# Patient Record
Sex: FEMALE | Race: WHITE | Employment: FULL TIME | URBAN - METROPOLITAN AREA
[De-identification: names, ages, dates, MRNs, and addresses within clinical notes are randomized per-mention and may not be internally consistent; named-entity substitution may affect disease eponyms.]

---

## 2020-09-10 PROCEDURE — U0003 INFECTIOUS AGENT DETECTION BY NUCLEIC ACID (DNA OR RNA); SEVERE ACUTE RESPIRATORY SYNDROME CORONAVIRUS 2 (SARS-COV-2) (CORONAVIRUS DISEASE [COVID-19]), AMPLIFIED PROBE TECHNIQUE, MAKING USE OF HIGH THROUGHPUT TECHNOLOGIES AS DESCRIBED BY CMS-2020-01-R: HCPCS | Performed by: FAMILY MEDICINE

## 2020-09-25 LAB
SARS-COV-2 RNA SPEC QL NAA+PROBE: NOT DETECTED
SPECIMEN SOURCE: NORMAL

## 2020-10-15 ENCOUNTER — OFFICE VISIT (OUTPATIENT)
Dept: FAMILY MEDICINE | Facility: CLINIC | Age: 40
End: 2020-10-15
Payer: COMMERCIAL

## 2020-10-15 VITALS
HEART RATE: 84 BPM | HEIGHT: 64 IN | SYSTOLIC BLOOD PRESSURE: 140 MMHG | BODY MASS INDEX: 35.85 KG/M2 | DIASTOLIC BLOOD PRESSURE: 80 MMHG | WEIGHT: 210 LBS | RESPIRATION RATE: 18 BRPM | TEMPERATURE: 97.8 F

## 2020-10-15 DIAGNOSIS — J45.20 MILD INTERMITTENT ASTHMA WITHOUT COMPLICATION: ICD-10-CM

## 2020-10-15 DIAGNOSIS — F90.2 ATTENTION DEFICIT HYPERACTIVITY DISORDER (ADHD), COMBINED TYPE: ICD-10-CM

## 2020-10-15 DIAGNOSIS — Z23 NEED FOR PROPHYLACTIC VACCINATION AND INOCULATION AGAINST INFLUENZA: ICD-10-CM

## 2020-10-15 DIAGNOSIS — Z00.00 ROUTINE GENERAL MEDICAL EXAMINATION AT A HEALTH CARE FACILITY: Primary | ICD-10-CM

## 2020-10-15 DIAGNOSIS — F41.1 GAD (GENERALIZED ANXIETY DISORDER): ICD-10-CM

## 2020-10-15 DIAGNOSIS — R00.2 PALPITATIONS: ICD-10-CM

## 2020-10-15 DIAGNOSIS — N89.8 VAGINAL DISCHARGE: ICD-10-CM

## 2020-10-15 DIAGNOSIS — I10 BENIGN ESSENTIAL HYPERTENSION: ICD-10-CM

## 2020-10-15 DIAGNOSIS — G43.009 MIGRAINE WITHOUT AURA AND WITHOUT STATUS MIGRAINOSUS, NOT INTRACTABLE: ICD-10-CM

## 2020-10-15 DIAGNOSIS — R63.5 ABNORMAL WEIGHT GAIN: ICD-10-CM

## 2020-10-15 DIAGNOSIS — Z30.41 ENCOUNTER FOR SURVEILLANCE OF CONTRACEPTIVE PILLS: ICD-10-CM

## 2020-10-15 LAB
ALBUMIN SERPL-MCNC: 3.4 G/DL (ref 3.4–5)
ALP SERPL-CCNC: 65 U/L (ref 40–150)
ALT SERPL W P-5'-P-CCNC: 20 U/L (ref 0–50)
ANION GAP SERPL CALCULATED.3IONS-SCNC: 2 MMOL/L (ref 3–14)
AST SERPL W P-5'-P-CCNC: 16 U/L (ref 0–45)
BILIRUB SERPL-MCNC: 0.3 MG/DL (ref 0.2–1.3)
BUN SERPL-MCNC: 10 MG/DL (ref 7–30)
CALCIUM SERPL-MCNC: 8.7 MG/DL (ref 8.5–10.1)
CHLORIDE SERPL-SCNC: 104 MMOL/L (ref 94–109)
CHOLEST SERPL-MCNC: 175 MG/DL
CO2 SERPL-SCNC: 28 MMOL/L (ref 20–32)
CREAT SERPL-MCNC: 0.64 MG/DL (ref 0.52–1.04)
ERYTHROCYTE [DISTWIDTH] IN BLOOD BY AUTOMATED COUNT: 13.6 % (ref 10–15)
GFR SERPL CREATININE-BSD FRML MDRD: >90 ML/MIN/{1.73_M2}
GLUCOSE SERPL-MCNC: 90 MG/DL (ref 70–99)
HCT VFR BLD AUTO: 38.1 % (ref 35–47)
HDLC SERPL-MCNC: 46 MG/DL
HGB BLD-MCNC: 12.2 G/DL (ref 11.7–15.7)
LDLC SERPL CALC-MCNC: 71 MG/DL
MCH RBC QN AUTO: 28.6 PG (ref 26.5–33)
MCHC RBC AUTO-ENTMCNC: 32 G/DL (ref 31.5–36.5)
MCV RBC AUTO: 89 FL (ref 78–100)
NONHDLC SERPL-MCNC: 129 MG/DL
PLATELET # BLD AUTO: 285 10E9/L (ref 150–450)
POTASSIUM SERPL-SCNC: 3.9 MMOL/L (ref 3.4–5.3)
PROT SERPL-MCNC: 7.7 G/DL (ref 6.8–8.8)
RBC # BLD AUTO: 4.26 10E12/L (ref 3.8–5.2)
SODIUM SERPL-SCNC: 134 MMOL/L (ref 133–144)
SPECIMEN SOURCE: NORMAL
T4 FREE SERPL-MCNC: 0.8 NG/DL (ref 0.76–1.46)
TRIGL SERPL-MCNC: 289 MG/DL
TSH SERPL DL<=0.005 MIU/L-ACNC: 4.93 MU/L (ref 0.4–4)
WBC # BLD AUTO: 11.5 10E9/L (ref 4–11)
WET PREP SPEC: NORMAL

## 2020-10-15 PROCEDURE — 90471 IMMUNIZATION ADMIN: CPT | Performed by: NURSE PRACTITIONER

## 2020-10-15 PROCEDURE — 90686 IIV4 VACC NO PRSV 0.5 ML IM: CPT | Performed by: NURSE PRACTITIONER

## 2020-10-15 PROCEDURE — 80061 LIPID PANEL: CPT | Performed by: NURSE PRACTITIONER

## 2020-10-15 PROCEDURE — 84439 ASSAY OF FREE THYROXINE: CPT | Performed by: NURSE PRACTITIONER

## 2020-10-15 PROCEDURE — 80053 COMPREHEN METABOLIC PANEL: CPT | Performed by: NURSE PRACTITIONER

## 2020-10-15 PROCEDURE — G0145 SCR C/V CYTO,THINLAYER,RESCR: HCPCS | Performed by: NURSE PRACTITIONER

## 2020-10-15 PROCEDURE — 99214 OFFICE O/P EST MOD 30 MIN: CPT | Mod: 25 | Performed by: NURSE PRACTITIONER

## 2020-10-15 PROCEDURE — 87210 SMEAR WET MOUNT SALINE/INK: CPT | Performed by: NURSE PRACTITIONER

## 2020-10-15 PROCEDURE — 84443 ASSAY THYROID STIM HORMONE: CPT | Performed by: NURSE PRACTITIONER

## 2020-10-15 PROCEDURE — 36415 COLL VENOUS BLD VENIPUNCTURE: CPT | Performed by: NURSE PRACTITIONER

## 2020-10-15 PROCEDURE — 85027 COMPLETE CBC AUTOMATED: CPT | Performed by: NURSE PRACTITIONER

## 2020-10-15 PROCEDURE — 87624 HPV HI-RISK TYP POOLED RSLT: CPT | Performed by: NURSE PRACTITIONER

## 2020-10-15 PROCEDURE — 99396 PREV VISIT EST AGE 40-64: CPT | Mod: 25 | Performed by: NURSE PRACTITIONER

## 2020-10-15 RX ORDER — MONTELUKAST SODIUM 10 MG/1
10 TABLET ORAL AT BEDTIME
Qty: 90 TABLET | Refills: 1 | Status: SHIPPED | OUTPATIENT
Start: 2020-10-15 | End: 2021-03-18

## 2020-10-15 RX ORDER — DEXMETHYLPHENIDATE HYDROCHLORIDE 10 MG/1
10 TABLET ORAL 2 TIMES DAILY
Qty: 60 TABLET | Refills: 0 | Status: SHIPPED | OUTPATIENT
Start: 2020-12-14 | End: 2021-01-19

## 2020-10-15 RX ORDER — METHOCARBAMOL 750 MG/1
750 TABLET, FILM COATED ORAL 4 TIMES DAILY PRN
COMMUNITY
End: 2020-12-11

## 2020-10-15 RX ORDER — PROPRANOLOL HYDROCHLORIDE 80 MG/1
80 TABLET ORAL DAILY
COMMUNITY
End: 2020-10-15

## 2020-10-15 RX ORDER — ALBUTEROL SULFATE 90 UG/1
2 AEROSOL, METERED RESPIRATORY (INHALATION) EVERY 6 HOURS
COMMUNITY
End: 2020-10-15

## 2020-10-15 RX ORDER — DEXMETHYLPHENIDATE HYDROCHLORIDE 10 MG/1
10 TABLET ORAL 2 TIMES DAILY
Qty: 60 TABLET | Refills: 0 | Status: SHIPPED | OUTPATIENT
Start: 2020-11-14 | End: 2020-10-15

## 2020-10-15 RX ORDER — ESCITALOPRAM OXALATE 20 MG/1
20 TABLET ORAL DAILY
Qty: 90 TABLET | Refills: 3 | Status: SHIPPED | OUTPATIENT
Start: 2020-10-15 | End: 2021-09-13

## 2020-10-15 RX ORDER — ESCITALOPRAM OXALATE 20 MG/1
20 TABLET ORAL DAILY
COMMUNITY
End: 2020-10-15

## 2020-10-15 RX ORDER — LISINOPRIL 20 MG/1
20 TABLET ORAL DAILY
Qty: 90 TABLET | Refills: 1 | Status: SHIPPED | OUTPATIENT
Start: 2020-10-15 | End: 2021-04-12

## 2020-10-15 RX ORDER — DEXMETHYLPHENIDATE HYDROCHLORIDE 10 MG/1
10 TABLET ORAL 2 TIMES DAILY
COMMUNITY
End: 2020-10-15

## 2020-10-15 RX ORDER — DEXMETHYLPHENIDATE HYDROCHLORIDE 10 MG/1
10 TABLET ORAL 2 TIMES DAILY
Qty: 60 TABLET | Refills: 0 | Status: SHIPPED | OUTPATIENT
Start: 2020-10-15 | End: 2020-10-15

## 2020-10-15 RX ORDER — ALBUTEROL SULFATE 90 UG/1
2 AEROSOL, METERED RESPIRATORY (INHALATION) EVERY 6 HOURS
Qty: 1 INHALER | Refills: 1 | Status: SHIPPED | OUTPATIENT
Start: 2020-10-15 | End: 2023-12-19

## 2020-10-15 RX ORDER — LORAZEPAM 1 MG/1
1 TABLET ORAL EVERY 6 HOURS PRN
COMMUNITY
End: 2020-12-11

## 2020-10-15 RX ORDER — IPRATROPIUM BROMIDE AND ALBUTEROL SULFATE 2.5; .5 MG/3ML; MG/3ML
1 SOLUTION RESPIRATORY (INHALATION) EVERY 6 HOURS PRN
COMMUNITY
End: 2023-02-21

## 2020-10-15 RX ORDER — PROPRANOLOL HYDROCHLORIDE 80 MG/1
80 TABLET ORAL DAILY
Qty: 90 TABLET | Refills: 3 | Status: SHIPPED | OUTPATIENT
Start: 2020-10-15 | End: 2021-02-10

## 2020-10-15 RX ORDER — ETHYNODIOL DIACETATE AND ETHINYL ESTRADIOL 1 MG-50MCG
1 KIT ORAL DAILY
COMMUNITY
End: 2020-10-15

## 2020-10-15 RX ORDER — ETHYNODIOL DIACETATE AND ETHINYL ESTRADIOL 1 MG-50MCG
1 KIT ORAL DAILY
Qty: 84 TABLET | Refills: 4 | Status: SHIPPED | OUTPATIENT
Start: 2020-10-15 | End: 2021-10-27

## 2020-10-15 RX ORDER — BUSPIRONE HYDROCHLORIDE 5 MG/1
TABLET ORAL
Qty: 106 TABLET | Refills: 0 | Status: SHIPPED | OUTPATIENT
Start: 2020-10-15 | End: 2020-11-09

## 2020-10-15 ASSESSMENT — PATIENT HEALTH QUESTIONNAIRE - PHQ9
SUM OF ALL RESPONSES TO PHQ QUESTIONS 1-9: 12
10. IF YOU CHECKED OFF ANY PROBLEMS, HOW DIFFICULT HAVE THESE PROBLEMS MADE IT FOR YOU TO DO YOUR WORK, TAKE CARE OF THINGS AT HOME, OR GET ALONG WITH OTHER PEOPLE: SOMEWHAT DIFFICULT
SUM OF ALL RESPONSES TO PHQ QUESTIONS 1-9: 12

## 2020-10-15 ASSESSMENT — ENCOUNTER SYMPTOMS
HEARTBURN: 1
SORE THROAT: 0
ABDOMINAL PAIN: 1
MYALGIAS: 1
DYSURIA: 0
PARESTHESIAS: 0
EYE PAIN: 0
FREQUENCY: 0
COUGH: 1
BREAST MASS: 0
DIZZINESS: 0
CHILLS: 0
ARTHRALGIAS: 1
NAUSEA: 0
HEMATOCHEZIA: 0
CONSTIPATION: 0
DIARRHEA: 1
HEADACHES: 0
HEMATURIA: 0
WEAKNESS: 0
PALPITATIONS: 1
SHORTNESS OF BREATH: 1
NERVOUS/ANXIOUS: 1
FEVER: 0
JOINT SWELLING: 0

## 2020-10-15 ASSESSMENT — MIFFLIN-ST. JEOR: SCORE: 1607.55

## 2020-10-15 NOTE — PROGRESS NOTES
SUBJECTIVE:   CC: Anne Ferreira is an 39 year old woman who presents for preventive health visit.       Patient has been advised of split billing requirements and indicates understanding: Yes  Healthy Habits:     Getting at least 3 servings of Calcium per day:  Yes    Bi-annual eye exam:  Yes    Dental care twice a year:  NO    Sleep apnea or symptoms of sleep apnea:  Daytime drowsiness    Diet:  Regular (no restrictions)    Frequency of exercise:  None    Taking medications regularly:  Yes    Medication side effects:  None    PHQ-2 Total Score: 5    Additional concerns today:  Yes          Today's PHQ-2 Score:   PHQ-2 ( 1999 [employer]) 10/15/2020   Q1: Little interest or pleasure in doing things 3   Q2: Feeling down, depressed or hopeless 2   PHQ-2 Score 5   Q1: Little interest or pleasure in doing things Nearly every day   Q2: Feeling down, depressed or hopeless More than half the days   PHQ-2 Score 5       Abuse: Current or Past (Physical, Sexual or Emotional) - No  Do you feel safe in your environment? Yes        Social History     Tobacco Use     Smoking status: Not on file   Substance Use Topics     Alcohol use: Not on file         Alcohol Use 10/15/2020   Prescreen: >3 drinks/day or >7 drinks/week? No       Reviewed orders with patient.  Reviewed health maintenance and updated orders accordingly - Yes  BP Readings from Last 3 Encounters:   10/15/20 (!) 140/80    Wt Readings from Last 3 Encounters:   10/15/20 95.3 kg (210 lb)                  There is no problem list on file for this patient.    History reviewed. No pertinent surgical history.    Social History     Tobacco Use     Smoking status: Current Every Day Smoker     Smokeless tobacco: Never Used   Substance Use Topics     Alcohol use: Yes     Family History   Problem Relation Age of Onset     Hypertension Father      Hypertension Brother      Hypertension Sister      Hypertension Brother          Current Outpatient Medications   Medication Sig Dispense  Refill     albuterol (PROAIR HFA/PROVENTIL HFA/VENTOLIN HFA) 108 (90 Base) MCG/ACT inhaler Inhale 2 puffs into the lungs every 6 hours       escitalopram (LEXAPRO) 20 MG tablet Take 20 mg by mouth daily       ethynodiol-ethinyl estradiol (ZOVIA) 1-50 MG-MCG tablet Take 1 tablet by mouth daily       ipratropium - albuterol 0.5 mg/2.5 mg/3 mL (DUONEB) 0.5-2.5 (3) MG/3ML neb solution Take 1 vial by nebulization every 6 hours as needed for shortness of breath / dyspnea or wheezing       LORazepam (ATIVAN) 1 MG tablet Take 1 mg by mouth every 6 hours as needed for anxiety       methocarbamol (ROBAXIN) 750 MG tablet Take 750 mg by mouth 4 times daily as needed for muscle spasms       omeprazole (PRILOSEC) 20 MG DR capsule Take 20 mg by mouth daily       propranolol (INDERAL) 80 MG tablet Take 80 mg by mouth daily       dexmethylphenidate (FOCALIN) 10 MG tablet Take 10 mg by mouth 2 times daily       No Known Allergies  No lab results found.     Mammogram Screening: Patient under age 50, mutual decision reflected in health maintenance.      Pertinent mammograms are reviewed under the imaging tab.  History of abnormal Pap smear: NO - age 30- 65 PAP every 3 years recommended     Reviewed and updated as needed this visit by clinical staff                 Reviewed and updated as needed this visit by Provider                History reviewed. No pertinent past medical history.   History reviewed. No pertinent surgical history.  OB History   No obstetric history on file.       Review of Systems   Constitutional: Negative for chills and fever.   HENT: Negative for congestion, ear pain, hearing loss and sore throat.    Eyes: Negative for pain and visual disturbance.   Respiratory: Positive for cough and shortness of breath.    Cardiovascular: Positive for chest pain, palpitations and peripheral edema.   Gastrointestinal: Positive for abdominal pain, diarrhea and heartburn. Negative for constipation, hematochezia and nausea.  "  Breasts:  Negative for tenderness, breast mass and discharge.   Genitourinary: Positive for pelvic pain and vaginal discharge. Negative for dysuria, frequency, genital sores, hematuria, urgency and vaginal bleeding.   Musculoskeletal: Positive for arthralgias and myalgias. Negative for joint swelling.   Skin: Negative for rash.   Neurological: Negative for dizziness, weakness, headaches and paresthesias.   Psychiatric/Behavioral: Positive for mood changes. The patient is nervous/anxious.           OBJECTIVE:   BP (!) 140/80 (BP Location: Right arm, Cuff Size: Adult Large)   Pulse 84   Temp 97.8  F (36.6  C) (Tympanic)   Resp 18   Ht 1.626 m (5' 4\")   Wt 95.3 kg (210 lb)   LMP  (LMP Unknown)   BMI 36.05 kg/m    Physical Exam  GENERAL: healthy, alert and no distress  EYES: Eyes grossly normal to inspection, PERRL and conjunctivae and sclerae normal  HENT: ear canals and TM's normal, nose and mouth without ulcers or lesions  NECK: no adenopathy, no asymmetry, masses, or scars and thyroid normal to palpation  RESP: lungs clear to auscultation - no rales, rhonchi or wheezes  BREAST: normal without masses, tenderness or nipple discharge and no palpable axillary masses or adenopathy  CV: regular rate and rhythm, normal S1 S2, no S3 or S4, no murmur, click or rub, no peripheral edema and peripheral pulses strong  ABDOMEN: soft, nontender, no hepatosplenomegaly, no masses and bowel sounds normal   (female): normal female external genitalia, normal urethral meatus, vaginal mucosa pink, moist, well rugated, and normal cervix/adnexa/uterus without masses or discharge  MS: no gross musculoskeletal defects noted, no edema  SKIN: no suspicious lesions or rashes  NEURO: Normal strength and tone, mentation intact and speech normal  PSYCH: mentation appears normal, affect normal/bright    Diagnostic Test Results:  Labs reviewed in Epic  Results for orders placed or performed in visit on 10/15/20   TSH with free T4 reflex   "   Status: Abnormal   Result Value Ref Range    TSH 4.93 (H) 0.40 - 4.00 mU/L   CBC with platelets     Status: Abnormal   Result Value Ref Range    WBC 11.5 (H) 4.0 - 11.0 10e9/L    RBC Count 4.26 3.8 - 5.2 10e12/L    Hemoglobin 12.2 11.7 - 15.7 g/dL    Hematocrit 38.1 35.0 - 47.0 %    MCV 89 78 - 100 fl    MCH 28.6 26.5 - 33.0 pg    MCHC 32.0 31.5 - 36.5 g/dL    RDW 13.6 10.0 - 15.0 %    Platelet Count 285 150 - 450 10e9/L   Comprehensive metabolic panel     Status: Abnormal   Result Value Ref Range    Sodium 134 133 - 144 mmol/L    Potassium 3.9 3.4 - 5.3 mmol/L    Chloride 104 94 - 109 mmol/L    Carbon Dioxide 28 20 - 32 mmol/L    Anion Gap 2 (L) 3 - 14 mmol/L    Glucose 90 70 - 99 mg/dL    Urea Nitrogen 10 7 - 30 mg/dL    Creatinine 0.64 0.52 - 1.04 mg/dL    GFR Estimate >90 >60 mL/min/[1.73_m2]    GFR Estimate If Black >90 >60 mL/min/[1.73_m2]    Calcium 8.7 8.5 - 10.1 mg/dL    Bilirubin Total 0.3 0.2 - 1.3 mg/dL    Albumin 3.4 3.4 - 5.0 g/dL    Protein Total 7.7 6.8 - 8.8 g/dL    Alkaline Phosphatase 65 40 - 150 U/L    ALT 20 0 - 50 U/L    AST 16 0 - 45 U/L   Lipid panel reflex to direct LDL Fasting     Status: Abnormal   Result Value Ref Range    Cholesterol 175 <200 mg/dL    Triglycerides 289 (H) <150 mg/dL    HDL Cholesterol 46 (L) >49 mg/dL    LDL Cholesterol Calculated 71 <100 mg/dL    Non HDL Cholesterol 129 <130 mg/dL   T4 free     Status: None   Result Value Ref Range    T4 Free 0.80 0.76 - 1.46 ng/dL   Wet prep     Status: None    Specimen: Vagina   Result Value Ref Range    Specimen Description Vagina     Wet Prep No Trichomonas seen     Wet Prep No clue cells seen     Wet Prep No yeast seen     Wet Prep Moderate  WBC'S seen          ASSESSMENT/PLAN:   (Z00.00) Routine general medical examination at a health care facility  (primary encounter diagnosis)  Comment:   Plan: Pap imaged thin layer screen with HPV -         recommended age 30 - 65 years (select HPV order        below), HPV High Risk Types  DNA Cervical, *MA         Screening Digital Bilateral, CBC with         platelets, Comprehensive metabolic panel, Lipid        panel reflex to direct LDL Fasting, T4 free            (Z23) Need for prophylactic vaccination and inoculation against influenza  Comment:   Plan: INFLUENZA VACCINE IM > 6 MONTHS VALENT IIV4         [54775], Vaccine Administration, Initial         [48187]            (F41.1) MILES (generalized anxiety disorder)  Comment: Patient presently on Lexapro continue to have increased anxiety is not suicidal we will start BuSpar and have follow-up in 1 month for refills  Plan: escitalopram (LEXAPRO) 20 MG tablet, busPIRone         (BUSPAR) 5 MG tablet, OFFICE/OUTPT         VISIT,EST,LEVL IV        (Z30.41) Encounter for surveillance of contraceptive pills  Comment: Refill today  Plan: ethynodiol-ethinyl estradiol (ZOVIA) 1-50         MG-MCG tablet          (I10) Benign essential hypertension  Comment: Controlled no change in treatment plan  Plan: propranolol (INDERAL) 80 MG tablet, lisinopril         (ZESTRIL) 20 MG tablet, OFFICE/OUTPT         VISIT,EST,LEVL IV            (G43.009) Migraine without aura and without status migrainosus, not intractable  Comment: We will refill Inderal  Plan: propranolol (INDERAL) 80 MG tablet,         OFFICE/OUTPT VISIT,EST,LEVL IV      (R63.5) Abnormal weight gain  Comment: We will obtain thyroid thyroid with mildly elevated would not recommend medications at this time but would recommend a recheck in 6 weeks if it remains elevated or increases would start on levothyroxine low-dose  Plan: TSH with free T4 reflex, OFFICE/OUTPT         VISIT,EST,LEVL IV        (R00.2) Palpitations  Comment: Patient concerning for palpitations will obtain cardiac monitor  Plan: TSH with free T4 reflex, Zio Patch Holter Adult        Pediatric Greater than 48 hrs, **TSH with free         T4 reflex FUTURE anytime, OFFICE/OUTPT         VISIT,EST,LEVL IV      (J45.20) Mild intermittent asthma  without complication  Comment: Uncontrolled will start on Singulair  Plan: albuterol (PROAIR HFA/PROVENTIL HFA/VENTOLIN         HFA) 108 (90 Base) MCG/ACT inhaler, montelukast        (SINGULAIR) 10 MG tablet, OFFICE/OUTPT         VISIT,EST,LEVL IV      (N89.8) Vaginal discharge  Comment: Wet prep negative  Plan: Wet prep, OFFICE/OUTPT VISIT,EST,LEVL IV          (F90.2) Attention deficit hyperactivity disorder (ADHD), combined type  Comment:  Controlled no change in treatment plan  Will need a virtual appointment for further refills in 3 months  Plan: dexmethylphenidate (FOCALIN) 10 MG tablet,         OFFICE/OUTPT VISIT,EST,LEVL IV, DISCONTINUED:         dexmethylphenidate (FOCALIN) 10 MG tablet,         DISCONTINUED: dexmethylphenidate (FOCALIN) 10         MG tablet        Patient has been advised of split billing requirements and indicates understanding: Yes  COUNSELING:  Reviewed preventive health counseling, as reflected in patient instructions       Regular exercise       Healthy diet/nutrition       Vision screening       Hearing screening       Contraception       Family planning       Folic Acid Counseling       Osteoporosis Prevention/Bone Health    There is no height or weight on file to calculate BMI.    Weight management plan: Discussed healthy diet and exercise guidelines    She has no history on file for tobacco.      Counseling Resources:  ATP IV Guidelines  Pooled Cohorts Equation Calculator  Breast Cancer Risk Calculator  BRCA-Related Cancer Risk Assessment: FHS-7 Tool  FRAX Risk Assessment  ICSI Preventive Guidelines  Dietary Guidelines for Americans, 2010  USDA's MyPlate  ASA Prophylaxis  Lung CA Screening    FRANKLYN Christianson CNP  [facility]

## 2020-10-15 NOTE — LETTER
My Asthma Action Plan    Name: Anne Ferreira   YOB: 1980  Date: 10/15/2020   My doctor: FRANKLYN Christianson CNP   My clinic: [facility]        My Rescue Medicine:   Albuterol inhaler (Proair/Ventolin/Proventil HFA)  2-4 puffs EVERY 4 HOURS as needed. Use a spacer if recommended by your provider.   My Asthma Severity:   Intermittent / Exercise Induced  Know your asthma triggers: Patient is unaware of triggers             GREEN ZONE   Good Control    I feel good    No cough or wheeze    Can work, sleep and play without asthma symptoms       Take your asthma control medicine every day.     1. If exercise triggers your asthma, take your rescue medication    15 minutes before exercise or sports, and    During exercise if you have asthma symptoms  2. Spacer to use with inhaler: If you have a spacer, make sure to use it with your inhaler             YELLOW ZONE Getting Worse  I have ANY of these:    I do not feel good    Cough or wheeze    Chest feels tight    Wake up at night   1. Keep taking your Green Zone medications  2. Start taking your rescue medicine:    every 20 minutes for up to 1 hour. Then every 4 hours for 24-48 hours.  3. If you stay in the Yellow Zone for more than 12-24 hours, contact your doctor.  4. If you do not return to the Green Zone in 12-24 hours or you get worse, start taking your oral steroid medicine if prescribed by your provider.           RED ZONE Medical Alert - Get Help  I have ANY of these:    I feel awful    Medicine is not helping    Breathing getting harder    Trouble walking or talking    Nose opens wide to breathe       1. Take your rescue medicine NOW  2. If your provider has prescribed an oral steroid medicine, start taking it NOW  3. Call your doctor NOW  4. If you are still in the Red Zone after 20 minutes and you have not reached your doctor:    Take your rescue medicine again and    Call 911 or go to the emergency room right away    See  your regular doctor within 2 weeks of an Emergency Room or Urgent Care visit for follow-up treatment.          Annual Reminders:  Meet with Asthma Educator,  Flu Shot in the Fall, consider Pneumonia Vaccination for patients with asthma (aged 19 and older).    Pharmacy: [facility] [id number] [place] [address]    Electronically signed by FRANKLYN Christianson CNP   Date: 10/15/20                    Asthma Triggers  How To Control Things That Make Your Asthma Worse    Triggers are things that make your asthma worse.  Look at the list below to help you find your triggers and   what you can do about them. You can help prevent asthma flare-ups by staying away from your triggers.      Trigger                                                          What you can do   Cigarette Smoke  Tobacco smoke can make asthma worse. Do not allow smoking in your home, car or around you.  Be sure no one smokes at a child s day care or school.  If you smoke, ask your health care provider for ways to help you quit.  Ask family members to quit too.  Ask your health care provider for a referral to Quit Plan to help you quit smoking, or call 9-241-706-PLAN.     Colds, Flu, Bronchitis  These are common triggers of asthma. Wash your hands often.  Don t touch your eyes, nose or mouth.  Get a flu shot every year.     Dust Mites  These are tiny bugs that live in cloth or carpet. They are too small to see. Wash sheets and blankets in hot water every week.   Encase pillows and mattress in dust mite proof covers.  Avoid having carpet if you can. If you have carpet, vacuum weekly.   Use a dust mask and HEPA vacuum.   Pollen and Outdoor Mold  Some people are allergic to trees, grass, or weed pollen, or molds. Try to keep your windows closed.  Limit time out doors when pollen count is high.   Ask you health care provider about taking medicine during allergy season.     Animal Dander  Some people are allergic to skin flakes, urine or  saliva from pets with fur or feathers. Keep pets with fur or feathers out of your home.    If you can t keep the pet outdoors, then keep the pet out of your bedroom.  Keep the bedroom door closed.  Keep pets off cloth furniture and away from stuffed toys.     Mice, Rats, and Cockroaches  Some people are allergic to the waste from these pests.   Cover food and garbage.  Clean up spills and food crumbs.  Store grease in the refrigerator.   Keep food out of the bedroom.   Indoor Mold  This can be a trigger if your home has high moisture. Fix leaking faucets, pipes, or other sources of water.   Clean moldy surfaces.  Dehumidify basement if it is damp and smelly.   Smoke, Strong Odors, and Sprays  These can reduce air quality. Stay away from strong odors and sprays, such as perfume, powder, hair spray, paints, smoke incense, paint, cleaning products, candles and new carpet.   Exercise or Sports  Some people with asthma have this trigger. Be active!  Ask your doctor about taking medicine before sports or exercise to prevent symptoms.    Warm up for 5-10 minutes before and after sports or exercise.     Other Triggers of Asthma  Cold air:  Cover your nose and mouth with a scarf.  Sometimes laughing or crying can be a trigger.  Some medicines and food can trigger asthma.

## 2020-10-15 NOTE — PATIENT INSTRUCTIONS
Contact Cardiology procedure at [phone]     [facility] Mammo Schedule  [facility] ~ [phone]  One Day Weekly- Alternating Days    [place] ~ [phone]  Every other Monday or Wednesday   & one Saturday morning a month    [place] ~ [phone]  Every Other Monday Afternoon    [place]   Every Other Monday Morning    [place] ~ [phone]  Every Monday morning  Every Tuesday afternoon  Wed, Thurs, Friday morning & afternoon      Preventive Health Recommendations  Female Ages 40 to 49    Yearly exam:     See your health care provider every year in order to  1. Review health changes.   2. Discuss preventive care.    3. Review your medicines if your doctor prescribed any.      Get a Pap test every three years (unless you have an abnormal result and your provider advises testing more often).      If you get Pap tests with HPV test, you only need to test every 5 years, unless you have an abnormal result. You do not need a Pap test if your uterus was removed (hysterectomy) and you have not had cancer.      You should be tested each year for STDs (sexually transmitted diseases), if you're at risk.     Ask your doctor if you should have a mammogram.      Have a colonoscopy (test for colon cancer) if someone in your family has had colon cancer or polyps before age 50.       Have a cholesterol test every 5 years.       Have a diabetes test (fasting glucose) after age 45. If you are at risk for diabetes, you should have this test every 3 years.    Shots: Get a flu shot each year. Get a tetanus shot every 10 years.     Nutrition:     Eat at least 5 servings of fruits and vegetables each day.    Eat whole-grain bread, whole-wheat pasta and brown rice instead of white grains and rice.    Get adequate Calcium and Vitamin D.      Lifestyle    Exercise at least 150 minutes a week (an average of 30 minutes a day, 5 days a week). This will help you control your weight and prevent disease.    Limit alcohol to one  drink per day.    No smoking.     Wear sunscreen to prevent skin cancer.    See your dentist every six months for an exam and cleaning.

## 2020-10-16 ASSESSMENT — ASTHMA QUESTIONNAIRES: ACT_TOTALSCORE: 16

## 2020-10-16 NOTE — RESULT ENCOUNTER NOTE
Please Notify Anne  of test results your thyroid came back mildly elevated at 4.9 normal is 4.0.  Based on this I would not start medications at this time but would recheck a lab only visit in 6 weeks if it remains elevated we will start medications at that time.    Comprehensive panel remains normal limits indicating good kidney and liver function.    Total cholesterol is within normal limits at 175 relates that below 200 your triglycerides are mildly elevated at 289 relates he goes below 150 continue with low-cholesterol diet and continued exercise and will recheck in 1 year.  Your CBC is within normal limits        Bertha Loya CNP

## 2020-10-19 LAB
COPATH REPORT: NORMAL
PAP: NORMAL

## 2020-10-20 LAB
FINAL DIAGNOSIS: NORMAL
HPV HR 12 DNA CVX QL NAA+PROBE: NEGATIVE
HPV16 DNA SPEC QL NAA+PROBE: NEGATIVE
HPV18 DNA SPEC QL NAA+PROBE: NEGATIVE
SPECIMEN DESCRIPTION: NORMAL
SPECIMEN SOURCE CVX/VAG CYTO: NORMAL

## 2020-11-06 DIAGNOSIS — F41.1 GAD (GENERALIZED ANXIETY DISORDER): ICD-10-CM

## 2020-11-09 RX ORDER — BUSPIRONE HYDROCHLORIDE 5 MG/1
10 TABLET ORAL 2 TIMES DAILY
Qty: 120 TABLET | Refills: 0 | Status: SHIPPED | OUTPATIENT
Start: 2020-11-09 | End: 2020-12-02

## 2020-11-10 ENCOUNTER — APPOINTMENT (OUTPATIENT)
Dept: ULTRASOUND IMAGING | Facility: CLINIC | Age: 40
End: 2020-11-10
Attending: NURSE PRACTITIONER
Payer: COMMERCIAL

## 2020-11-10 ENCOUNTER — HOSPITAL ENCOUNTER (EMERGENCY)
Facility: CLINIC | Age: 40
Discharge: HOME | End: 2020-11-10
Attending: NURSE PRACTITIONER | Admitting: NURSE PRACTITIONER
Payer: COMMERCIAL

## 2020-11-10 VITALS
TEMPERATURE: 98 F | WEIGHT: 200 LBS | HEART RATE: 64 BPM | SYSTOLIC BLOOD PRESSURE: 124 MMHG | DIASTOLIC BLOOD PRESSURE: 69 MMHG | BODY MASS INDEX: 34.33 KG/M2 | OXYGEN SATURATION: 97 % | RESPIRATION RATE: 18 BRPM

## 2020-11-10 DIAGNOSIS — R82.90 ABNORMAL FINDING ON URINALYSIS: ICD-10-CM

## 2020-11-10 DIAGNOSIS — R10.11 ABDOMINAL PAIN, RIGHT UPPER QUADRANT: ICD-10-CM

## 2020-11-10 PROBLEM — F90.2 ATTENTION DEFICIT HYPERACTIVITY DISORDER, COMBINED TYPE, MILD: Status: ACTIVE | Noted: 2018-08-29

## 2020-11-10 PROBLEM — E78.5 DYSLIPIDEMIA: Status: ACTIVE | Noted: 2020-11-10

## 2020-11-10 PROBLEM — M54.6 THORACIC BACK PAIN: Status: ACTIVE | Noted: 2020-11-10

## 2020-11-10 PROBLEM — F41.1 GENERALIZED ANXIETY DISORDER: Status: ACTIVE | Noted: 2018-08-29

## 2020-11-10 PROBLEM — M48.02 CERVICAL SPINAL STENOSIS: Status: ACTIVE | Noted: 2019-07-01

## 2020-11-10 PROBLEM — K21.9 GERD (GASTROESOPHAGEAL REFLUX DISEASE): Status: ACTIVE | Noted: 2020-11-10

## 2020-11-10 PROBLEM — N94.6 DYSMENORRHEA: Status: ACTIVE | Noted: 2020-11-10

## 2020-11-10 LAB
ALBUMIN SERPL-MCNC: 3.2 G/DL (ref 3.4–5)
ALBUMIN UR-MCNC: NEGATIVE MG/DL
ALP SERPL-CCNC: 72 U/L (ref 40–150)
ALT SERPL W P-5'-P-CCNC: 16 U/L (ref 0–50)
ANION GAP SERPL CALCULATED.3IONS-SCNC: 3 MMOL/L (ref 3–14)
APPEARANCE UR: ABNORMAL
AST SERPL W P-5'-P-CCNC: 14 U/L (ref 0–45)
BACTERIA #/AREA URNS HPF: ABNORMAL /HPF
BASOPHILS # BLD AUTO: 0 10E9/L (ref 0–0.2)
BASOPHILS NFR BLD AUTO: 0.4 %
BILIRUB SERPL-MCNC: 0.2 MG/DL (ref 0.2–1.3)
BILIRUB UR QL STRIP: NEGATIVE
BUN SERPL-MCNC: 9 MG/DL (ref 7–30)
CALCIUM SERPL-MCNC: 9.2 MG/DL (ref 8.5–10.1)
CHLORIDE SERPL-SCNC: 109 MMOL/L (ref 94–109)
CO2 SERPL-SCNC: 27 MMOL/L (ref 20–32)
COLOR UR AUTO: YELLOW
CREAT SERPL-MCNC: 0.66 MG/DL (ref 0.52–1.04)
DIFFERENTIAL METHOD BLD: NORMAL
EOSINOPHIL # BLD AUTO: 0.1 10E9/L (ref 0–0.7)
EOSINOPHIL NFR BLD AUTO: 1.2 %
ERYTHROCYTE [DISTWIDTH] IN BLOOD BY AUTOMATED COUNT: 13.2 % (ref 10–15)
GFR SERPL CREATININE-BSD FRML MDRD: >90 ML/MIN/{1.73_M2}
GLUCOSE SERPL-MCNC: 83 MG/DL (ref 70–99)
GLUCOSE UR STRIP-MCNC: NEGATIVE MG/DL
HCT VFR BLD AUTO: 38.5 % (ref 35–47)
HGB BLD-MCNC: 12.4 G/DL (ref 11.7–15.7)
HGB UR QL STRIP: ABNORMAL
HYALINE CASTS #/AREA URNS LPF: 2 /LPF (ref 0–2)
IMM GRANULOCYTES # BLD: 0 10E9/L (ref 0–0.4)
IMM GRANULOCYTES NFR BLD: 0.3 %
KETONES UR STRIP-MCNC: NEGATIVE MG/DL
LEUKOCYTE ESTERASE UR QL STRIP: ABNORMAL
LIPASE SERPL-CCNC: 101 U/L (ref 73–393)
LYMPHOCYTES # BLD AUTO: 2.6 10E9/L (ref 0.8–5.3)
LYMPHOCYTES NFR BLD AUTO: 25.6 %
MCH RBC QN AUTO: 29 PG (ref 26.5–33)
MCHC RBC AUTO-ENTMCNC: 32.2 G/DL (ref 31.5–36.5)
MCV RBC AUTO: 90 FL (ref 78–100)
MONOCYTES # BLD AUTO: 0.6 10E9/L (ref 0–1.3)
MONOCYTES NFR BLD AUTO: 5.8 %
MUCOUS THREADS #/AREA URNS LPF: PRESENT /LPF
NEUTROPHILS # BLD AUTO: 6.7 10E9/L (ref 1.6–8.3)
NEUTROPHILS NFR BLD AUTO: 66.7 %
NITRATE UR QL: NEGATIVE
NRBC # BLD AUTO: 0 10*3/UL
NRBC BLD AUTO-RTO: 0 /100
PH UR STRIP: 5 PH (ref 5–7)
PLATELET # BLD AUTO: 282 10E9/L (ref 150–450)
POTASSIUM SERPL-SCNC: 4.4 MMOL/L (ref 3.4–5.3)
PROT SERPL-MCNC: 7 G/DL (ref 6.8–8.8)
RBC # BLD AUTO: 4.28 10E12/L (ref 3.8–5.2)
RBC #/AREA URNS AUTO: 9 /HPF (ref 0–2)
SODIUM SERPL-SCNC: 139 MMOL/L (ref 133–144)
SOURCE: ABNORMAL
SP GR UR STRIP: 1.02 (ref 1–1.03)
SQUAMOUS #/AREA URNS AUTO: 9 /HPF (ref 0–1)
UROBILINOGEN UR STRIP-MCNC: 0 MG/DL (ref 0–2)
WBC # BLD AUTO: 10.1 10E9/L (ref 4–11)
WBC #/AREA URNS AUTO: 40 /HPF (ref 0–5)

## 2020-11-10 PROCEDURE — 76705 ECHO EXAM OF ABDOMEN: CPT

## 2020-11-10 PROCEDURE — 80053 COMPREHEN METABOLIC PANEL: CPT | Performed by: NURSE PRACTITIONER

## 2020-11-10 PROCEDURE — U0003 INFECTIOUS AGENT DETECTION BY NUCLEIC ACID (DNA OR RNA); SEVERE ACUTE RESPIRATORY SYNDROME CORONAVIRUS 2 (SARS-COV-2) (CORONAVIRUS DISEASE [COVID-19]), AMPLIFIED PROBE TECHNIQUE, MAKING USE OF HIGH THROUGHPUT TECHNOLOGIES AS DESCRIBED BY CMS-2020-01-R: HCPCS | Performed by: NURSE PRACTITIONER

## 2020-11-10 PROCEDURE — 81001 URINALYSIS AUTO W/SCOPE: CPT | Performed by: FAMILY MEDICINE

## 2020-11-10 PROCEDURE — 85025 COMPLETE CBC W/AUTO DIFF WBC: CPT | Performed by: NURSE PRACTITIONER

## 2020-11-10 PROCEDURE — 83690 ASSAY OF LIPASE: CPT | Performed by: NURSE PRACTITIONER

## 2020-11-10 PROCEDURE — 87086 URINE CULTURE/COLONY COUNT: CPT | Performed by: NURSE PRACTITIONER

## 2020-11-10 PROCEDURE — 99285 EMERGENCY DEPT VISIT HI MDM: CPT | Mod: 25 | Performed by: NURSE PRACTITIONER

## 2020-11-10 PROCEDURE — 96374 THER/PROPH/DIAG INJ IV PUSH: CPT | Performed by: NURSE PRACTITIONER

## 2020-11-10 PROCEDURE — 250N000011 HC RX IP 250 OP 636: Performed by: NURSE PRACTITIONER

## 2020-11-10 PROCEDURE — 99284 EMERGENCY DEPT VISIT MOD MDM: CPT | Performed by: NURSE PRACTITIONER

## 2020-11-10 PROCEDURE — C9803 HOPD COVID-19 SPEC COLLECT: HCPCS | Performed by: NURSE PRACTITIONER

## 2020-11-10 PROCEDURE — 96375 TX/PRO/DX INJ NEW DRUG ADDON: CPT | Performed by: NURSE PRACTITIONER

## 2020-11-10 RX ORDER — CEPHALEXIN 500 MG/1
500 CAPSULE ORAL 2 TIMES DAILY
Qty: 14 CAPSULE | Refills: 0 | Status: SHIPPED | OUTPATIENT
Start: 2020-11-10 | End: 2020-11-17

## 2020-11-10 RX ORDER — HYDROMORPHONE HYDROCHLORIDE 1 MG/ML
0.5 INJECTION, SOLUTION INTRAMUSCULAR; INTRAVENOUS; SUBCUTANEOUS ONCE
Status: COMPLETED | OUTPATIENT
Start: 2020-11-10 | End: 2020-11-10

## 2020-11-10 RX ORDER — HYDROCODONE BITARTRATE AND ACETAMINOPHEN 5; 325 MG/1; MG/1
1 TABLET ORAL EVERY 6 HOURS PRN
Qty: 10 TABLET | Refills: 0 | Status: SHIPPED | OUTPATIENT
Start: 2020-11-10 | End: 2020-11-13

## 2020-11-10 RX ORDER — ONDANSETRON 2 MG/ML
4 INJECTION INTRAMUSCULAR; INTRAVENOUS ONCE
Status: COMPLETED | OUTPATIENT
Start: 2020-11-10 | End: 2020-11-10

## 2020-11-10 RX ORDER — ONDANSETRON 4 MG/1
4 TABLET, ORALLY DISINTEGRATING ORAL EVERY 8 HOURS PRN
Qty: 10 TABLET | Refills: 0 | Status: SHIPPED | OUTPATIENT
Start: 2020-11-10 | End: 2020-11-13

## 2020-11-10 RX ORDER — KETOROLAC TROMETHAMINE 15 MG/ML
15 INJECTION, SOLUTION INTRAMUSCULAR; INTRAVENOUS ONCE
Status: COMPLETED | OUTPATIENT
Start: 2020-11-10 | End: 2020-11-10

## 2020-11-10 RX ADMIN — ONDANSETRON 4 MG: 2 INJECTION INTRAMUSCULAR; INTRAVENOUS at 16:31

## 2020-11-10 RX ADMIN — HYDROMORPHONE HYDROCHLORIDE 0.5 MG: 1 INJECTION, SOLUTION INTRAMUSCULAR; INTRAVENOUS; SUBCUTANEOUS at 17:18

## 2020-11-10 RX ADMIN — KETOROLAC TROMETHAMINE 15 MG: 15 INJECTION, SOLUTION INTRAMUSCULAR; INTRAVENOUS at 16:32

## 2020-11-10 ASSESSMENT — ENCOUNTER SYMPTOMS
DIARRHEA: 1
NEUROLOGICAL NEGATIVE: 1
ABDOMINAL PAIN: 1
VOMITING: 0
FREQUENCY: 0
APPETITE CHANGE: 1
DYSURIA: 0
FEVER: 0
NAUSEA: 1
COUGH: 1
BACK PAIN: 1

## 2020-11-10 NOTE — LETTER
November 10, 2020      To Whom It May Concern:      Anne WOODROW Ferreira was seen in our Emergency Department today, 11/10/20.  No work until she knows the results of Covid test.  Sincerely,        FRANKLYN Antonio CNP

## 2020-11-10 NOTE — ED PROVIDER NOTES
History     Chief Complaint   Patient presents with     Abdominal Pain     RUQ abd pain, known covid exposure. nausea. sx for 3 days.      HPI  Anne Ferreira is a 40 year old female with history of dyslipidemia, GERD, allergic rhinitis, and anxiety who presents to the emergency department for evaluation of abdominal pain. Symptoms started on Saturday, 3 days ago shortly after eating [employer] hamburger. Accompanied by diarrhea. Diarrhea has subsided and reports normal formed stools. Pain is persistent, more focal to the right upper abdominal region and radiates into her back. Feels bloated. Nauseated, but no vomiting. No fevers. Urine has had a foul smell.   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  Allergies:  Allergies   Allergen Reactions     Sulfa Drugs        Problem List:    Patient Active Problem List    Diagnosis Date Noted     Dyslipidemia 11/10/2020     Priority: Medium     Dysmenorrhea 11/10/2020     Priority: Medium     treated with continuous OCPs       GERD (gastroesophageal reflux disease) 11/10/2020     Priority: Medium     Thoracic back pain 11/10/2020     Priority: Medium     left, approx T10       H/O LEEP      Priority: Medium     1998 LEEP- Unknown results.  2010 NIL pap.  2/2/12 COLP and ECC- DARREN 1.  2013 NIL pap  4/7/15 LSIL pap, + HR HPV   8/13/15 COLP- DARREN 1, ECC- Negative.  6/10/16 NIL pap, Neg HPV.  5/14/19 NIL pap, Neg HPV.  Above results found in [facility]  10/15/20 NIL pap, Neg HPV. Plan cotest in 3 years.        Cervical spinal stenosis 07/01/2019     Priority: Medium     7/2019 MRI - At C5-6, broad-based central disc extrusion demonstrating caudal migration mildly deforms the cord and severely narrows the spinal canal. Uncovertebral joint spurring contributes to mild to moderate left neural foraminal narrowing with potential left C6 nerve root encroachment.       Herniation of cervical intervertebral disc with radiculopathy 07/01/2019     Priority: Medium     see MRI  7/2019       Attention deficit hyperactivity disorder, combined type, mild 08/29/2018     Priority: Medium     Generalized anxiety disorder 08/29/2018     Priority: Medium     Allergic rhinitis 04/01/2008     Priority: Medium        Past Medical History:    Past Medical History:   Diagnosis Date     Abnormal Pap smear of cervix        Past Surgical History:    No past surgical history on file.    Family History:    Family History   Problem Relation Age of Onset     Hypertension Father      Hypertension Brother      Hypertension Sister      Hypertension Brother        Social History:  Marital Status:   [4]  Social History     Tobacco Use     Smoking status: Current Every Day Smoker     Smokeless tobacco: Never Used   Substance Use Topics     Alcohol use: Yes     Drug use: Never        Medications:         cephALEXin (KEFLEX) 500 MG capsule       HYDROcodone-acetaminophen (NORCO) 5-325 MG tablet       ondansetron (ZOFRAN ODT) 4 MG ODT tab       albuterol (PROAIR HFA/PROVENTIL HFA/VENTOLIN HFA) 108 (90 Base) MCG/ACT inhaler       busPIRone (BUSPAR) 5 MG tablet       [START ON 12/14/2020] dexmethylphenidate (FOCALIN) 10 MG tablet       escitalopram (LEXAPRO) 20 MG tablet       ethynodiol-ethinyl estradiol (ZOVIA) 1-50 MG-MCG tablet       ipratropium - albuterol 0.5 mg/2.5 mg/3 mL (DUONEB) 0.5-2.5 (3) MG/3ML neb solution       lisinopril (ZESTRIL) 20 MG tablet       LORazepam (ATIVAN) 1 MG tablet       methocarbamol (ROBAXIN) 750 MG tablet       montelukast (SINGULAIR) 10 MG tablet       omeprazole (PRILOSEC) 20 MG DR capsule       propranolol (INDERAL) 80 MG tablet          Review of Systems   Constitutional: Positive for appetite change. Negative for fever.   HENT: Positive for congestion.    Respiratory: Positive for cough.    Gastrointestinal: Positive for abdominal pain, diarrhea and nausea (burping). Negative for vomiting.   Genitourinary: Negative for dysuria, frequency and urgency.   Musculoskeletal:  Positive for back pain.   Neurological: Negative.    All other systems reviewed and are negative.      Physical Exam   BP: (!) 146/85  Pulse: 70  Temp: 98  F (36.7  C)  Resp: 18  Weight: 90.7 kg (200 lb)  SpO2: 98 %      Physical Exam  Constitutional:       General: She is in acute distress.      Appearance: She is well-developed. She is not ill-appearing.   Cardiovascular:      Rate and Rhythm: Normal rate and regular rhythm.   Pulmonary:      Effort: Pulmonary effort is normal.      Breath sounds: Normal breath sounds.   Abdominal:      General: Bowel sounds are normal. There is no distension.      Palpations: Abdomen is soft. There is no hepatomegaly or splenomegaly.      Tenderness: There is generalized abdominal tenderness (more focal to the RUQ) and tenderness in the right upper quadrant.   Neurological:      General: No focal deficit present.      Mental Status: She is alert and oriented to person, place, and time.         ED Course        Procedures               Results for orders placed or performed during the hospital encounter of 11/10/20 (from the past 24 hour(s))   UA reflex to Microscopic   Result Value Ref Range    Color Urine Yellow     Appearance Urine Cloudy     Glucose Urine Negative NEG^Negative mg/dL    Bilirubin Urine Negative NEG^Negative    Ketones Urine Negative NEG^Negative mg/dL    Specific Gravity Urine 1.024 1.003 - 1.035    Blood Urine Small (A) NEG^Negative    pH Urine 5.0 5.0 - 7.0 pH    Protein Albumin Urine Negative NEG^Negative mg/dL    Urobilinogen mg/dL 0.0 0.0 - 2.0 mg/dL    Nitrite Urine Negative NEG^Negative    Leukocyte Esterase Urine Large (A) NEG^Negative    Source Midstream Urine     RBC Urine 9 (H) 0 - 2 /HPF    WBC Urine 40 (H) 0 - 5 /HPF    Bacteria Urine Moderate (A) NEG^Negative /HPF    Squamous Epithelial /HPF Urine 9 (H) 0 - 1 /HPF    Mucous Urine Present (A) NEG^Negative /LPF    Hyaline Casts 2 0 - 2 /LPF   CBC with platelets differential   Result Value Ref Range     WBC 10.1 4.0 - 11.0 10e9/L    RBC Count 4.28 3.8 - 5.2 10e12/L    Hemoglobin 12.4 11.7 - 15.7 g/dL    Hematocrit 38.5 35.0 - 47.0 %    MCV 90 78 - 100 fl    MCH 29.0 26.5 - 33.0 pg    MCHC 32.2 31.5 - 36.5 g/dL    RDW 13.2 10.0 - 15.0 %    Platelet Count 282 150 - 450 10e9/L    Diff Method Automated Method     % Neutrophils 66.7 %    % Lymphocytes 25.6 %    % Monocytes 5.8 %    % Eosinophils 1.2 %    % Basophils 0.4 %    % Immature Granulocytes 0.3 %    Nucleated RBCs 0 0 /100    Absolute Neutrophil 6.7 1.6 - 8.3 10e9/L    Absolute Lymphocytes 2.6 0.8 - 5.3 10e9/L    Absolute Monocytes 0.6 0.0 - 1.3 10e9/L    Absolute Eosinophils 0.1 0.0 - 0.7 10e9/L    Absolute Basophils 0.0 0.0 - 0.2 10e9/L    Abs Immature Granulocytes 0.0 0 - 0.4 10e9/L    Absolute Nucleated RBC 0.0    Lipase   Result Value Ref Range    Lipase 101 73 - 393 U/L   Comprehensive metabolic panel   Result Value Ref Range    Sodium 139 133 - 144 mmol/L    Potassium 4.4 3.4 - 5.3 mmol/L    Chloride 109 94 - 109 mmol/L    Carbon Dioxide 27 20 - 32 mmol/L    Anion Gap 3 3 - 14 mmol/L    Glucose 83 70 - 99 mg/dL    Urea Nitrogen 9 7 - 30 mg/dL    Creatinine 0.66 0.52 - 1.04 mg/dL    GFR Estimate >90 >60 mL/min/[1.73_m2]    GFR Estimate If Black >90 >60 mL/min/[1.73_m2]    Calcium 9.2 8.5 - 10.1 mg/dL    Bilirubin Total 0.2 0.2 - 1.3 mg/dL    Albumin 3.2 (L) 3.4 - 5.0 g/dL    Protein Total 7.0 6.8 - 8.8 g/dL    Alkaline Phosphatase 72 40 - 150 U/L    ALT 16 0 - 50 U/L    AST 14 0 - 45 U/L   US Abdomen Limited (RUQ)    Narrative    US ABDOMEN LIMITED 11/10/2020 5:08 PM    CLINICAL HISTORY: RUQ abdominal pain.    TECHNIQUE: Limited abdominal ultrasound.    COMPARISON: None.    FINDINGS:    GALLBLADDER: The gallbladder is normal. No gallstones, wall  thickening, or pericholecystic fluid. Small amount of sludge is  suspected. Negative sonographic Ortega's sign.    BILE DUCTS: No evidence of common bile duct dilatation. The common  duct measures 5  mm.    LIVER: The liver is enlarged and increased in echogenicity without  focal mass.     RIGHT KIDNEY: Unremarkable.    PANCREAS: The visualized portions of the pancreas are normal.    No ascites.      Impression    IMPRESSION: Hepatomegaly with diffuse fatty infiltration of liver. No  gallstones or bile duct dilatation.    RAMÍREZ MCGHEE MD       Medications   ondansetron (ZOFRAN) injection 4 mg (4 mg Intravenous Given 11/10/20 1631)   ketorolac (TORADOL) injection 15 mg (15 mg Intravenous Given 11/10/20 1632)   HYDROmorphone (PF) (DILAUDID) injection 0.5 mg (0.5 mg Intravenous Given 11/10/20 1718)     5:15 pm-awaiting US results.  At bedside. toradol did not help pain. Order for Dilaudid placed.    Assessments & Plan (with Medical Decision Making)   40 year old female with history of dyslipidemia, GERD, allergic rhinitis, and anxiety who presents to the emergency department for evaluation of abdominal pain. Symptoms started on Saturday, 3 days ago shortly after eating [employer] hamburger. Accompanied by diarrhea. Diarrhea has subsided and reports normal formed stools. Pain is persistent, more focal to the right upper abdominal region and radiates into her back. Feels bloated. Nauseated, but no vomiting. No fevers. Urine has had a foul smell.     On exam patient is alert. Distressed due to pain. Afebrile. Normotensive. No tachycardia. Lung sounds CTA. No hypoxia. Abdominal tenderness generalized throughout with increased focal tenderness over the RUQ. CBC is normal. Electrolytes are normal. Kidney function is normal. LFTs are normal. Lipase is normal. UA reveals small blood, large leukocyte Estrace, >9 RBCs, >40 WBCs, and moderate bacteria.  Right upper quadrant ultrasound obtained and reveals hepatomegaly with diffuse fatty infiltration of the liver.  No gallstones or bile duct dilation.  No evidence of bile duct obstruction, bile duct is not dilated.  There is a small amount of sludge that is suspected in the  gallbladder.  I suspect patient's pain is related to her gallbladder given her history and constellation of symptoms and exam findings.  However, no acute cholecystitis or evidence of infection.  Incidentally her urine is concerning for infection and patient will be started on antibiotics with Keflex pending urine culture.  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  COVID-19 test was obtained and the result is pending.  Patient was given IV normal saline 1 L bolus, IV Zofran, IV Toradol, and IV Dilaudid.  This improved her pain.    Plan as follows:  Keflex 500 mg twice a day for 7 days. (possible UTI)  Zofran 4 mg every 8 hours as needed for nausea.  Ibuprofen 400-600 mg every 6-8 hours as needed for mild/moderate pain. Take with food. Stop if it is causing nausea or abdominal pain.     Tylenol 650 mg every 4 hours as needed for mild/moderate pain.  OR--  Norco 5-325 (hydrocodone-acetaminophen) 1 tablet every 6 hours if needed for moderate/severe pain. Do not use alcohol, operate machinery, drive, or climb on ladders for 8 hours after taking Norco.     Make appointment for recheck with surgery clinic. [phone].  Return for fevers, increased pain, vomiting, or any new symptoms of concern.    I have reviewed the nursing notes.    I have reviewed the findings, diagnosis, plan and need for follow up with the patient.    New Prescriptions    CEPHALEXIN (KEFLEX) 500 MG CAPSULE    Take 1 capsule (500 mg) by mouth 2 times daily for 7 days    HYDROCODONE-ACETAMINOPHEN (NORCO) 5-325 MG TABLET    Take 1 tablet by mouth every 6 hours as needed for moderate to severe pain    ONDANSETRON (ZOFRAN ODT) 4 MG ODT TAB    Take 1 tablet (4 mg) by mouth every 8 hours as needed for nausea       Final diagnoses:   Abdominal pain, right upper quadrant   Abnormal finding on urinalysis - concerning for UTI, Urine culture pending.       11/10/2020   [facility] EMERGENCY DEPT     Norma Pardo  Caro, FRANKLYN CNP  11/10/20 1815

## 2020-11-10 NOTE — DISCHARGE INSTRUCTIONS
Keflex 500 mg twice a day for 7 days. (possible UTI)  Zofran 4 mg every 8 hours as needed for nausea.  Ibuprofen 400-600 mg every 6-8 hours as needed for mild/moderate pain. Take with food. Stop if it is causing nausea or abdominal pain.     Tylenol 650 mg every 4 hours as needed for mild/moderate pain.  OR--  Norco 5-325 (hydrocodone-acetaminophen) 1 tablet every 6 hours if needed for moderate/severe pain. Do not use alcohol, operate machinery, drive, or climb on ladders for 8 hours after taking Norco.     Make appointment for recheck with surgery clinic. [phone].  Return for fevers, increased pain, vomiting, or any new symptoms of concern.

## 2020-11-10 NOTE — ED NOTES
Sx started Saturday, right abdominal pain  Nausea and diarrhea Saturday after eating [employer] 24 hours duration, resolved  Pain still persists

## 2020-11-10 NOTE — ED AVS SNAPSHOT
[facility] Emergency Dept  [address] [address]  Phone: [phone]  Fax: [phone]                                    Anne Ferreira   MRN: [id number]    Department: [facility] Emergency Dept   Date of Visit: 11/10/2020           After Visit Summary Signature Page    I have received my discharge instructions, and my questions have been answered. I have discussed any challenges I see with this plan with the nurse or doctor.    ..........................................................................................................................................  Patient/Patient Representative Signature      ..........................................................................................................................................  Patient Representative Print Name and Relationship to Patient    ..................................................               ................................................  Date                                   Time    ..........................................................................................................................................  Reviewed by Signature/Title    ...................................................              ..............................................  Date                                               Time          22EPIC Rev 08/18

## 2020-11-11 LAB
BACTERIA SPEC CULT: NO GROWTH
Lab: NORMAL
SPECIMEN SOURCE: NORMAL

## 2020-11-12 LAB
SARS-COV-2 RNA SPEC QL NAA+PROBE: NOT DETECTED
SPECIMEN SOURCE: NORMAL

## 2020-11-12 NOTE — RESULT ENCOUNTER NOTE
Emergency Dept/Urgent Care discharge antibiotic (if prescribed): Cephalexin (Keflex) 500 mg capsule, 1 capsule (500 mg) by mouth 2 times daily for 7 days.  Date of Rx (if applicable):  11/10/20  No changes in treatment per Urine culture protocol.

## 2020-11-13 NOTE — RESULT ENCOUNTER NOTE
Left voicemail message requesting a call back to [facility] ED Lab Result RN at [phone].  RN is available every day between 10 a.m. and 6:30 p.m..

## 2020-11-13 NOTE — RESULT ENCOUNTER NOTE
Notified of culture result  Advised to discontinue.  She is f/u with Surgeon on Monday.  Abdominal pain is less but still present on and off

## 2020-12-02 DIAGNOSIS — F41.1 GAD (GENERALIZED ANXIETY DISORDER): ICD-10-CM

## 2020-12-02 RX ORDER — BUSPIRONE HYDROCHLORIDE 5 MG/1
10 TABLET ORAL 2 TIMES DAILY
Qty: 120 TABLET | Refills: 3 | Status: SHIPPED | OUTPATIENT
Start: 2020-12-02 | End: 2020-12-11

## 2020-12-08 ENCOUNTER — HOSPITAL ENCOUNTER (EMERGENCY)
Facility: CLINIC | Age: 40
Discharge: HOME | End: 2020-12-08
Attending: FAMILY MEDICINE | Admitting: FAMILY MEDICINE
Payer: COMMERCIAL

## 2020-12-08 VITALS
TEMPERATURE: 97.2 F | SYSTOLIC BLOOD PRESSURE: 106 MMHG | BODY MASS INDEX: 35.85 KG/M2 | HEART RATE: 67 BPM | RESPIRATION RATE: 16 BRPM | DIASTOLIC BLOOD PRESSURE: 46 MMHG | WEIGHT: 210 LBS | OXYGEN SATURATION: 97 % | HEIGHT: 64 IN

## 2020-12-08 DIAGNOSIS — R10.11 RUQ ABDOMINAL PAIN: ICD-10-CM

## 2020-12-08 DIAGNOSIS — R07.9 RIGHT-SIDED CHEST PAIN: ICD-10-CM

## 2020-12-08 LAB
ALBUMIN SERPL-MCNC: 3.3 G/DL (ref 3.4–5)
ALP SERPL-CCNC: 74 U/L (ref 40–150)
ALT SERPL W P-5'-P-CCNC: 19 U/L (ref 0–50)
ANION GAP SERPL CALCULATED.3IONS-SCNC: 7 MMOL/L (ref 3–14)
AST SERPL W P-5'-P-CCNC: 10 U/L (ref 0–45)
BASOPHILS # BLD AUTO: 0 10E9/L (ref 0–0.2)
BASOPHILS NFR BLD AUTO: 0.3 %
BILIRUB SERPL-MCNC: 0.2 MG/DL (ref 0.2–1.3)
BUN SERPL-MCNC: 8 MG/DL (ref 7–30)
CALCIUM SERPL-MCNC: 9.2 MG/DL (ref 8.5–10.1)
CHLORIDE SERPL-SCNC: 109 MMOL/L (ref 94–109)
CO2 SERPL-SCNC: 24 MMOL/L (ref 20–32)
CREAT SERPL-MCNC: 0.6 MG/DL (ref 0.52–1.04)
DIFFERENTIAL METHOD BLD: ABNORMAL
EOSINOPHIL # BLD AUTO: 0.2 10E9/L (ref 0–0.7)
EOSINOPHIL NFR BLD AUTO: 1.3 %
ERYTHROCYTE [DISTWIDTH] IN BLOOD BY AUTOMATED COUNT: 13.2 % (ref 10–15)
GFR SERPL CREATININE-BSD FRML MDRD: >90 ML/MIN/{1.73_M2}
GLUCOSE SERPL-MCNC: 121 MG/DL (ref 70–99)
HCT VFR BLD AUTO: 40.8 % (ref 35–47)
HGB BLD-MCNC: 13.1 G/DL (ref 11.7–15.7)
IMM GRANULOCYTES # BLD: 0.1 10E9/L (ref 0–0.4)
IMM GRANULOCYTES NFR BLD: 0.6 %
LYMPHOCYTES # BLD AUTO: 3.1 10E9/L (ref 0.8–5.3)
LYMPHOCYTES NFR BLD AUTO: 26.2 %
MCH RBC QN AUTO: 29.2 PG (ref 26.5–33)
MCHC RBC AUTO-ENTMCNC: 32.1 G/DL (ref 31.5–36.5)
MCV RBC AUTO: 91 FL (ref 78–100)
MONOCYTES # BLD AUTO: 0.6 10E9/L (ref 0–1.3)
MONOCYTES NFR BLD AUTO: 4.9 %
NEUTROPHILS # BLD AUTO: 8 10E9/L (ref 1.6–8.3)
NEUTROPHILS NFR BLD AUTO: 66.7 %
NRBC # BLD AUTO: 0 10*3/UL
NRBC BLD AUTO-RTO: 0 /100
PLATELET # BLD AUTO: 313 10E9/L (ref 150–450)
POTASSIUM SERPL-SCNC: 4 MMOL/L (ref 3.4–5.3)
PROT SERPL-MCNC: 7.4 G/DL (ref 6.8–8.8)
RBC # BLD AUTO: 4.48 10E12/L (ref 3.8–5.2)
SODIUM SERPL-SCNC: 140 MMOL/L (ref 133–144)
TROPONIN I SERPL-MCNC: <0.015 UG/L (ref 0–0.04)
WBC # BLD AUTO: 12 10E9/L (ref 4–11)

## 2020-12-08 PROCEDURE — 99284 EMERGENCY DEPT VISIT MOD MDM: CPT | Performed by: FAMILY MEDICINE

## 2020-12-08 PROCEDURE — 85025 COMPLETE CBC W/AUTO DIFF WBC: CPT | Performed by: FAMILY MEDICINE

## 2020-12-08 PROCEDURE — 80053 COMPREHEN METABOLIC PANEL: CPT | Performed by: FAMILY MEDICINE

## 2020-12-08 PROCEDURE — 99284 EMERGENCY DEPT VISIT MOD MDM: CPT | Mod: 25 | Performed by: FAMILY MEDICINE

## 2020-12-08 PROCEDURE — 93005 ELECTROCARDIOGRAM TRACING: CPT | Performed by: FAMILY MEDICINE

## 2020-12-08 PROCEDURE — 93010 ELECTROCARDIOGRAM REPORT: CPT | Performed by: FAMILY MEDICINE

## 2020-12-08 PROCEDURE — 84484 ASSAY OF TROPONIN QUANT: CPT | Performed by: FAMILY MEDICINE

## 2020-12-08 ASSESSMENT — MIFFLIN-ST. JEOR: SCORE: 1602.55

## 2020-12-08 NOTE — ED AVS SNAPSHOT
[facility] Emergency Dept  [address] [address]  Phone: [phone]  Fax: [phone]                                    Anne Ferreira   MRN: [id number]    Department: [facility] Emergency Dept   Date of Visit: 12/8/2020           After Visit Summary Signature Page    I have received my discharge instructions, and my questions have been answered. I have discussed any challenges I see with this plan with the nurse or doctor.    ..........................................................................................................................................  Patient/Patient Representative Signature      ..........................................................................................................................................  Patient Representative Print Name and Relationship to Patient    ..................................................               ................................................  Date                                   Time    ..........................................................................................................................................  Reviewed by Signature/Title    ...................................................              ..............................................  Date                                               Time          22EPIC Rev 08/18

## 2020-12-09 NOTE — ED NOTES
Pt c/o R sided chest, shoulder, and jaw pain starting around 45 minutes ago. Pt has hx of anxiety that produces similar symptoms but pt doesn't believe this to be related to her anxiety.

## 2020-12-09 NOTE — DISCHARGE INSTRUCTIONS
Return to the Emergency Room if the following occurs:     Severely worsened pain, worsened breathing, fever >101, or for any concern at anytime.    Or, follow-up with the following provider as we discussed:     Return to the general surgeon to discuss whether or not your gallbladder could be the cause of your pain.  See contact information provided for scheduling.    Medications discussed:    None new.  No changes.    If you received pain-relieving or sedating medication during your time in the ER, avoid alcohol, driving automobiles, or working with machinery.  Also, a responsible adult must stay with you.        Call the Nurse Advice Line at [phone] or [phone] for any concern at anytime.

## 2020-12-09 NOTE — ED PROVIDER NOTES
"  HPI   The patient is a 40-year-old female presenting with right-sided chest pain.  She has a known history of anxiety and reflux.  She was seen recently for right upper quadrant abdominal pain and was found to have sludge in her gallbladder.  She does not drink alcohol.  She does smoke.  She has a known history of asthma recently finished a prescription of prednisone.  No drugs of abuse.    The patient had some anxiety earlier today.  She took some Ativan at home at about 11.  She says that she was \"doing nothing all day since then.\"  At about 6:00 she had.  At about 6:30 PM she had onset of right upper quadrant and right-sided chest pain.  Her pain was moderate to severe in severity.  It peaked quickly and then has slowly improved since.  She rates her pain as 0-1/10 currently.  She says, \"it is not really a pain it is just dull and uncomfortable.\"   cough she denies having shortness of breath or diaphoresis.  She denies having a new cough or fever.  She denies having new trauma, injury, overuse, or new activities.  She denies leg pain and swelling.  She denies skin rash.  She denies being especially anxious leading into the onset of symptoms.  She does get reflux on a regular basis.  She denies that it was especially worse today.        Allergies:  Allergies   Allergen Reactions     Sulfa Drugs      Problem List:    Patient Active Problem List    Diagnosis Date Noted     Dyslipidemia 11/10/2020     Priority: Medium     Dysmenorrhea 11/10/2020     Priority: Medium     treated with continuous OCPs       GERD (gastroesophageal reflux disease) 11/10/2020     Priority: Medium     Thoracic back pain 11/10/2020     Priority: Medium     left, approx T10       H/O LEEP      Priority: Medium     1998 LEEP- Unknown results.  2010 NIL pap.  2/2/12 COLP and ECC- DARREN 1.  2013 NIL pap  4/7/15 LSIL pap, + HR HPV   8/13/15 COLP- DARREN 1, ECC- Negative.  6/10/16 NIL pap, Neg HPV.  5/14/19 NIL pap, Neg HPV.  Above results found in " [facility]  10/15/20 NIL pap, Neg HPV. Plan cotest in 3 years.        Cervical spinal stenosis 07/01/2019     Priority: Medium     7/2019 MRI - At C5-6, broad-based central disc extrusion demonstrating caudal migration mildly deforms the cord and severely narrows the spinal canal. Uncovertebral joint spurring contributes to mild to moderate left neural foraminal narrowing with potential left C6 nerve root encroachment.       Herniation of cervical intervertebral disc with radiculopathy 07/01/2019     Priority: Medium     see MRI 7/2019       Attention deficit hyperactivity disorder, combined type, mild 08/29/2018     Priority: Medium     Generalized anxiety disorder 08/29/2018     Priority: Medium     Allergic rhinitis 04/01/2008     Priority: Medium      Past Medical History:    Past Medical History:   Diagnosis Date     Abnormal Pap smear of cervix      Past Surgical History:    No past surgical history on file.  Family History:    Family History   Problem Relation Age of Onset     Hypertension Father      Hypertension Brother      Hypertension Sister      Hypertension Brother      Social History:  Marital Status:   [4]  Social History     Tobacco Use     Smoking status: Current Every Day Smoker     Smokeless tobacco: Never Used   Substance Use Topics     Alcohol use: Yes     Drug use: Never      Medications:         albuterol (PROAIR HFA/PROVENTIL HFA/VENTOLIN HFA) 108 (90 Base) MCG/ACT inhaler       busPIRone (BUSPAR) 5 MG tablet       [START ON 12/14/2020] dexmethylphenidate (FOCALIN) 10 MG tablet       escitalopram (LEXAPRO) 20 MG tablet       ethynodiol-ethinyl estradiol (ZOVIA) 1-50 MG-MCG tablet       ipratropium - albuterol 0.5 mg/2.5 mg/3 mL (DUONEB) 0.5-2.5 (3) MG/3ML neb solution       lisinopril (ZESTRIL) 20 MG tablet       LORazepam (ATIVAN) 1 MG tablet       methocarbamol (ROBAXIN) 750 MG tablet       montelukast (SINGULAIR) 10 MG tablet       omeprazole (PRILOSEC) 20 MG DR capsule       propranolol  "(INDERAL) 80 MG tablet      Review of Systems   All other systems reviewed and are negative.      PE   BP: (!) 165/78  Pulse: 77  Temp: 97.2  F (36.2  C)  Resp: 18  Height: 162.6 cm (5' 4\")  Weight: 95.3 kg (210 lb)  SpO2: 96 %  Physical Exam  Vitals signs reviewed.   Constitutional:       General: She is not in acute distress.     Appearance: She is well-developed. She is obese.   HENT:      Head: Normocephalic and atraumatic.      Right Ear: External ear normal.      Left Ear: External ear normal.      Nose: Nose normal.      Mouth/Throat:      Mouth: Mucous membranes are moist.      Pharynx: Oropharynx is clear.   Eyes:      Extraocular Movements: Extraocular movements intact.      Conjunctiva/sclera: Conjunctivae normal.      Pupils: Pupils are equal, round, and reactive to light.   Neck:      Musculoskeletal: Normal range of motion.   Cardiovascular:      Rate and Rhythm: Normal rate and regular rhythm.   Pulmonary:      Effort: Pulmonary effort is normal.   Abdominal:      Comments: Pain with palpation in the right upper quadrant.  No chest wall tenderness.   Musculoskeletal: Normal range of motion.   Skin:     General: Skin is warm and dry.   Neurological:      Mental Status: She is alert and oriented to person, place, and time.   Psychiatric:         Behavior: Behavior normal.         ED COURSE and MDM   2100.  Patient presents with right-sided chest pain primarily.  At the end of my history taking she told me that her right upper quadrant abdomen was also more comfortable but better currently.  She has some mild tenderness there.  Recent history of evaluation for cholecystitis, as above.  EKG is unremarkable and documented below.  Chest x-ray added.    2213.  The patient is without symptoms.  She is without any difficulty with breathing.  She is refusing a chest x-ray.  We did discuss the reasoning behind getting the x-ray which she is refusing regardless.  Her blood work appears unremarkable except for " slight elevation of her white blood cell count.  I do have some concerns that her pain is related to her gallbladder.  No emergent need for hospitalization or consultation.  I will provide follow-up information for the general surgery clinic.  She should return here for worsening.    EKG  (2030)   Interpretation performed by me.  Rate: 71     Rhythm: sinus     Axis: nl  Intervals: OR (12-2) 154, QRS (<12) 90, QTc (>5) 387  P wave: nl     QRS complex: nl  ST segment / T-wave: nl  Conclusion: nl      LABS  Labs Ordered and Resulted from Time of ED Arrival Up to the Time of Departure from the ED   CBC WITH PLATELETS DIFFERENTIAL - Abnormal; Notable for the following components:       Result Value    WBC 12.0 (*)     All other components within normal limits   COMPREHENSIVE METABOLIC PANEL - Abnormal; Notable for the following components:    Glucose 121 (*)     Albumin 3.3 (*)     All other components within normal limits   TROPONIN I       IMAGING  Images reviewed by me.  Radiology report also reviewed.  XR Chest 2 Views    (Results Pending)       Procedures    Medications - No data to display      IMPRESSION       ICD-10-CM    1. Right-sided chest pain  R07.9    2. RUQ abdominal pain  R10.11             Medication List      There are no discharge medications for this visit.                       Sami Fischer MD  12/08/20 5870

## 2020-12-10 ENCOUNTER — APPOINTMENT (OUTPATIENT)
Dept: ULTRASOUND IMAGING | Facility: CLINIC | Age: 40
End: 2020-12-10
Attending: NURSE PRACTITIONER
Payer: COMMERCIAL

## 2020-12-10 ENCOUNTER — HOSPITAL ENCOUNTER (EMERGENCY)
Facility: CLINIC | Age: 40
Discharge: HOME | End: 2020-12-10
Attending: NURSE PRACTITIONER | Admitting: NURSE PRACTITIONER
Payer: COMMERCIAL

## 2020-12-10 ENCOUNTER — APPOINTMENT (OUTPATIENT)
Dept: CT IMAGING | Facility: CLINIC | Age: 40
End: 2020-12-10
Attending: NURSE PRACTITIONER
Payer: COMMERCIAL

## 2020-12-10 DIAGNOSIS — R10.11 RUQ ABDOMINAL PAIN: ICD-10-CM

## 2020-12-10 LAB
ALBUMIN SERPL-MCNC: 3.1 G/DL (ref 3.4–5)
ALBUMIN UR-MCNC: NEGATIVE MG/DL
ALP SERPL-CCNC: 64 U/L (ref 40–150)
ALT SERPL W P-5'-P-CCNC: 18 U/L (ref 0–50)
ANION GAP SERPL CALCULATED.3IONS-SCNC: 6 MMOL/L (ref 3–14)
APPEARANCE UR: CLEAR
AST SERPL W P-5'-P-CCNC: 14 U/L (ref 0–45)
BACTERIA #/AREA URNS HPF: ABNORMAL /HPF
BASOPHILS # BLD AUTO: 0 10E9/L (ref 0–0.2)
BASOPHILS NFR BLD AUTO: 0.3 %
BILIRUB SERPL-MCNC: 0.2 MG/DL (ref 0.2–1.3)
BILIRUB UR QL STRIP: NEGATIVE
BUN SERPL-MCNC: 10 MG/DL (ref 7–30)
CALCIUM SERPL-MCNC: 8.9 MG/DL (ref 8.5–10.1)
CHLORIDE SERPL-SCNC: 106 MMOL/L (ref 94–109)
CO2 SERPL-SCNC: 25 MMOL/L (ref 20–32)
COLOR UR AUTO: ABNORMAL
CREAT SERPL-MCNC: 0.58 MG/DL (ref 0.52–1.04)
DIFFERENTIAL METHOD BLD: NORMAL
EOSINOPHIL # BLD AUTO: 0.2 10E9/L (ref 0–0.7)
EOSINOPHIL NFR BLD AUTO: 1.5 %
ERYTHROCYTE [DISTWIDTH] IN BLOOD BY AUTOMATED COUNT: 13.4 % (ref 10–15)
GFR SERPL CREATININE-BSD FRML MDRD: >90 ML/MIN/{1.73_M2}
GLUCOSE SERPL-MCNC: 120 MG/DL (ref 70–99)
GLUCOSE UR STRIP-MCNC: NEGATIVE MG/DL
HCG UR QL: NEGATIVE
HCT VFR BLD AUTO: 38.4 % (ref 35–47)
HGB BLD-MCNC: 12.3 G/DL (ref 11.7–15.7)
HGB UR QL STRIP: ABNORMAL
IMM GRANULOCYTES # BLD: 0.1 10E9/L (ref 0–0.4)
IMM GRANULOCYTES NFR BLD: 0.5 %
KETONES UR STRIP-MCNC: NEGATIVE MG/DL
LEUKOCYTE ESTERASE UR QL STRIP: NEGATIVE
LIPASE SERPL-CCNC: 107 U/L (ref 73–393)
LYMPHOCYTES # BLD AUTO: 2.2 10E9/L (ref 0.8–5.3)
LYMPHOCYTES NFR BLD AUTO: 22 %
MCH RBC QN AUTO: 28.7 PG (ref 26.5–33)
MCHC RBC AUTO-ENTMCNC: 32 G/DL (ref 31.5–36.5)
MCV RBC AUTO: 90 FL (ref 78–100)
MONOCYTES # BLD AUTO: 0.7 10E9/L (ref 0–1.3)
MONOCYTES NFR BLD AUTO: 6.6 %
NEUTROPHILS # BLD AUTO: 7 10E9/L (ref 1.6–8.3)
NEUTROPHILS NFR BLD AUTO: 69.1 %
NITRATE UR QL: NEGATIVE
NRBC # BLD AUTO: 0 10*3/UL
NRBC BLD AUTO-RTO: 0 /100
PH UR STRIP: 6 PH (ref 5–7)
PLATELET # BLD AUTO: 252 10E9/L (ref 150–450)
POTASSIUM SERPL-SCNC: 3.8 MMOL/L (ref 3.4–5.3)
PROT SERPL-MCNC: 7 G/DL (ref 6.8–8.8)
RBC # BLD AUTO: 4.29 10E12/L (ref 3.8–5.2)
RBC #/AREA URNS AUTO: 0 /HPF (ref 0–2)
SODIUM SERPL-SCNC: 137 MMOL/L (ref 133–144)
SOURCE: ABNORMAL
SP GR UR STRIP: 1 (ref 1–1.03)
SPECIMEN SOURCE: NORMAL
UROBILINOGEN UR STRIP-MCNC: 0 MG/DL (ref 0–2)
WBC # BLD AUTO: 10.2 10E9/L (ref 4–11)
WBC #/AREA URNS AUTO: 1 /HPF (ref 0–5)
WET PREP SPEC: NORMAL

## 2020-12-10 PROCEDURE — 87210 SMEAR WET MOUNT SALINE/INK: CPT | Performed by: NURSE PRACTITIONER

## 2020-12-10 PROCEDURE — 250N000011 HC RX IP 250 OP 636: Performed by: NURSE PRACTITIONER

## 2020-12-10 PROCEDURE — 96375 TX/PRO/DX INJ NEW DRUG ADDON: CPT | Performed by: NURSE PRACTITIONER

## 2020-12-10 PROCEDURE — 250N000013 HC RX MED GY IP 250 OP 250 PS 637: Performed by: NURSE PRACTITIONER

## 2020-12-10 PROCEDURE — 99285 EMERGENCY DEPT VISIT HI MDM: CPT | Mod: 25 | Performed by: NURSE PRACTITIONER

## 2020-12-10 PROCEDURE — 85025 COMPLETE CBC W/AUTO DIFF WBC: CPT | Performed by: NURSE PRACTITIONER

## 2020-12-10 PROCEDURE — 250N000009 HC RX 250: Performed by: NURSE PRACTITIONER

## 2020-12-10 PROCEDURE — 74177 CT ABD & PELVIS W/CONTRAST: CPT

## 2020-12-10 PROCEDURE — 81025 URINE PREGNANCY TEST: CPT | Performed by: NURSE PRACTITIONER

## 2020-12-10 PROCEDURE — 96374 THER/PROPH/DIAG INJ IV PUSH: CPT | Mod: 59 | Performed by: NURSE PRACTITIONER

## 2020-12-10 PROCEDURE — 80053 COMPREHEN METABOLIC PANEL: CPT | Performed by: NURSE PRACTITIONER

## 2020-12-10 PROCEDURE — 99285 EMERGENCY DEPT VISIT HI MDM: CPT | Performed by: NURSE PRACTITIONER

## 2020-12-10 PROCEDURE — 81001 URINALYSIS AUTO W/SCOPE: CPT | Performed by: NURSE PRACTITIONER

## 2020-12-10 PROCEDURE — 76705 ECHO EXAM OF ABDOMEN: CPT

## 2020-12-10 PROCEDURE — 96376 TX/PRO/DX INJ SAME DRUG ADON: CPT | Performed by: NURSE PRACTITIONER

## 2020-12-10 PROCEDURE — 83690 ASSAY OF LIPASE: CPT | Performed by: NURSE PRACTITIONER

## 2020-12-10 RX ORDER — ONDANSETRON 4 MG/1
4 TABLET, ORALLY DISINTEGRATING ORAL EVERY 8 HOURS PRN
Qty: 10 TABLET | Refills: 0 | Status: SHIPPED | OUTPATIENT
Start: 2020-12-10 | End: 2020-12-13

## 2020-12-10 RX ORDER — LORAZEPAM 0.5 MG/1
0.5 TABLET ORAL ONCE
Status: COMPLETED | OUTPATIENT
Start: 2020-12-10 | End: 2020-12-10

## 2020-12-10 RX ORDER — IOPAMIDOL 755 MG/ML
100 INJECTION, SOLUTION INTRAVASCULAR ONCE
Status: COMPLETED | OUTPATIENT
Start: 2020-12-10 | End: 2020-12-10

## 2020-12-10 RX ORDER — HYDROCODONE BITARTRATE AND ACETAMINOPHEN 5; 325 MG/1; MG/1
1 TABLET ORAL EVERY 6 HOURS PRN
Qty: 10 TABLET | Refills: 0 | Status: SHIPPED | OUTPATIENT
Start: 2020-12-10 | End: 2020-12-13

## 2020-12-10 RX ORDER — ONDANSETRON 2 MG/ML
4 INJECTION INTRAMUSCULAR; INTRAVENOUS ONCE
Status: COMPLETED | OUTPATIENT
Start: 2020-12-10 | End: 2020-12-10

## 2020-12-10 RX ORDER — HYDROMORPHONE HYDROCHLORIDE 1 MG/ML
0.5 INJECTION, SOLUTION INTRAMUSCULAR; INTRAVENOUS; SUBCUTANEOUS ONCE
Status: COMPLETED | OUTPATIENT
Start: 2020-12-10 | End: 2020-12-10

## 2020-12-10 RX ORDER — ONDANSETRON 2 MG/ML
4 INJECTION INTRAMUSCULAR; INTRAVENOUS EVERY 30 MIN PRN
Status: DISCONTINUED | OUTPATIENT
Start: 2020-12-10 | End: 2020-12-11 | Stop reason: HOSPADM

## 2020-12-10 RX ADMIN — HYDROMORPHONE HYDROCHLORIDE 0.5 MG: 1 INJECTION, SOLUTION INTRAMUSCULAR; INTRAVENOUS; SUBCUTANEOUS at 15:06

## 2020-12-10 RX ADMIN — IOPAMIDOL 100 ML: 755 INJECTION, SOLUTION INTRAVENOUS at 17:16

## 2020-12-10 RX ADMIN — HYDROMORPHONE HYDROCHLORIDE 0.5 MG: 1 INJECTION, SOLUTION INTRAMUSCULAR; INTRAVENOUS; SUBCUTANEOUS at 16:49

## 2020-12-10 RX ADMIN — ONDANSETRON 4 MG: 2 INJECTION INTRAMUSCULAR; INTRAVENOUS at 18:28

## 2020-12-10 RX ADMIN — SODIUM CHLORIDE 66 ML: 9 INJECTION, SOLUTION INTRAVENOUS at 17:16

## 2020-12-10 RX ADMIN — ONDANSETRON 4 MG: 2 INJECTION INTRAMUSCULAR; INTRAVENOUS at 15:06

## 2020-12-10 RX ADMIN — LORAZEPAM 0.5 MG: 0.5 TABLET ORAL at 18:28

## 2020-12-10 ASSESSMENT — ENCOUNTER SYMPTOMS
BACK PAIN: 1
FLANK PAIN: 1
DYSURIA: 0
APPETITE CHANGE: 1
NEUROLOGICAL NEGATIVE: 1
COUGH: 0
CONSTIPATION: 1
ABDOMINAL PAIN: 1
SHORTNESS OF BREATH: 0
FEVER: 0
DIARRHEA: 1
VOMITING: 0
NAUSEA: 1
BLOOD IN STOOL: 0

## 2020-12-10 ASSESSMENT — MIFFLIN-ST. JEOR: SCORE: 1602.55

## 2020-12-10 NOTE — LETTER
December 10, 2020      To Whom It May Concern:      Anne Ferreira was seen in our Emergency Department 11/27/2020 and 12/10/20. Please excuse her from work.    Sincerely,        FRANKLYN Antonio CNP

## 2020-12-10 NOTE — ED AVS SNAPSHOT
[facility] Emergency Dept  [address] [address]  Phone: [phone]  Fax: [phone]                                    Anne Ferreira   MRN: [id number]    Department: [facility] Emergency Dept   Date of Visit: 12/10/2020           After Visit Summary Signature Page    I have received my discharge instructions, and my questions have been answered. I have discussed any challenges I see with this plan with the nurse or doctor.    ..........................................................................................................................................  Patient/Patient Representative Signature      ..........................................................................................................................................  Patient Representative Print Name and Relationship to Patient    ..................................................               ................................................  Date                                   Time    ..........................................................................................................................................  Reviewed by Signature/Title    ...................................................              ..............................................  Date                                               Time          22EPIC Rev 08/18

## 2020-12-10 NOTE — ED PROVIDER NOTES
History     Chief Complaint   Patient presents with     Abdominal Pain     RUQ  pain. inermittent for weeks, constant pain past 24 hours. has been seen for same sx ? gallbladder     HPI  Anne Ferreira is a 40 year old female with history of MILES, GERD, ADHD, and allergic rhinitis who presents with RUQ abdominal pain. Pain has been intermittent for the last month. Persistent pain for the last 24 hours. Pain focal to the RUQ with radiation down right side and into her back. Worsened by eating. Nauseated but no vomiting. Having constipation and diarrhea. No fevers or chills. Urine smells foul. Vaginal discharge    Evaluated here on 11/10/2020 and had labs and ultrasound. No significant concerning findings, possible sludge in the gallbladder. She was treated for her pain and instructed to follow-up with surgery clinic if pain returns.  Evaluated again here 2 days ago for similar symptoms but pain was also in the right side of chest. Slight elevation of WBC, but no other concerning findings. Pt had declined chest xray. Her symptoms had resolved spontaneously and she was instructed to follow-up with surgery clinic. Covid test done on 11/27/2020 due to exposure and was negative.    Allergies:  Allergies   Allergen Reactions     Sulfa Drugs        Problem List:    Patient Active Problem List    Diagnosis Date Noted     Dyslipidemia 11/10/2020     Priority: Medium     Dysmenorrhea 11/10/2020     Priority: Medium     treated with continuous OCPs       GERD (gastroesophageal reflux disease) 11/10/2020     Priority: Medium     Thoracic back pain 11/10/2020     Priority: Medium     left, approx T10       H/O LEEP      Priority: Medium     1998 LEEP- Unknown results.  2010 NIL pap.  2/2/12 COLP and ECC- DARREN 1.  2013 NIL pap  4/7/15 LSIL pap, + HR HPV   8/13/15 COLP- DARREN 1, ECC- Negative.  6/10/16 NIL pap, Neg HPV.  5/14/19 NIL pap, Neg HPV.  Above results found in [facility]  10/15/20 NIL pap, Neg HPV. Plan cotest in 3 years.         Cervical spinal stenosis 07/01/2019     Priority: Medium     7/2019 MRI - At C5-6, broad-based central disc extrusion demonstrating caudal migration mildly deforms the cord and severely narrows the spinal canal. Uncovertebral joint spurring contributes to mild to moderate left neural foraminal narrowing with potential left C6 nerve root encroachment.       Herniation of cervical intervertebral disc with radiculopathy 07/01/2019     Priority: Medium     see MRI 7/2019       Attention deficit hyperactivity disorder, combined type, mild 08/29/2018     Priority: Medium     Generalized anxiety disorder 08/29/2018     Priority: Medium     Allergic rhinitis 04/01/2008     Priority: Medium        Past Medical History:    Past Medical History:   Diagnosis Date     Abnormal Pap smear of cervix        Past Surgical History:    No past surgical history on file.    Family History:    Family History   Problem Relation Age of Onset     Hypertension Father      Hypertension Brother      Hypertension Sister      Hypertension Brother        Social History:  Marital Status:   [4]  Social History     Tobacco Use     Smoking status: Current Every Day Smoker     Smokeless tobacco: Never Used   Substance Use Topics     Alcohol use: Yes     Drug use: Never        Medications:         HYDROcodone-acetaminophen (NORCO) 5-325 MG tablet       ondansetron (ZOFRAN ODT) 4 MG ODT tab       albuterol (PROAIR HFA/PROVENTIL HFA/VENTOLIN HFA) 108 (90 Base) MCG/ACT inhaler       busPIRone (BUSPAR) 5 MG tablet       [START ON 12/14/2020] dexmethylphenidate (FOCALIN) 10 MG tablet       escitalopram (LEXAPRO) 20 MG tablet       ethynodiol-ethinyl estradiol (ZOVIA) 1-50 MG-MCG tablet       ipratropium - albuterol 0.5 mg/2.5 mg/3 mL (DUONEB) 0.5-2.5 (3) MG/3ML neb solution       lisinopril (ZESTRIL) 20 MG tablet       LORazepam (ATIVAN) 1 MG tablet       methocarbamol (ROBAXIN) 750 MG tablet       montelukast (SINGULAIR) 10 MG tablet        "omeprazole (PRILOSEC) 20 MG DR capsule       propranolol (INDERAL) 80 MG tablet          Review of Systems   Constitutional: Positive for appetite change. Negative for fever.   HENT: Negative for congestion.    Respiratory: Negative for cough and shortness of breath.    Cardiovascular: Negative for chest pain.   Gastrointestinal: Positive for abdominal pain, constipation, diarrhea and nausea. Negative for blood in stool and vomiting.   Genitourinary: Positive for flank pain (right) and vaginal discharge (blood tinged/dark thick). Negative for dysuria and urgency.   Musculoskeletal: Positive for back pain.   Skin: Negative.    Neurological: Negative.    All other systems reviewed and are negative.      Physical Exam   BP: (!) 154/98  Pulse: 76  Temp: 97.6  F (36.4  C)  Resp: 18  Height: 162.6 cm (5' 4\")  Weight: 95.3 kg (210 lb)  SpO2: 99 %      Physical Exam  Vitals signs reviewed.   Constitutional:       General: She is in acute distress.      Appearance: She is well-developed.      Comments: tearful   HENT:      Head: Normocephalic and atraumatic.   Eyes:      General: No scleral icterus.  Cardiovascular:      Rate and Rhythm: Normal rate and regular rhythm.      Heart sounds: No murmur.   Pulmonary:      Effort: Pulmonary effort is normal.      Breath sounds: Normal breath sounds.   Abdominal:      General: Bowel sounds are normal. There is no distension.      Palpations: Abdomen is soft.      Tenderness: There is abdominal tenderness in the right upper quadrant. There is right CVA tenderness.   Skin:     General: Skin is warm and dry.   Neurological:      General: No focal deficit present.      Mental Status: She is alert and oriented to person, place, and time.         ED Course        Procedures             Results for orders placed or performed during the hospital encounter of 12/10/20 (from the past 24 hour(s))   CBC with platelets differential   Result Value Ref Range    WBC 10.2 4.0 - 11.0 10e9/L    RBC " Count 4.29 3.8 - 5.2 10e12/L    Hemoglobin 12.3 11.7 - 15.7 g/dL    Hematocrit 38.4 35.0 - 47.0 %    MCV 90 78 - 100 fl    MCH 28.7 26.5 - 33.0 pg    MCHC 32.0 31.5 - 36.5 g/dL    RDW 13.4 10.0 - 15.0 %    Platelet Count 252 150 - 450 10e9/L    Diff Method Automated Method     % Neutrophils 69.1 %    % Lymphocytes 22.0 %    % Monocytes 6.6 %    % Eosinophils 1.5 %    % Basophils 0.3 %    % Immature Granulocytes 0.5 %    Nucleated RBCs 0 0 /100    Absolute Neutrophil 7.0 1.6 - 8.3 10e9/L    Absolute Lymphocytes 2.2 0.8 - 5.3 10e9/L    Absolute Monocytes 0.7 0.0 - 1.3 10e9/L    Absolute Eosinophils 0.2 0.0 - 0.7 10e9/L    Absolute Basophils 0.0 0.0 - 0.2 10e9/L    Abs Immature Granulocytes 0.1 0 - 0.4 10e9/L    Absolute Nucleated RBC 0.0    Comprehensive metabolic panel   Result Value Ref Range    Sodium 137 133 - 144 mmol/L    Potassium 3.8 3.4 - 5.3 mmol/L    Chloride 106 94 - 109 mmol/L    Carbon Dioxide 25 20 - 32 mmol/L    Anion Gap 6 3 - 14 mmol/L    Glucose 120 (H) 70 - 99 mg/dL    Urea Nitrogen 10 7 - 30 mg/dL    Creatinine 0.58 0.52 - 1.04 mg/dL    GFR Estimate >90 >60 mL/min/[1.73_m2]    GFR Estimate If Black >90 >60 mL/min/[1.73_m2]    Calcium 8.9 8.5 - 10.1 mg/dL    Bilirubin Total 0.2 0.2 - 1.3 mg/dL    Albumin 3.1 (L) 3.4 - 5.0 g/dL    Protein Total 7.0 6.8 - 8.8 g/dL    Alkaline Phosphatase 64 40 - 150 U/L    ALT 18 0 - 50 U/L    AST 14 0 - 45 U/L   Lipase   Result Value Ref Range    Lipase 107 73 - 393 U/L   UA with Microscopic   Result Value Ref Range    Color Urine Straw     Appearance Urine Clear     Glucose Urine Negative NEG^Negative mg/dL    Bilirubin Urine Negative NEG^Negative    Ketones Urine Negative NEG^Negative mg/dL    Specific Gravity Urine 1.005 1.003 - 1.035    Blood Urine Small (A) NEG^Negative    pH Urine 6.0 5.0 - 7.0 pH    Protein Albumin Urine Negative NEG^Negative mg/dL    Urobilinogen mg/dL 0.0 0.0 - 2.0 mg/dL    Nitrite Urine Negative NEG^Negative    Leukocyte Esterase Urine  Negative NEG^Negative    Source Midstream Urine     WBC Urine 1 0 - 5 /HPF    RBC Urine 0 0 - 2 /HPF    Bacteria Urine Few (A) NEG^Negative /HPF   HCG qualitative urine (UPT)   Result Value Ref Range    HCG Qual Urine Negative NEG^Negative   Wet prep    Specimen: Vagina   Result Value Ref Range    Specimen Description Vagina     Wet Prep WBC'S seen  Few       Wet Prep No yeast seen     Wet Prep No Trichomonas seen     Wet Prep No clue cells seen    US Abdomen Limited (RUQ)    Narrative    US ABDOMEN LIMITED   12/10/2020 4:34 PM     HISTORY:  Right upper quadrant and right flank pain.    COMPARISON: Abdominal ultrasound on 11/10/2020    FINDINGS:    Gallbladder: Normal with no cholelithiasis, wall thickening or focal  tenderness.      Bile ducts:  CHD is normal diameter.  No intrahepatic biliary  dilatation. The distal portion of the common bile duct is obscured by  overlying bowel gas.    Liver: Demonstrates increased parenchymal echogenicity. No focal  hepatic mass visualized.    Pancreas:  Partially obscured by overlying bowel gas,  but grossly  unremarkable.     Right kidney:  No hydronephrosis or shadowing calculi.    Aorta and IVC:  Not specifically assessed.       Impression    IMPRESSION:  Hepatic steatosis. No focal hepatic mass visualized.    GEORGE BAHENA MD   CT Abdomen Pelvis w Contrast    Narrative    EXAM: CT ABDOMEN PELVIS W CONTRAST  LOCATION: [facility]  DATE/TIME: 12/10/2020 5:13 PM    INDICATION:  right side abdominal pain. back pain.;  COMPARISON: None.  TECHNIQUE: CT scan of the abdomen and pelvis was performed following injection of IV contrast. Multiplanar reformats were obtained. Dose reduction techniques were used.  CONTRAST: 100 mL Isovue 370    FINDINGS:   LOWER CHEST: Normal.    HEPATOBILIARY: Mild hepatomegaly. No suspicious liver lesion. No calcified gallstones..    PANCREAS: Normal.    SPLEEN: Normal.    ADRENAL GLANDS: Normal.    KIDNEYS/BLADDER: No renal mass or  hydronephrosis. There are couple of 2-3 mm stones in the right kidney. Urinary bladder within normal limits.    BOWEL: Normal. Normal appendix.    LYMPH NODES: Normal.    VASCULATURE: Negative    PELVIC ORGANS: 2 cm uterine fundal fibroid suggested.    MUSCULOSKELETAL: Normal.      Impression    IMPRESSION:   1.  No acute abnormality in the abdomen or pelvis.   2.  Nonobstructing right renal stones.   3.  Mild hepatomegaly.       Medications   ondansetron (ZOFRAN) injection 4 mg (4 mg Intravenous Given 12/10/20 1506)   HYDROmorphone (PF) (DILAUDID) injection 0.5 mg (0.5 mg Intravenous Given 12/10/20 1506)   HYDROmorphone (PF) (DILAUDID) injection 0.5 mg (0.5 mg Intravenous Given 12/10/20 1649)   iopamidol (ISOVUE-370) solution 100 mL (100 mLs Intravenous Given 12/10/20 1716)   sodium chloride 0.9 % bag 500mL for CT scan flush use (66 mLs As instructed Given 12/10/20 1716)   LORazepam (ATIVAN) tablet 0.5 mg (0.5 mg Oral Given 12/10/20 1828)   ondansetron (ZOFRAN) injection 4 mg (4 mg Intravenous Given 12/10/20 1828)       Assessments & Plan (with Medical Decision Making)   40 year old female with history of MILES, GERD, ADHD, and allergic rhinitis who presents with RUQ abdominal pain. Pain has been intermittent for the last month. Persistent pain for the last 24 hours. Pain focal to the RUQ with radiation down right side and into her back. Worsened by eating. Nauseated but no vomiting. Having constipation and diarrhea. No fevers or chills. Urine smells foul. Vaginal discharge    Evaluated here on 11/10/2020 and had labs and ultrasound. No significant concerning findings, possible sludge in the gallbladder. She was treated for her pain and instructed to follow-up with surgery clinic if pain returns.  Evaluated again here 2 days ago for similar symptoms but pain was also in the right side of chest. Slight elevation of WBC, but no other concerning findings. Pt had declined chest xray. Her symptoms had resolved  spontaneously and she was instructed to follow-up with surgery clinic. Covid test done on 11/27/2020 due to exposure and was negative.    On exam patient appears distressed and is tearful.  Afebrile.  Normotensive.  No tachycardia.  Lungs are CTA.  No hypoxia.  Tenderness with palpation to the right upper quadrant and right flank region.  CBC is normal.  Electrolytes are normal.  Kidney function labs are normal.  LFTs are normal.  UA is not concerning for infection.  Given her incidental complaint of vaginal discharge a wet prep was obtained and is negative for bacterial vaginosis or other abnormality.Repeat US obtained today and is negative for evidence of cholecystitis.  I reviewed the lab and imaging findings with patient.  She continues to be tearful and in pain.  Appears to be frustrated with not being able to figure out what is causing her pain.  At this point we discussed proceeding to CT for further work-up.  Abdominal/pelvis CT obtained and reveals no acute abnormality.  There are nonobstructing right renal stones.  Mild hepatomegaly.  No evidence of ureteral stone.  Appendix is normal.  No hydronephrosis to indicate a recently passed kidney stone.  I discussed the CT results with patient.  Patient was given IV normal saline bolus, IV Zofran, and IV Dilaudid.  Patient reports feeling very anxious and tells me she has a lot of stress in her life right now.  Patient asked if she could have a dose of lorazepam which I did agree to and she was given Ativan 0.5 mg p.o.  Patient was discharged home and instructed to follow-up with surgery clinic if she has persistent right upper quadrant pain.      Plan:    Call tomorrow morning to make appointment with Surgery Clinic. [phone].  Drink plenty of fluids.  Follow a bland diet (avoid fatty foods, dairy, or alcohol).  Zofran 4 mg every 8  hours as needed for nausea.  Norco 1 tablet every 6 hours as needed for pain.      I have reviewed the nursing notes.    I  have reviewed the findings, diagnosis, plan and need for follow up with the patient.      New Prescriptions    HYDROCODONE-ACETAMINOPHEN (NORCO) 5-325 MG TABLET    Take 1 tablet by mouth every 6 hours as needed for moderate to severe pain    ONDANSETRON (ZOFRAN ODT) 4 MG ODT TAB    Take 1 tablet (4 mg) by mouth every 8 hours as needed for nausea       Final diagnoses:   RUQ abdominal pain       12/10/2020   [facility] EMERGENCY DEPT     Char, FRANKLYN Sanchez CNP  12/11/20 0048

## 2020-12-11 ENCOUNTER — OFFICE VISIT (OUTPATIENT)
Dept: FAMILY MEDICINE | Facility: CLINIC | Age: 40
End: 2020-12-11
Payer: COMMERCIAL

## 2020-12-11 ENCOUNTER — TELEPHONE (OUTPATIENT)
Dept: FAMILY MEDICINE | Facility: CLINIC | Age: 40
End: 2020-12-11

## 2020-12-11 VITALS
SYSTOLIC BLOOD PRESSURE: 110 MMHG | BODY MASS INDEX: 35.51 KG/M2 | TEMPERATURE: 97.6 F | WEIGHT: 208 LBS | DIASTOLIC BLOOD PRESSURE: 70 MMHG | HEART RATE: 68 BPM | HEIGHT: 64 IN

## 2020-12-11 VITALS
BODY MASS INDEX: 35.85 KG/M2 | DIASTOLIC BLOOD PRESSURE: 63 MMHG | TEMPERATURE: 97.6 F | HEART RATE: 68 BPM | WEIGHT: 210 LBS | SYSTOLIC BLOOD PRESSURE: 114 MMHG | OXYGEN SATURATION: 96 % | HEIGHT: 64 IN | RESPIRATION RATE: 16 BRPM

## 2020-12-11 DIAGNOSIS — E66.01 MORBID OBESITY (H): ICD-10-CM

## 2020-12-11 DIAGNOSIS — F41.1 GAD (GENERALIZED ANXIETY DISORDER): Primary | ICD-10-CM

## 2020-12-11 DIAGNOSIS — F33.1 MODERATE EPISODE OF RECURRENT MAJOR DEPRESSIVE DISORDER (H): ICD-10-CM

## 2020-12-11 PROCEDURE — 99214 OFFICE O/P EST MOD 30 MIN: CPT | Performed by: NURSE PRACTITIONER

## 2020-12-11 RX ORDER — ESCITALOPRAM OXALATE 10 MG/1
10 TABLET ORAL DAILY
Qty: 90 TABLET | Refills: 0 | Status: SHIPPED | OUTPATIENT
Start: 2020-12-11 | End: 2021-03-05

## 2020-12-11 RX ORDER — LORAZEPAM 1 MG/1
.5-1 TABLET ORAL 2 TIMES DAILY PRN
Qty: 15 TABLET | Refills: 0 | Status: SHIPPED | OUTPATIENT
Start: 2020-12-11 | End: 2021-05-10

## 2020-12-11 ASSESSMENT — MIFFLIN-ST. JEOR: SCORE: 1593.48

## 2020-12-11 NOTE — PROGRESS NOTES
Subjective     Anne Ferreira is a 40 year old female who presents to clinic today for the following health issues:    HPI         ED/UC Followup:    Facility:  [facility] Emergency Department  Date of visit: 12/8/20 & 12/10/20  Reason for visit: abdominal pain  Current Status: Patient states that she still has abdominal pain but it is not as bad as it was yesterday.      Hypertension Follow-up      Do you check your blood pressure regularly outside of the clinic? No     Are you following a low salt diet? No    Are your blood pressures ever more than 140 on the top number (systolic) OR more   than 90 on the bottom number (diastolic), for example 140/90? No    Depression Followup    How are you doing with your depression since your last visit? Worsened     Are you having other symptoms that might be associated with depression? No    Have you had a significant life event?  Relationship Concerns and Job Concerns     Are you feeling anxious or having panic attacks?   Yes:  panic attacks    Do you have any concerns with your use of alcohol or other drugs? No    Social History     Tobacco Use     Smoking status: Current Every Day Smoker     Smokeless tobacco: Never Used   Substance Use Topics     Alcohol use: Yes     Drug use: Never     PHQ 10/15/2020   PHQ-9 Total Score 12   Q9: Thoughts of better off dead/self-harm past 2 weeks Not at all     No flowsheet data found.  Last PHQ-9 10/15/2020   1.  Little interest or pleasure in doing things 2   2.  Feeling down, depressed, or hopeless 2   3.  Trouble falling or staying asleep, or sleeping too much 2   4.  Feeling tired or having little energy 3   5.  Poor appetite or overeating 0   6.  Feeling bad about yourself 1   7.  Trouble concentrating 2   8.  Moving slowly or restless 0   Q9: Thoughts of better off dead/self-harm past 2 weeks 0   PHQ-9 Total Score 12     In the past two weeks have you had thoughts of suicide or self-harm?  No.    Do you have concerns  "about your personal safety or the safety of others?   No    Suicide Assessment Five-step Evaluation and Treatment (SAFE-T)             Review of Systems   Constitutional, HEENT, cardiovascular, pulmonary, GI, , musculoskeletal, neuro, skin, endocrine and psych systems are negative, except as otherwise noted.      Objective    /70 (BP Location: Right arm, Cuff Size: Adult Regular)   Pulse 68   Temp 97.6  F (36.4  C) (Tympanic)   Ht 1.626 m (5' 4\")   Wt 94.3 kg (208 lb)   BMI 35.70 kg/m    Body mass index is 35.7 kg/m .  Physical Exam   GENERAL: healthy, alert and no distress  EYES: Eyes grossly normal to inspection, PERRL and conjunctivae and sclerae normal  HENT: ear canals and TM's normal, nose and mouth without ulcers or lesions  NECK: no adenopathy, no asymmetry, masses, or scars and thyroid normal to palpation  RESP: lungs clear to auscultation - no rales, rhonchi or wheezes  CV: regular rate and rhythm, normal S1 S2, no S3 or S4, no murmur, click or rub, no peripheral edema and peripheral pulses strong  ABDOMEN: soft, nontender, no hepatosplenomegaly, no masses and bowel sounds normal  MS: no gross musculoskeletal defects noted, no edema  SKIN: no suspicious lesions or rashes  NEURO: Normal strength and tone, mentation intact and speech normal  PSYCH: mentation appears normal, affect normal/bright    No results found for any visits on 12/11/20.        Assessment & Plan     MILES (generalized anxiety disorder)  Uncontrolled will increase Lexapro to 30 mg patient has been on various medications in the past if not effective will have her follow-up with psychiatry for medication evaluation  - MENTAL HEALTH REFERRAL  - Adult; Psychiatry; Psychiatry; [facility]: Collaborative Care Psychiatry Service/Bridge to Long-Term Psychiatry as indicated ([phone]); No - Patient must be evaluated prior to placing referral OR document reason for refer...  - escitalopram (LEXAPRO) 10 MG tablet; Take 1 tablet (10 mg) by " mouth daily  - LORazepam (ATIVAN) 1 MG tablet; Take 0.5-1 tablets (0.5-1 mg) by mouth 2 times daily as needed for anxiety Max dose with 1 mg a day.  15 tablets must last a month    Moderate episode of recurrent major depressive disorder (H)  - MENTAL HEALTH REFERRAL  - Adult; Psychiatry; Psychiatry; G: [facility] Psychiatry Service/Bridge to Long-Term Psychiatry as indicated ([phone]); No - Patient must be evaluated prior to placing referral OR document reason for refer...  - escitalopram (LEXAPRO) 10 MG tablet; Take 1 tablet (10 mg) by mouth daily    Morbid obesity (H)  Reviewed diet and exercise       Tobacco Cessation:   reports that she has been smoking. She has never used smokeless tobacco.  Tobacco Cessation Action Plan: Information offered: Patient not interested at this time         See Patient Instructions    No follow-ups on file.    FRANKLYN Christianson [facility]

## 2020-12-11 NOTE — TELEPHONE ENCOUNTER
Pharmacist called tonight questioning the RX for lorazepam as was filled on 11/29/20 for #10/month.  RX for lorazepam written as start date today for #15/month    pharmacist will fill the lorazepam  Ana Yusuf RN

## 2020-12-11 NOTE — DISCHARGE INSTRUCTIONS
Call tomorrow morning to make appointment with [facility]. [phone].  Drink plenty of fluids.  Follow a bland diet (avoid fatty foods, dairy, or alcohol).  Zofran 4 mg every 8  hours as needed for nausea.  Norco 1 tablet every 6 hours as needed for pain.

## 2020-12-11 NOTE — LETTER
[facility]  [address] [place] [id number]  Phone: [phone]  Fax: [phone]    December 11, 2020        Anne Ferreira  [address] [facility] [address]          To whom it may concern:    RE: Anne Ferreira    Patient was seen and treated today at our clinic and missed work. Symptoms started on 12/10/2020.    Please contact me for questions or concerns.      Sincerely,        FRANKLYN Christianson CNP

## 2020-12-11 NOTE — H&P (VIEW-ONLY)
Subjective     Anne Ferreira is a 40 year old female who presents to clinic today for the following health issues:    HPI         ED/UC Followup:    Facility:  [facility] Emergency Department  Date of visit: 12/8/20 & 12/10/20  Reason for visit: abdominal pain  Current Status: Patient states that she still has abdominal pain but it is not as bad as it was yesterday.      Hypertension Follow-up      Do you check your blood pressure regularly outside of the clinic? No     Are you following a low salt diet? No    Are your blood pressures ever more than 140 on the top number (systolic) OR more   than 90 on the bottom number (diastolic), for example 140/90? No    Depression Followup    How are you doing with your depression since your last visit? Worsened     Are you having other symptoms that might be associated with depression? No    Have you had a significant life event?  Relationship Concerns and Job Concerns     Are you feeling anxious or having panic attacks?   Yes:  panic attacks    Do you have any concerns with your use of alcohol or other drugs? No    Social History     Tobacco Use     Smoking status: Current Every Day Smoker     Smokeless tobacco: Never Used   Substance Use Topics     Alcohol use: Yes     Drug use: Never     PHQ 10/15/2020   PHQ-9 Total Score 12   Q9: Thoughts of better off dead/self-harm past 2 weeks Not at all     No flowsheet data found.  Last PHQ-9 10/15/2020   1.  Little interest or pleasure in doing things 2   2.  Feeling down, depressed, or hopeless 2   3.  Trouble falling or staying asleep, or sleeping too much 2   4.  Feeling tired or having little energy 3   5.  Poor appetite or overeating 0   6.  Feeling bad about yourself 1   7.  Trouble concentrating 2   8.  Moving slowly or restless 0   Q9: Thoughts of better off dead/self-harm past 2 weeks 0   PHQ-9 Total Score 12     In the past two weeks have you had thoughts of suicide or self-harm?  No.    Do you have concerns  "about your personal safety or the safety of others?   No    Suicide Assessment Five-step Evaluation and Treatment (SAFE-T)             Review of Systems   Constitutional, HEENT, cardiovascular, pulmonary, GI, , musculoskeletal, neuro, skin, endocrine and psych systems are negative, except as otherwise noted.      Objective    /70 (BP Location: Right arm, Cuff Size: Adult Regular)   Pulse 68   Temp 97.6  F (36.4  C) (Tympanic)   Ht 1.626 m (5' 4\")   Wt 94.3 kg (208 lb)   BMI 35.70 kg/m    Body mass index is 35.7 kg/m .  Physical Exam   GENERAL: healthy, alert and no distress  EYES: Eyes grossly normal to inspection, PERRL and conjunctivae and sclerae normal  HENT: ear canals and TM's normal, nose and mouth without ulcers or lesions  NECK: no adenopathy, no asymmetry, masses, or scars and thyroid normal to palpation  RESP: lungs clear to auscultation - no rales, rhonchi or wheezes  CV: regular rate and rhythm, normal S1 S2, no S3 or S4, no murmur, click or rub, no peripheral edema and peripheral pulses strong  ABDOMEN: soft, nontender, no hepatosplenomegaly, no masses and bowel sounds normal  MS: no gross musculoskeletal defects noted, no edema  SKIN: no suspicious lesions or rashes  NEURO: Normal strength and tone, mentation intact and speech normal  PSYCH: mentation appears normal, affect normal/bright    No results found for any visits on 12/11/20.        Assessment & Plan     MILES (generalized anxiety disorder)  Uncontrolled will increase Lexapro to 30 mg patient has been on various medications in the past if not effective will have her follow-up with psychiatry for medication evaluation  - MENTAL HEALTH REFERRAL  - Adult; Psychiatry; Psychiatry; FMG: Collaborative Care Psychiatry Service/Bridge to Long-Term Psychiatry as indicated ([phone]); No - Patient must be evaluated prior to placing referral OR document reason for refer...  - escitalopram (LEXAPRO) 10 MG tablet; Take 1 tablet (10 mg) by " mouth daily  - LORazepam (ATIVAN) 1 MG tablet; Take 0.5-1 tablets (0.5-1 mg) by mouth 2 times daily as needed for anxiety Max dose with 1 mg a day.  15 tablets must last a month    Moderate episode of recurrent major depressive disorder (H)  - MENTAL HEALTH REFERRAL  - Adult; Psychiatry; Psychiatry; G: [facility] Psychiatry Service/Bridge to Long-Term Psychiatry as indicated ([phone]); No - Patient must be evaluated prior to placing referral OR document reason for refer...  - escitalopram (LEXAPRO) 10 MG tablet; Take 1 tablet (10 mg) by mouth daily    Morbid obesity (H)  Reviewed diet and exercise       Tobacco Cessation:   reports that she has been smoking. She has never used smokeless tobacco.  Tobacco Cessation Action Plan: Information offered: Patient not interested at this time         See Patient Instructions    No follow-ups on file.    FRANKLYN Christianson [facility]

## 2020-12-11 NOTE — PATIENT INSTRUCTIONS
Patient Education     Depression  Depression is one of the most common mental health problems today. It is not just a state of unhappiness or sadness. It is a true disease. The cause seems to be related to a decrease in chemicals that transmit signals in the brain. Having a family history of depression, alcoholism, or suicide increases the risk. Chronic illness, chronic pain, migraine headaches, and high emotional stress also increase the risk.  Depression is something we tend to recognize in others, but may have a hard time seeing in ourselves. It can show in many physical and emotional ways:    Loss of appetite    Overeating    Not being able to sleep    Sleeping too much    Tiredness not related to physical exertion    Restlessness or irritability    Slowness of movement or speech    Feeling depressed or withdrawn    Loss of interest in things you once enjoyed    Trouble concentrating, poor memory, trouble making decisions    Thoughts of harming or killing oneself, or thoughts that life is not worth living    Low self-esteem  The treatment for depression may include both medicine and psychotherapy. Antidepressants can reduce suffering and can improve the ability to function during the depressed period. Therapy can offer emotional support and help you understand emotional factors that may be causing the depression.  Home care    Ongoing care and support help people manage this disease. Find a healthcare provider and therapist who meet your needs. Seek help when you feel like you may be getting ill.    Be kind to yourself. Make it a point to do things that you enjoy (gardening, walking in nature, going to a movie). Reward yourself for small successes.    Take care of your physical body. Eat a balanced diet (low in saturated fat and high in fruits and vegetables). Exercise at least 3 times a week for 30 minutes. Even mild-moderate exercise (like brisk walking) can make you feel better.    Don't drink alcohol,  which can make depression worse.    Take medicine as prescribed.    Tell each of your healthcare providers about all of the prescription and over-the-counter medicines, vitamins, and supplements you take. Certain supplements interact with medicines and can result in dangerous side effects. Ask your pharmacist when you have questions about medicine interactions.    Talk with your family and trusted friends about your feelings and thoughts. Ask them to help you recognize behavior changes early so you can get help and, if needed, medicine can be adjusted.    Follow-up care  Follow up with your healthcare provider, or as advised.  Call 911  Call 911 if you:    Have suicidal thoughts, a suicide plan, and the means to carry out the plan; or serious thoughts of hurting someone else     Have trouble breathing    Are very confused    Feel very drowsy or have trouble awakening    Faint or lose consciousness    Have new chest pain that becomes more severe, lasts longer, or spreads into your shoulder, arm, neck, jaw, or back  When to seek medical advice  Call your healthcare provider right away if any of these happen:    Feeling extreme depression, fear, anxiety, or anger toward yourself or others    Feeling out of control    Feeling that you may try to harm yourself or another    Hearing voices that others do not hear    Seeing things that others do not see    Can t sleep or eat for 3 days in a row    Friends or family express concern over your behavior and ask you to seek help  [employer] last reviewed this educational content on 10/1/2017    4869-5650 [employer]. [address] [place] [address]. All rights reserved. This information is not intended as a substitute for professional medical care. Always follow your healthcare professional's instructions.           Patient Education     Anxiety Reaction  Anxiety is the feeling we all get when we think something bad might happen. It is a normal response to  stress and usually causes only a mild reaction. When anxiety becomes more severe, it can interfere with daily life. In some cases, you may not even be aware of what it is you re anxious about. There may also be a genetic link or it may be a learned behavior in the home.  Both psychological and physical triggers cause stress reaction. It's often a response to fear or emotional stress, real or imagined. This stress may come from home, family, work, or social relationships.  During an anxiety reaction, you may feel:    Helpless    Nervous    Depressed    Irritable  Your body may show signs of anxiety in many ways. You may experience:    Dry mouth    Shakiness    Dizziness    Weakness    Trouble breathing    Breathing fast (hyperventilating)    Chest pressure    Sweating    Headache    Nausea    Diarrhea    Tiredness    Inability to sleep    Sexual problems  Home care    Try to locate the sources of stress in your life. They may not be obvious. These may include:  ? Daily hassles of life (such as traffic jams, missed appointments, or car troubles)  ? Major life changes, both good (new baby or job promotion) and bad (loss of job or loss of loved one)  ? Overload: feeling that you have too many responsibilities and can't take care of all of them at once  ? Feeling helpless or feeling that your problems are beyond what you re able to solve    Notice how your body reacts to stress. Learn to listen to your body signals. This will help you take action before the stress becomes severe.    When you can, do something about the source of your stress. (Avoid hassles, limit the amount of change that happens in your life at one time and take a break when you feel overloaded).    Unfortunately, many stressful situations can't be avoided. It is necessary to learn how to better manage stress. There are many proven methods that will reduce your anxiety. These include simple things like exercise, good nutrition, and adequate rest. Also,  there are certain techniques that are helpful:  ? Relaxation  ? Breathing exercises  ? Visualization  ? Biofeedback  ? Meditation  For more information about this, consult your healthcare provider or go to a local bookstore and review the many books and tapes available on this subject.  Follow-up care  If you feel that your anxiety is not responding to self-help measures, contact your healthcare provider or make an appointment with a counselor. You may need short-term psychological counseling and temporary medicine to help you manage stress.  Call 911  Call 911 if any of these happen:    Trouble breathing    Confusion    Drowsiness or trouble wakening    Fainting or loss of consciousness    Rapid heart rate    Seizure    New chest pain that becomes more severe, lasts longer, or spreads into your shoulder, arm, neck, jaw, or back  When to seek medical advice  Call your healthcare provider right away if any of these happen:    Your symptoms get worse    Severe headache not relieved by rest and mild pain reliever  [employer] last reviewed this educational content on 10/1/2017    7340-0567 [employer] [address] [place] [address], [place] [address]. All rights reserved. This information is not intended as a substitute for professional medical care. Always follow your healthcare professional's instructions.

## 2020-12-14 ENCOUNTER — TELEPHONE (OUTPATIENT)
Dept: FAMILY MEDICINE | Facility: CLINIC | Age: 40
End: 2020-12-14

## 2020-12-14 NOTE — LETTER
[facility]  [address] [place] [id number]  [phone]      December 14, 2020    Anne TROY atz                                                                                                                     [address] [facility] [address]            Dear Anne,    At [facility] we care about your health and well-being. A review of your chart has indicated that you are due for an Asthma Control Test. For copyright reasons we have attached a copy of the questionnaire. Please complete the questions and mail them back to the clinic in the provided envelope.    You may contact the clinic at [phone] if you have any questions or concerns about this request.       Sincerely,     Benjamin [facility] Care Staff/ ss

## 2020-12-14 NOTE — TELEPHONE ENCOUNTER
Panel Management Review      Patient has the following on her problem list:     Asthma review     ACT Total Scores 10/15/2020   ACT TOTAL SCORE (Goal Greater than or Equal to 20) 16   In the past 12 months, how many times did you visit the emergency room for your asthma without being admitted to the hospital? 2   In the past 12 months, how many times were you hospitalized overnight because of your asthma? 0      1. Is Asthma diagnosis on the Problem List? No   2. Is Asthma listed on Health Maintenance? No   3. Patient is due for:  ACT      Composite cancer screening  Chart review shows that this patient is due/due soon for the following None  Summary:    Patient is due/failing the following:   ACT    Action needed:   Patient needs to do ACT.    Type of outreach:    Copy of ACT mailed to patient, will reach out in 5 days.    Questions for provider review:    None                                                                                                                                    Lavonne Richey MA      Chart routed to Care Team .

## 2020-12-15 ENCOUNTER — OFFICE VISIT (OUTPATIENT)
Dept: SURGERY | Facility: CLINIC | Age: 40
End: 2020-12-15
Payer: COMMERCIAL

## 2020-12-15 VITALS
BODY MASS INDEX: 35.49 KG/M2 | WEIGHT: 207.89 LBS | DIASTOLIC BLOOD PRESSURE: 101 MMHG | HEART RATE: 77 BPM | TEMPERATURE: 98.2 F | HEIGHT: 64 IN | SYSTOLIC BLOOD PRESSURE: 138 MMHG

## 2020-12-15 DIAGNOSIS — K80.20 SYMPTOMATIC CHOLELITHIASIS: Primary | ICD-10-CM

## 2020-12-15 DIAGNOSIS — Z11.59 ENCOUNTER FOR SCREENING FOR OTHER VIRAL DISEASES: Primary | ICD-10-CM

## 2020-12-15 PROCEDURE — 99204 OFFICE O/P NEW MOD 45 MIN: CPT | Performed by: SURGERY

## 2020-12-15 ASSESSMENT — MIFFLIN-ST. JEOR: SCORE: 1593

## 2020-12-15 NOTE — PROGRESS NOTES
Surgical Consultation/History and Physical  [facility]    Anne is seen in consultation for abdominal pain    Chief Complaint:  Abdominal pain    History of Present Illness: Anne Ferreira is a 40 year old female presents with right upper quadrant abdominal pain.  Patient notes intermittent, stabbing, dull at times; pain lasts for several hours then resolves, radiates into her back.  Worse with foods, no specific foods.  She has associated nausea.  Denies acholic stools, dark urine, melena, history of PUD.  She admits reflux, she feels this is stable.  She notes no improvement with Prilosec.    Patient Active Problem List   Diagnosis     H/O LEEP     Allergic rhinitis     Attention deficit hyperactivity disorder, combined type, mild     Cervical spinal stenosis     Dyslipidemia     Dysmenorrhea     Generalized anxiety disorder     GERD (gastroesophageal reflux disease)     Herniation of cervical intervertebral disc with radiculopathy     Thoracic back pain     Morbid obesity (H)     Past Medical History:   Diagnosis Date     Abnormal Pap smear of cervix      No past surgical history on file.    Family History   Problem Relation Age of Onset     Hypertension Father      Hypertension Brother      Hypertension Sister      Hypertension Brother      Social History     Tobacco Use     Smoking status: Current Every Day Smoker     Smokeless tobacco: Never Used   Substance Use Topics     Alcohol use: Yes      History   Drug Use Unknown     Current Outpatient Medications   Medication Sig Dispense Refill     albuterol (PROAIR HFA/PROVENTIL HFA/VENTOLIN HFA) 108 (90 Base) MCG/ACT inhaler Inhale 2 puffs into the lungs every 6 hours 1 Inhaler 1     dexmethylphenidate (FOCALIN) 10 MG tablet Take 1 tablet (10 mg) by mouth 2 times daily 60 tablet 0     escitalopram (LEXAPRO) 10 MG tablet Take 1 tablet (10 mg) by mouth daily 90 tablet 0     escitalopram (LEXAPRO) 20 MG tablet Take 1 tablet (20 mg) by mouth daily 90  "tablet 3     ethynodiol-ethinyl estradiol (ZOVIA) 1-50 MG-MCG tablet Take 1 tablet by mouth daily 84 tablet 4     ipratropium - albuterol 0.5 mg/2.5 mg/3 mL (DUONEB) 0.5-2.5 (3) MG/3ML neb solution Take 1 vial by nebulization every 6 hours as needed for shortness of breath / dyspnea or wheezing       lisinopril (ZESTRIL) 20 MG tablet Take 1 tablet (20 mg) by mouth daily 90 tablet 1     LORazepam (ATIVAN) 1 MG tablet Take 0.5-1 tablets (0.5-1 mg) by mouth 2 times daily as needed for anxiety Max dose with 1 mg a day.  15 tablets must last a month 15 tablet 0     montelukast (SINGULAIR) 10 MG tablet Take 1 tablet (10 mg) by mouth At Bedtime 90 tablet 1     omeprazole (PRILOSEC) 20 MG DR capsule Take 20 mg by mouth daily       propranolol (INDERAL) 80 MG tablet Take 1 tablet (80 mg) by mouth daily 90 tablet 3     Allergies   Allergen Reactions     Sulfa Drugs      Review of Systems:   10 point ROS otherwise negative    Physical Exam:  BP (!) 138/101 (BP Location: Right arm, Patient Position: Sitting, Cuff Size: Adult Large)   Pulse 77   Temp 98.2  F (36.8  C) (Tympanic)   Ht 1.626 m (5' 4\")   Wt 94.3 kg (207 lb 14.3 oz)   BMI 35.68 kg/m      Constitutional- No acute distress, well nourished, non-toxic  Eyes: Anicteric, no injection.  PERRL  ENT:  Normocephalic, atraumatic, Nose midline, moist mucus membranes  Neck - supple, no LAD, Thyroid smooth, symmetric, Carotids without bruits  Respiratory- Clear to auscultation bilaterally, good inspiratory effort  Cardiovascular - Heart RRR, no lift's, thrills, murmurs, rubs, or gallop.  No peripheral edema.  No clubbing.  Abdomen - Soft, non-tender, +BS, no hepatosplenomegaly, no palpable masses  Neuro - No focal neuro deficits, Alert and oriented x 3  Psych: Appropriate mood and affect  Musculoskeletal: Normal gait, symmetric strength.  FROM upper and lower extremities.  Skin: Warm, Dry    Lab Results   Component Value Date    WBC 10.2 12/10/2020     Lab Results "   Component Value Date    RBC 4.29 12/10/2020     Lab Results   Component Value Date    HGB 12.3 12/10/2020     Lab Results   Component Value Date    HCT 38.4 12/10/2020     Lab Results   Component Value Date    MCV 90 12/10/2020     Lab Results   Component Value Date    MCH 28.7 12/10/2020     Lab Results   Component Value Date    MCHC 32.0 12/10/2020     Lab Results   Component Value Date    RDW 13.4 12/10/2020     Lab Results   Component Value Date     12/10/2020     Last Comprehensive Metabolic Panel:  Sodium   Date Value Ref Range Status   12/10/2020 137 133 - 144 mmol/L Final     Potassium   Date Value Ref Range Status   12/10/2020 3.8 3.4 - 5.3 mmol/L Final     Chloride   Date Value Ref Range Status   12/10/2020 106 94 - 109 mmol/L Final     Carbon Dioxide   Date Value Ref Range Status   12/10/2020 25 20 - 32 mmol/L Final     Anion Gap   Date Value Ref Range Status   12/10/2020 6 3 - 14 mmol/L Final     Glucose   Date Value Ref Range Status   12/10/2020 120 (H) 70 - 99 mg/dL Final     Urea Nitrogen   Date Value Ref Range Status   12/10/2020 10 7 - 30 mg/dL Final     Creatinine   Date Value Ref Range Status   12/10/2020 0.58 0.52 - 1.04 mg/dL Final     GFR Estimate   Date Value Ref Range Status   12/10/2020 >90 >60 mL/min/[1.73_m2] Final     Comment:     Non  GFR Calc  Starting 12/18/2018, serum creatinine based estimated GFR (eGFR) will be   calculated using the Chronic Kidney Disease Epidemiology Collaboration   (CKD-EPI) equation.       Calcium   Date Value Ref Range Status   12/10/2020 8.9 8.5 - 10.1 mg/dL Final     Bilirubin Total   Date Value Ref Range Status   12/10/2020 0.2 0.2 - 1.3 mg/dL Final     Alkaline Phosphatase   Date Value Ref Range Status   12/10/2020 64 40 - 150 U/L Final     ALT   Date Value Ref Range Status   12/10/2020 18 0 - 50 U/L Final     AST   Date Value Ref Range Status   12/10/2020 14 0 - 45 U/L Final     Lipase   Date Value Ref Range Status   12/10/2020 107  73 - 393 U/L Final     CT Abdomen/Pelvis:  FINDINGS:   LOWER CHEST: Normal.     HEPATOBILIARY: Mild hepatomegaly. No suspicious liver lesion. No calcified gallstones..     PANCREAS: Normal.     SPLEEN: Normal.     ADRENAL GLANDS: Normal.     KIDNEYS/BLADDER: No renal mass or hydronephrosis. There are couple of 2-3 mm stones in the right kidney. Urinary bladder within normal limits.     BOWEL: Normal. Normal appendix.     LYMPH NODES: Normal.     VASCULATURE: Negative     PELVIC ORGANS: 2 cm uterine fundal fibroid suggested.     MUSCULOSKELETAL: Normal.                                                                    IMPRESSION:   1.  No acute abnormality in the abdomen or pelvis.   2.  Nonobstructing right renal stones.   3.  Mild hepatomegaly.    US Abdomen:FINDINGS:    Gallbladder: Normal with no cholelithiasis, wall thickening or focal  tenderness.       Bile ducts:  CHD is normal diameter.  No intrahepatic biliary  dilatation. The distal portion of the common bile duct is obscured by  overlying bowel gas.     Liver: Demonstrates increased parenchymal echogenicity. No focal  hepatic mass visualized.     Pancreas:  Partially obscured by overlying bowel gas,  but grossly  unremarkable.      Right kidney:  No hydronephrosis or shadowing calculi.     Aorta and IVC:  Not specifically assessed.                                                                       IMPRESSION:  Hepatic steatosis. No focal hepatic mass visualized.       Assessment:  1. Symptomatic cholelithiasis      Plan:   Anne Ferreira presents with right upper quadrant abdominal pain. Symptoms are progressively worsening recently with 3 ED visits in the last month, increasing in intensity and in frequency. Examination is generally unremarkable, and liver function tests are normal. Ultrasound demonstrates sludge (11/2020 ultrasound), without biliary duct dilatation, gallbladder wall thickening or pericholecystic fluid.  The patient may benefit from  laparoscopic cholecystectomy, and the indications, risks, benefits and alternatives to surgery were discussed in detail.  She understood the counseling offered and wishes to proceed as planned and outlined. Risks specifically discussed include  bleeding, infection, hernia, need for additional treatment, nontherapeutic intervention, wound complication (such as dehiscence), retained bile duct stone, conversion to open surgery, potential for cholangiography or bile duct exploration, drainage tubes, chronic diarrhea, bile duct injury, bile leak, and rare complications related to surgery and/or anesthesia such as venous thromboembolism and cardiorespiratory complications.    She is able to obtain 4 METS of activity without chest pain or shortness of breath, she can proceed to OR without further evaluation.    We discussed COVID testing prior to operating room.    Manuel Durham, DO on 12/15/2020 at 7:40 AM

## 2020-12-15 NOTE — LETTER
12/15/2020         RE: Anne Ferreira  [address] [address]        Dear Colleague,    Thank you for referring your patient, Anne Ferreira, to the [facility]. Please see a copy of my visit note below.    Surgical Consultation/History and Physical  [facility]    Anne is seen in consultation for abdominal pain    Chief Complaint:  Abdominal pain    History of Present Illness: Anne Ferreira is a 40 year old female presents with right upper quadrant abdominal pain.  Patient notes intermittent, stabbing, dull at times; pain lasts for several hours then resolves, radiates into her back.  Worse with foods, no specific foods.  She has associated nausea.  Denies acholic stools, dark urine, melena, history of PUD.  She admits reflux, she feels this is stable.  She notes no improvement with Prilosec.    Patient Active Problem List   Diagnosis     H/O LEEP     Allergic rhinitis     Attention deficit hyperactivity disorder, combined type, mild     Cervical spinal stenosis     Dyslipidemia     Dysmenorrhea     Generalized anxiety disorder     GERD (gastroesophageal reflux disease)     Herniation of cervical intervertebral disc with radiculopathy     Thoracic back pain     Morbid obesity (H)     Past Medical History:   Diagnosis Date     Abnormal Pap smear of cervix      No past surgical history on file.    Family History   Problem Relation Age of Onset     Hypertension Father      Hypertension Brother      Hypertension Sister      Hypertension Brother      Social History     Tobacco Use     Smoking status: Current Every Day Smoker     Smokeless tobacco: Never Used   Substance Use Topics     Alcohol use: Yes      History   Drug Use Unknown     Current Outpatient Medications   Medication Sig Dispense Refill     albuterol (PROAIR HFA/PROVENTIL HFA/VENTOLIN HFA) 108 (90 Base) MCG/ACT inhaler Inhale 2 puffs into the lungs every 6 hours 1 Inhaler 1     dexmethylphenidate  "(FOCALIN) 10 MG tablet Take 1 tablet (10 mg) by mouth 2 times daily 60 tablet 0     escitalopram (LEXAPRO) 10 MG tablet Take 1 tablet (10 mg) by mouth daily 90 tablet 0     escitalopram (LEXAPRO) 20 MG tablet Take 1 tablet (20 mg) by mouth daily 90 tablet 3     ethynodiol-ethinyl estradiol (ZOVIA) 1-50 MG-MCG tablet Take 1 tablet by mouth daily 84 tablet 4     ipratropium - albuterol 0.5 mg/2.5 mg/3 mL (DUONEB) 0.5-2.5 (3) MG/3ML neb solution Take 1 vial by nebulization every 6 hours as needed for shortness of breath / dyspnea or wheezing       lisinopril (ZESTRIL) 20 MG tablet Take 1 tablet (20 mg) by mouth daily 90 tablet 1     LORazepam (ATIVAN) 1 MG tablet Take 0.5-1 tablets (0.5-1 mg) by mouth 2 times daily as needed for anxiety Max dose with 1 mg a day.  15 tablets must last a month 15 tablet 0     montelukast (SINGULAIR) 10 MG tablet Take 1 tablet (10 mg) by mouth At Bedtime 90 tablet 1     omeprazole (PRILOSEC) 20 MG DR capsule Take 20 mg by mouth daily       propranolol (INDERAL) 80 MG tablet Take 1 tablet (80 mg) by mouth daily 90 tablet 3     Allergies   Allergen Reactions     Sulfa Drugs      Review of Systems:   10 point ROS otherwise negative    Physical Exam:  BP (!) 138/101 (BP Location: Right arm, Patient Position: Sitting, Cuff Size: Adult Large)   Pulse 77   Temp 98.2  F (36.8  C) (Tympanic)   Ht 1.626 m (5' 4\")   Wt 94.3 kg (207 lb 14.3 oz)   BMI 35.68 kg/m      Constitutional- No acute distress, well nourished, non-toxic  Eyes: Anicteric, no injection.  PERRL  ENT:  Normocephalic, atraumatic, Nose midline, moist mucus membranes  Neck - supple, no LAD, Thyroid smooth, symmetric, Carotids without bruits  Respiratory- Clear to auscultation bilaterally, good inspiratory effort  Cardiovascular - Heart RRR, no lift's, thrills, murmurs, rubs, or gallop.  No peripheral edema.  No clubbing.  Abdomen - Soft, non-tender, +BS, no hepatosplenomegaly, no palpable masses  Neuro - No focal neuro deficits, " Alert and oriented x 3  Psych: Appropriate mood and affect  Musculoskeletal: Normal gait, symmetric strength.  FROM upper and lower extremities.  Skin: Warm, Dry    Lab Results   Component Value Date    WBC 10.2 12/10/2020     Lab Results   Component Value Date    RBC 4.29 12/10/2020     Lab Results   Component Value Date    HGB 12.3 12/10/2020     Lab Results   Component Value Date    HCT 38.4 12/10/2020     Lab Results   Component Value Date    MCV 90 12/10/2020     Lab Results   Component Value Date    MCH 28.7 12/10/2020     Lab Results   Component Value Date    MCHC 32.0 12/10/2020     Lab Results   Component Value Date    RDW 13.4 12/10/2020     Lab Results   Component Value Date     12/10/2020     Last Comprehensive Metabolic Panel:  Sodium   Date Value Ref Range Status   12/10/2020 137 133 - 144 mmol/L Final     Potassium   Date Value Ref Range Status   12/10/2020 3.8 3.4 - 5.3 mmol/L Final     Chloride   Date Value Ref Range Status   12/10/2020 106 94 - 109 mmol/L Final     Carbon Dioxide   Date Value Ref Range Status   12/10/2020 25 20 - 32 mmol/L Final     Anion Gap   Date Value Ref Range Status   12/10/2020 6 3 - 14 mmol/L Final     Glucose   Date Value Ref Range Status   12/10/2020 120 (H) 70 - 99 mg/dL Final     Urea Nitrogen   Date Value Ref Range Status   12/10/2020 10 7 - 30 mg/dL Final     Creatinine   Date Value Ref Range Status   12/10/2020 0.58 0.52 - 1.04 mg/dL Final     GFR Estimate   Date Value Ref Range Status   12/10/2020 >90 >60 mL/min/[1.73_m2] Final     Comment:     Non  GFR Calc  Starting 12/18/2018, serum creatinine based estimated GFR (eGFR) will be   calculated using the Chronic Kidney Disease Epidemiology Collaboration   (CKD-EPI) equation.       Calcium   Date Value Ref Range Status   12/10/2020 8.9 8.5 - 10.1 mg/dL Final     Bilirubin Total   Date Value Ref Range Status   12/10/2020 0.2 0.2 - 1.3 mg/dL Final     Alkaline Phosphatase   Date Value Ref Range  Status   12/10/2020 64 40 - 150 U/L Final     ALT   Date Value Ref Range Status   12/10/2020 18 0 - 50 U/L Final     AST   Date Value Ref Range Status   12/10/2020 14 0 - 45 U/L Final     Lipase   Date Value Ref Range Status   12/10/2020 107 73 - 393 U/L Final     CT Abdomen/Pelvis:  FINDINGS:   LOWER CHEST: Normal.     HEPATOBILIARY: Mild hepatomegaly. No suspicious liver lesion. No calcified gallstones..     PANCREAS: Normal.     SPLEEN: Normal.     ADRENAL GLANDS: Normal.     KIDNEYS/BLADDER: No renal mass or hydronephrosis. There are couple of 2-3 mm stones in the right kidney. Urinary bladder within normal limits.     BOWEL: Normal. Normal appendix.     LYMPH NODES: Normal.     VASCULATURE: Negative     PELVIC ORGANS: 2 cm uterine fundal fibroid suggested.     MUSCULOSKELETAL: Normal.                                                                    IMPRESSION:   1.  No acute abnormality in the abdomen or pelvis.   2.  Nonobstructing right renal stones.   3.  Mild hepatomegaly.    US Abdomen:FINDINGS:    Gallbladder: Normal with no cholelithiasis, wall thickening or focal  tenderness.       Bile ducts:  CHD is normal diameter.  No intrahepatic biliary  dilatation. The distal portion of the common bile duct is obscured by  overlying bowel gas.     Liver: Demonstrates increased parenchymal echogenicity. No focal  hepatic mass visualized.     Pancreas:  Partially obscured by overlying bowel gas,  but grossly  unremarkable.      Right kidney:  No hydronephrosis or shadowing calculi.     Aorta and IVC:  Not specifically assessed.                                                                       IMPRESSION:  Hepatic steatosis. No focal hepatic mass visualized.       Assessment:  1. Symptomatic cholelithiasis      Plan:   Anne Ferreira presents with right upper quadrant abdominal pain. Symptoms are progressively worsening recently with 3 ED visits in the last month, increasing in intensity and in frequency.  Examination is generally unremarkable, and liver function tests are normal. Ultrasound demonstrates sludge (11/2020 ultrasound), without biliary duct dilatation, gallbladder wall thickening or pericholecystic fluid.  The patient may benefit from laparoscopic cholecystectomy, and the indications, risks, benefits and alternatives to surgery were discussed in detail.  She understood the counseling offered and wishes to proceed as planned and outlined. Risks specifically discussed include  bleeding, infection, hernia, need for additional treatment, nontherapeutic intervention, wound complication (such as dehiscence), retained bile duct stone, conversion to open surgery, potential for cholangiography or bile duct exploration, drainage tubes, chronic diarrhea, bile duct injury, bile leak, and rare complications related to surgery and/or anesthesia such as venous thromboembolism and cardiorespiratory complications.    She is able to obtain 4 METS of activity without chest pain or shortness of breath, she can proceed to OR without further evaluation.    We discussed COVID testing prior to operating room.    Manuel Durham DO on 12/15/2020 at 7:40 AM        Again, thank you for allowing me to participate in the care of your patient.        Sincerely,        Manuel Durham DO

## 2020-12-15 NOTE — NURSING NOTE
"Initial BP (!) 138/101 (BP Location: Right arm, Patient Position: Sitting, Cuff Size: Adult Large)   Pulse 77   Temp 98.2  F (36.8  C) (Tympanic)   Ht 1.626 m (5' 4\")   Wt 94.3 kg (207 lb 14.3 oz)   BMI 35.68 kg/m   Estimated body mass index is 35.68 kg/m  as calculated from the following:    Height as of this encounter: 1.626 m (5' 4\").    Weight as of this encounter: 94.3 kg (207 lb 14.3 oz). .    Anne Brown MA    "

## 2020-12-18 ENCOUNTER — ANESTHESIA EVENT (OUTPATIENT)
Dept: SURGERY | Facility: CLINIC | Age: 40
End: 2020-12-18
Payer: COMMERCIAL

## 2020-12-18 DIAGNOSIS — Z11.59 ENCOUNTER FOR SCREENING FOR OTHER VIRAL DISEASES: ICD-10-CM

## 2020-12-18 LAB
SARS-COV-2 RNA SPEC QL NAA+PROBE: NORMAL
SPECIMEN SOURCE: NORMAL

## 2020-12-18 PROCEDURE — U0003 INFECTIOUS AGENT DETECTION BY NUCLEIC ACID (DNA OR RNA); SEVERE ACUTE RESPIRATORY SYNDROME CORONAVIRUS 2 (SARS-COV-2) (CORONAVIRUS DISEASE [COVID-19]), AMPLIFIED PROBE TECHNIQUE, MAKING USE OF HIGH THROUGHPUT TECHNOLOGIES AS DESCRIBED BY CMS-2020-01-R: HCPCS | Performed by: SURGERY

## 2020-12-18 ASSESSMENT — LIFESTYLE VARIABLES: TOBACCO_USE: 1

## 2020-12-18 NOTE — ANESTHESIA PREPROCEDURE EVALUATION
Anesthesia Pre-Procedure Evaluation    Patient: Anne Ferreira   MRN: [id number] : 1980          Preoperative Diagnosis: Symptomatic cholelithiasis [K80.20]    Procedure(s):  Laparoscopic cholecystectomy possible open cholecystectomy    Past Medical History:   Diagnosis Date     Abnormal Pap smear of cervix      No past surgical history on file.    Anesthesia Evaluation     .             ROS/MED HX    ENT/Pulmonary:     (+)allergic rhinitis, tobacco use, , . .    Neurologic:  - neg neurologic ROS     Cardiovascular:  - neg cardiovascular ROS       METS/Exercise Tolerance:     Hematologic:  - neg hematologic  ROS       Musculoskeletal:  - neg musculoskeletal ROS       GI/Hepatic:     (+) GERD       Renal/Genitourinary:  - ROS Renal section negative       Endo:  - neg endo ROS   (+) Obesity, .      Psychiatric:     (+) psychiatric history anxiety and other (comment) (ADHD)      Infectious Disease:  - neg infectious disease ROS       Malignancy:      - no malignancy   Other:                          Physical Exam  Normal systems: cardiovascular, pulmonary and dental    Airway   Mallampati: II  TM distance: >3 FB  Neck ROM: full    Dental     Cardiovascular       Pulmonary             Lab Results   Component Value Date    WBC 10.2 12/10/2020    HGB 12.3 12/10/2020    HCT 38.4 12/10/2020     12/10/2020     12/10/2020    POTASSIUM 3.8 12/10/2020    CHLORIDE 106 12/10/2020    CO2 25 12/10/2020    BUN 10 12/10/2020    CR 0.58 12/10/2020     (H) 12/10/2020    JAIME 8.9 12/10/2020    ALBUMIN 3.1 (L) 12/10/2020    PROTTOTAL 7.0 12/10/2020    ALT 18 12/10/2020    AST 14 12/10/2020    ALKPHOS 64 12/10/2020    BILITOTAL 0.2 12/10/2020    LIPASE 107 12/10/2020    TSH 4.93 (H) 10/15/2020    T4 0.80 10/15/2020    HCG Negative 12/10/2020       Preop Vitals  BP Readings from Last 3 Encounters:   12/15/20 (!) 138/101   20 110/70   12/10/20 114/63    Pulse Readings from Last 3 Encounters:   12/15/20 77  "  12/11/20 68   12/10/20 68      Resp Readings from Last 3 Encounters:   12/10/20 16   12/08/20 16   11/10/20 18    SpO2 Readings from Last 3 Encounters:   12/10/20 96%   12/08/20 97%   11/10/20 97%      Temp Readings from Last 1 Encounters:   12/15/20 36.8  C (98.2  F) (Tympanic)    Ht Readings from Last 1 Encounters:   12/15/20 1.626 m (5' 4\")      Wt Readings from Last 1 Encounters:   12/15/20 94.3 kg (207 lb 14.3 oz)    Estimated body mass index is 35.68 kg/m  as calculated from the following:    Height as of 12/15/20: 1.626 m (5' 4\").    Weight as of 12/15/20: 94.3 kg (207 lb 14.3 oz).       Anesthesia Plan      History & Physical Review  History and physical reviewed and following examination; no interval change.    ASA Status:  2 .    NPO Status:  > 8 hours    Plan for General with Intravenous induction. Maintenance will be Inhalation.    PONV prophylaxis:  Ondansetron (or other 5HT-3) and Dexamethasone or Solumedrol  Additional equipment: Videolaryngoscope        Postoperative Care  Postoperative pain management:  Multi-modal analgesia.      Consents  Anesthetic plan, risks, benefits and alternatives discussed with:  Patient.  Use of blood products discussed: Yes.   Use of blood products discussed with Patient.  .                 FRANKLYN Sy CRNA  "

## 2020-12-19 LAB
LABORATORY COMMENT REPORT: NORMAL
SARS-COV-2 RNA SPEC QL NAA+PROBE: NEGATIVE
SPECIMEN SOURCE: NORMAL

## 2020-12-21 ENCOUNTER — HOSPITAL ENCOUNTER (OUTPATIENT)
Facility: CLINIC | Age: 40
Discharge: HOME | End: 2020-12-21
Attending: SURGERY | Admitting: SURGERY
Payer: COMMERCIAL

## 2020-12-21 ENCOUNTER — ANESTHESIA (OUTPATIENT)
Dept: SURGERY | Facility: CLINIC | Age: 40
End: 2020-12-21
Payer: COMMERCIAL

## 2020-12-21 VITALS
DIASTOLIC BLOOD PRESSURE: 68 MMHG | SYSTOLIC BLOOD PRESSURE: 115 MMHG | BODY MASS INDEX: 35.85 KG/M2 | OXYGEN SATURATION: 96 % | HEART RATE: 68 BPM | RESPIRATION RATE: 20 BRPM | TEMPERATURE: 98.9 F | HEIGHT: 64 IN | WEIGHT: 210 LBS

## 2020-12-21 DIAGNOSIS — K80.20 SYMPTOMATIC CHOLELITHIASIS: ICD-10-CM

## 2020-12-21 LAB — HCG UR QL: NEGATIVE

## 2020-12-21 PROCEDURE — 250N000003 HC SEVOFLURANE, EA 15 MIN: Performed by: SURGERY

## 2020-12-21 PROCEDURE — 258N000003 HC RX IP 258 OP 636: Performed by: REGISTERED NURSE

## 2020-12-21 PROCEDURE — 250N000009 HC RX 250: Performed by: NURSE ANESTHETIST, CERTIFIED REGISTERED

## 2020-12-21 PROCEDURE — 271N000001 HC OR GENERAL SUPPLY NON-STERILE: Performed by: SURGERY

## 2020-12-21 PROCEDURE — 360N000021 HC SURGERY LEVEL 3 EA 15 ADDTL MIN: Performed by: SURGERY

## 2020-12-21 PROCEDURE — 761N000002 HC RECOVERY PHASE 1 LEVEL 1 EA ADDTL HR: Performed by: SURGERY

## 2020-12-21 PROCEDURE — 250N000009 HC RX 250: Performed by: SURGERY

## 2020-12-21 PROCEDURE — 258N000003 HC RX IP 258 OP 636: Performed by: NURSE ANESTHETIST, CERTIFIED REGISTERED

## 2020-12-21 PROCEDURE — 81025 URINE PREGNANCY TEST: CPT | Performed by: NURSE ANESTHETIST, CERTIFIED REGISTERED

## 2020-12-21 PROCEDURE — 250N000011 HC RX IP 250 OP 636: Performed by: SURGERY

## 2020-12-21 PROCEDURE — 47562 LAPAROSCOPIC CHOLECYSTECTOMY: CPT | Performed by: SURGERY

## 2020-12-21 PROCEDURE — 47562 LAPAROSCOPIC CHOLECYSTECTOMY: CPT | Mod: AS | Performed by: PHYSICIAN ASSISTANT

## 2020-12-21 PROCEDURE — 88304 TISSUE EXAM BY PATHOLOGIST: CPT | Mod: 26 | Performed by: PATHOLOGY

## 2020-12-21 PROCEDURE — 370N000001 HC ANESTHESIA TECHNICAL FEE, 1ST 30 MIN: Performed by: SURGERY

## 2020-12-21 PROCEDURE — 250N000013 HC RX MED GY IP 250 OP 250 PS 637: Performed by: NURSE ANESTHETIST, CERTIFIED REGISTERED

## 2020-12-21 PROCEDURE — 761N000001 HC RECOVERY PHASE 1 LEVEL 1 FIRST HR: Performed by: SURGERY

## 2020-12-21 PROCEDURE — 370N000002 HC ANESTHESIA TECHNICAL FEE, EACH ADDTL 15 MIN: Performed by: SURGERY

## 2020-12-21 PROCEDURE — 272N000004 HC RX 272: Performed by: SURGERY

## 2020-12-21 PROCEDURE — 272N000001 HC OR GENERAL SUPPLY STERILE: Performed by: SURGERY

## 2020-12-21 PROCEDURE — 999N000135 HC STATISTIC PRE PROC ASSESS I: Performed by: SURGERY

## 2020-12-21 PROCEDURE — 250N000011 HC RX IP 250 OP 636: Performed by: REGISTERED NURSE

## 2020-12-21 PROCEDURE — 250N000013 HC RX MED GY IP 250 OP 250 PS 637: Performed by: SURGERY

## 2020-12-21 PROCEDURE — 761N000007 HC RECOVERY PHASE 2 EACH 15 MINS: Performed by: SURGERY

## 2020-12-21 PROCEDURE — 250N000009 HC RX 250: Performed by: REGISTERED NURSE

## 2020-12-21 PROCEDURE — 88304 TISSUE EXAM BY PATHOLOGIST: CPT | Mod: TC | Performed by: SURGERY

## 2020-12-21 PROCEDURE — 360N000020 HC SURGERY LEVEL 3 1ST 30 MIN: Performed by: SURGERY

## 2020-12-21 RX ORDER — DOCUSATE SODIUM 100 MG/1
100 CAPSULE, LIQUID FILLED ORAL 2 TIMES DAILY
Refills: 0 | COMMUNITY
Start: 2020-12-21 | End: 2021-05-10

## 2020-12-21 RX ORDER — KETOROLAC TROMETHAMINE 30 MG/ML
30 INJECTION, SOLUTION INTRAMUSCULAR; INTRAVENOUS EVERY 6 HOURS PRN
Status: DISCONTINUED | OUTPATIENT
Start: 2020-12-21 | End: 2020-12-21 | Stop reason: HOSPADM

## 2020-12-21 RX ORDER — DEXAMETHASONE SODIUM PHOSPHATE 4 MG/ML
INJECTION, SOLUTION INTRA-ARTICULAR; INTRALESIONAL; INTRAMUSCULAR; INTRAVENOUS; SOFT TISSUE PRN
Status: DISCONTINUED | OUTPATIENT
Start: 2020-12-21 | End: 2020-12-21

## 2020-12-21 RX ORDER — ONDANSETRON 2 MG/ML
INJECTION INTRAMUSCULAR; INTRAVENOUS PRN
Status: DISCONTINUED | OUTPATIENT
Start: 2020-12-21 | End: 2020-12-21

## 2020-12-21 RX ORDER — NALOXONE HYDROCHLORIDE 0.4 MG/ML
0.4 INJECTION, SOLUTION INTRAMUSCULAR; INTRAVENOUS; SUBCUTANEOUS
Status: DISCONTINUED | OUTPATIENT
Start: 2020-12-21 | End: 2020-12-21 | Stop reason: HOSPADM

## 2020-12-21 RX ORDER — BUPIVACAINE HYDROCHLORIDE 5 MG/ML
INJECTION, SOLUTION PERINEURAL PRN
Status: DISCONTINUED | OUTPATIENT
Start: 2020-12-21 | End: 2020-12-21 | Stop reason: HOSPADM

## 2020-12-21 RX ORDER — CEFAZOLIN SODIUM 2 G/100ML
2 INJECTION, SOLUTION INTRAVENOUS
Status: COMPLETED | OUTPATIENT
Start: 2020-12-21 | End: 2020-12-21

## 2020-12-21 RX ORDER — CEFAZOLIN SODIUM 1 G/3ML
1 INJECTION, POWDER, FOR SOLUTION INTRAMUSCULAR; INTRAVENOUS SEE ADMIN INSTRUCTIONS
Status: DISCONTINUED | OUTPATIENT
Start: 2020-12-21 | End: 2020-12-21 | Stop reason: HOSPADM

## 2020-12-21 RX ORDER — NALOXONE HYDROCHLORIDE 0.4 MG/ML
0.2 INJECTION, SOLUTION INTRAMUSCULAR; INTRAVENOUS; SUBCUTANEOUS
Status: DISCONTINUED | OUTPATIENT
Start: 2020-12-21 | End: 2020-12-21 | Stop reason: HOSPADM

## 2020-12-21 RX ORDER — OXYCODONE HCL 5 MG/5 ML
5 SOLUTION, ORAL ORAL EVERY 4 HOURS PRN
Status: DISCONTINUED | OUTPATIENT
Start: 2020-12-21 | End: 2020-12-21 | Stop reason: HOSPADM

## 2020-12-21 RX ORDER — ONDANSETRON 4 MG/1
4 TABLET, ORALLY DISINTEGRATING ORAL EVERY 30 MIN PRN
Status: DISCONTINUED | OUTPATIENT
Start: 2020-12-21 | End: 2020-12-21 | Stop reason: HOSPADM

## 2020-12-21 RX ORDER — DOCUSATE SODIUM 100 MG/1
100 CAPSULE, LIQUID FILLED ORAL 2 TIMES DAILY
Refills: 0 | COMMUNITY
Start: 2020-12-21 | End: 2021-04-21

## 2020-12-21 RX ORDER — LIDOCAINE HYDROCHLORIDE 10 MG/ML
INJECTION, SOLUTION EPIDURAL; INFILTRATION; INTRACAUDAL; PERINEURAL PRN
Status: DISCONTINUED | OUTPATIENT
Start: 2020-12-21 | End: 2020-12-21

## 2020-12-21 RX ORDER — GLYCOPYRROLATE 0.2 MG/ML
INJECTION, SOLUTION INTRAMUSCULAR; INTRAVENOUS PRN
Status: DISCONTINUED | OUTPATIENT
Start: 2020-12-21 | End: 2020-12-21

## 2020-12-21 RX ORDER — FENTANYL CITRATE 50 UG/ML
INJECTION, SOLUTION INTRAMUSCULAR; INTRAVENOUS PRN
Status: DISCONTINUED | OUTPATIENT
Start: 2020-12-21 | End: 2020-12-21

## 2020-12-21 RX ORDER — LIDOCAINE 40 MG/G
CREAM TOPICAL
Status: DISCONTINUED | OUTPATIENT
Start: 2020-12-21 | End: 2020-12-21 | Stop reason: HOSPADM

## 2020-12-21 RX ORDER — MEPERIDINE HYDROCHLORIDE 25 MG/ML
12.5 INJECTION INTRAMUSCULAR; INTRAVENOUS; SUBCUTANEOUS
Status: DISCONTINUED | OUTPATIENT
Start: 2020-12-21 | End: 2020-12-21 | Stop reason: HOSPADM

## 2020-12-21 RX ORDER — OXYCODONE HYDROCHLORIDE 5 MG/1
5 TABLET ORAL EVERY 4 HOURS PRN
Status: DISCONTINUED | OUTPATIENT
Start: 2020-12-21 | End: 2020-12-21 | Stop reason: HOSPADM

## 2020-12-21 RX ORDER — ACETAMINOPHEN 325 MG/1
975 TABLET ORAL ONCE
Status: DISCONTINUED | OUTPATIENT
Start: 2020-12-21 | End: 2020-12-21 | Stop reason: HOSPADM

## 2020-12-21 RX ORDER — OXYCODONE HYDROCHLORIDE 5 MG/1
5 TABLET ORAL
Status: DISCONTINUED | OUTPATIENT
Start: 2020-12-21 | End: 2020-12-21 | Stop reason: DRUGHIGH

## 2020-12-21 RX ORDER — METOPROLOL TARTRATE 1 MG/ML
1-2 INJECTION, SOLUTION INTRAVENOUS EVERY 5 MIN PRN
Status: DISCONTINUED | OUTPATIENT
Start: 2020-12-21 | End: 2020-12-21 | Stop reason: HOSPADM

## 2020-12-21 RX ORDER — LIDOCAINE HYDROCHLORIDE AND EPINEPHRINE 10; 10 MG/ML; UG/ML
INJECTION, SOLUTION INFILTRATION; PERINEURAL PRN
Status: DISCONTINUED | OUTPATIENT
Start: 2020-12-21 | End: 2020-12-21 | Stop reason: HOSPADM

## 2020-12-21 RX ORDER — EPHEDRINE SULFATE 50 MG/ML
INJECTION, SOLUTION INTRAVENOUS PRN
Status: DISCONTINUED | OUTPATIENT
Start: 2020-12-21 | End: 2020-12-21

## 2020-12-21 RX ORDER — HYDRALAZINE HYDROCHLORIDE 20 MG/ML
2.5-5 INJECTION INTRAMUSCULAR; INTRAVENOUS EVERY 10 MIN PRN
Status: DISCONTINUED | OUTPATIENT
Start: 2020-12-21 | End: 2020-12-21 | Stop reason: HOSPADM

## 2020-12-21 RX ORDER — ACETAMINOPHEN 325 MG/1
975 TABLET ORAL ONCE
Status: COMPLETED | OUTPATIENT
Start: 2020-12-21 | End: 2020-12-21

## 2020-12-21 RX ORDER — ALBUTEROL SULFATE 0.83 MG/ML
2.5 SOLUTION RESPIRATORY (INHALATION) EVERY 4 HOURS PRN
Status: DISCONTINUED | OUTPATIENT
Start: 2020-12-21 | End: 2020-12-21 | Stop reason: HOSPADM

## 2020-12-21 RX ORDER — ONDANSETRON 2 MG/ML
4 INJECTION INTRAMUSCULAR; INTRAVENOUS EVERY 30 MIN PRN
Status: DISCONTINUED | OUTPATIENT
Start: 2020-12-21 | End: 2020-12-21 | Stop reason: HOSPADM

## 2020-12-21 RX ORDER — FENTANYL CITRATE 50 UG/ML
25-50 INJECTION, SOLUTION INTRAMUSCULAR; INTRAVENOUS
Status: DISCONTINUED | OUTPATIENT
Start: 2020-12-21 | End: 2020-12-21 | Stop reason: HOSPADM

## 2020-12-21 RX ORDER — INDOCYANINE GREEN AND WATER 25 MG
2.5 KIT INJECTION ONCE
Status: COMPLETED | OUTPATIENT
Start: 2020-12-21 | End: 2020-12-21

## 2020-12-21 RX ORDER — METOCLOPRAMIDE HYDROCHLORIDE 5 MG/ML
10 INJECTION INTRAMUSCULAR; INTRAVENOUS EVERY 6 HOURS PRN
Status: DISCONTINUED | OUTPATIENT
Start: 2020-12-21 | End: 2020-12-21 | Stop reason: HOSPADM

## 2020-12-21 RX ORDER — PROPOFOL 10 MG/ML
INJECTION, EMULSION INTRAVENOUS PRN
Status: DISCONTINUED | OUTPATIENT
Start: 2020-12-21 | End: 2020-12-21

## 2020-12-21 RX ORDER — METOCLOPRAMIDE 10 MG/1
10 TABLET ORAL EVERY 6 HOURS PRN
Status: DISCONTINUED | OUTPATIENT
Start: 2020-12-21 | End: 2020-12-21 | Stop reason: HOSPADM

## 2020-12-21 RX ORDER — IBUPROFEN 200 MG
600 TABLET ORAL
Status: DISCONTINUED | OUTPATIENT
Start: 2020-12-21 | End: 2020-12-21 | Stop reason: HOSPADM

## 2020-12-21 RX ORDER — OXYCODONE HYDROCHLORIDE 5 MG/1
5 TABLET ORAL EVERY 6 HOURS PRN
Qty: 12 TABLET | Refills: 0 | Status: SHIPPED | OUTPATIENT
Start: 2020-12-21 | End: 2020-12-24

## 2020-12-21 RX ORDER — SODIUM CHLORIDE, SODIUM LACTATE, POTASSIUM CHLORIDE, CALCIUM CHLORIDE 600; 310; 30; 20 MG/100ML; MG/100ML; MG/100ML; MG/100ML
INJECTION, SOLUTION INTRAVENOUS CONTINUOUS
Status: DISCONTINUED | OUTPATIENT
Start: 2020-12-21 | End: 2020-12-21 | Stop reason: HOSPADM

## 2020-12-21 RX ADMIN — ROCURONIUM BROMIDE 50 MG: 10 INJECTION INTRAVENOUS at 10:55

## 2020-12-21 RX ADMIN — SUGAMMADEX 200 MG: 100 INJECTION, SOLUTION INTRAVENOUS at 11:49

## 2020-12-21 RX ADMIN — DEXAMETHASONE SODIUM PHOSPHATE 10 MG: 4 INJECTION, SOLUTION INTRA-ARTICULAR; INTRALESIONAL; INTRAMUSCULAR; INTRAVENOUS; SOFT TISSUE at 10:55

## 2020-12-21 RX ADMIN — FENTANYL CITRATE 50 MCG: 50 INJECTION, SOLUTION INTRAMUSCULAR; INTRAVENOUS at 11:51

## 2020-12-21 RX ADMIN — EPHEDRINE SULFATE 5 MG: 50 INJECTION, SOLUTION INTRAVENOUS at 12:01

## 2020-12-21 RX ADMIN — FENTANYL CITRATE 50 MCG: 50 INJECTION, SOLUTION INTRAMUSCULAR; INTRAVENOUS at 11:41

## 2020-12-21 RX ADMIN — INDOCYANINE GREEN 2.5 MG: KIT INTRAVENOUS at 10:43

## 2020-12-21 RX ADMIN — CEFAZOLIN SODIUM 2 G: 2 INJECTION, SOLUTION INTRAVENOUS at 10:51

## 2020-12-21 RX ADMIN — FENTANYL CITRATE 50 MCG: 50 INJECTION, SOLUTION INTRAMUSCULAR; INTRAVENOUS at 11:17

## 2020-12-21 RX ADMIN — FENTANYL CITRATE 50 MCG: 50 INJECTION, SOLUTION INTRAMUSCULAR; INTRAVENOUS at 12:46

## 2020-12-21 RX ADMIN — FENTANYL CITRATE 100 MCG: 50 INJECTION, SOLUTION INTRAMUSCULAR; INTRAVENOUS at 10:55

## 2020-12-21 RX ADMIN — FENTANYL CITRATE 50 MCG: 50 INJECTION, SOLUTION INTRAMUSCULAR; INTRAVENOUS at 12:53

## 2020-12-21 RX ADMIN — OXYCODONE HYDROCHLORIDE 5 MG: 5 TABLET ORAL at 13:25

## 2020-12-21 RX ADMIN — EPHEDRINE SULFATE 10 MG: 50 INJECTION, SOLUTION INTRAVENOUS at 11:32

## 2020-12-21 RX ADMIN — EPHEDRINE SULFATE 10 MG: 50 INJECTION, SOLUTION INTRAVENOUS at 11:09

## 2020-12-21 RX ADMIN — ROCURONIUM BROMIDE 20 MG: 10 INJECTION INTRAVENOUS at 11:33

## 2020-12-21 RX ADMIN — GLYCOPYRROLATE 0.2 MG: 0.2 INJECTION, SOLUTION INTRAMUSCULAR; INTRAVENOUS at 11:07

## 2020-12-21 RX ADMIN — SODIUM CHLORIDE, POTASSIUM CHLORIDE, SODIUM LACTATE AND CALCIUM CHLORIDE: 600; 310; 30; 20 INJECTION, SOLUTION INTRAVENOUS at 10:50

## 2020-12-21 RX ADMIN — ACETAMINOPHEN 975 MG: 325 TABLET, FILM COATED ORAL at 10:36

## 2020-12-21 RX ADMIN — KETOROLAC TROMETHAMINE 30 MG: 30 INJECTION, SOLUTION INTRAMUSCULAR at 13:09

## 2020-12-21 RX ADMIN — MIDAZOLAM 2 MG: 1 INJECTION INTRAMUSCULAR; INTRAVENOUS at 10:53

## 2020-12-21 RX ADMIN — ONDANSETRON 4 MG: 2 INJECTION INTRAMUSCULAR; INTRAVENOUS at 11:43

## 2020-12-21 RX ADMIN — DEXMEDETOMIDINE HYDROCHLORIDE 0.5 MCG/KG/HR: 100 INJECTION, SOLUTION INTRAVENOUS at 10:59

## 2020-12-21 RX ADMIN — SODIUM CHLORIDE, POTASSIUM CHLORIDE, SODIUM LACTATE AND CALCIUM CHLORIDE: 600; 310; 30; 20 INJECTION, SOLUTION INTRAVENOUS at 10:43

## 2020-12-21 RX ADMIN — LIDOCAINE HYDROCHLORIDE 50 MG: 10 INJECTION, SOLUTION EPIDURAL; INFILTRATION; INTRACAUDAL; PERINEURAL at 10:55

## 2020-12-21 RX ADMIN — LIDOCAINE HYDROCHLORIDE 0.1 ML: 10 INJECTION, SOLUTION INFILTRATION; PERINEURAL at 10:43

## 2020-12-21 RX ADMIN — PROPOFOL 200 MG: 10 INJECTION, EMULSION INTRAVENOUS at 10:55

## 2020-12-21 ASSESSMENT — MIFFLIN-ST. JEOR: SCORE: 1602.55

## 2020-12-21 NOTE — ANESTHESIA PROCEDURE NOTES
Airway   Date/Time: 12/21/2020 10:58 AM   Patient location during procedure: OR    Staff -   CRNA: Waleska Arvizu APRN CRNA  Other Anesthesia Staff: Anne Loo  Performed By: SRNA    Consent for Airway   Urgency: elective    Indications and Patient Condition  Indications for airway management: michael-procedural  Induction type:intravenousMask difficulty assessment: 1 - vent by mask    Final Airway Details  Final airway type: endotracheal airway  Successful airway:ETT - single  Endotracheal Airway Details   ETT size (mm): 7.0  Cuffed: yes  Successful intubation technique: video laryngoscopy  Grade View of Cords: 1  Adjucts: stylet  Measured from: lips  Secured at (cm): 21  Secured with: cloth tape  Bite block used: None    Post intubation assessment   Placement verified by: capnometry and chest rise   Number of attempts at approach: 1  Number of other approaches attempted: 0  Secured with:cloth tape  Ease of procedure: easy  Dentition: Intact and Unchanged

## 2020-12-21 NOTE — ANESTHESIA POSTPROCEDURE EVALUATION
Patient: Anne Ferreira    Procedure(s):  Laparoscopic cholecystectomy    Diagnosis:Symptomatic cholelithiasis [K80.20]  Diagnosis Additional Information: No value filed.    Anesthesia Type:  General    Note:  Anesthesia Post Evaluation    Patient location during evaluation: PACU  Patient participation: Able to fully participate in evaluation  Level of consciousness: awake  Pain management: adequate  Airway patency: patent  Cardiovascular status: acceptable  Respiratory status: acceptable  Hydration status: acceptable  PONV: none     Anesthetic complications: None          Last vitals:  Vitals:    12/21/20 1339 12/21/20 1417 12/21/20 1451   BP: 112/50 102/44 115/68   Pulse: 67 65 68   Resp: 14 16 20   Temp: 37.2  C (98.9  F)     SpO2: 97% 95% 96%         Electronically Signed By: FRANKLYN Arteaga CRNA  December 21, 2020  2:53 PM

## 2020-12-21 NOTE — DISCHARGE INSTRUCTIONS
HOME CARE FOLLOWING ABDOMINAL SURGERY    INCISIONAL CARE:  Replace the bandage over your incision (or incisions) until all drainage stops, or if more comfortable to have in place.  If present, leave the steri-strips (white paper tapes) in place for 14 days after surgery.  If you have staples in your incision at the time of discharge, they will be removed at your follow-up appointment.  If Dermabond (a type of skin glue) is present, leave in place until it wears/flakes off.     BATHING:  Avoid baths for 1 week after surgery.  Showers are okay.  You may wash your hair at any time.  Gently pat your incision dry after bathing.    ACTIVITY:  Light Activity -- you may immediately be up and about as tolerated.  Driving -- you may drive when comfortable and off narcotic pain medications (example: Norco, Percocet, Hydrocodone).  Light Work -- resume when comfortable off pain medications.  (If you can drive, you probably can work.)  Strenuous Work/Activity -- limit lifting to 20 pounds for 4 weeks.  Then, progressively increase with time.  Active Sports (running, biking, etc.) -- cautiously resume after 6 weeks.  Most importantly listen to your body.  If an activity causes pain or discomfort please stop and try in a few days or decrease your intensity.    DISCOMFORT:  Use pain medications as prescribed by your surgeon.  Take the pain medication with some food, when possible, to minimize side effects.  Expect gradual improvement.      Narcotic Pain Medications:  Do not drive, make important decisions or operate heavy machinery while on narcotic pain medications.  Narcotic pain medications can be associated with nausea, sleep disturbance, and constipation.  Unless directed otherwise, please take an over the counter stool softener (Colace 100mg twice a day) unless directed otherwise.  As soon as you are able to stop narcotic pain medications the better. Long term use of narcotics is associated with tolerance (the need for more  medication for effect) and potential addiction.    DIET:  Return to diet you were on before surgery, unless you are given specific diet instructions.  Drink plenty of fluids.  While taking pain medications, increase dietary fiber or add a fiber supplementation like Metamucil or Citrucel to help prevent constipation - a possible side effect of pain medications.    NAUSEA:  If nauseated from the anesthetic/pain meds; rest in bed, get up cautiously with assistance, and drink clear liquids (juice, tea, broth).    RETURN APPOINTMENT:  Schedule a follow-up visit 2-3 weeks after discharge from the hospital.  Office Phone: [phone]     CONTACT US IF THE FOLLOWING DEVELOPS:   1. A fever that is above 101     2. If there is a large amount of drainage, bleeding, or swelling.   3. Severe pain that is not relieved by your prescription.   4. Drainage that is thick, cloudy, yellow, green or white.   5. Any other questions not answered by  Frequently Asked Questions  sheet.      FREQUENTLY ASKED QUESTIONS:    Q:  How should my incision look?    A:  Normally your incision will appear slightly swollen with light redness directly along the incision itself as it heals.  It may feel like a bump or ridge as the healing/scarring happens, and over time (3-4 months) this bump or ridge feeling should slowly go away.  In general, clear or pink watery drainage can be normal at first as your incision heals, but should decrease over time.    Q:  How do I know if my incision is infected?  A:  Look at your incision for signs of infection, like redness around the incision spreading to surrounding skin, or drainage of cloudy or foul-smelling drainage.  If you feel warm, check your temperature to see if you are running a fever.    **If any of these things occur, please notify the nurse at our office.  We may need you to come into the office for an incision check.      Q:  How do I take care of my incision?  A:  If you have a dressing in place -  Starting the day after surgery, replace the dressing 1-2 times a day until there is no further drainage from the incision.  At that time, a dressing is no longer needed.  Try to minimize tape on the skin if irritation is occurring at the tape sites.  If you have significant irritation from tape on the skin, please call the office to discuss other method of dressing your incision.    Small pieces of tape called  steri-strips  may be present directly overlying your incision; these may be removed 10 days after surgery unless otherwise specified by your surgeon.  If these tapes start to loosen at the ends, you may trim them back until they fall off or are removed.    A:  If you had  Dermabond  tissue glue used as a dressing (this causes your incision to look shiny with a clear covering over it) - This type of dressing wears off with time and does not require more dressings over the top unless it is draining around the glue as it wears off.  Do not apply ointments or lotions over the incisions until the glue has completely worn off.    Q:  There is a piece of tape or a sticky  lead  still on my skin.  Can I remove this?  A:  Sometimes the sticky  leads  used for monitoring during surgery or for evaluation in the emergency department are not all removed while you are in the hospital.  These sometimes have a tab or metal dot on them.  You can easily remove these on your own, like taking off a band-aid.  If there is a gel substance under the  lead , simply wipe/clean it off with a washcloth or paper towel.      Q:  What can I do to minimize constipation (very hard stools, or lack of stools)?  A:  Stay well hydrated.  Increase your dietary fiber intake or take a fiber supplement -with plenty of water.  Walk around frequently.  You may consider an over-the-counter stool-softener.  Your Pharmacist can assist you with choosing one that is stocked at your pharmacy.  Constipation is also one of the most common side effects of  pain medication.  If you are using pain medication, be pro-active and try to PREVENT problems with constipation by taking the steps above BEFORE constipation becomes a problem.    Q:  What do I do if I need more pain medications?  A:  Call the office to receive refills.  Be aware that certain pain meds cannot be called into a pharmacy and actually require a paper prescription.  A change may be made in your pain med as you progress thru your recovery period or if you have side effects to certain meds.    --Pain meds are NOT refilled after 5pm on weekdays, and NOT AT ALL on the weekends, so please look ahead to prevent problems.      Q:  Why am I having a hard time sleeping now that I am at home?  A:  Many medications you receive while you are in the hospital can impact your sleep for a number of days after your surgery/hospitalization.  Decreased level of activity and naps during the day may also make sleeping at night difficult.  Try to minimize day-time naps, and get up frequently during the day to walk around your home during your recovery time.  Sleep aides may be of some help, but are not recommended for long-term use.      Q:  I am having some back discomfort.  What should I do?  A:  This may be related to certain positioning that was required for your surgery, extended periods of time in bed, or other changes in your overall activity level.  You may try ice, heat, acetaminophen, or ibuprofen to treat this temporarily.  Note that many pain medications have acetaminophen in them and would state this on the prescription bottle.  Be sure not to exceed the maximum of 4000mg per day of acetaminophen.     **If the pain you are having does not resolve, is severe, or is a flare of back pain you have had on other occasions prior to surgery, please contact your primary physician for further recommendations or for an appointment to be examined at their office.    Q:  Why am I having headaches?  A:  Headaches can be caused  by many things:  caffeine withdrawal, use of pain meds, dehydration, high blood pressure, lack of sleep, over-activity/exhaustion, flare-up of usual migraine headaches.  If you feel this is related to muscle tension (a band-like feeling around the head, or a pressure at the low-back of the head) you may try ice or heat to this area.  You may need to drink more fluids (try electrolyte drink like Gatorade), rest, or take your usual migraine medications.   **If your headaches do not resolve, worsen, are accompanied by other symptoms, or if your blood pressure is high, please call your primary physician for recommendation and/or examination.    Q:  I am unable to urinate.  What do I do?  A:  A small percentage of people can have difficulty urinating initially after surgery.  This includes being able to urinate only a very small amount at a time and feeling discomfort or pressure in the very low abdomen.  This is called  urinary retention , and is actually an urgent situation.  Proceed to your nearest Emergency department for evaluation (not an Urgent Care Center).  Sometimes the bladder does not work correctly after certain medications you receive during surgery, or related to certain procedures.  You may need to have a catheter placed until your bladder recovers.  When planning to go to an Emergency department, it may help to call the ER to let them know you are coming in for this problem after a surgery.  This may help you get in quicker to be evaluated.  **If you have symptoms of a urinary tract infection, please contact your primary physician for the proper evaluation and treatment.          If you have other questions, please call the office Monday thru Friday between 8am and 5pm to discuss with the nurse.  # [phone]    There is a surgeon ON CALL on weekday evenings and over the weekend in case of urgent need only, and may be contacted at the same number.    If you are having an emergency, call 911 or proceed  to your nearest emergency department.                        Same Day Surgery Discharge Instructions  Special Precautions After Surgery - Adult    1. It is not unusual to feel lightheaded or faint, up to 24 hours after surgery or while taking pain medication.  If you have these symptoms; sit for a few minutes before standing and have someone assist you when getting up.  2. You should rest and relax for the next 24 hours and must have someone stay with you for at least 24 hours after your discharge.  3. DO NOT DRIVE any vehicle or operate mechanical equipment for 24 hours following the end of your surgery.  DO NOT DRIVE while taking narcotic pain medications that have been prescribed by your physician.  If you had a limb operated on, you must be able to use it fully to drive.  4. DO NOT drink alcoholic beverages for 24 hours following surgery or while taking prescription pain medication.  5. Drink clear liquids (apple juice, ginger ale, broth, 7-Up, etc.).  Progress to your regular diet as you feel able.  6. Any questions call your physician and do not make important decisions for 24 hours.           __________________________________________________________________________________________________________________________________  IMPORTANT NUMBERS:    [facility] [place]  [phone], [phone]  Pharmacy:  [phone]  Same Day Surgery:  [phone], Monday - Friday until 8:30 p.m.  Urgent Care:  [phone]  Emergency Room:  [phone]

## 2020-12-21 NOTE — OP NOTE
Procedure Date: December 21, 2020    Pre-operative Diagnosis: Symptomatic Cholelithiasis    Post-operative Diagnosis:  Symptomatic Cholelithiasis    Procedure Performed: Laparoscopic Cholecystectomy    Surgeon: Manuel Durham DO    Assistants: Marquis Rice PA-C (needed for retraction and suction and running laparoscope)    Anesthesia Type: General plus local    Findings: Critical view of safety    Estimated Blood Loss: 30 mL           Condition: Stable    Indications:   Ms. Anne Ferreira presents with right upper abdominal pain after meals, and characteristics of biliary sludge on routine ultrasound. She may benefit from cholecystectomy, and was advised of the indications, alternatives, benefits, and risks (including, but not limited to, bleeding, infection, conversion to open surgery, potential for bile leak or bile duct injury, MI, DVT/PE, or death). She understood the information and consented to the aforementioned operative procedure    Procedure: The patient was brought to the Operating Room and placed on the operating table in a supine position. After induction of general endotracheal anesthesia, the abdomen was prepped and draped in the usual sterile fashion. The abdomen was entered through an infraumbilical incision using an open Terrie approach, a 5mm trochar was delivered under direct visualization, and the abdomen was insufflated with CO2 gas to a pressure of 15mmHg. Standard laparoscopic cholecystectomy ports were placed in the epigastrium, right midclavicular line and right anterior axillary line. The gallbladder was identified and the fundus retracted superiorly. The cystic duct and artery were carefully dissected to establish the critical view of safety, confirmed with indocyanine green. Both structures were secured with two hemoclips proximal and one hemooclip distal, prior to division. The gallbladder was then dissected from the liver bed using electrocautery; a small posterior arterial branch was  encountered and singly clipped on the proximal side. The gallbladder was retrieved through the infraumbilical port, and the trochar replaced. Once pneumoperitoneum was reestablished, the gallbladder bed was inspected for bleeding and bile leak and no bile leak was found.  The liver edge was bleeding minimally, controlled with cautery and surgicel applied. The patient was positioned flat, irrigant solution was suctioned free, and pneumoperitoneum was relieved before removing the trocars.  Wound closure at all incision was accomplished with 4-0 Monocryl, and the sites cleansed and dried prior to application of sterile glue. The patient tolerated the procedure well, was extubated in the Operating Room without incident, and transferred to the recovery room in stable condition.     Manuel Durham DO on 12/21/2020 at 11:52 AM

## 2020-12-23 LAB — COPATH REPORT: NORMAL

## 2020-12-24 ENCOUNTER — TELEPHONE (OUTPATIENT)
Dept: FAMILY MEDICINE | Facility: CLINIC | Age: 40
End: 2020-12-24

## 2020-12-24 DIAGNOSIS — K80.20 SYMPTOMATIC CHOLELITHIASIS: ICD-10-CM

## 2020-12-24 RX ORDER — OXYCODONE HYDROCHLORIDE 5 MG/1
5 TABLET ORAL EVERY 6 HOURS PRN
Qty: 12 TABLET | Refills: 0 | Status: SHIPPED | OUTPATIENT
Start: 2020-12-24 | End: 2021-05-10

## 2020-12-24 NOTE — TELEPHONE ENCOUNTER
Patient requesting refill of oxycodone.  Has 2 pills left and needs refill before the holidays, when we are not open.    Please jacob when done or if you have any questions.  OK to LM on VM    Patients [facility] [place]

## 2020-12-24 NOTE — TELEPHONE ENCOUNTER
Requested Prescriptions   Pending Prescriptions Disp Refills     oxyCODONE (ROXICODONE) 5 MG tablet 12 tablet 0     Sig: Take 1 tablet (5 mg) by mouth every 6 hours as needed for pain       There is no refill protocol information for this order        Last Written Prescription Date:  12/21/20  Last Fill Quantity: 12,  # refills: 0   Last office visit: 12/11/2020 with prescribing provider:     Future Office Visit:

## 2021-01-04 NOTE — TELEPHONE ENCOUNTER
Called and left message for patient to call clinic back. Needs to complete and ACT.    Lavonne Richey MA

## 2021-01-05 ENCOUNTER — TELEPHONE (OUTPATIENT)
Dept: SURGERY | Facility: CLINIC | Age: 41
End: 2021-01-05

## 2021-01-05 DIAGNOSIS — G89.18 POST-OP PAIN: Primary | ICD-10-CM

## 2021-01-05 NOTE — TELEPHONE ENCOUNTER
Reason for Call:  Other     Detailed comments: Pt is status post cholecystectomy 12/21/2020. Today she is not feeling well, nauseous, feels stabbing/poking pain in the abdomen where her incisions are. Redness around the incisions. Does not feel like she has a fever. Symptoms started this morning - Please advise     PHARMACY:  [facility]     Phone Number Patient can be reached at: Home number on file [phone] (home)    Best Time: Any    Can we leave a detailed message on this number? YES    Call taken on 1/5/2021 at 8:57 AM by Denise Behrendt

## 2021-01-05 NOTE — TELEPHONE ENCOUNTER
Called pt:  Her work did rapid covid and she is positive.    Changed plan to take pictures in MyChart and send to provider.  Labs will wait.    Liliya GIBBS   [facility]

## 2021-01-14 ENCOUNTER — VIRTUAL VISIT (OUTPATIENT)
Dept: SURGERY | Facility: CLINIC | Age: 41
End: 2021-01-14

## 2021-01-14 DIAGNOSIS — Z90.49 S/P LAPAROSCOPIC CHOLECYSTECTOMY: Primary | ICD-10-CM

## 2021-01-14 PROCEDURE — 99024 POSTOP FOLLOW-UP VISIT: CPT | Mod: TEL | Performed by: SURGERY

## 2021-01-14 NOTE — PROGRESS NOTES
General Surgery Post Op    Pt returns for follow up visit s/p laparoscopic cholecystectomy on 12/21/20.    Patient recently developed COVID-19 infection.  Patient still has residual symptoms from her viral infection.  Initially she had some redness around incision, this has improved.  She admits some diarrhea, this started with COVID-19 infection.      Path:  FINAL DIAGNOSIS:   Gallbladder, cholecystectomy:   - Calculi not present   -Chronic cholecystitis with prominent cholesterolosis     Assessment:     ICD-10-CM    1. S/P laparoscopic cholecystectomy  Z90.49      Plan: Ms. Ferreira is recovering without issue from her gallbladder perspective. She admits diarrhea however states this started with her COVID-19 infection.  We reviewed her pathology and weight lifting restrictions going forward.  Note to be left in MyCHART to return to work next week without restrictions.  If her diarrhea persists she will call for trial of Cholestyramine.    Total time for telephone visit 6 minutes    Manuel Durham,  on 1/14/2021 at 1:08 PM

## 2021-01-14 NOTE — LETTER
1/14/2021         RE: Anne Ferreira  [id number] [address] [id number]        Dear Colleague,    Thank you for referring your patient, Anne Ferreira, to the [facility]. Please see a copy of my visit note below.    General Surgery Post Op    Pt returns for follow up visit s/p laparoscopic cholecystectomy on 12/21/20.    Patient recently developed COVID-19 infection.  Patient still has residual symptoms from her viral infection.  Initially she had some redness around incision, this has improved.  She admits some diarrhea, this started with COVID-19 infection.      Path:  FINAL DIAGNOSIS:   Gallbladder, cholecystectomy:   - Calculi not present   -Chronic cholecystitis with prominent cholesterolosis     Assessment:     ICD-10-CM    1. S/P laparoscopic cholecystectomy  Z90.49      Plan: Ms. Ferreira is recovering without issue from her gallbladder perspective. She admits diarrhea however states this started with her COVID-19 infection.  We reviewed her pathology and weight lifting restrictions going forward.  Note to be left in MyCHART to return to work next week without restrictions.  If her diarrhea persists she will call for trial of Cholestyramine.    Total time for telephone visit 6 minutes    Manuel Durham DO on 1/14/2021 at 1:08 PM          Again, thank you for allowing me to participate in the care of your patient.        Sincerely,        Manuel Durham DO

## 2021-01-15 ENCOUNTER — TELEPHONE (OUTPATIENT)
Dept: FAMILY MEDICINE | Facility: CLINIC | Age: 41
End: 2021-01-15

## 2021-01-15 DIAGNOSIS — F90.2 ATTENTION DEFICIT HYPERACTIVITY DISORDER (ADHD), COMBINED TYPE: ICD-10-CM

## 2021-01-15 RX ORDER — DEXMETHYLPHENIDATE HYDROCHLORIDE 10 MG/1
10 TABLET ORAL 2 TIMES DAILY
Status: CANCELLED | OUTPATIENT
Start: 2021-01-15

## 2021-01-16 ENCOUNTER — MYC REFILL (OUTPATIENT)
Dept: FAMILY MEDICINE | Facility: CLINIC | Age: 41
End: 2021-01-16

## 2021-01-16 DIAGNOSIS — F90.2 ATTENTION DEFICIT HYPERACTIVITY DISORDER (ADHD), COMBINED TYPE: ICD-10-CM

## 2021-01-16 RX ORDER — DEXMETHYLPHENIDATE HYDROCHLORIDE 10 MG/1
10 TABLET ORAL 2 TIMES DAILY
Qty: 60 TABLET | Refills: 0 | Status: CANCELLED | OUTPATIENT
Start: 2021-01-16

## 2021-01-18 NOTE — TELEPHONE ENCOUNTER
Requested Prescriptions   Pending Prescriptions Disp Refills     dexmethylphenidate (FOCALIN) 10 MG tablet 60 tablet 0     Sig: Take 1 tablet (10 mg) by mouth 2 times daily       There is no refill protocol information for this order        Last Written Prescription Date:  12/14/20  Last Fill Quantity: 60,  # refills: 0   Last office visit: 12/11/2020 with prescribing provider:     Future Office Visit:

## 2021-01-19 ENCOUNTER — VIRTUAL VISIT (OUTPATIENT)
Dept: FAMILY MEDICINE | Facility: CLINIC | Age: 41
End: 2021-01-19
Payer: COMMERCIAL

## 2021-01-19 ENCOUNTER — MYC MEDICAL ADVICE (OUTPATIENT)
Dept: FAMILY MEDICINE | Facility: CLINIC | Age: 41
End: 2021-01-19

## 2021-01-19 DIAGNOSIS — F90.2 ATTENTION DEFICIT HYPERACTIVITY DISORDER (ADHD), COMBINED TYPE: ICD-10-CM

## 2021-01-19 PROCEDURE — 99213 OFFICE O/P EST LOW 20 MIN: CPT | Mod: GT | Performed by: NURSE PRACTITIONER

## 2021-01-19 RX ORDER — DEXMETHYLPHENIDATE HYDROCHLORIDE 10 MG/1
10 TABLET ORAL 2 TIMES DAILY
Qty: 60 TABLET | Refills: 0 | Status: SHIPPED | OUTPATIENT
Start: 2021-01-19 | End: 2021-01-19

## 2021-01-19 RX ORDER — DEXMETHYLPHENIDATE HYDROCHLORIDE 10 MG/1
10 TABLET ORAL 2 TIMES DAILY
Qty: 60 TABLET | Refills: 0 | Status: SHIPPED | OUTPATIENT
Start: 2021-02-19 | End: 2021-01-19

## 2021-01-19 RX ORDER — DEXMETHYLPHENIDATE HYDROCHLORIDE 10 MG/1
10 TABLET ORAL 2 TIMES DAILY
Qty: 60 TABLET | Refills: 0 | OUTPATIENT
Start: 2021-01-19

## 2021-01-19 RX ORDER — DEXMETHYLPHENIDATE HYDROCHLORIDE 10 MG/1
10 TABLET ORAL 2 TIMES DAILY
Qty: 60 TABLET | Refills: 0 | Status: SHIPPED | OUTPATIENT
Start: 2021-03-19 | End: 2021-04-21

## 2021-01-19 NOTE — PROGRESS NOTES
Anne is a 40 year old who is being evaluated via a billable video visit.      How would you like to obtain your AVS? MyChart  If the video visit is dropped, the invitation should be resent by: Text to cell phone: . [phone]  Will anyone else be joining your video visit? No    Video Start Time: 4:14 PM  Assessment & Plan     Attention deficit hyperactivity disorder (ADHD), combined type   Controlled no change in treatment plan  - dexmethylphenidate (FOCALIN) 10 MG tablet; Take 1 tablet (10 mg) by mouth 2 times daily  No follow-ups on file.    FRANKLYN Christianson CNP  [facility]    Subjective     Anne is a 40 year old who presents to clinic today for the following health issues     HPI       Medication Followup of dexmethylphenidate (FOCALIN) 10 MG tablet    Taking Medication as prescribed: yes    Side Effects:  None    Medication Helping Symptoms:  yes         Review of Systems   Constitutional, HEENT, cardiovascular, pulmonary, GI, , musculoskeletal, neuro, skin, endocrine and psych systems are negative, except as otherwise noted.      Objective           Vitals:  No vitals were obtained today due to virtual visit.    Physical Exam   GENERAL: Healthy, alert and no distress  EYES: Eyes grossly normal to inspection.  No discharge or erythema, or obvious scleral/conjunctival abnormalities.  RESP: No audible wheeze, cough, or visible cyanosis.  No visible retractions or increased work of breathing.    SKIN: Visible skin clear. No significant rash, abnormal pigmentation or lesions.  NEURO: Cranial nerves grossly intact.  Mentation and speech appropriate for age.  PSYCH: Mentation appears normal, affect normal/bright, judgement and insight intact, normal speech and appearance well-groomed.    No results found for any visits on 01/19/21.            Video-Visit Details    Type of service:  Video Visit    Video End Time:4:30    Originating Location (pt. Location): Home    Distant Location  (provider location):  [facility]     Platform used for Video Visit: Juliette

## 2021-02-05 DIAGNOSIS — G43.009 MIGRAINE WITHOUT AURA AND WITHOUT STATUS MIGRAINOSUS, NOT INTRACTABLE: ICD-10-CM

## 2021-02-05 DIAGNOSIS — I10 BENIGN ESSENTIAL HYPERTENSION: ICD-10-CM

## 2021-02-05 RX ORDER — PROPRANOLOL HYDROCHLORIDE 80 MG/1
80 TABLET ORAL DAILY
Qty: 90 TABLET | Refills: 3 | OUTPATIENT
Start: 2021-02-05

## 2021-02-09 DIAGNOSIS — I10 BENIGN ESSENTIAL HYPERTENSION: ICD-10-CM

## 2021-02-09 DIAGNOSIS — G43.009 MIGRAINE WITHOUT AURA AND WITHOUT STATUS MIGRAINOSUS, NOT INTRACTABLE: ICD-10-CM

## 2021-02-10 RX ORDER — PROPRANOLOL HYDROCHLORIDE 80 MG/1
80 TABLET ORAL DAILY
Qty: 90 TABLET | Refills: 1 | Status: SHIPPED | OUTPATIENT
Start: 2021-02-10 | End: 2022-03-29

## 2021-03-04 DIAGNOSIS — F41.1 GAD (GENERALIZED ANXIETY DISORDER): ICD-10-CM

## 2021-03-04 DIAGNOSIS — F33.1 MODERATE EPISODE OF RECURRENT MAJOR DEPRESSIVE DISORDER (H): ICD-10-CM

## 2021-03-05 RX ORDER — ESCITALOPRAM OXALATE 10 MG/1
TABLET ORAL
Qty: 90 TABLET | Refills: 0 | Status: SHIPPED | OUTPATIENT
Start: 2021-03-05 | End: 2022-04-12

## 2021-03-17 DIAGNOSIS — J45.20 MILD INTERMITTENT ASTHMA WITHOUT COMPLICATION: ICD-10-CM

## 2021-03-18 RX ORDER — MONTELUKAST SODIUM 10 MG/1
TABLET ORAL
Qty: 90 TABLET | Refills: 0 | Status: SHIPPED | OUTPATIENT
Start: 2021-03-18 | End: 2021-07-28

## 2021-04-11 DIAGNOSIS — I10 BENIGN ESSENTIAL HYPERTENSION: ICD-10-CM

## 2021-04-12 RX ORDER — LISINOPRIL 20 MG/1
TABLET ORAL
Qty: 90 TABLET | Refills: 1 | Status: SHIPPED | OUTPATIENT
Start: 2021-04-12 | End: 2021-10-11

## 2021-04-15 ENCOUNTER — NURSE TRIAGE (OUTPATIENT)
Dept: NURSING | Facility: CLINIC | Age: 41
End: 2021-04-15

## 2021-04-15 ENCOUNTER — HOSPITAL ENCOUNTER (EMERGENCY)
Facility: CLINIC | Age: 41
Discharge: HOME | End: 2021-04-15
Attending: EMERGENCY MEDICINE | Admitting: EMERGENCY MEDICINE
Payer: COMMERCIAL

## 2021-04-15 VITALS
OXYGEN SATURATION: 98 % | BODY MASS INDEX: 33.29 KG/M2 | TEMPERATURE: 97.6 F | HEART RATE: 89 BPM | WEIGHT: 195 LBS | DIASTOLIC BLOOD PRESSURE: 95 MMHG | SYSTOLIC BLOOD PRESSURE: 166 MMHG | HEIGHT: 64 IN | RESPIRATION RATE: 22 BRPM

## 2021-04-15 DIAGNOSIS — T50.Z95A ADVERSE EFFECT OF VACCINE, INITIAL ENCOUNTER: ICD-10-CM

## 2021-04-15 PROCEDURE — 99282 EMERGENCY DEPT VISIT SF MDM: CPT | Performed by: EMERGENCY MEDICINE

## 2021-04-15 RX ORDER — DIPHENHYDRAMINE HCL 25 MG
25-50 TABLET ORAL EVERY 6 HOURS PRN
Qty: 30 TABLET | Refills: 1 | Status: SHIPPED | OUTPATIENT
Start: 2021-04-15

## 2021-04-15 RX ORDER — PREDNISONE 20 MG/1
TABLET ORAL
Qty: 10 TABLET | Refills: 0 | Status: SHIPPED | OUTPATIENT
Start: 2021-04-15 | End: 2021-05-10

## 2021-04-15 ASSESSMENT — MIFFLIN-ST. JEOR: SCORE: 1534.51

## 2021-04-15 NOTE — ED NOTES
"Pt got covid vaccine at 1430, generalized \" body burning \" pt is pink and speaking clearly, denies any resp distress  "

## 2021-04-15 NOTE — ED PROVIDER NOTES
History     Chief Complaint   Patient presents with     itching after coivid injection     rash on chest, airway patent     HPI  Anne Ferreira is a 40 year old female who presents emergency department for evaluation of an adverse reaction to the maternal COVID-19 vaccination in the left deltoid area at approximately 2:30 PM this afternoon. This was her first of the 2 vaccination series.  Shortly after getting the injection she developed anxiety and shortness of breath followed by flushing and burning of her chest, arms and legs.  No other signs or symptoms of allergic reaction or anaphylactic reaction.    Allergies:  Allergies   Allergen Reactions     Bupropion Nausea     Sulfa Drugs        Problem List:    Patient Active Problem List    Diagnosis Date Noted     Symptomatic cholelithiasis 12/15/2020     Priority: Medium     Added automatically from request for surgery [id number]       Morbid obesity (H) 12/11/2020     Priority: Medium     Dyslipidemia 11/10/2020     Priority: Medium     Dysmenorrhea 11/10/2020     Priority: Medium     treated with continuous OCPs       GERD (gastroesophageal reflux disease) 11/10/2020     Priority: Medium     Thoracic back pain 11/10/2020     Priority: Medium     left, approx T10       H/O LEEP      Priority: Medium     1998 LEEP- Unknown results.  2010 NIL pap.  2/2/12 COLP and ECC- DARREN 1.  2013 NIL pap  4/7/15 LSIL pap, + HR HPV   8/13/15 COLP- DARREN 1, ECC- Negative.  6/10/16 NIL pap, Neg HPV.  5/14/19 NIL pap, Neg HPV.  Above results found in [facility]  10/15/20 NIL pap, Neg HPV. Plan cotest in 3 years.        Cervical spinal stenosis 07/01/2019     Priority: Medium     7/2019 MRI - At C5-6, broad-based central disc extrusion demonstrating caudal migration mildly deforms the cord and severely narrows the spinal canal. Uncovertebral joint spurring contributes to mild to moderate left neural foraminal narrowing with potential left C6 nerve root encroachment.       Herniation of cervical  intervertebral disc with radiculopathy 07/01/2019     Priority: Medium     see MRI 7/2019       Attention deficit hyperactivity disorder, combined type, mild 08/29/2018     Priority: Medium     Generalized anxiety disorder 08/29/2018     Priority: Medium     Allergic rhinitis 04/01/2008     Priority: Medium        Past Medical History:    Past Medical History:   Diagnosis Date     Abnormal Pap smear of cervix        Past Surgical History:    Past Surgical History:   Procedure Laterality Date     LAPAROSCOPIC CHOLECYSTECTOMY N/A 12/21/2020    Procedure: Laparoscopic cholecystectomy;  Surgeon: Manuel Durham DO;  Location: [facility] OR       Family History:    Family History   Problem Relation Age of Onset     Hypertension Father      Hypertension Brother      Hypertension Sister      Hypertension Brother        Social History:  Marital Status:   [4]  Social History     Tobacco Use     Smoking status: Current Every Day Smoker     Types: Cigarettes     Smokeless tobacco: Never Used   Substance Use Topics     Alcohol use: Yes     Drug use: Never        Medications:    diphenhydrAMINE (BENADRYL) 25 MG tablet  predniSONE (DELTASONE) 20 MG tablet  albuterol (PROAIR HFA/PROVENTIL HFA/VENTOLIN HFA) 108 (90 Base) MCG/ACT inhaler  dexmethylphenidate (FOCALIN) 10 MG tablet  docusate sodium (COLACE) 100 MG capsule  docusate sodium (COLACE) 100 MG capsule  escitalopram (LEXAPRO) 10 MG tablet  escitalopram (LEXAPRO) 20 MG tablet  ethynodiol-ethinyl estradiol (ZOVIA) 1-50 MG-MCG tablet  ipratropium - albuterol 0.5 mg/2.5 mg/3 mL (DUONEB) 0.5-2.5 (3) MG/3ML neb solution  lisinopril (ZESTRIL) 20 MG tablet  LORazepam (ATIVAN) 1 MG tablet  montelukast (SINGULAIR) 10 MG tablet  omeprazole (PRILOSEC) 20 MG DR capsule  oxyCODONE (ROXICODONE) 5 MG tablet  propranolol (INDERAL) 80 MG tablet      Review of Systems   HENT: Negative for trouble swallowing and voice change.    Respiratory: Negative.    Skin: Positive for color change  "( flushing). Negative for rash.       Physical Exam   BP: (!) 166/95  Pulse: 89  Temp: 97.6  F (36.4  C)  Resp: 22  Height: 162.6 cm (5' 4\")  Weight: 88.5 kg (195 lb)  SpO2: 98 %      Physical Exam    ED Course        Procedures                 No results found for this or any previous visit (from the past 24 hour(s)).    Medications - No data to display    Assessments & Plan (with Medical Decision Making)   40 year old female who presents emergency department for evaluation of an adverse reaction to the maternal COVID-19 vaccination in the left deltoid area at approximately 2:30 PM this afternoon. This was her first of the 2 vaccination series.  Shortly after getting the injection she developed anxiety and shortness of breath followed by flushing and burning of her chest, arms and legs. No other signs or symptoms of allergic reaction or anaphylactic reaction.  She now has a normal examination and this appears to be a benign reaction.  She will use Benadryl as needed and I prescribed a short course of Prednisone. She was provided instructions for supportive care and will return as needed for worsened condition or worsening symptoms, or new problems or concerns.    I have reviewed the nursing notes.    I have reviewed the findings, diagnosis, plan and need for follow up with the patient.    New Prescriptions    DIPHENHYDRAMINE (BENADRYL) 25 MG TABLET    Take 1-2 tablets (25-50 mg) by mouth every 6 hours as needed for itching or allergies    PREDNISONE (DELTASONE) 20 MG TABLET    Take two tablets (= 40mg) each day for 5 (five) days       Final diagnoses:   Adverse effect of vaccine, initial encounter       4/15/2021   [facility] EMERGENCY DEPT     Collin Nicole MD  04/19/21 1052    "

## 2021-04-15 NOTE — TELEPHONE ENCOUNTER
"\"I just got my covid vaccine(Moderna) about an hour ago and now I have a rash all over with bumps and I am itching.  The rash started on my left arm where given and now it's my arm, legs, collarbone area, neck and chest.\"  Denies other sx  Triaged and advised ED  Darlene Muir RN [facility]    COVID 19 Nurse Triage Plan/Patient Instructions    Please be aware that novel coronavirus (COVID-19) may be circulating in the community. If you develop symptoms such as fever, cough, or SOB or if you have concerns about the presence of another infection including coronavirus (COVID-19), please contact your health care provider or visit [email or web address].     Disposition/Instructions    ED Visit recommended. Follow protocol based instructions.     Bring Your Own Device:  Please also bring your smart device(s) (smart phones, tablets, laptops) and their charging cables for your personal use and to communicate with your care team during your visit.    Thank you for taking steps to prevent the spread of this virus.  o Limit your contact with others.  o Wear a simple mask to cover your cough.  o Wash your hands well and often.    Resources    [facility]: About COVID-19: [email or web address]    CDC: What to Do If You're Sick: www.cdc.gov/coronavirus/2019-ncov/about/steps-when-sick.html    CDC: Ending Home Isolation: www.cdc.gov/coronavirus/2019-ncov/hcp/disposition-in-home-patients.html     CDC: Caring for Someone: www.cdc.gov/coronavirus/2019-ncov/if-you-are-sick/care-for-someone.html     [facility]: Interim Guidance for Hospital Discharge to Home: www.health.[facility].[place]/diseases/coronavirus/hcp/hospdischarge.pdf    [facility] clinical trials (COVID-19 research studies): [email or web address].[email or web address]/umn-clinical-trials     Below are the COVID-19 hotlines at the [place] Department of Health ([facility]). Interpreters are available.   o For health questions: Call 959-073-6332 or " 1-931.889.7001 (7 a.m. to 7 p.m.)  o For questions about schools and childcare: Call 238-948-8756 or 1-966.837.3965 (7 a.m. to 7 p.m.)                     Reason for Disposition    Sounds like a severe, unusual reaction to the triager    Protocols used: CORONAVIRUS (COVID-19) VACCINE QUESTIONS AND [id number]-WL 1.3

## 2021-04-19 DIAGNOSIS — F90.2 ATTENTION DEFICIT HYPERACTIVITY DISORDER (ADHD), COMBINED TYPE: ICD-10-CM

## 2021-04-19 ASSESSMENT — ENCOUNTER SYMPTOMS
COLOR CHANGE: 1
RESPIRATORY NEGATIVE: 1
TROUBLE SWALLOWING: 0
VOICE CHANGE: 0

## 2021-04-20 RX ORDER — DEXMETHYLPHENIDATE HYDROCHLORIDE 10 MG/1
10 TABLET ORAL 2 TIMES DAILY
Qty: 60 TABLET | Refills: 0 | OUTPATIENT
Start: 2021-04-20

## 2021-04-20 NOTE — TELEPHONE ENCOUNTER
Patient states her only day off is tomorrow for the next week. Her PCP is off tomorrow. She cannot do video visit from work. VV scheduled with Daphne Clement for tomorrow for med refill.   Cinthya ANN RN

## 2021-04-20 NOTE — TELEPHONE ENCOUNTER
Requested Prescriptions   Pending Prescriptions Disp Refills     dexmethylphenidate (FOCALIN) 10 MG tablet 60 tablet 0     Sig: Take 1 tablet (10 mg) by mouth 2 times daily       There is no refill protocol information for this order        Last Written Prescription Date:  3/19/21  Last Fill Quantity: 60,  # refills: 0   Last office visit: 12/11/2020 with prescribing provider:     Future Office Visit:

## 2021-04-21 ENCOUNTER — VIRTUAL VISIT (OUTPATIENT)
Dept: FAMILY MEDICINE | Facility: CLINIC | Age: 41
End: 2021-04-21
Payer: COMMERCIAL

## 2021-04-21 DIAGNOSIS — F90.2 ATTENTION DEFICIT HYPERACTIVITY DISORDER (ADHD), COMBINED TYPE: Primary | ICD-10-CM

## 2021-04-21 DIAGNOSIS — R79.89 ELEVATED TSH: ICD-10-CM

## 2021-04-21 PROCEDURE — 99213 OFFICE O/P EST LOW 20 MIN: CPT | Mod: GT | Performed by: NURSE PRACTITIONER

## 2021-04-21 RX ORDER — DEXMETHYLPHENIDATE HYDROCHLORIDE 10 MG/1
10 TABLET ORAL 2 TIMES DAILY
Qty: 60 TABLET | Refills: 0 | Status: SHIPPED | OUTPATIENT
Start: 2021-04-21 | End: 2021-05-17

## 2021-04-21 ASSESSMENT — ANXIETY QUESTIONNAIRES
3. WORRYING TOO MUCH ABOUT DIFFERENT THINGS: SEVERAL DAYS
7. FEELING AFRAID AS IF SOMETHING AWFUL MIGHT HAPPEN: NOT AT ALL
1. FEELING NERVOUS, ANXIOUS, OR ON EDGE: SEVERAL DAYS
5. BEING SO RESTLESS THAT IT IS HARD TO SIT STILL: NOT AT ALL
6. BECOMING EASILY ANNOYED OR IRRITABLE: NOT AT ALL
IF YOU CHECKED OFF ANY PROBLEMS ON THIS QUESTIONNAIRE, HOW DIFFICULT HAVE THESE PROBLEMS MADE IT FOR YOU TO DO YOUR WORK, TAKE CARE OF THINGS AT HOME, OR GET ALONG WITH OTHER PEOPLE: SOMEWHAT DIFFICULT
GAD7 TOTAL SCORE: 3
2. NOT BEING ABLE TO STOP OR CONTROL WORRYING: SEVERAL DAYS

## 2021-04-21 ASSESSMENT — PATIENT HEALTH QUESTIONNAIRE - PHQ9
5. POOR APPETITE OR OVEREATING: NOT AT ALL
SUM OF ALL RESPONSES TO PHQ QUESTIONS 1-9: 0

## 2021-04-21 NOTE — PROGRESS NOTES
Anne is a 40 year old who is being evaluated via a billable video visit.      How would you like to obtain your AVS? MyChart  If the video visit is dropped, the invitation should be resent by: Text to cell phone: [phone]  Will anyone else be joining your video visit? No    Video Start Time: 720    Assessment & Plan     Attention deficit hyperactivity disorder (ADHD), combined type  Stable on current meds  Refilled x 1 month  Make appt with Primary Care Provider for CSA and yearly urine confirmation  - dexmethylphenidate (FOCALIN) 10 MG tablet; Take 1 tablet (10 mg) by mouth 2 times daily    Elevated TSH  Due for follow up labs   Order in system  Make appt in near future to have this done.         12 minutes spent on the date of the encounter doing chart review, history and exam, documentation and further activities per the note    Call or return to the clinic with any worsening of symptoms or no resolution. Patient/Parent verbalized understanding and is in agreement. Medication side effects reviewed.   Current Outpatient Medications   Medication Sig Dispense Refill     albuterol (PROAIR HFA/PROVENTIL HFA/VENTOLIN HFA) 108 (90 Base) MCG/ACT inhaler Inhale 2 puffs into the lungs every 6 hours 1 Inhaler 1     dexmethylphenidate (FOCALIN) 10 MG tablet Take 1 tablet (10 mg) by mouth 2 times daily 60 tablet 0     docusate sodium (COLACE) 100 MG capsule Take 1 capsule (100 mg) by mouth 2 times daily Take while on opiate to prevent constipation  0     escitalopram (LEXAPRO) 10 MG tablet TAKE 1 TABLET BY MOUTH EVERY DAY TAKE WITH 20MG FOR TOTAL OF 30MG DAILY 90 tablet 0     escitalopram (LEXAPRO) 20 MG tablet Take 1 tablet (20 mg) by mouth daily 90 tablet 3     ethynodiol-ethinyl estradiol (ZOVIA) 1-50 MG-MCG tablet Take 1 tablet by mouth daily 84 tablet 4     lisinopril (ZESTRIL) 20 MG tablet TAKE 1 TABLET BY MOUTH EVERY DAY 90 tablet 1     montelukast (SINGULAIR) 10 MG tablet TAKE 1 TABLET BY MOUTH AT BEDTIME 90 tablet  0     omeprazole (PRILOSEC) 20 MG DR capsule Take 20 mg by mouth daily       oxyCODONE (ROXICODONE) 5 MG tablet Take 1 tablet (5 mg) by mouth every 6 hours as needed for pain 12 tablet 0     predniSONE (DELTASONE) 20 MG tablet Take two tablets (= 40mg) each day for 5 (five) days 10 tablet 0     propranolol (INDERAL) 80 MG tablet Take 1 tablet (80 mg) by mouth daily 90 tablet 1     diphenhydrAMINE (BENADRYL) 25 MG tablet Take 1-2 tablets (25-50 mg) by mouth every 6 hours as needed for itching or allergies (Patient not taking: Reported on 4/21/2021) 30 tablet 1     ipratropium - albuterol 0.5 mg/2.5 mg/3 mL (DUONEB) 0.5-2.5 (3) MG/3ML neb solution Take 1 vial by nebulization every 6 hours as needed for shortness of breath / dyspnea or wheezing       LORazepam (ATIVAN) 1 MG tablet Take 0.5-1 tablets (0.5-1 mg) by mouth 2 times daily as needed for anxiety Max dose with 1 mg a day.  15 tablets must last a month (Patient not taking: Reported on 4/21/2021) 15 tablet 0     As the provider for this telephone/video service, I attest that I introduced myself to the patient, provided my credentials, disclosed my location, and determined that, based on a review of the patient's chart and/or a discussion with members of the patient's treatment team, a telephone/video visit is an appropriate and effective means of providing this service. The patient and I mutually agree that this visit is appropriate for telephone/video visit as well.    Follow up Primary Care Provider 1 month    FRANKLYN Ruiz [facility]    Justin Rodríguez is a 40 year old who presents for the following health issues     HPI     ADHD    Onset: 1 1/2 year(s) ago     Description:  Medicine helps with this  Easily distracted: no  Short attention span: no  Trouble following directions: no   Impulsive behavior: no   Trouble completing tasks: no    Accompanying Signs & Symptoms:        Change in sleep pattern:  no  Irritability at certain times of the day: no  Socially withdrawn: no  Depression symptoms: no  Anxiety symptoms: no    History:  Caffeine intake: Moderate  Loss of appetite: no  Healthy diet: depends on day  Did you have problems in school or with previous employment: no  Family history of ADHD: YES- daughter    Do you use alcohol or drugs: no    Therapies tried: Focalin with total relief    Has been taking this for a year or so now.   Attention and focus issues have gotten much better  Psychiatrist and psychotherapy NONE   Has depression and anxiety as well  Moods feel pretty stable.   [facility] Follow-up to PHQ 10/15/2020 4/21/2021   PHQ-9 9. Suicide Ideation past 2 weeks Not at all Not at all     MILES-7 SCORE 4/21/2021   Total Score 3     No chest pain or SHORTNESS OF BREATH  Weight loss after working overnights and moved back to days  Wt Readings from Last 5 Encounters:   04/15/21 88.5 kg (195 lb)   12/21/20 95.3 kg (210 lb)   12/15/20 94.3 kg (207 lb 14.3 oz)   12/11/20 94.3 kg (208 lb)   12/10/20 95.3 kg (210 lb)   sleep has been good  Due for repeat TSH  Needs CSA and yearly urine done.   Will schedule with Primary Care Provider for same        Review of Systems   Constitutional, HEENT, cardiovascular, pulmonary, GI, , musculoskeletal, neuro, skin, endocrine and psych systems are negative, except as otherwise noted.      Objective           Vitals:  No vitals were obtained today due to virtual visit.    Physical Exam   GENERAL: Healthy, alert and no distress  EYES: Eyes grossly normal to inspection.  No discharge or erythema, or obvious scleral/conjunctival abnormalities.  RESP: No audible wheeze, cough, or visible cyanosis.  No visible retractions or increased work of breathing.    SKIN: Visible skin clear. No significant rash, abnormal pigmentation or lesions.  NEURO: Cranial nerves grossly intact.  Mentation and speech appropriate for age.  PSYCH: Mentation appears normal, affect normal/bright, judgement  "and insight intact, normal speech and appearance well-groomed.    Admission on 12/21/2020, Discharged on 12/21/2020   Component Date Value Ref Range Status     HCG Qual Urine 12/21/2020 Negative  NEG^Negative Final    Comment: This test is for screening purposes.  Results should be interpreted along with   the clinical picture.  Confirmation testing is available if warranted by   ordering [id number], HCG Quantitative Pregnancy.       Copath Report 12/21/2020    Final                    Value:Patient Name: RAJENDRA BUCIO  MR#: [id number]  Specimen #: [id number]  Collected: 12/21/2020  Received: 12/22/2020  Reported: 12/23/2020 17:09  Ordering Phy(s): PORSHA YAP    For improved result formatting, select 'View Enhanced Report Format' under   Linked Documents section.    SPECIMEN(S):  Gallbladder and contents    FINAL DIAGNOSIS:  Gallbladder, cholecystectomy:  - Calculi not present  -Chronic cholecystitis with prominent cholesterolosis    Electronically signed out by:    Nimisha Lundberg M.D.    CLINICAL HISTORY:    Symptomatic cholelithiasis    GROSS:  The specimen is received in formalin with the proper patient information,   labeled \"gallbladder and contents\".  The specimen consists of a 7.6 x 2.6 x 1.1 cm previously incised   gallbladder with patent 0.4 cm cystic duct.  The serosa is purple-green and glistening. Sectioning the specimen reveals   a wall thickness averaging 0.2 cm.  The mucosa is green with yellow stippling. The lumen contains a viscous   green-yello                          w bile. No stones are  located within the lumen of the gallbladder or within the specimen   container. No polyps or masses are  identified. Representative sections are submitted in one cassette to   include cystic duct margin (inked black,  en face). (Dictated by: ULISSES Duran 12/22/2020 08:54 AM)    MICROSCOPIC:  A formal microscopic examination has been performed.    The technical component of this testing was " completed at the [facility], with the professional component performed   at the [facility]  Laboratory, [facility] [place]  [address] ([phone])    CPT Codes:  A: 91901-PA3    COLLECTION SITE:  Client: [facility]  Location: [facility] ()       TSH   Date Value Ref Range Status   10/15/2020 4.93 (H) 0.40 - 4.00 mU/L Final               Video-Visit Details    Type of service:  Video Visit    Video End Time:729    Originating Location (pt. Location): Home    Distant Location (provider location):  [facility]     Platform used for Video Visit: Trey

## 2021-04-21 NOTE — PATIENT INSTRUCTIONS
Patient Education     Attention-Deficit/Hyperactivity Disorder (ADHD) in Adults  You ve always had trouble concentrating. Your mind wanders, and it s hard to finish tasks. As a result, you didn t do well in school. And now, you often struggle with your job. Sometimes this makes you rojas or depressed. These may be symptoms of attention-deficit/hyperactivity disorder (ADHD). To find out more, talk to your healthcare provider. He or she can offer guidance and support.  Symptoms of ADHD in adults  For an adult to be diagnosed with ADHD, the symptoms must have been present since childhood. The symptoms may include:    Trouble thinking things through    Low self-esteem    Depression    Trouble holding a job    Memory problems    Problems with a marriage or relationship    Lack of discipline    Trouble finishing tasks or projects  What is ADHD?  Attention-deficit/hyperactivity disorder makes it hard to focus your mind. You may daydream a lot. And you may be restless much of the time. As a result, you may have trouble with detailed or boring work. And it may be hard to stick with one project for very long. You also may forget things. Or, you may miss key points during a lecture or meeting. You may even have trouble sitting through a movie or concert. At times, you may feel frustrated or angry. This can affect your relationships with others.  Who does it affect?  ADHD starts in childhood. Sometimes, your symptoms may improve as you get older. But they also may persist into your adult years. ADHD is often thought of as a  kid s problem.  That s why it s often missed in adults. In fact, many parents learn they have ADHD when their children are diagnosed.  What causes it?  The exact cause of ADHD isn t known. The disorder does run in families. Having one parent with ADHD makes it more likely you ll have it too. And the part of your brain that controls attention may be involved. Certain brain chemicals that are out of balance  may also play a role.  What can be done?  The first step is finding out if you have ADHD. Your doctor will use special guidelines to diagnose the disorder. Most adults with ADHD are greatly helped by some combination of medicine, therapy and coaching.  To learn more    Children and Adults with Attention-Deficit/Hyperactivity Disorder (CARLO) https://carlo.org/    Attention Deficit Disorder Association (ADDA) https://add.org/    Salvador last reviewed this educational content on 4/1/2020 2000-2021 [employer]. All rights reserved. This information is not intended as a substitute for professional medical care. Always follow your healthcare professional's instructions.

## 2021-04-22 ASSESSMENT — ANXIETY QUESTIONNAIRES: GAD7 TOTAL SCORE: 3

## 2021-04-22 ASSESSMENT — ASTHMA QUESTIONNAIRES: ACT_TOTALSCORE: 25

## 2021-05-06 ENCOUNTER — OFFICE VISIT (OUTPATIENT)
Dept: URGENT CARE | Facility: URGENT CARE | Age: 41
End: 2021-05-06
Payer: COMMERCIAL

## 2021-05-06 VITALS
DIASTOLIC BLOOD PRESSURE: 70 MMHG | BODY MASS INDEX: 33.29 KG/M2 | HEIGHT: 64 IN | HEART RATE: 73 BPM | TEMPERATURE: 98.5 F | RESPIRATION RATE: 16 BRPM | OXYGEN SATURATION: 97 % | WEIGHT: 195 LBS | SYSTOLIC BLOOD PRESSURE: 114 MMHG

## 2021-05-06 DIAGNOSIS — J01.01 ACUTE RECURRENT MAXILLARY SINUSITIS: Primary | ICD-10-CM

## 2021-05-06 PROCEDURE — 99214 OFFICE O/P EST MOD 30 MIN: CPT | Performed by: NURSE PRACTITIONER

## 2021-05-06 RX ORDER — PREDNISONE 20 MG/1
40 TABLET ORAL DAILY
Qty: 10 TABLET | Refills: 0 | Status: SHIPPED | OUTPATIENT
Start: 2021-05-06 | End: 2021-05-10

## 2021-05-06 ASSESSMENT — MIFFLIN-ST. JEOR: SCORE: 1534.51

## 2021-05-06 NOTE — LETTER
May 6, 2021      Anne Ferreira  [id number] [address] [facility] [address]        To Whom It May Concern:    Anne Ferreira was seen in our clinic. Please excuse her from work missed this week and for the remainder of this week. Thank you!    Sincerely,        FRANKLYN Zee CNP

## 2021-05-06 NOTE — PROGRESS NOTES
"    Assessment & Plan   Problem List Items Addressed This Visit     None      Visit Diagnoses     Acute recurrent maxillary sinusitis    -  Primary    Relevant Medications    amoxicillin-clavulanate (AUGMENTIN) 875-125 MG tablet    predniSONE (DELTASONE) 20 MG tablet             20 minutes spent on the date of the encounter doing chart review, history and exam, documentation and further activities per the note       BMI:   Estimated body mass index is 33.47 kg/m  as calculated from the following:    Height as of this encounter: 1.626 m (5' 4\").    Weight as of this encounter: 88.5 kg (195 lb).   Weight management plan: Patient was referred to their PCP to discuss a diet and exercise plan.    Patient Instructions   Increase rest and fluids. Tylenol and/or Ibuprofen for comfort. Cool mist vaporizer. If your symptoms worsen or do not resolve follow up with your primary care provider in 1 week and sooner if needed.      Mucinex 600 mg 12 hour formula for ear, head and chest congestion.  It can also thin post nasal drip which can cause a cough and sore throat.    Indications for emergent return to emergency department discussed with patient, who verbalized good understanding and agreement.  Patient understands the limitations of today's evaluation.           Patient Education     Sinusitis (Antibiotic Treatment)    The sinuses are air-filled spaces within the bones of the face. They connect to the inside of the nose. Sinusitis is an inflammation of the tissue that lines the sinuses. Sinusitis can occur during a cold. It can also happen due to allergies to pollens and other particles in the air. Sinusitis can cause symptoms of sinus congestion and a feeling of fullness. A sinus infection causes fever, headache, and facial pain. There is often green or yellow fluid draining from the nose or into the back of the throat (post-nasal drip). You have been given antibiotics to treat this condition.   Home care    Take the full " course of antibiotics as instructed. Don't stop taking them, even when you feel better.    Drink plenty of water, hot tea, and other liquids as directed by the healthcare provider. This may help thin nasal mucus. It also may help your sinuses drain fluids.    Heat may help soothe painful areas of your face. Use a towel soaked in hot water. Or,  the shower and direct the warm spray onto your face. Using a vaporizer along with a menthol rub at night may also help soothe symptoms.     An expectorant with guaifenesin may help thin nasal mucus and help your sinuses drain fluids. Talk with your provider or pharmacists before taking an over-the-counter (OTC) medicine if you have any questions about it or its side effects..    You can use an OTC decongestant, unless a similar medicine was prescribed to you. Nasal sprays work the fastest. Use one that contains phenylephrine or oxymetazoline. First blow your nose gently. Then use the spray. Don't use these medicines more often than directed on the label. If you do, your symptoms may get worse. You may also take pills that contain pseudoephedrine. Don t use products that combine multiple medicines. This is because side effects may be increased. Read labels. You can also ask the pharmacist for help. (People with high blood pressure should not use decongestants. They can raise blood pressure.) Talk with your provider or pharmacist if you have any questions about the medicine..    OTC antihistamines may help if allergies contributed to your sinusitis. Talk with your provider or pharmacist if you have any questions about the medicine..    Don't use nasal rinses or irrigation during an acute sinus infection, unless your healthcare provider tells you to. Rinsing may spread the infection to other areas in your sinuses.    Use acetaminophen or ibuprofen to control pain, unless another pain medicine was prescribed to you. If you have chronic liver or kidney disease or ever had  a stomach ulcer, talk with your healthcare provider before using these medicines. Never give aspirin to anyone under age 18 who is ill with a fever. It may cause severe liver damage.    Don't smoke. This can make symptoms worse.    Follow-up care  Follow up with your healthcare provider, or as advised.   When to seek medical advice  Call your healthcare provider if any of these occur:     Facial pain or headache that gets worse    Stiff neck    Unusual drowsiness or confusion    Swelling of your forehead or eyelids    Symptoms don't go away in 10 days    Vision problems, such as blurred or double vision    Fever of 100.4 F (38 C) or higher, or as directed by your healthcare provider  Call 911  Call 911 if any of these occur:     Seizure    Trouble breathing    Feeling dizzy or faint    Fingernails, skin or lips look blue, purple , or gray  Prevention  Here are steps you can take to help prevent an infection:     Keep good hand washing habits.    Don t have close contact with people who have sore throats, colds, or other upper respiratory infections.    Don t smoke, and stay away from secondhand smoke.    Stay up to date with of your vaccines.  [employer] last reviewed this educational content on 12/1/2019 2000-2021 [employer]. All rights reserved. This information is not intended as a substitute for professional medical care. Always follow your healthcare professional's instructions.           Patient Education     Using Oral Corticosteroids for Asthma Flare-Ups   Your healthcare provider may prescribe oral corticosteroids for asthma flare-ups. These are not the steroids that you hear about athletes abusing. They are medicines that help to reduce swelling and mucus production in the airways. Often they are only taken for a short time. For example, they may be taken for 5 days. Sometimes people with asthma have to take them for a longer time.   While taking these medicines, it s important that  you:    Keep tracking your asthma symptoms    Keep taking your long-term controller medicines    Use your quick-relief medicines as needed    Contact your provider if you have side effects    Tips for taking your medicine  Make sure you take the medicine exactly as it is prescribed. The directions can be confusing. If you are unsure ask your healthcare provider or pharmacist. Keep these instructions on your Asthma Action Plan.   Here are some tips:    Make sure to follow an Asthma Action Plan. This is a written worksheet made just for you. It is put together by you and your provider. It gives exact steps to take for early treatment of your asthma symptoms. These steps will help keep your asthma from getting worse. The worksheet also advises when to call your healthcare provider, call 911, or go to the emergency room. Go over the worksheet with your close family members. And keep a copy with you. Take it with you to your appointments so it can be updated every year or when your treatments change.    Sometimes, the dose is higher when you start taking the medicine. Then it is slowly lowered until you are done. This is called tapering.    Don t forget to take your medicine on time. To help you remember, try taking your medicine when you brush your teeth. Consider setting an alarm on your phone, smart watch, or computer to help you remember. Write down each dose as you take it so you don't accidentally miss a dose or take extra medicine.    Don t stop taking this medicine unless your healthcare provider tells you to. Take all the medicine as directed until it is gone.    Know what to do if you happen to miss a dose. Write this on your Asthma Action plan. If you realize you aren't sure about what to do, contact your healthcare provider, as soon as possible, or your pharmacist.   Common oral corticosteroids     Methylprednisolone    Prednisolone    Prednisolone sodium phosphate    Prednisone    Side effects  This medicine  has few side effects when taken for a short time. The most common ones include:     Appetite changes    Nervousness    Sleep problems    Indigestion    If you have diabetes, it may make your blood sugar hard to control.   If this medicine is used for a long time, more serious side effects may occur. These include:       Acne    Weight gain    Mood changes    High blood pressure    Swelling    Bruising    Sleep problems    Stomach, eye, or bone problems  Don't take more medicine than prescribed. And don't take it more often than you are supposed to.   Talk with your healthcare provider about any side effects that you have. In the meantime, don t stop taking your medicine.   These medicines can cause problems with other medicines. They can also make other health problems worse. Let your healthcare providers know about all the medicines you take. This includes over-the-counter and prescription medicines, vitamins, and herbal products. Tell all your providers that you are taking oral corticosteroids, including your dentist.   Call your healthcare provider if you have any of the following:     Trouble seeing    Need to urinate more often than normal    Increased thirst    Asthma symptoms that get worse    Other new symptoms that concern you    7239-3427 [employer] All rights reserved. This information is not intended as a substitute for professional medical care. Always follow your healthcare professional's instructions.               Return in about 10 days (around 5/16/2021), or if symptoms worsen or fail to improve, for Follow up with your primary care provider.    FRANKLYN [facility] C[facility] [place]    Justin Rodríguez is a 40 year old who presents for the following health issues     HPI     Chief Complaint   Patient presents with     Cough     5/3/21 Patient had a headache, left side neck pain felt pinched, 4th wheezing started, cough, itching in throat, eyes  "and ears. Patient had covid and vaccinated first dose.     Sinus Problem   has recurrent sinus infection about once a year        Review of Systems   Constitutional, HEENT, cardiovascular, pulmonary, GI, , musculoskeletal, neuro, skin, endocrine and psych systems are negative, except as otherwise noted.      Objective    /70 (BP Location: Right arm, Patient Position: Sitting, Cuff Size: Adult Large)   Pulse 73   Temp 98.5  F (36.9  C) (Tympanic)   Resp 16   Ht 1.626 m (5' 4\")   Wt 88.5 kg (195 lb)   SpO2 97%   BMI 33.47 kg/m    Body mass index is 33.47 kg/m .  Physical Exam   GENERAL: healthy, alert and no distress, nontoxic in appearance but looks tired with puffy eyes  EYES: Eyes grossly normal to inspection but with upper eyelid puffiness, PERRL and conjunctivae and sclerae normal  HENT: ear canals and TM's normal, nose and mouth without ulcers or lesions, has pain below both eyes from sinus pressure  NECK: no adenopathy, supple with full ROM  RESP: lungs clear to auscultation - no rales, rhonchi but small short wheezes throughout  CV: regular rate and rhythm, normal S1 S2, no S3 or S4, no murmur, click or rub, no peripheral edema   ABDOMEN: soft, nontender  MS: no gross musculoskeletal defects noted, no edema  No rash    No results found for this or any previous visit (from the past 24 hour(s)).            "

## 2021-05-06 NOTE — PATIENT INSTRUCTIONS
Increase rest and fluids. Tylenol and/or Ibuprofen for comfort. Cool mist vaporizer. If your symptoms worsen or do not resolve follow up with your primary care provider in 1 week and sooner if needed.      Mucinex 600 mg 12 hour formula for ear, head and chest congestion.  It can also thin post nasal drip which can cause a cough and sore throat.    Indications for emergent return to emergency department discussed with patient, who verbalized good understanding and agreement.  Patient understands the limitations of today's evaluation.           Patient Education     Sinusitis (Antibiotic Treatment)    The sinuses are air-filled spaces within the bones of the face. They connect to the inside of the nose. Sinusitis is an inflammation of the tissue that lines the sinuses. Sinusitis can occur during a cold. It can also happen due to allergies to pollens and other particles in the air. Sinusitis can cause symptoms of sinus congestion and a feeling of fullness. A sinus infection causes fever, headache, and facial pain. There is often green or yellow fluid draining from the nose or into the back of the throat (post-nasal drip). You have been given antibiotics to treat this condition.   Home care    Take the full course of antibiotics as instructed. Don't stop taking them, even when you feel better.    Drink plenty of water, hot tea, and other liquids as directed by the healthcare provider. This may help thin nasal mucus. It also may help your sinuses drain fluids.    Heat may help soothe painful areas of your face. Use a towel soaked in hot water. Or,  the shower and direct the warm spray onto your face. Using a vaporizer along with a menthol rub at night may also help soothe symptoms.     An expectorant with guaifenesin may help thin nasal mucus and help your sinuses drain fluids. Talk with your provider or pharmacists before taking an over-the-counter (OTC) medicine if you have any questions about it or its side  effects..    You can use an OTC decongestant, unless a similar medicine was prescribed to you. Nasal sprays work the fastest. Use one that contains phenylephrine or oxymetazoline. First blow your nose gently. Then use the spray. Don't use these medicines more often than directed on the label. If you do, your symptoms may get worse. You may also take pills that contain pseudoephedrine. Don t use products that combine multiple medicines. This is because side effects may be increased. Read labels. You can also ask the pharmacist for help. (People with high blood pressure should not use decongestants. They can raise blood pressure.) Talk with your provider or pharmacist if you have any questions about the medicine..    OTC antihistamines may help if allergies contributed to your sinusitis. Talk with your provider or pharmacist if you have any questions about the medicine..    Don't use nasal rinses or irrigation during an acute sinus infection, unless your healthcare provider tells you to. Rinsing may spread the infection to other areas in your sinuses.    Use acetaminophen or ibuprofen to control pain, unless another pain medicine was prescribed to you. If you have chronic liver or kidney disease or ever had a stomach ulcer, talk with your healthcare provider before using these medicines. Never give aspirin to anyone under age 18 who is ill with a fever. It may cause severe liver damage.    Don't smoke. This can make symptoms worse.    Follow-up care  Follow up with your healthcare provider, or as advised.   When to seek medical advice  Call your healthcare provider if any of these occur:     Facial pain or headache that gets worse    Stiff neck    Unusual drowsiness or confusion    Swelling of your forehead or eyelids    Symptoms don't go away in 10 days    Vision problems, such as blurred or double vision    Fever of 100.4 F (38 C) or higher, or as directed by your healthcare provider  Call 911  Call 911 if any of  these occur:     Seizure    Trouble breathing    Feeling dizzy or faint    Fingernails, skin or lips look blue, purple , or gray  Prevention  Here are steps you can take to help prevent an infection:     Keep good hand washing habits.    Don t have close contact with people who have sore throats, colds, or other upper respiratory infections.    Don t smoke, and stay away from secondhand smoke.    Stay up to date with of your vaccines.  [employer] last reviewed this educational content on 12/1/2019 2000-2021 [employer]. All rights reserved. This information is not intended as a substitute for professional medical care. Always follow your healthcare professional's instructions.           Patient Education     Using Oral Corticosteroids for Asthma Flare-Ups   Your healthcare provider may prescribe oral corticosteroids for asthma flare-ups. These are not the steroids that you hear about athletes abusing. They are medicines that help to reduce swelling and mucus production in the airways. Often they are only taken for a short time. For example, they may be taken for 5 days. Sometimes people with asthma have to take them for a longer time.   While taking these medicines, it s important that you:    Keep tracking your asthma symptoms    Keep taking your long-term controller medicines    Use your quick-relief medicines as needed    Contact your provider if you have side effects    Tips for taking your medicine  Make sure you take the medicine exactly as it is prescribed. The directions can be confusing. If you are unsure ask your healthcare provider or pharmacist. Keep these instructions on your Asthma Action Plan.   Here are some tips:    Make sure to follow an Asthma Action Plan. This is a written worksheet made just for you. It is put together by you and your provider. It gives exact steps to take for early treatment of your asthma symptoms. These steps will help keep your asthma from getting worse. The  worksheet also advises when to call your healthcare provider, call 911, or go to the emergency room. Go over the worksheet with your close family members. And keep a copy with you. Take it with you to your appointments so it can be updated every year or when your treatments change.    Sometimes, the dose is higher when you start taking the medicine. Then it is slowly lowered until you are done. This is called tapering.    Don t forget to take your medicine on time. To help you remember, try taking your medicine when you brush your teeth. Consider setting an alarm on your phone, smart watch, or computer to help you remember. Write down each dose as you take it so you don't accidentally miss a dose or take extra medicine.    Don t stop taking this medicine unless your healthcare provider tells you to. Take all the medicine as directed until it is gone.    Know what to do if you happen to miss a dose. Write this on your Asthma Action plan. If you realize you aren't sure about what to do, contact your healthcare provider, as soon as possible, or your pharmacist.   Common oral corticosteroids     Methylprednisolone    Prednisolone    Prednisolone sodium phosphate    Prednisone    Side effects  This medicine has few side effects when taken for a short time. The most common ones include:     Appetite changes    Nervousness    Sleep problems    Indigestion    If you have diabetes, it may make your blood sugar hard to control.   If this medicine is used for a long time, more serious side effects may occur. These include:       Acne    Weight gain    Mood changes    High blood pressure    Swelling    Bruising    Sleep problems    Stomach, eye, or bone problems  Don't take more medicine than prescribed. And don't take it more often than you are supposed to.   Talk with your healthcare provider about any side effects that you have. In the meantime, don t stop taking your medicine.   These medicines can cause problems with other  medicines. They can also make other health problems worse. Let your healthcare providers know about all the medicines you take. This includes over-the-counter and prescription medicines, vitamins, and herbal products. Tell all your providers that you are taking oral corticosteroids, including your dentist.   Call your healthcare provider if you have any of the following:     Trouble seeing    Need to urinate more often than normal    Increased thirst    Asthma symptoms that get worse    Other new symptoms that concern you    3195-8370 [employer]. All rights reserved. This information is not intended as a substitute for professional medical care. Always follow your healthcare professional's instructions.

## 2021-05-07 ENCOUNTER — MYC MEDICAL ADVICE (OUTPATIENT)
Dept: FAMILY MEDICINE | Facility: CLINIC | Age: 41
End: 2021-05-07

## 2021-05-07 DIAGNOSIS — Z71.6 ENCOUNTER FOR SMOKING CESSATION COUNSELING: Primary | ICD-10-CM

## 2021-05-10 ENCOUNTER — VIRTUAL VISIT (OUTPATIENT)
Dept: FAMILY MEDICINE | Facility: CLINIC | Age: 41
End: 2021-05-10
Payer: COMMERCIAL

## 2021-05-10 DIAGNOSIS — F41.1 GAD (GENERALIZED ANXIETY DISORDER): ICD-10-CM

## 2021-05-10 DIAGNOSIS — J20.9 ACUTE BRONCHITIS, UNSPECIFIED ORGANISM: Primary | ICD-10-CM

## 2021-05-10 PROCEDURE — 99214 OFFICE O/P EST MOD 30 MIN: CPT | Mod: GT | Performed by: NURSE PRACTITIONER

## 2021-05-10 RX ORDER — LORAZEPAM 1 MG/1
.5-1 TABLET ORAL 2 TIMES DAILY PRN
Qty: 15 TABLET | Refills: 0 | Status: SHIPPED | OUTPATIENT
Start: 2021-05-10 | End: 2021-12-10

## 2021-05-10 RX ORDER — NICOTINE 21 MG/24HR
1 PATCH, TRANSDERMAL 24 HOURS TRANSDERMAL EVERY 24 HOURS
Qty: 30 PATCH | Refills: 0 | Status: SHIPPED | OUTPATIENT
Start: 2021-05-10 | End: 2021-08-19

## 2021-05-10 RX ORDER — PREDNISONE 20 MG/1
TABLET ORAL
Qty: 19 TABLET | Refills: 0 | Status: SHIPPED | OUTPATIENT
Start: 2021-05-10 | End: 2021-05-17

## 2021-05-10 NOTE — PROGRESS NOTES
Anne is a 40 year old who is being evaluated via a billable video visit.      How would you like to obtain your AVS? MyChart  If the video visit is dropped, the invitation should be resent by: Text to cell phone: [phone]  Will anyone else be joining your video visit? No    Video Start Time: 4:34 PM    Assessment & Plan     (J20.9) Acute bronchitis, unspecified organism  (primary encounter diagnosis)  Comment: Patient recently been treated for sinus infection currently on Augmentin patient was tested for Covid 5 days ago was negative patient continues to have a cough with use nebulizer.  Patient denies any fevers.  We will treat cough with a tapering dose of prednisone nebulizer treatments and also a long-acting inhaler.  Patient to contact me if not improving over the next 2 days and if not improving or any fever spike patient should be seen in person in the clinic or emergency room or urgent care.  Patient is to have a Covid vaccination on Thursday if improving patient can have it if not improving would recommend holding off on the Covid vaccination we will touch base in the next 2 days once starting the increased dose of prednisone and long-acting inhaler  Plan: predniSONE (DELTASONE) 20 MG tablet,         fluticasone-salmeterol (ADVAIR) 100-50 MCG/DOSE        inhaler      (F41.1) MILES (generalized anxiety disorder)  Comment: Patient has increased anxiety control with Ativan renewed Ativan patient knows not to take more than 1 pill a day.  Plan: LORazepam (ATIVAN) 1 MG tablet  No follow-ups on file.    FRANKLYN [facility]    Justin Rodríguez is a 40 year old who presents for the following health issues  accompanied by her :    HPI     Patient was seen in urgent care last week and was treated for a sinus infection. She is still on the antibiotics. She is still having wheezing and now her lungs have started to hurt.         Review of Systems   Constitutional, HEENT,  cardiovascular, pulmonary, gi and gu systems are negative, except as otherwise noted.      Objective           Vitals:  No vitals were obtained today due to virtual visit.    Physical Exam   GENERAL: Healthy, alert and no distress  EYES: Eyes grossly normal to inspection.  No discharge or erythema, or obvious scleral/conjunctival abnormalities.  RESP: No audible wheeze, cough, or visible cyanosis.  No visible retractions or increased work of breathing.    SKIN: Visible skin clear. No significant rash, abnormal pigmentation or lesions.  NEURO: Cranial nerves grossly intact.  Mentation and speech appropriate for age.  PSYCH: Mentation appears normal, affect normal/bright, judgement and insight intact, normal speech and appearance well-groomed.    No results found for any visits on 05/10/21.            Video-Visit Details    Type of service:  Video Visit    Video End Time:4:57 PM    Originating Location (pt. Location): Home    Distant Location (provider location):  [facility]     Platform used for Video Visit: Juliette

## 2021-05-10 NOTE — LETTER
My Depression Action Plan  Name: Anne Ferreira   Date of Birth 1980  Date: 5/10/2021    My doctor: Bertha Loya   My clinic: [facility] [id number] [place] [id number] [phone]          GREEN    ZONE   Good Control    What it looks like:     Things are going generally well. You have normal ups and downs. You may even feel depressed from time to time, but bad moods usually last less than a day.   What you need to do:  1. Continue to care for yourself (see self care plan)  2. Check your depression survival kit and update it as needed  3. Follow your physician s recommendations including any medication.  4. Do not stop taking medication unless you consult with your physician first.           YELLOW         ZONE Getting Worse    What it looks like:     Depression is starting to interfere with your life.     It may be hard to get out of bed; you may be starting to isolate yourself from others.    Symptoms of depression are starting to last most all day and this has happened for several days.     You may have suicidal thoughts but they are not constant.   What you need to do:     1. Call your care team. Your response to treatment will improve if you keep your care team informed of your progress. Yellow periods are signs an adjustment may need to be made.     2. Continue your self-care.  Just get dressed and ready for the day.  Don't give yourself time to talk yourself out of it.    3. Talk to someone in your support network.    4. Open up your Depression Self-Care Plan/Wellness Kit.           RED    ZONE Medical Alert - Get Help    What it looks like:     Depression is seriously interfering with your life.     You may experience these or other symptoms: You can t get out of bed most days, can t work or engage in other necessary activities, you have trouble taking care of basic hygiene, or basic responsibilities, thoughts of suicide or death that will not  go away, self-injurious behavior.     What you need to do:  1. Call your care team and request a same-day appointment. If they are not available (weekends or after hours) call your local crisis line, emergency room or 911.          Depression Self-Care Plan / Wellness Kit    Many people find that medication and therapy are helpful treatments for managing depression. In addition, making small changes to your everyday life can help to boost your mood and improve your wellbeing. Below are some tips for you to consider. Be sure to talk with your medical provider and/or behavioral health consultant if your symptoms are worsening or not improving.     Sleep   Sleep hygiene  means all of the habits that support good, restful sleep. It includes maintaining a consistent bedtime and wake time, using your bedroom only for sleeping or sex, and keeping the bedroom dark and free of distractions like a computer, smartphone, or television.     Develop a Healthy Routine  Maintain good hygiene. Get out of bed in the morning, make your bed, brush your teeth, take a shower, and get dressed. Don t spend too much time viewing media that makes you feel stressed. Find time to relax each day.    Exercise  Get some form of exercise every day. This will help reduce pain and release endorphins, the  feel good  chemicals in your brain. It can be as simple as just going for a walk or doing some gardening, anything that will get you moving.      Diet  Strive to eat healthy foods, including fruits and vegetables. Drink plenty of water. Avoid excessive sugar, caffeine, alcohol, and other mood-altering substances.     Stay Connected with Others  Stay in touch with friends and family members.    Manage Your Mood  Try deep breathing, massage therapy, biofeedback, or meditation. Take part in fun activities when you can. Try to find something to smile about each day.     Psychotherapy  Be open to working with a therapist if your provider recommends it.      Medication  Be sure to take your medication as prescribed. Most anti-depressants need to be taken every day. It usually takes several weeks for medications to work. Not all medicines work for all people. It is important to follow-up with your provider to make sure you have a treatment plan that is working for you. Do not stop your medication abruptly without first discussing it with your provider.    Crisis Resources   These hotlines are for both adults and children. They and are open 24 hours a day, 7 days a week unless noted otherwise.      National Suicide Prevention Lifeline   9-754-254-TALK (1733)      Crisis Text Line    www.crisistextline.org  Text HOME to 048713 from anywhere in the [place], anytime, about any type of crisis. A live, trained crisis counselor will receive the text and respond quickly.      [facility] Lifeline for LGBTQ Youth  A national crisis intervention and suicide lifeline for LGBTQ youth under 25. Provides a safe place to talk without judgement. Call 1-951.648.9596; text START to 522858 or visit www.thetrevorproject.org to talk to a trained counselor.      For [facility] crisis numbers, visit the [facility] website at:  https://[place].gov/dhs/people-we-serve/adults/health-care/mental-health/resources/crisis-contacts.jsp

## 2021-05-12 ENCOUNTER — ANCILLARY PROCEDURE (OUTPATIENT)
Dept: GENERAL RADIOLOGY | Facility: CLINIC | Age: 41
End: 2021-05-12
Attending: NURSE PRACTITIONER
Payer: COMMERCIAL

## 2021-05-12 ENCOUNTER — OFFICE VISIT (OUTPATIENT)
Dept: FAMILY MEDICINE | Facility: CLINIC | Age: 41
End: 2021-05-12
Payer: COMMERCIAL

## 2021-05-12 VITALS
DIASTOLIC BLOOD PRESSURE: 82 MMHG | RESPIRATION RATE: 18 BRPM | HEART RATE: 66 BPM | SYSTOLIC BLOOD PRESSURE: 130 MMHG | TEMPERATURE: 99 F | OXYGEN SATURATION: 98 %

## 2021-05-12 DIAGNOSIS — R05.9 COUGH: ICD-10-CM

## 2021-05-12 DIAGNOSIS — J45.41 MODERATE PERSISTENT ASTHMA WITH ACUTE EXACERBATION: Primary | ICD-10-CM

## 2021-05-12 PROCEDURE — 99214 OFFICE O/P EST MOD 30 MIN: CPT | Performed by: NURSE PRACTITIONER

## 2021-05-12 PROCEDURE — 71046 X-RAY EXAM CHEST 2 VIEWS: CPT | Performed by: RADIOLOGY

## 2021-05-12 NOTE — LETTER
May 12, 2021      Anne Ferreira  [address] [facility] [address]        To Whom It May Concern:    Anne Ferreira was seen in our clinic. She may return to work without restrictions 5/17/21.      Sincerely,        FRANKLYN Buckley CNP

## 2021-05-12 NOTE — PROGRESS NOTES
Assessment & Plan     Moderate persistent asthma with acute exacerbation  No acute distress.  Not controlled. X ray negative per FRANKLYN Buckley CNP. Continue with current prednisone and finish Augmentin.  Follow up if symptoms do not improve or worsen.  - fluticasone-salmeterol (ADVAIR) 250-50 MCG/DOSE inhaler; Inhale 1 puff into the lungs every 12 hours    Cough    - XR Chest 2 Views; Future    Return in about 1 week (around 5/19/2021), or if symptoms worsen or fail to improve.    FRANKLYN Buckley CNP  [facility]    Justin Rodríguez is a 40 year old who presents for the following health issues     HPI     Acute Illness  Acute illness concerns: Wheezing   Onset/Duration: follow up   Symptoms:  Fever: no  Chills/Sweats: YES  Headache (location?): YES  Sinus Pressure: YES - comes and goes   Conjunctivitis:  no  Ear Pain: YES- pressure   Rhinorrhea: YES - mild  Congestion: YES  Sore Throat: no   Cough: YES-non-productive  Wheeze: YES  Decreased Appetite: no  Nausea: YES  Vomiting: no  Diarrhea: no  Dysuria/Freq.: no  Dysuria or Hematuria: no  Fatigue/Achiness: YES  Sick/Strep Exposure: YES - daughter had same sx   Therapies tried and outcome: Prednisone , Augmentin and advair     covid testing down at work yesterday    Review of Systems   Constitutional, HEENT, cardiovascular, pulmonary, gi and gu systems are negative, except as otherwise noted.      Objective    /82   Pulse 66   Temp 99  F (37.2  C) (Tympanic)   Resp 18   SpO2 98%   There is no height or weight on file to calculate BMI.  Physical Exam   GENERAL: healthy, alert and no distress  HENT: ear canals and TM's normal, nose and mouth without ulcers or lesions  NECK: no adenopathy  RESP: expiratory wheezes L upper posterior, L mid posterior and L lower posterior  CV: regular rate and rhythm, normal S1 S2, no S3 or S4, no murmur  PSYCH: mentation appears normal, affect normal/bright    Xray - Reviewed and  interpreted by me.  negative

## 2021-05-17 ENCOUNTER — VIRTUAL VISIT (OUTPATIENT)
Dept: FAMILY MEDICINE | Facility: CLINIC | Age: 41
End: 2021-05-17
Payer: COMMERCIAL

## 2021-05-17 DIAGNOSIS — F33.1 MODERATE EPISODE OF RECURRENT MAJOR DEPRESSIVE DISORDER (H): ICD-10-CM

## 2021-05-17 DIAGNOSIS — F90.2 ATTENTION DEFICIT HYPERACTIVITY DISORDER (ADHD), COMBINED TYPE: ICD-10-CM

## 2021-05-17 DIAGNOSIS — E66.01 MORBID OBESITY (H): ICD-10-CM

## 2021-05-17 PROCEDURE — 99214 OFFICE O/P EST MOD 30 MIN: CPT | Mod: GT | Performed by: NURSE PRACTITIONER

## 2021-05-17 RX ORDER — DEXMETHYLPHENIDATE HYDROCHLORIDE 10 MG/1
10 TABLET ORAL 2 TIMES DAILY
Qty: 60 TABLET | Refills: 0 | Status: SHIPPED | OUTPATIENT
Start: 2021-05-20 | End: 2021-05-17

## 2021-05-17 RX ORDER — DEXMETHYLPHENIDATE HYDROCHLORIDE 10 MG/1
10 TABLET ORAL 2 TIMES DAILY
Qty: 60 TABLET | Refills: 0 | Status: SHIPPED | OUTPATIENT
Start: 2021-07-20 | End: 2021-08-19

## 2021-05-17 RX ORDER — DEXMETHYLPHENIDATE HYDROCHLORIDE 10 MG/1
10 TABLET ORAL 2 TIMES DAILY
Qty: 60 TABLET | Refills: 0 | Status: SHIPPED | OUTPATIENT
Start: 2021-06-20 | End: 2021-05-17

## 2021-05-17 NOTE — PROGRESS NOTES
Anne is a 40 year old who is being evaluated via a billable video visit.      How would you like to obtain your AVS? MyChart  If the video visit is dropped, the invitation should be resent by: Text to cell phone: [phone]  Will anyone else be joining your video visit? No    Video Start Time: 4:02 PM    Assessment & Plan    (F90.2) Attention deficit hyperactivity disorder (ADHD), combined type  Comment:  Controlled no change in treatment plan  Plan: dexmethylphenidate (FOCALIN) 10 MG tablet,         DISCONTINUED: dexmethylphenidate (FOCALIN) 10         MG tablet, DISCONTINUED: dexmethylphenidate         (FOCALIN) 10 MG tablet      (F33.1) Moderate episode of recurrent major depressive disorder (H)  Comment:  Controlled no change in treatment plan  Plan:     (E66.01) Morbid obesity (H)  Comment: reviewed diet   Plan:         Return in about 3 months (around 8/17/2021) for using a video visit.    FRANKLYN Christianson [facility]    Justin Rodríguez is a 40 year old who presents for the following health issues     HPI     Medication Followup of focalin    Taking Medication as prescribed: yes    Side Effects:  None    Medication Helping Symptoms:  yes         Review of Systems   Constitutional, HEENT, cardiovascular, pulmonary, gi and gu systems are negative, except as otherwise noted.      Objective           Vitals:  No vitals were obtained today due to virtual visit.    Physical Exam   GENERAL: Healthy, alert and no distress  EYES: Eyes grossly normal to inspection.  No discharge or erythema, or obvious scleral/conjunctival abnormalities.  RESP: No audible wheeze, cough, or visible cyanosis.  No visible retractions or increased work of breathing.    SKIN: Visible skin clear. No significant rash, abnormal pigmentation or lesions.  NEURO: Cranial nerves grossly intact.  Mentation and speech appropriate for age.  PSYCH: Mentation appears normal, affect normal/bright, judgement and  insight intact, normal speech and appearance well-groomed.    No results found for any visits on 05/17/21.            Video-Visit Details    Type of service:  Video Visit    Video End Time:4:12 PM    Originating Location (pt. Location): Home    Distant Location (provider location):  [facility]     Platform used for Video Visit: LolaAquacue

## 2021-05-21 PROBLEM — F33.1 MODERATE EPISODE OF RECURRENT MAJOR DEPRESSIVE DISORDER (H): Status: ACTIVE | Noted: 2021-05-21

## 2021-06-03 DIAGNOSIS — J45.41 MODERATE PERSISTENT ASTHMA WITH ACUTE EXACERBATION: ICD-10-CM

## 2021-06-23 ENCOUNTER — PATIENT OUTREACH (OUTPATIENT)
Dept: CARE COORDINATION | Facility: CLINIC | Age: 41
End: 2021-06-23

## 2021-06-23 DIAGNOSIS — M48.02 CERVICAL SPINAL STENOSIS: Primary | ICD-10-CM

## 2021-06-23 NOTE — PROGRESS NOTES
Clinic Care Coordination Contact  Community Health Worker Initial Outreach    CHW Initial Information Gathering:  Referral Source: ED Follow-Up  Type of residence:: Private home - stairs  Community Resources: None  Supplies used at home:: Other, Nebulizer tubing  Equipment Currently Used at Home: none  No PCP office visit in Past Year: No  Transportation means:: Accessible car       Patient accepts CC: Pt declined CCC plans to set-up an appointment with PCP as soon as she receives her MRI report.

## 2021-06-26 ENCOUNTER — HOSPITAL ENCOUNTER (OUTPATIENT)
Dept: MRI IMAGING | Facility: CLINIC | Age: 41
Discharge: HOME | End: 2021-06-26
Attending: NURSE PRACTITIONER | Admitting: NURSE PRACTITIONER
Payer: COMMERCIAL

## 2021-06-26 DIAGNOSIS — Z98.1 HISTORY OF FUSION OF CERVICAL SPINE: ICD-10-CM

## 2021-06-26 DIAGNOSIS — M54.2 NECK PAIN: ICD-10-CM

## 2021-06-26 PROCEDURE — 72141 MRI NECK SPINE W/O DYE: CPT

## 2021-07-27 ENCOUNTER — OFFICE VISIT (OUTPATIENT)
Dept: FAMILY MEDICINE | Facility: CLINIC | Age: 41
End: 2021-07-27
Payer: COMMERCIAL

## 2021-07-27 VITALS
HEIGHT: 64 IN | WEIGHT: 198 LBS | SYSTOLIC BLOOD PRESSURE: 136 MMHG | HEART RATE: 84 BPM | DIASTOLIC BLOOD PRESSURE: 70 MMHG | TEMPERATURE: 97.1 F | BODY MASS INDEX: 33.8 KG/M2

## 2021-07-27 DIAGNOSIS — M62.838 MUSCLE SPASM: ICD-10-CM

## 2021-07-27 DIAGNOSIS — J44.1 CHRONIC OBSTRUCTIVE PULMONARY DISEASE WITH ACUTE EXACERBATION (H): ICD-10-CM

## 2021-07-27 DIAGNOSIS — S39.012A STRAIN OF LUMBAR REGION, INITIAL ENCOUNTER: ICD-10-CM

## 2021-07-27 DIAGNOSIS — R06.83 SNORING: Primary | ICD-10-CM

## 2021-07-27 PROBLEM — J44.9 COPD (CHRONIC OBSTRUCTIVE PULMONARY DISEASE) (H): Status: ACTIVE | Noted: 2021-07-27

## 2021-07-27 PROCEDURE — 99214 OFFICE O/P EST MOD 30 MIN: CPT | Mod: 25 | Performed by: NURSE PRACTITIONER

## 2021-07-27 PROCEDURE — 94010 BREATHING CAPACITY TEST: CPT | Performed by: NURSE PRACTITIONER

## 2021-07-27 RX ORDER — MELOXICAM 7.5 MG/1
7.5 TABLET ORAL DAILY
Qty: 30 TABLET | Refills: 0 | Status: SHIPPED | OUTPATIENT
Start: 2021-07-27 | End: 2021-08-19

## 2021-07-27 RX ORDER — CYCLOBENZAPRINE HCL 10 MG
10 TABLET ORAL 2 TIMES DAILY PRN
Qty: 60 TABLET | Refills: 1 | Status: SHIPPED | OUTPATIENT
Start: 2021-07-27 | End: 2021-08-19

## 2021-07-27 ASSESSMENT — MIFFLIN-ST. JEOR: SCORE: 1548.12

## 2021-07-27 NOTE — PROGRESS NOTES
"    Assessment & Plan     (R06.83) Snoring  (primary encounter diagnosis)  Comment:   Plan: SLEEP EVALUATION & MANAGEMENT REFERRAL - ADULT         -            (J44.1) Chronic obstructive pulmonary disease with acute exacerbation (H)  Comment: Uncontrolled will have patient obtain pulmonary function test and spirometry.  Also patient to take Advair at regular patient has not been taking Advair at her regular scheduled time if not improving post that patient to follow-up  Plan: Spirometry, Breathing Capacity, Pulmonary         Function Test      (M62.838) Muscle spasm  Comment: Patient noted to have muscle spasms in the lumbar area  Plan: cyclobenzaprine (FLEXERIL) 10 MG tablet      (S39.012A) Strain of lumbar region, initial encounter  Comment: MRI within normal limits unchanged we will start with Mobic for pain management and muscle relaxers patient aware he should not to be taking ibuprofen if taking Mobic.  If not improving reviewed physical therapy patient declined physical therapy at this point has had physical therapy in the past and continues to have the packets and information will start on her own at home if not improving patient will follow up  Plan: meloxicam (MOBIC) 7.5 MG tablet      Return in about 1 month (around 8/27/2021), or if symptoms worsen or fail to improve.    FRANKLYN Christianson CNP  [facility]    Justin Rodríguez is a 40 year old who presents for the following health issues     HPI     Patient would like to follow up with her MRI results and neck pain. She states that her pain has been worse. She is now having headaches and back pain with it.    Review of Systems   Constitutional, HEENT, cardiovascular, pulmonary, gi and gu systems are negative, except as otherwise noted.      Objective    /70 (BP Location: Right arm, Cuff Size: Adult Regular)   Pulse 84   Temp 97.1  F (36.2  C) (Tympanic)   Ht 1.626 m (5' 4\")   Wt 89.8 kg (198 lb)   BMI 33.99 kg/m  "   There is no height or weight on file to calculate BMI.  Physical Exam   GENERAL: healthy, alert and no distress  EYES: Eyes grossly normal to inspection, PERRL and conjunctivae and sclerae normal  HENT: ear canals and TM's normal, nose and mouth without ulcers or lesions  NECK: no adenopathy, no asymmetry, masses, or scars and thyroid normal to palpation  RESP: lungs clear to auscultation - no rales, rhonchi or wheezes  CV: regular rate and rhythm, normal S1 S2, no S3 or S4, no murmur, click or rub, no peripheral edema and peripheral pulses strong  ABDOMEN: soft, nontender, no hepatosplenomegaly, no masses and bowel sounds normal  MS: no gross musculoskeletal defects noted, no edema tenderness to the lumbar muscles bilateral full range of motion of the back  SKIN: no suspicious lesions or rashes  NEURO: Normal strength and tone, mentation intact and speech normal  PSYCH: mentation appears normal, affect normal/bright    No results found for any visits on 07/27/21.

## 2021-07-28 DIAGNOSIS — J45.20 MILD INTERMITTENT ASTHMA WITHOUT COMPLICATION: ICD-10-CM

## 2021-07-28 RX ORDER — MONTELUKAST SODIUM 10 MG/1
1 TABLET ORAL AT BEDTIME
Qty: 90 TABLET | Refills: 1 | Status: SHIPPED | OUTPATIENT
Start: 2021-07-28 | End: 2022-02-10

## 2021-08-19 ENCOUNTER — OFFICE VISIT (OUTPATIENT)
Dept: FAMILY MEDICINE | Facility: CLINIC | Age: 41
End: 2021-08-19
Payer: COMMERCIAL

## 2021-08-19 DIAGNOSIS — F90.2 ATTENTION DEFICIT HYPERACTIVITY DISORDER (ADHD), COMBINED TYPE: ICD-10-CM

## 2021-08-19 DIAGNOSIS — M62.838 MUSCLE SPASM: ICD-10-CM

## 2021-08-19 DIAGNOSIS — F41.1 GAD (GENERALIZED ANXIETY DISORDER): Primary | ICD-10-CM

## 2021-08-19 DIAGNOSIS — S39.012A STRAIN OF LUMBAR REGION, INITIAL ENCOUNTER: ICD-10-CM

## 2021-08-19 PROCEDURE — 99214 OFFICE O/P EST MOD 30 MIN: CPT | Mod: GT | Performed by: NURSE PRACTITIONER

## 2021-08-19 RX ORDER — HYDROXYZINE HYDROCHLORIDE 25 MG/1
25 TABLET, FILM COATED ORAL EVERY 8 HOURS PRN
Qty: 60 TABLET | Refills: 0 | Status: SHIPPED | OUTPATIENT
Start: 2021-08-19 | End: 2021-11-18

## 2021-08-19 RX ORDER — DEXMETHYLPHENIDATE HYDROCHLORIDE 10 MG/1
10 TABLET ORAL 2 TIMES DAILY
Qty: 60 TABLET | Refills: 0 | Status: SHIPPED | OUTPATIENT
Start: 2021-09-19 | End: 2021-08-19

## 2021-08-19 RX ORDER — CYCLOBENZAPRINE HCL 10 MG
10 TABLET ORAL 2 TIMES DAILY PRN
Qty: 60 TABLET | Refills: 1 | Status: SHIPPED | OUTPATIENT
Start: 2021-08-19 | End: 2021-11-02

## 2021-08-19 RX ORDER — DEXMETHYLPHENIDATE HYDROCHLORIDE 10 MG/1
10 TABLET ORAL 2 TIMES DAILY
Qty: 60 TABLET | Refills: 0 | Status: SHIPPED | OUTPATIENT
Start: 2021-08-19 | End: 2021-08-19

## 2021-08-19 RX ORDER — DEXMETHYLPHENIDATE HYDROCHLORIDE 10 MG/1
10 TABLET ORAL 2 TIMES DAILY
Qty: 60 TABLET | Refills: 0 | Status: SHIPPED | OUTPATIENT
Start: 2021-10-19 | End: 2021-11-18

## 2021-08-19 RX ORDER — MELOXICAM 7.5 MG/1
7.5 TABLET ORAL DAILY
Qty: 90 TABLET | Refills: 1 | Status: SHIPPED | OUTPATIENT
Start: 2021-08-19 | End: 2022-03-10

## 2021-08-19 ASSESSMENT — ANXIETY QUESTIONNAIRES
1. FEELING NERVOUS, ANXIOUS, OR ON EDGE: SEVERAL DAYS
4. TROUBLE RELAXING: SEVERAL DAYS
GAD7 TOTAL SCORE: 7
6. BECOMING EASILY ANNOYED OR IRRITABLE: SEVERAL DAYS
3. WORRYING TOO MUCH ABOUT DIFFERENT THINGS: SEVERAL DAYS
GAD7 TOTAL SCORE: 7
7. FEELING AFRAID AS IF SOMETHING AWFUL MIGHT HAPPEN: SEVERAL DAYS
GAD7 TOTAL SCORE: 7
5. BEING SO RESTLESS THAT IT IS HARD TO SIT STILL: SEVERAL DAYS
8. IF YOU CHECKED OFF ANY PROBLEMS, HOW DIFFICULT HAVE THESE MADE IT FOR YOU TO DO YOUR WORK, TAKE CARE OF THINGS AT HOME, OR GET ALONG WITH OTHER PEOPLE?: SOMEWHAT DIFFICULT
7. FEELING AFRAID AS IF SOMETHING AWFUL MIGHT HAPPEN: SEVERAL DAYS
2. NOT BEING ABLE TO STOP OR CONTROL WORRYING: SEVERAL DAYS

## 2021-08-19 ASSESSMENT — PATIENT HEALTH QUESTIONNAIRE - PHQ9
SUM OF ALL RESPONSES TO PHQ QUESTIONS 1-9: 4
10. IF YOU CHECKED OFF ANY PROBLEMS, HOW DIFFICULT HAVE THESE PROBLEMS MADE IT FOR YOU TO DO YOUR WORK, TAKE CARE OF THINGS AT HOME, OR GET ALONG WITH OTHER PEOPLE: SOMEWHAT DIFFICULT
SUM OF ALL RESPONSES TO PHQ QUESTIONS 1-9: 4

## 2021-08-19 NOTE — PROGRESS NOTES
Anne is a 40 year old who is being evaluated via a billable video visit.      How would you like to obtain your AVS? MyChart  If the video visit is dropped, the invitation should be resent by: Text to cell phone: [phone]  Will anyone else be joining your video visit? No    Video Start Time: 7:40 AM    Assessment & Plan     (F41.1) MILES (generalized anxiety disorder)  (primary encounter diagnosis)  Comment:  Controlled no change in treatment plan  Plan: hydrOXYzine (ATARAX) 25 MG tablet      (M62.838) Muscle spasm  Comment:  Controlled no change in treatment plan  Plan: cyclobenzaprine (FLEXERIL) 10 MG tablet    (S39.012A) Strain of lumbar region, initial encounter  Comment:  Controlled no change in treatment plan  Plan: meloxicam (MOBIC) 7.5 MG tablet      (F90.2) Attention deficit hyperactivity disorder (ADHD), combined type  Comment:  Controlled no change in treatment plan  Plan: dexmethylphenidate (FOCALIN) 10 MG tablet,         DISCONTINUED: dexmethylphenidate (FOCALIN) 10         MG tablet, DISCONTINUED: dexmethylphenidate         (FOCALIN) 10 MG tablet         See Patient Instructions    Return in about 3 months (around 11/19/2021) for using a video visit ADHD .    FRANKLYN Christianson [facility]    Subjective   Anne is a 40 year old who presents for the following health issues     HPI   Medication Followup of Focalin and Meloxicam    Taking Medication as prescribed: yes    Side Effects:  None    Medication Helping Symptoms:  yes           Review of Systems   Constitutional, HEENT, cardiovascular, pulmonary, gi and gu systems are negative, except as otherwise noted.      Objective           Vitals:  No vitals were obtained today due to virtual visit.    Physical Exam   GENERAL: Healthy, alert and no distress  EYES: Eyes grossly normal to inspection.  No discharge or erythema, or obvious scleral/conjunctival abnormalities.  RESP: No audible wheeze, cough, or visible cyanosis.   No visible retractions or increased work of breathing.    SKIN: Visible skin clear. No significant rash, abnormal pigmentation or lesions.  NEURO: Cranial nerves grossly intact.  Mentation and speech appropriate for age.  PSYCH: Mentation appears normal, affect normal/bright, judgement and insight intact, normal speech and appearance well-groomed.    No results found for any visits on 08/19/21.            Video-Visit Details    Type of service:  Video Visit    Video End Time:7:47 AM    Originating Location (pt. Location): Home    Distant Location (provider location):  [facility]     Platform used for Video Visit: TriciaSapheneia

## 2021-08-19 NOTE — PROGRESS NOTES
{PROVIDER CHARTING PREFERENCE:580215}    Subjective   Anne is a 40 year old who presents for the following health issues {ACCOMPANIED BY STATEMENT (Optional):127891}    HPI     Medication Followup of Focalin and Meloxicam    Taking Medication as prescribed: yes    Side Effects:  None    Medication Helping Symptoms:  yes       {additonal problems for provider to add (Optional):787024}    Review of Systems   {ROS COMP (Optional):817103}      Objective    There were no vitals taken for this visit.  There is no height or weight on file to calculate BMI.  Physical Exam   {Exam List (Optional):518945}    {Diagnostic Test Results (Optional):098249}    {AMBULATORY ATTESTATION (Optional):577333}

## 2021-08-20 ASSESSMENT — ANXIETY QUESTIONNAIRES: GAD7 TOTAL SCORE: 7

## 2021-09-11 DIAGNOSIS — F41.1 GAD (GENERALIZED ANXIETY DISORDER): ICD-10-CM

## 2021-09-13 RX ORDER — ESCITALOPRAM OXALATE 20 MG/1
TABLET ORAL
Qty: 90 TABLET | Refills: 3 | Status: SHIPPED | OUTPATIENT
Start: 2021-09-13 | End: 2022-09-16

## 2021-10-11 DIAGNOSIS — I10 BENIGN ESSENTIAL HYPERTENSION: ICD-10-CM

## 2021-10-11 RX ORDER — LISINOPRIL 20 MG/1
20 TABLET ORAL DAILY
Qty: 90 TABLET | Refills: 2 | Status: SHIPPED | OUTPATIENT
Start: 2021-10-11 | End: 2022-08-22

## 2021-10-18 ENCOUNTER — LAB REQUISITION (OUTPATIENT)
Dept: LAB | Facility: CLINIC | Age: 41
End: 2021-10-18
Payer: COMMERCIAL

## 2021-10-18 PROCEDURE — U0003 INFECTIOUS AGENT DETECTION BY NUCLEIC ACID (DNA OR RNA); SEVERE ACUTE RESPIRATORY SYNDROME CORONAVIRUS 2 (SARS-COV-2) (CORONAVIRUS DISEASE [COVID-19]), AMPLIFIED PROBE TECHNIQUE, MAKING USE OF HIGH THROUGHPUT TECHNOLOGIES AS DESCRIBED BY CMS-2020-01-R: HCPCS | Mod: ORL | Performed by: FAMILY MEDICINE

## 2021-10-20 LAB — SARS-COV-2 RNA RESP QL NAA+PROBE: NOT DETECTED

## 2021-10-23 ENCOUNTER — HEALTH MAINTENANCE LETTER (OUTPATIENT)
Age: 41
End: 2021-10-23

## 2021-10-25 DIAGNOSIS — Z30.41 ENCOUNTER FOR SURVEILLANCE OF CONTRACEPTIVE PILLS: ICD-10-CM

## 2021-10-27 RX ORDER — ETHYNODIOL DIACETATE AND ETHINYL ESTRADIOL 1 MG-50MCG
1 KIT ORAL DAILY
Qty: 84 TABLET | Refills: 4 | Status: SHIPPED | OUTPATIENT
Start: 2021-10-27 | End: 2022-04-25

## 2021-10-30 DIAGNOSIS — M62.838 MUSCLE SPASM: ICD-10-CM

## 2021-11-02 RX ORDER — CYCLOBENZAPRINE HCL 10 MG
10 TABLET ORAL 2 TIMES DAILY PRN
Qty: 60 TABLET | Refills: 1 | Status: SHIPPED | OUTPATIENT
Start: 2021-11-02 | End: 2022-01-03

## 2021-11-02 NOTE — TELEPHONE ENCOUNTER
Requested Prescriptions   Pending Prescriptions Disp Refills     cyclobenzaprine (FLEXERIL) 10 MG tablet [Pharmacy Med Name: CYCLOBENZAPRINE 10 MG TABLET] 60 tablet 1     Sig: TAKE 1 TABLET (10 MG) BY MOUTH 2 TIMES DAILY AS NEEDED FOR MUSCLE SPASMS       There is no refill protocol information for this order        Last Written Prescription Date:  8/19/21  Last Fill Quantity: 60,  # refills: 0   Last office visit: 8/19/2021 with prescribing provider:     Future Office Visit:

## 2021-11-13 DIAGNOSIS — F90.2 ATTENTION DEFICIT HYPERACTIVITY DISORDER (ADHD), COMBINED TYPE: ICD-10-CM

## 2021-11-15 RX ORDER — DEXMETHYLPHENIDATE HYDROCHLORIDE 10 MG/1
10 TABLET ORAL 2 TIMES DAILY
Qty: 60 TABLET | Refills: 0 | OUTPATIENT
Start: 2021-11-15

## 2021-11-15 NOTE — TELEPHONE ENCOUNTER
Routing refill request to provider for review/approval because:  Drug not on the [facility] refill protocol   Last Written Prescription Date:  10/19/21  Last Fill Quantity: 60,  # refills: 0   Last office visit: 8/19/2021 with prescribing provider:  Bertha Loya NP   Future Office Visit:  None  Ana Yusuf RN

## 2021-11-18 ENCOUNTER — VIRTUAL VISIT (OUTPATIENT)
Dept: FAMILY MEDICINE | Facility: CLINIC | Age: 41
End: 2021-11-18
Payer: COMMERCIAL

## 2021-11-18 DIAGNOSIS — M54.16 LUMBAR RADICULOPATHY: Primary | ICD-10-CM

## 2021-11-18 DIAGNOSIS — F90.2 ATTENTION DEFICIT HYPERACTIVITY DISORDER (ADHD), COMBINED TYPE: ICD-10-CM

## 2021-11-18 DIAGNOSIS — F41.1 GAD (GENERALIZED ANXIETY DISORDER): ICD-10-CM

## 2021-11-18 PROCEDURE — 99214 OFFICE O/P EST MOD 30 MIN: CPT | Mod: GT | Performed by: NURSE PRACTITIONER

## 2021-11-18 RX ORDER — DEXMETHYLPHENIDATE HYDROCHLORIDE 10 MG/1
10 TABLET ORAL 2 TIMES DAILY
Qty: 60 TABLET | Refills: 0 | Status: SHIPPED | OUTPATIENT
Start: 2022-01-18 | End: 2022-02-17

## 2021-11-18 RX ORDER — HYDROXYZINE HYDROCHLORIDE 25 MG/1
25 TABLET, FILM COATED ORAL EVERY 8 HOURS PRN
Qty: 60 TABLET | Refills: 0 | Status: SHIPPED | OUTPATIENT
Start: 2021-11-18 | End: 2022-05-12

## 2021-11-18 RX ORDER — DEXMETHYLPHENIDATE HYDROCHLORIDE 10 MG/1
10 TABLET ORAL 2 TIMES DAILY
Qty: 60 TABLET | Refills: 0 | Status: SHIPPED | OUTPATIENT
Start: 2021-11-18 | End: 2021-11-18

## 2021-11-18 RX ORDER — DEXMETHYLPHENIDATE HYDROCHLORIDE 10 MG/1
10 TABLET ORAL 2 TIMES DAILY
Qty: 60 TABLET | Refills: 0 | Status: SHIPPED | OUTPATIENT
Start: 2021-12-18 | End: 2021-11-18

## 2021-11-18 NOTE — PROGRESS NOTES
"Anne is a 41 year old who is being evaluated via a billable video visit.      How would you like to obtain your AVS? MyChart  If the video visit is dropped, the invitation should be resent by: Text to cell phone: [phone]  Will anyone else be joining your video visit? No    Video Start Time: 3:01 PM    Assessment & Plan     (M54.16) Lumbar radiculopathy  (primary encounter diagnosis)  Comment:   Plan: MR Lumbar Spine w/o Contrast            (F90.2) Attention deficit hyperactivity disorder (ADHD), combined type  Comment:  Controlled no change in treatment plan  Plan: dexmethylphenidate (FOCALIN) 10 MG tablet,         DISCONTINUED: dexmethylphenidate (FOCALIN) 10         MG tablet, DISCONTINUED: dexmethylphenidate         (FOCALIN) 10 MG tablet      (F41.1) MILES (generalized anxiety disorder)  Comment:  Controlled no change in treatment plan  Plan: hydrOXYzine (ATARAX) 25 MG tablet         Tobacco Cessation:   reports that she has been smoking cigarettes. She has never used smokeless tobacco.  Tobacco Cessation Action Plan: Information offered: Patient not interested at this time    BMI:   Estimated body mass index is 33.99 kg/m  as calculated from the following:    Height as of 7/27/21: 1.626 m (5' 4\").    Weight as of 7/27/21: 89.8 kg (198 lb).   Weight management plan: Discussed healthy diet and exercise guidelines    See Patient Instructions    No follow-ups on file.    FRANKLYN Christianson CNP  [facility]    Subjective   Anne is a 41 year old who presents for the following health issues     HPI     Medication Followup of  Focalin    Taking Medication as prescribed: yes    Side Effects:  None    Medication Helping Symptoms:  yes         Review of Systems   Constitutional, HEENT, cardiovascular, pulmonary, gi and gu systems are negative, except as otherwise noted.      Objective           Vitals:  No vitals were obtained today due to virtual visit.    Physical Exam   GENERAL: Healthy, " alert and no distress  EYES: Eyes grossly normal to inspection.  No discharge or erythema, or obvious scleral/conjunctival abnormalities.  RESP: No audible wheeze, cough, or visible cyanosis.  No visible retractions or increased work of breathing.    SKIN: Visible skin clear. No significant rash, abnormal pigmentation or lesions.  NEURO: Cranial nerves grossly intact.  Mentation and speech appropriate for age.  PSYCH: Mentation appears normal, affect normal/bright, judgement and insight intact, normal speech and appearance well-groomed.    No results found for any visits on 11/18/21.            Video-Visit Details    Type of service:  Video Visit    Video End Time:3;20    Originating Location (pt. Location): Home    Distant Location (provider location):  [facility]     Platform used for Video Visit: TriciaMayi Zhaopin

## 2021-12-03 DIAGNOSIS — J45.41 MODERATE PERSISTENT ASTHMA WITH ACUTE EXACERBATION: ICD-10-CM

## 2021-12-10 ENCOUNTER — VIRTUAL VISIT (OUTPATIENT)
Dept: FAMILY MEDICINE | Facility: CLINIC | Age: 41
End: 2021-12-10
Payer: COMMERCIAL

## 2021-12-10 DIAGNOSIS — F41.1 GAD (GENERALIZED ANXIETY DISORDER): ICD-10-CM

## 2021-12-10 DIAGNOSIS — J20.9 ACUTE BRONCHITIS WITH SYMPTOMS > 10 DAYS: Primary | ICD-10-CM

## 2021-12-10 PROCEDURE — 99213 OFFICE O/P EST LOW 20 MIN: CPT | Mod: TEL | Performed by: NURSE PRACTITIONER

## 2021-12-10 RX ORDER — LORAZEPAM 1 MG/1
.5-1 TABLET ORAL 2 TIMES DAILY PRN
Qty: 6 TABLET | Refills: 0 | Status: SHIPPED | OUTPATIENT
Start: 2021-12-10 | End: 2022-04-21

## 2021-12-10 RX ORDER — PREDNISONE 20 MG/1
TABLET ORAL
Qty: 10 TABLET | Refills: 0 | Status: SHIPPED | OUTPATIENT
Start: 2021-12-10 | End: 2022-01-18

## 2021-12-10 RX ORDER — DOXYCYCLINE HYCLATE 100 MG
100 TABLET ORAL 2 TIMES DAILY
Qty: 20 TABLET | Refills: 0 | Status: SHIPPED | OUTPATIENT
Start: 2021-12-10 | End: 2021-12-20

## 2021-12-10 NOTE — PROGRESS NOTES
Anne is a 41 year old who is being evaluated via a billable telephone visit.      What phone number would you like to be contacted at? [phone]  How would you like to obtain your AVS? MyChart    Assessment & Plan     (J20.9) Acute bronchitis with symptoms > 10 days  (primary encounter diagnosis)  Comment: Symptoms also representative of a bronchitis patient is Covid test 4 days ago was negative.  We will treat with a course of doxycycline and prednisone  Plan: predniSONE (DELTASONE) 20 MG tablet,         doxycycline hyclate (VIBRA-TABS) 100 MG tablet      (F41.1) MILES (generalized anxiety disorder)  Comment: *Patient has increased anxiety will provide her with limited amount of Ativan and then will need to go back to hydroxyzine  Plan: LORazepam (ATIVAN) 1 MG tablet      No follow-ups on file.    FRANKLYN Christianson CNP  [facility]    Subjective   Anne is a 41 year old who presents for the following health issues     HPI     Acute Illness  Acute illness concerns:   Onset/Duration: 9 days  Symptoms:  Fever: YES  Chills/Sweats: YES  Headache (location?): YES  Sinus Pressure: YES  Conjunctivitis:  no  Ear Pain: YES  Rhinorrhea: YES  Congestion: YES  Sore Throat: YES  Cough: YES  Wheeze: YES  Decreased Appetite: no  Nausea: YES  Vomiting: no  Diarrhea: no  Fatigue/Achiness: YES  Sick/Strep Exposure: YES- covid  Therapies tried and outcome: nyquil, advil         Review of Systems   Constitutional, HEENT, cardiovascular, pulmonary, gi and gu systems are negative, except as otherwise noted.      Objective           Vitals:  No vitals were obtained today due to virtual visit.    Physical Exam   healthy, alert and no distress  PSYCH: Alert and oriented times 3; coherent speech, normal   rate and volume, able to articulate logical thoughts, able   to abstract reason, no tangential thoughts, no hallucinations   or delusions  Her affect is normal  RESP: No cough, no audible wheezing, able to talk  in full sentences  Remainder of exam unable to be completed due to telephone visits    No results found for any visits on 12/10/21.            Phone call duration: 15  minutes   Yes

## 2021-12-10 NOTE — LETTER
[facility]  [address] [place] [id number]  Phone: [phone]  Fax: [phone]    December 10, 2021        Anne Ferreira  [address] [facility] [address]          To whom it may concern:    RE: Anne Ferreira    Patient was seen and treated today at our clinic and missed work.    Off of work for the next 48 hours.     Please contact me for questions or concerns.      Sincerely,        FRANKLYN Christianson CNP

## 2021-12-14 ENCOUNTER — HOSPITAL ENCOUNTER (EMERGENCY)
Facility: CLINIC | Age: 41
Discharge: HOME | End: 2021-12-14
Attending: FAMILY MEDICINE | Admitting: FAMILY MEDICINE
Payer: COMMERCIAL

## 2021-12-14 ENCOUNTER — VIRTUAL VISIT (OUTPATIENT)
Dept: FAMILY MEDICINE | Facility: CLINIC | Age: 41
End: 2021-12-14
Payer: COMMERCIAL

## 2021-12-14 VITALS
TEMPERATURE: 97 F | RESPIRATION RATE: 18 BRPM | OXYGEN SATURATION: 97 % | HEART RATE: 66 BPM | SYSTOLIC BLOOD PRESSURE: 140 MMHG | WEIGHT: 195 LBS | DIASTOLIC BLOOD PRESSURE: 78 MMHG | BODY MASS INDEX: 33.29 KG/M2 | HEIGHT: 64 IN

## 2021-12-14 DIAGNOSIS — R06.00 DYSPNEA, UNSPECIFIED TYPE: Primary | ICD-10-CM

## 2021-12-14 DIAGNOSIS — J11.1 INFLUENZA-LIKE ILLNESS: ICD-10-CM

## 2021-12-14 DIAGNOSIS — R05.9 COUGH: ICD-10-CM

## 2021-12-14 DIAGNOSIS — M79.10 MYALGIA: ICD-10-CM

## 2021-12-14 LAB
ALBUMIN SERPL-MCNC: 3 G/DL (ref 3.4–5)
ALBUMIN UR-MCNC: NEGATIVE MG/DL
ALP SERPL-CCNC: 53 U/L (ref 40–150)
ALT SERPL W P-5'-P-CCNC: 20 U/L (ref 0–50)
ANION GAP SERPL CALCULATED.3IONS-SCNC: 9 MMOL/L (ref 3–14)
APPEARANCE UR: CLEAR
AST SERPL W P-5'-P-CCNC: 14 U/L (ref 0–45)
BASE EXCESS BLDV CALC-SCNC: 2.7 MMOL/L (ref -7.7–1.9)
BASOPHILS # BLD MANUAL: 0 10E3/UL (ref 0–0.2)
BASOPHILS NFR BLD MANUAL: 0 %
BILIRUB SERPL-MCNC: 0.2 MG/DL (ref 0.2–1.3)
BILIRUB UR QL STRIP: NEGATIVE
BUN SERPL-MCNC: 13 MG/DL (ref 7–30)
CALCIUM SERPL-MCNC: 8.3 MG/DL (ref 8.5–10.1)
CHLORIDE BLD-SCNC: 106 MMOL/L (ref 94–109)
CO2 SERPL-SCNC: 24 MMOL/L (ref 20–32)
COLOR UR AUTO: YELLOW
CREAT SERPL-MCNC: 0.56 MG/DL (ref 0.52–1.04)
D DIMER PPP FEU-MCNC: 0.3 UG/ML FEU (ref 0–0.5)
EOSINOPHIL # BLD MANUAL: 0.3 10E3/UL (ref 0–0.7)
EOSINOPHIL NFR BLD MANUAL: 2 %
ERYTHROCYTE [DISTWIDTH] IN BLOOD BY AUTOMATED COUNT: 13.8 % (ref 10–15)
FLUAV RNA SPEC QL NAA+PROBE: NEGATIVE
FLUBV RNA RESP QL NAA+PROBE: NEGATIVE
GFR SERPL CREATININE-BSD FRML MDRD: >90 ML/MIN/1.73M2
GLUCOSE BLD-MCNC: 83 MG/DL (ref 70–99)
GLUCOSE UR STRIP-MCNC: NEGATIVE MG/DL
HCG SERPL QL: NEGATIVE
HCO3 BLDV-SCNC: 26 MMOL/L (ref 21–28)
HCT VFR BLD AUTO: 40 % (ref 35–47)
HGB BLD-MCNC: 12.8 G/DL (ref 11.7–15.7)
HGB UR QL STRIP: NEGATIVE
KETONES UR STRIP-MCNC: NEGATIVE MG/DL
LACTATE SERPL-SCNC: 1.9 MMOL/L (ref 0.7–2)
LEUKOCYTE ESTERASE UR QL STRIP: NEGATIVE
LYMPHOCYTES # BLD MANUAL: 7.5 10E3/UL (ref 0.8–5.3)
LYMPHOCYTES NFR BLD MANUAL: 54 %
MCH RBC QN AUTO: 28.9 PG (ref 26.5–33)
MCHC RBC AUTO-ENTMCNC: 32 G/DL (ref 31.5–36.5)
MCV RBC AUTO: 90 FL (ref 78–100)
MONOCYTES # BLD MANUAL: 0.1 10E3/UL (ref 0–1.3)
MONOCYTES NFR BLD MANUAL: 1 %
NEUTROPHILS # BLD MANUAL: 6 10E3/UL (ref 1.6–8.3)
NEUTROPHILS NFR BLD MANUAL: 43 %
NITRATE UR QL: NEGATIVE
NT-PROBNP SERPL-MCNC: 48 PG/ML (ref 0–450)
O2/TOTAL GAS SETTING VFR VENT: 21 %
PCO2 BLDV: 37 MM HG (ref 40–50)
PH BLDV: 7.47 [PH] (ref 7.32–7.43)
PH UR STRIP: 6 [PH] (ref 5–7)
PLAT MORPH BLD: ABNORMAL
PLATELET # BLD AUTO: 354 10E3/UL (ref 150–450)
PO2 BLDV: 58 MM HG (ref 25–47)
POTASSIUM BLD-SCNC: 4.4 MMOL/L (ref 3.4–5.3)
PROT SERPL-MCNC: 7 G/DL (ref 6.8–8.8)
RBC # BLD AUTO: 4.43 10E6/UL (ref 3.8–5.2)
RBC MORPH BLD: ABNORMAL
SARS-COV-2 RNA RESP QL NAA+PROBE: NEGATIVE
SODIUM SERPL-SCNC: 139 MMOL/L (ref 133–144)
SP GR UR STRIP: 1.02 (ref 1–1.03)
TROPONIN I SERPL HS-MCNC: 5 NG/L
UROBILINOGEN UR STRIP-MCNC: NORMAL MG/DL
WBC # BLD AUTO: 13.9 10E3/UL (ref 4–11)

## 2021-12-14 PROCEDURE — 84703 CHORIONIC GONADOTROPIN ASSAY: CPT | Performed by: FAMILY MEDICINE

## 2021-12-14 PROCEDURE — 99213 OFFICE O/P EST LOW 20 MIN: CPT | Mod: GT | Performed by: STUDENT IN AN ORGANIZED HEALTH CARE EDUCATION/TRAINING PROGRAM

## 2021-12-14 PROCEDURE — 85014 HEMATOCRIT: CPT | Performed by: FAMILY MEDICINE

## 2021-12-14 PROCEDURE — 83880 ASSAY OF NATRIURETIC PEPTIDE: CPT | Performed by: FAMILY MEDICINE

## 2021-12-14 PROCEDURE — C9803 HOPD COVID-19 SPEC COLLECT: HCPCS | Performed by: FAMILY MEDICINE

## 2021-12-14 PROCEDURE — 250N000013 HC RX MED GY IP 250 OP 250 PS 637: Performed by: FAMILY MEDICINE

## 2021-12-14 PROCEDURE — 81003 URINALYSIS AUTO W/O SCOPE: CPT | Performed by: FAMILY MEDICINE

## 2021-12-14 PROCEDURE — 82803 BLOOD GASES ANY COMBINATION: CPT | Performed by: FAMILY MEDICINE

## 2021-12-14 PROCEDURE — 94640 AIRWAY INHALATION TREATMENT: CPT | Performed by: FAMILY MEDICINE

## 2021-12-14 PROCEDURE — 86769 SARS-COV-2 COVID-19 ANTIBODY: CPT | Performed by: FAMILY MEDICINE

## 2021-12-14 PROCEDURE — 87636 SARSCOV2 & INF A&B AMP PRB: CPT | Performed by: FAMILY MEDICINE

## 2021-12-14 PROCEDURE — 85379 FIBRIN DEGRADATION QUANT: CPT | Performed by: FAMILY MEDICINE

## 2021-12-14 PROCEDURE — 96374 THER/PROPH/DIAG INJ IV PUSH: CPT | Performed by: FAMILY MEDICINE

## 2021-12-14 PROCEDURE — 80053 COMPREHEN METABOLIC PANEL: CPT | Performed by: FAMILY MEDICINE

## 2021-12-14 PROCEDURE — 250N000011 HC RX IP 250 OP 636: Performed by: FAMILY MEDICINE

## 2021-12-14 PROCEDURE — 84484 ASSAY OF TROPONIN QUANT: CPT | Performed by: FAMILY MEDICINE

## 2021-12-14 PROCEDURE — 87040 BLOOD CULTURE FOR BACTERIA: CPT | Performed by: FAMILY MEDICINE

## 2021-12-14 PROCEDURE — 258N000003 HC RX IP 258 OP 636: Performed by: FAMILY MEDICINE

## 2021-12-14 PROCEDURE — 36415 COLL VENOUS BLD VENIPUNCTURE: CPT | Performed by: FAMILY MEDICINE

## 2021-12-14 PROCEDURE — 96361 HYDRATE IV INFUSION ADD-ON: CPT | Performed by: FAMILY MEDICINE

## 2021-12-14 PROCEDURE — 99284 EMERGENCY DEPT VISIT MOD MDM: CPT | Mod: 25 | Performed by: FAMILY MEDICINE

## 2021-12-14 PROCEDURE — 83605 ASSAY OF LACTIC ACID: CPT | Performed by: FAMILY MEDICINE

## 2021-12-14 PROCEDURE — 99284 EMERGENCY DEPT VISIT MOD MDM: CPT | Performed by: FAMILY MEDICINE

## 2021-12-14 RX ORDER — PREDNISONE 20 MG/1
TABLET ORAL
Qty: 10 TABLET | Refills: 0 | Status: SHIPPED | OUTPATIENT
Start: 2021-12-14 | End: 2022-01-18

## 2021-12-14 RX ORDER — KETOROLAC TROMETHAMINE 15 MG/ML
15 INJECTION, SOLUTION INTRAMUSCULAR; INTRAVENOUS ONCE
Status: COMPLETED | OUTPATIENT
Start: 2021-12-14 | End: 2021-12-14

## 2021-12-14 RX ORDER — ALBUTEROL SULFATE 90 UG/1
6 AEROSOL, METERED RESPIRATORY (INHALATION) ONCE
Status: COMPLETED | OUTPATIENT
Start: 2021-12-14 | End: 2021-12-14

## 2021-12-14 RX ADMIN — SODIUM CHLORIDE, POTASSIUM CHLORIDE, SODIUM LACTATE AND CALCIUM CHLORIDE 1000 ML: 600; 310; 30; 20 INJECTION, SOLUTION INTRAVENOUS at 21:49

## 2021-12-14 RX ADMIN — ALBUTEROL SULFATE 6 PUFF: 90 AEROSOL, METERED RESPIRATORY (INHALATION) at 22:00

## 2021-12-14 RX ADMIN — KETOROLAC TROMETHAMINE 15 MG: 15 INJECTION, SOLUTION INTRAMUSCULAR; INTRAVENOUS at 21:49

## 2021-12-14 ASSESSMENT — MIFFLIN-ST. JEOR: SCORE: 1529.51

## 2021-12-14 NOTE — PROGRESS NOTES
"Anne is a 41 year old with PMH of COPD, obesity, GERD, ADHD, MDD, MILES who is being evaluated via a billable video visit.      How would you like to obtain your AVS? MyChart  If the video visit is dropped, the invitation should be resent by: Text to cell phone: [phone]  Will anyone else be joining your video visit? No      Video Start Time: 349    Assessment & Plan     Dyspnea, unspecified type  Cough  Myalgia  I feel she needs in person evaluation in urgent care due to worsening respiratory symptoms despite doxycycline and prednisone. She has had multiple negative COVID tests. I have explained to her some of my concerns including the possibility for hypoxia, pneumonia, asthma exacerbation, amongst others. I would recommend in person evaluation for vitals, oximetry with ambulation, CXR, consideration of CBC, flu swab, physical exam. She verbalizes understanding and is in agreement with the plan. She will proceed to the nearest [place] urgent care today.       Stephanie Loja MD  [facility]    Subjective   Anne is a 41 year old who presents for the following health issues    HPI     Seen today to follow up on bronchitis symptoms.    She was seen by virtual visit on 12/10/21 and diagnosed with bronchitis. She was given a 10 day course of doxycycline and 5 days of prednisone. After starting medication, she noted improvement for two days but her symptoms worsened yesterday. States she feels \"rotten today.\" She endorses whole body aches, non productive cough, low grade fever (T 99.8 F today), dyspnea, chest pressure, and wheezing. She has been checking her oxygen levels at home only at rest with levels aroun d97 to 98%. Has not checked after exertion. She is feeling short of breath both at rest and with activity. Dyspnea has woken her at night. Albuterol has provided temporary relief. 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 Twelve of her co workers are out sick due to COVID. She " herself has had two negative PCR tests (last was one week ago) and she had a negative rapid test yesterday. She denies nausea, vomiting, abdominal pain, diarrhea.     Asthma - her mediations include Advair, Singulair, albuterol as needed.      Objective           Vitals:  No vitals were obtained today due to virtual visit.    Physical Exam   GENERAL: she appears fatigued  EYES: Eyes grossly normal to inspection.    RESP: No audible wheeze, cough, or visible cyanosis.  No visible retractions or increased work of breathing.    SKIN: Visible skin clear. No significant rash, abnormal pigmentation or lesions.  NEURO: Cranial nerves grossly intact.  Mentation and speech appropriate for age.  PSYCH: Mentation appears normal, affect flat, judgement and insight intact, normal speech, appears poorly groomed            Video-Visit Details    Type of service:  Video Visit    Video End Time:401 pm    Originating Location (pt. Location): Home    Distant Location (provider location):  [facility]     Platform used for Video Visit: Presentain

## 2021-12-14 NOTE — LETTER
December 14, 2021      To Whom It May Concern:      Anne TROY Adamgila was seen in our Emergency Department today, 12/14/21.  Please excuse her from work December 14 through December 19, 2021, due to an acute medical problem.    Sincerely,        Kody Altman MD

## 2021-12-15 NOTE — ED PROVIDER NOTES
History     Chief Complaint   Patient presents with     Covid Concern     Fever     HPI  Anne Ferreira is a 41 year old female who comes in with flulike symptoms of headache, myalgia, fevers.  She has a bit of a cough.  She feels short of breath and is wheezing.  She was immunized against Covid with 2 dose series but has not had a booster.  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  She has had a Covid test at work which was negative.  She had a flu shot this year.  She has a history of asthma and COPD.  She had a virtual visit with a primary care doctor 4 days ago who put her on prednisone and doxycycline for uncertain reasons.  She took her last dose of prednisone this morning.  She is also using an Advair inhaler.  She is not having chest pain or abdominal pain.  She is nauseated but not vomiting.  Her appetite is poor.  She feels short of breath with any exertion.  She feels generally fatigued and has muscle aches especially in the lower back.  She does not have flank pain.  She does not have urinary frequency or urgency.  She has not been having any diarrhea.  She does smoke.    Allergies:  Allergies   Allergen Reactions     Bupropion Nausea     Sulfa Drugs        Problem List:    Patient Active Problem List    Diagnosis Date Noted     COPD (chronic obstructive pulmonary disease) (H) 07/27/2021     Priority: Medium     Moderate episode of recurrent major depressive disorder (H) 05/21/2021     Priority: Medium     Symptomatic cholelithiasis 12/15/2020     Priority: Medium     Added automatically from request for surgery [id number]       Morbid obesity (H) 12/11/2020     Priority: Medium     Dyslipidemia 11/10/2020     Priority: Medium     Dysmenorrhea 11/10/2020     Priority: Medium     treated with continuous OCPs       GERD (gastroesophageal reflux disease) 11/10/2020     Priority: Medium     Thoracic back pain 11/10/2020      Priority: Medium     left, approx T10       H/O LEEP      Priority: Medium     1998 LEEP- Unknown results.  2010 NIL pap.  2/2/12 COLP and ECC- DARREN 1.  2013 NIL pap  4/7/15 LSIL pap, + HR HPV   8/13/15 COLP- DARREN 1, ECC- Negative.  6/10/16 NIL pap, Neg HPV.  5/14/19 NIL pap, Neg HPV.  Above results found in [facility]  10/15/20 NIL pap, Neg HPV. Plan cotest in 3 years.        Cervical spinal stenosis 07/01/2019     Priority: Medium     7/2019 MRI - At C5-6, broad-based central disc extrusion demonstrating caudal migration mildly deforms the cord and severely narrows the spinal canal. Uncovertebral joint spurring contributes to mild to moderate left neural foraminal narrowing with potential left C6 nerve root encroachment.       Herniation of cervical intervertebral disc with radiculopathy 07/01/2019     Priority: Medium     see MRI 7/2019       Attention deficit hyperactivity disorder, combined type, mild 08/29/2018     Priority: Medium     Generalized anxiety disorder 08/29/2018     Priority: Medium     Allergic rhinitis 04/01/2008     Priority: Medium        Past Medical History:    Past Medical History:   Diagnosis Date     Abnormal Pap smear of cervix        Past Surgical History:    Past Surgical History:   Procedure Laterality Date     LAPAROSCOPIC CHOLECYSTECTOMY N/A 12/21/2020    Procedure: Laparoscopic cholecystectomy;  Surgeon: Manuel Durham DO;  Location: [facility] OR       Family History:    Family History   Problem Relation Age of Onset     Hypertension Father      Hypertension Brother      Hypertension Sister      Hypertension Brother        Social History:  Marital Status:   [4]  Social History     Tobacco Use     Smoking status: Current Every Day Smoker     Types: Cigarettes     Smokeless tobacco: Never Used   Vaping Use     Vaping Use: Never used   Substance Use Topics     Alcohol use: Yes     Drug use: Never        Medications:    predniSONE (DELTASONE) 20 MG tablet  ADVAIR DISKUS 250-50  "MCG/DOSE inhaler  albuterol (PROAIR HFA/PROVENTIL HFA/VENTOLIN HFA) 108 (90 Base) MCG/ACT inhaler  cyclobenzaprine (FLEXERIL) 10 MG tablet  [START ON 1/18/2022] dexmethylphenidate (FOCALIN) 10 MG tablet  diphenhydrAMINE (BENADRYL) 25 MG tablet  doxycycline hyclate (VIBRA-TABS) 100 MG tablet  escitalopram (LEXAPRO) 10 MG tablet  escitalopram (LEXAPRO) 20 MG tablet  ethynodiol-ethinyl estradiol (ZOVIA) 1-50 MG-MCG tablet  hydrOXYzine (ATARAX) 25 MG tablet  ipratropium - albuterol 0.5 mg/2.5 mg/3 mL (DUONEB) 0.5-2.5 (3) MG/3ML neb solution  lisinopril (ZESTRIL) 20 MG tablet  LORazepam (ATIVAN) 1 MG tablet  meloxicam (MOBIC) 7.5 MG tablet  montelukast (SINGULAIR) 10 MG tablet  omeprazole (PRILOSEC) 20 MG DR capsule  predniSONE (DELTASONE) 20 MG tablet  propranolol (INDERAL) 80 MG tablet          Review of Systems  All other systems are reviewed and are negative    Physical Exam   BP: 136/75  Pulse: 78  Temp: 97  F (36.1  C)  Resp: 18  Height: 162.6 cm (5' 4\")  Weight: 88.5 kg (195 lb)  SpO2: 98 %      Physical Exam  Nursing note and vitals were reviewed.  Constitutional: Awake and alert, overweight appearing 41-year-old in no apparent discomfort, who does not appear acutely ill, and who answers questions appropriately and cooperates with examination.  HEENT: Atraumatic and normocephalic EOMI.   Neck: Freely mobile.  Cardiovascular: Cardiac examination reveals normal heart rate and regular rhythm without murmur.  Pulmonary/Chest: Breathing is unlabored.  Occasional wheezes throughout the lung fields with mild prolongation of the expiratory phase but no retractions or tachypnea and normal oxygenation on oximeter.   Abdomen: Obese, soft, nontender, no HSM or masses rebound or guarding.  Musculoskeletal: Extremities are warm and well-perfused and without edema  Neurological: Alert, oriented, thought content logical, coherent   Skin: Warm, dry, no rashes.  Psychiatric: Affect congruent with acute illness.    ED Course      "            Procedures              Critical Care time:  none               Results for orders placed or performed during the hospital encounter of 12/14/21 (from the past 24 hour(s))   Symptomatic; Unknown Influenza A/B & SARS-CoV2 (COVID-19) Virus PCR Multiplex Nasopharyngeal    Specimen: Nasopharyngeal; Swab   Result Value Ref Range    Influenza A PCR Negative Negative    Influenza B PCR Negative Negative    SARS CoV2 PCR Negative Negative    Narrative    Testing was performed using the ezra SARS-CoV-2 & Influenza A/B Assay on the ezra Naomie System. This test should be ordered for the detection of SARS-CoV-2 and influenza viruses in individuals who meet clinical and/or epidemiological criteria. Test performance is unknown in asymptomatic patients. This test is for in vitro diagnostic use under the FDA EUA for laboratories certified under CLIA to perform moderate and/or high complexity testing. This test has not been FDA cleared or approved. A negative result does not rule out the presence of PCR inhibitors in the specimen or target RNA in concentration below the limit of detection for the assay. If only one viral target is positive but coinfection with multiple targets is suspected, the sample should be re-tested with another FDA cleared, approved or authorized test, if coinfection would change clinical management. [facility] are certified under the Clinical Laboratory Improvement Amendments of 1988 (CLIA-88) as  qualified to perform moderate and/or high complexity laboratory testing.   UA reflex to Microscopic   Result Value Ref Range    Color Urine Yellow Colorless, Straw, Light Yellow, Yellow    Appearance Urine Clear Clear    Glucose Urine Negative Negative mg/dL    Bilirubin Urine Negative Negative    Ketones Urine Negative Negative mg/dL    Specific Gravity Urine 1.019 1.003 - 1.035    Blood Urine Negative Negative    pH Urine 6.0 5.0 - 7.0    Protein Albumin Urine Negative Negative  mg/dL    Urobilinogen Urine Normal Normal, 2.0 mg/dL    Nitrite Urine Negative Negative    Leukocyte Esterase Urine Negative Negative    Narrative    Microscopic not indicated   D dimer quantitative   Result Value Ref Range    D-Dimer Quantitative 0.30 0.00 - 0.50 ug/mL FEU    Narrative    This D-dimer assay is intended for use in conjunction with a clinical pretest probability assessment model to exclude pulmonary embolism (PE) and deep venous thrombosis (DVT) in outpatients suspected of PE or DVT. The cut-off value is 0.50 ug/mL FEU.   CBC with platelets differential    Narrative    The following orders were created for panel order CBC with platelets differential.  Procedure                               Abnormality         Status                     ---------                               -----------         ------                     CBC with platelets and d...[[id number]]  Abnormal            Final result               Manual Differential[[id number]]          Abnormal            Final result                 Please view results for these tests on the individual orders.   Comprehensive metabolic panel   Result Value Ref Range    Sodium 139 133 - 144 mmol/L    Potassium 4.4 3.4 - 5.3 mmol/L    Chloride 106 94 - 109 mmol/L    Carbon Dioxide (CO2) 24 20 - 32 mmol/L    Anion Gap 9 3 - 14 mmol/L    Urea Nitrogen 13 7 - 30 mg/dL    Creatinine 0.56 0.52 - 1.04 mg/dL    Calcium 8.3 (L) 8.5 - 10.1 mg/dL    Glucose 83 70 - 99 mg/dL    Alkaline Phosphatase 53 40 - 150 U/L    AST 14 0 - 45 U/L    ALT 20 0 - 50 U/L    Protein Total 7.0 6.8 - 8.8 g/dL    Albumin 3.0 (L) 3.4 - 5.0 g/dL    Bilirubin Total 0.2 0.2 - 1.3 mg/dL    GFR Estimate >90 >60 mL/min/1.73m2   HCG qualitative Blood   Result Value Ref Range    hCG Serum Qualitative Negative Negative   Lactic acid whole blood   Result Value Ref Range    Lactic Acid 1.9 0.7 - 2.0 mmol/L   Nt probnp inpatient (BNP)   Result Value Ref Range    N terminal Pro BNP Inpatient 48 0 -  450 pg/mL   Troponin I   Result Value Ref Range    Troponin I High Sensitivity 5 <54 ng/L   CBC with platelets and differential   Result Value Ref Range    WBC Count 13.9 (H) 4.0 - 11.0 10e3/uL    RBC Count 4.43 3.80 - 5.20 10e6/uL    Hemoglobin 12.8 11.7 - 15.7 g/dL    Hematocrit 40.0 35.0 - 47.0 %    MCV 90 78 - 100 fL    MCH 28.9 26.5 - 33.0 pg    MCHC 32.0 31.5 - 36.5 g/dL    RDW 13.8 10.0 - 15.0 %    Platelet Count 354 150 - 450 10e3/uL   Manual Differential   Result Value Ref Range    % Neutrophils 43 %    % Lymphocytes 54 %    % Monocytes 1 %    % Eosinophils 2 %    % Basophils 0 %    Absolute Neutrophils 6.0 1.6 - 8.3 10e3/uL    Absolute Lymphocytes 7.5 (H) 0.8 - 5.3 10e3/uL    Absolute Monocytes 0.1 0.0 - 1.3 10e3/uL    Absolute Eosinophils 0.3 0.0 - 0.7 10e3/uL    Absolute Basophils 0.0 0.0 - 0.2 10e3/uL    RBC Morphology Confirmed RBC Indices     Platelet Assessment  Automated Count Confirmed. Platelet morphology is normal.     Automated Count Confirmed. Platelet morphology is normal.   Blood gas venous   Result Value Ref Range    pH Venous 7.47 (H) 7.32 - 7.43    pCO2 Venous 37 (L) 40 - 50 mm Hg    pO2 Venous 58 (H) 25 - 47 mm Hg    Bicarbonate Venous 26 21 - 28 mmol/L    Base Excess/Deficit (+/-) 2.7 (H) -7.7 - 1.9 mmol/L    FIO2 21        Medications   lactated ringers BOLUS 1,000 mL (0 mLs Intravenous Stopped 12/14/21 2257)   ketorolac (TORADOL) injection 15 mg (15 mg Intravenous Given 12/14/21 2149)   albuterol (PROVENTIL HFA/VENTOLIN HFA) inhaler (6 puffs Inhalation Given 12/14/21 2200)       Assessments & Plan (with Medical Decision Making)     41-year-old female presented with myalgias, headache, shortness of breath, cough and other influenza-like illness symptoms.  She underwent an extensive work-up because of the duration of her symptoms.  All of this was reassuring with a normal D-dimer, troponin, BNP, blood chemistries, urine analysis, venous blood gas, influenza and Covid testing.  CBC shows  normal hemoglobin and slightly elevated WBC likely due to her recent steroid therapy and/or viral infection.  I suspect a viral respiratory infection.   Breakthrough Covid is still possible.  Antibody testing for nuclear capsid antigen for Covid is pending which would confirm natural infection rather than antibody response from immunization which she has had.  At this time symptomatic care is recommended as well as treatment of her asthma and COPD with prednisone and inhalers.  I asked her to be off work for the remainder of the week.  She should return if she develops fevers, worsening dyspnea, significant pain, or other worrisome symptoms.  She expressed understanding and her questions were answered.    I have reviewed the nursing notes.    I have reviewed the findings, diagnosis, plan and need for follow up with the patient.       Discharge Medication List as of 12/14/2021 10:58 PM      START taking these medications    Details   !! predniSONE (DELTASONE) 20 MG tablet Take two tablets (= 40mg) each day for 5 (five) days, Disp-10 tablet, R-0, E-Prescribe       !! - Potential duplicate medications found. Please discuss with provider.          Final diagnoses:   Influenza-like illness       12/14/2021   [facility] EMERGENCY DEPT     Kody Altman MD  12/14/21 [id number]

## 2021-12-15 NOTE — DISCHARGE INSTRUCTIONS
Your tests today are all reassuring.  Your white blood cell count is slightly elevated likely due to a viral infection plus the steroids you have been on.  We do not find any evidence of pneumonia, heart failure, heart inflammation, clots in your lungs, urinary tract infection, or other source for your symptoms.  Most likely this is due to a viral illness, possibly breakthrough Covid.  We will notify you if any of the pending tests come back positive.  Stay home and rest and treat your symptoms with Tylenol and ibuprofen and return if you are worsening including getting significant breathing difficulty, significant pain, fevers above 101, or other worrisome symptoms.  Take additional 5 days of prednisone, 20 mg, 2 pills in the morning with food as well as your albuterol inhaler 2 puffs up to every 4 hours if needed for wheezing or shortness of breath.

## 2021-12-15 NOTE — ED TRIAGE NOTES
Here with myalgia, ongoing fever, SOA & chest tightness, sick for about 2 weeks, home covid test was negative initially, co-workers all +.

## 2021-12-16 LAB — SARS-COV-2 AB SERPL QL IA: POSITIVE

## 2021-12-18 ENCOUNTER — HEALTH MAINTENANCE LETTER (OUTPATIENT)
Age: 41
End: 2021-12-18

## 2021-12-20 LAB
BACTERIA BLD CULT: NO GROWTH
BACTERIA BLD CULT: NO GROWTH

## 2022-01-03 DIAGNOSIS — M62.838 MUSCLE SPASM: ICD-10-CM

## 2022-01-03 RX ORDER — CYCLOBENZAPRINE HCL 10 MG
10 TABLET ORAL 2 TIMES DAILY PRN
Qty: 60 TABLET | Refills: 1 | Status: SHIPPED | OUTPATIENT
Start: 2022-01-03 | End: 2022-07-14

## 2022-01-03 NOTE — TELEPHONE ENCOUNTER
Requested Prescriptions   Pending Prescriptions Disp Refills     cyclobenzaprine (FLEXERIL) 10 MG tablet 60 tablet 1     Sig: Take 1 tablet (10 mg) by mouth 2 times daily as needed for muscle spasms       There is no refill protocol information for this order        Last Written Prescription Date:  11/2/21  Last Fill Quantity: 60,  # refills: 1   Last office visit: 8/19/2021 with prescribing provider:     Future Office Visit:

## 2022-01-12 ENCOUNTER — OFFICE VISIT (OUTPATIENT)
Dept: FAMILY MEDICINE | Facility: CLINIC | Age: 42
End: 2022-01-12
Payer: COMMERCIAL

## 2022-01-12 VITALS
WEIGHT: 214 LBS | TEMPERATURE: 98.5 F | HEIGHT: 64 IN | RESPIRATION RATE: 24 BRPM | HEART RATE: 74 BPM | BODY MASS INDEX: 36.54 KG/M2 | DIASTOLIC BLOOD PRESSURE: 78 MMHG | OXYGEN SATURATION: 96 % | SYSTOLIC BLOOD PRESSURE: 156 MMHG

## 2022-01-12 DIAGNOSIS — N92.0 MENORRHAGIA WITH REGULAR CYCLE: ICD-10-CM

## 2022-01-12 DIAGNOSIS — N93.9 ABNORMAL UTERINE BLEEDING: ICD-10-CM

## 2022-01-12 DIAGNOSIS — M54.42 ACUTE BILATERAL LOW BACK PAIN WITH LEFT-SIDED SCIATICA: Primary | ICD-10-CM

## 2022-01-12 LAB
ERYTHROCYTE [DISTWIDTH] IN BLOOD BY AUTOMATED COUNT: 14.3 % (ref 10–15)
FERRITIN SERPL-MCNC: 110 NG/ML (ref 12–150)
HCT VFR BLD AUTO: 39.9 % (ref 35–47)
HGB BLD-MCNC: 12.7 G/DL (ref 11.7–15.7)
MCH RBC QN AUTO: 29.5 PG (ref 26.5–33)
MCHC RBC AUTO-ENTMCNC: 31.8 G/DL (ref 31.5–36.5)
MCV RBC AUTO: 93 FL (ref 78–100)
PLATELET # BLD AUTO: 339 10E3/UL (ref 150–450)
RBC # BLD AUTO: 4.31 10E6/UL (ref 3.8–5.2)
WBC # BLD AUTO: 11.4 10E3/UL (ref 4–11)

## 2022-01-12 PROCEDURE — 85027 COMPLETE CBC AUTOMATED: CPT | Performed by: NURSE PRACTITIONER

## 2022-01-12 PROCEDURE — 36415 COLL VENOUS BLD VENIPUNCTURE: CPT | Performed by: NURSE PRACTITIONER

## 2022-01-12 PROCEDURE — 99214 OFFICE O/P EST MOD 30 MIN: CPT | Performed by: NURSE PRACTITIONER

## 2022-01-12 PROCEDURE — 82728 ASSAY OF FERRITIN: CPT | Performed by: NURSE PRACTITIONER

## 2022-01-12 RX ORDER — HYDROCODONE BITARTRATE AND ACETAMINOPHEN 5; 325 MG/1; MG/1
1 TABLET ORAL 2 TIMES DAILY PRN
Qty: 10 TABLET | Refills: 0 | Status: SHIPPED | OUTPATIENT
Start: 2022-01-12 | End: 2022-01-18

## 2022-01-12 RX ORDER — METHYLPREDNISOLONE 4 MG
TABLET, DOSE PACK ORAL
Qty: 21 TABLET | Refills: 0 | Status: SHIPPED | OUTPATIENT
Start: 2022-01-12 | End: 2022-01-18

## 2022-01-12 RX ORDER — DICLOFENAC SODIUM 75 MG/1
75 TABLET, DELAYED RELEASE ORAL 2 TIMES DAILY
Qty: 60 TABLET | Refills: 0 | Status: SHIPPED | OUTPATIENT
Start: 2022-01-12 | End: 2022-02-17

## 2022-01-12 RX ORDER — TRANEXAMIC ACID 650 MG/1
1300 TABLET ORAL 3 TIMES DAILY
Qty: 30 TABLET | Refills: 0 | Status: SHIPPED | OUTPATIENT
Start: 2022-01-12 | End: 2022-01-18

## 2022-01-12 RX ORDER — METHOCARBAMOL 500 MG/1
500 TABLET, FILM COATED ORAL 4 TIMES DAILY PRN
Qty: 60 TABLET | Refills: 0 | Status: SHIPPED | OUTPATIENT
Start: 2022-01-12 | End: 2023-01-08

## 2022-01-12 ASSESSMENT — PAIN SCALES - GENERAL: PAINLEVEL: SEVERE PAIN (6)

## 2022-01-12 ASSESSMENT — MIFFLIN-ST. JEOR: SCORE: 1619.67

## 2022-01-12 NOTE — PROGRESS NOTES
Assessment & Plan     Acute bilateral low back pain with left-sided sciatica  Acute acute flare worse than previous.  Having radiculopathy down the left leg.  No red flags noted on examination.  Had previous MRI ordered, has not scheduled yet.  Highly advised patient to schedule this as soon as able at [phone].  We will have patient start course of steroids, stop Flexeril and start Robaxin (muscle relaxant) as needed as well as diclofenac twice daily for inflammation.  Encouraged heat to area and stretches daily.  May use Norco's only at night as needed for pain.  - methocarbamol (ROBAXIN) 500 MG tablet; Take 1 tablet (500 mg) by mouth 4 times daily as needed for muscle spasms  - methylPREDNISolone (MEDROL DOSEPAK) 4 MG tablet therapy pack; Follow package instructions  - diclofenac (VOLTAREN) 75 MG EC tablet; Take 1 tablet (75 mg) by mouth 2 times daily  - HYDROcodone-acetaminophen (NORCO) 5-325 MG tablet; Take 1 tablet by mouth 2 times daily as needed for pain    Abnormal uterine bleeding  Abnormal uterine bleeding starting in December.  Has not had menstrual cycle since being on continuous oral contraceptives, bleeding has been significantly heavy.  Will have patient schedule a pelvic ultrasound to further evaluate at number above, will call with results and any further recommendations.  Patient to also start medication for 5 days to help slow bleeding.  - US Pelvic Complete with Transvaginal; Future  - tranexamic acid (LYSTEDA) 650 MG tablet; Take 2 tablets (1,300 mg) by mouth 3 times daily for 5 days    Menorrhagia with regular cycle  Heavy bleeding and recommendations as above.  We will also get blood work as well and notify patient of results and any further recommendations.  - US Pelvic Complete with Transvaginal; Future  - tranexamic acid (LYSTEDA) 650 MG tablet; Take 2 tablets (1,300 mg) by mouth 3 times daily for 5 days  - CBC with platelets; Future  - Ferritin; Future            See Patient  Instructions    Return in about 2 weeks (around 1/26/2022), or if symptoms worsen or fail to improve.    Soo Briones, LILIANA, APRN-CNP   [facility]     Anne Ferreira is a 41 year old female who presents today for the following   health issues:    HPI    Back Pain       Duration: since 1/9/2022        Specific cause: none    Description:   Location of pain: low back bilateral  Character of pain: zing, spasms  Pain radiation:radiates into the left leg  New numbness or weakness in legs, not attributed to pain:  no     Intensity: Currently 6/10    History:   Pain interferes with job: YES  History of back problems: per patient history of back problems  Any previous MRI or X-rays: None  Sees a specialist for back pain:  No  Therapies tried without relief: meloxicam, acetaminophen (Tylenol) and muscle relaxants    Alleviating factors:   Improved by: 0      Precipitating factors:  Worsened by: walking-started the same time as menses    Accompanying Signs & Symptoms:  Risk of Fracture:  None  Risk of Cauda Equina:  None  Risk of Infection:  None  Risk of Cancer:  None  Risk of Ankylosing Spondylitis:  Onset at age <35, male, AND morning back stiffness. no     MRI ordered by primary care provider in November 2021  Hurts worse now than in November  Gets a zing down into top of foot at times over the last few days          Vaginal Bleeding (Dysmenorrhea)      Onset: 2 cycles heavy and irregular    Description:  Duration of bleeding episodes: 10 days  1 week in between  Had menses for 10 days at [place] and not again 1/9  Frequency between periods:  1 week in [facility]  Describe bleeding/flow:   Clots: YES - small   Number of pads/hour: 1  Cramping: moderate    Intensity:  severe    Accompanying signs and symptoms: 0    History (similar episodes/previous evaluation): never used to get menses because birth control continuous - stopped taking on Sunday    Precipitating or alleviating  "factors: None    Therapies tried and outcome: tylenol and meloxicam    Usually doesn't get menstrual cycle at all - until December   A lot of pain in the left pelvic pain       Review of Systems    Constitutional, HEENT, cardiovascular, pulmonary, gi and gu systems are negative, except as otherwise noted.    Objective   BP (!) 156/78   Pulse 74   Temp 98.5  F (36.9  C)   Resp 24   Ht 1.632 m (5' 4.25\")   Wt 97.1 kg (214 lb)   LMP 01/09/2022   SpO2 96%   BMI 36.45 kg/m    Body mass index is 36.45 kg/m .    Physical Exam  GENERAL APPEARANCE: healthy, alert and no distress  ABDOMEN: soft, generalized tenderness, without hepatosplenomegaly or masses and bowel sounds normal  MS: extremities normal- no gross deformities noted, peripheral pulses normal and decreased range of motion of lumbar spine  ORTHO: Lumber/Thoracic Spine Exam: Tender:  left parathoracic muscles, right parathoracic muscles, left para lumbar muscles, left sciatic notch  Non-tender:  Remainder non-tender   Range of Motion:  Decreased through out   Strength:  able to heel walk, able to toe walk, gastrocsoleus   5/5, hamstrings  5/5, quadriceps  5/5, tibialis anterior  5/5, peroneals  5/5  Special tests:  negative straight leg raises  SKIN: no suspicious lesions or rashes  NEURO: Normal strength and tone, mentation intact, speech normal and DTR symmetrically normal in lower extremities  PSYCH: mentation appears normal and affect normal/bright    Diagnostic Test Results:  Pending      Chart documentation with Dragon Voice recognition Software. Although reviewed after completion, some words and grammatical errors may remain.          "

## 2022-01-12 NOTE — PATIENT INSTRUCTIONS
Acute bilateral low back pain with left-sided sciatica  Acute acute flare worse than previous.  Having radiculopathy down the left leg.  No red flags noted on examination.  Had previous MRI ordered, has not scheduled yet.  Highly advised patient to schedule this as soon as able at [phone].  We will have patient start course of steroids, stop Flexeril and start Robaxin (muscle relaxant) as needed as well as diclofenac twice daily for inflammation.  Encouraged heat to area and stretches daily.  May use Norco's only at night as needed for pain.  - methocarbamol (ROBAXIN) 500 MG tablet; Take 1 tablet (500 mg) by mouth 4 times daily as needed for muscle spasms  - methylPREDNISolone (MEDROL DOSEPAK) 4 MG tablet therapy pack; Follow package instructions  - diclofenac (VOLTAREN) 75 MG EC tablet; Take 1 tablet (75 mg) by mouth 2 times daily  - HYDROcodone-acetaminophen (NORCO) 5-325 MG tablet; Take 1 tablet by mouth 2 times daily as needed for pain    Abnormal uterine bleeding  Abnormal uterine bleeding starting in December.  Has not had menstrual cycle since being on continuous oral contraceptives, bleeding has been significantly heavy.  Will have patient schedule a pelvic ultrasound to further evaluate at number above, will call with results and any further recommendations.  Patient to also start medication for 5 days to help slow bleeding.  - US Pelvic Complete with Transvaginal; Future  - tranexamic acid (LYSTEDA) 650 MG tablet; Take 2 tablets (1,300 mg) by mouth 3 times daily for 5 days    Menorrhagia with regular cycle  Heavy bleeding and recommendations as above.  We will also get blood work as well and notify patient of results and any further recommendations.  - US Pelvic Complete with Transvaginal; Future  - tranexamic acid (LYSTEDA) 650 MG tablet; Take 2 tablets (1,300 mg) by mouth 3 times daily for 5 days  - CBC with platelets; Future  - Ferritin; Future

## 2022-01-17 ENCOUNTER — HOSPITAL ENCOUNTER (OUTPATIENT)
Dept: ULTRASOUND IMAGING | Facility: CLINIC | Age: 42
End: 2022-01-17
Attending: NURSE PRACTITIONER
Payer: COMMERCIAL

## 2022-01-17 ENCOUNTER — HOSPITAL ENCOUNTER (OUTPATIENT)
Dept: MRI IMAGING | Facility: CLINIC | Age: 42
End: 2022-01-17
Attending: NURSE PRACTITIONER
Payer: COMMERCIAL

## 2022-01-17 DIAGNOSIS — N92.0 MENORRHAGIA WITH REGULAR CYCLE: ICD-10-CM

## 2022-01-17 DIAGNOSIS — N93.9 ABNORMAL UTERINE BLEEDING: ICD-10-CM

## 2022-01-17 DIAGNOSIS — M54.16 LUMBAR RADICULOPATHY: ICD-10-CM

## 2022-01-17 PROCEDURE — 76830 TRANSVAGINAL US NON-OB: CPT

## 2022-01-17 PROCEDURE — 72148 MRI LUMBAR SPINE W/O DYE: CPT

## 2022-01-18 ENCOUNTER — VIRTUAL VISIT (OUTPATIENT)
Dept: FAMILY MEDICINE | Facility: CLINIC | Age: 42
End: 2022-01-18
Payer: COMMERCIAL

## 2022-01-18 DIAGNOSIS — M25.471 PAIN AND SWELLING OF ANKLE, RIGHT: ICD-10-CM

## 2022-01-18 DIAGNOSIS — M25.571 PAIN AND SWELLING OF ANKLE, RIGHT: ICD-10-CM

## 2022-01-18 DIAGNOSIS — D25.9 UTERINE LEIOMYOMA, UNSPECIFIED LOCATION: ICD-10-CM

## 2022-01-18 DIAGNOSIS — M51.26 POSTERIOR LUMBAR DISC PROLAPSE: Primary | ICD-10-CM

## 2022-01-18 DIAGNOSIS — M54.42 ACUTE BILATERAL LOW BACK PAIN WITH LEFT-SIDED SCIATICA: Primary | ICD-10-CM

## 2022-01-18 PROCEDURE — 99214 OFFICE O/P EST MOD 30 MIN: CPT | Mod: GT | Performed by: NURSE PRACTITIONER

## 2022-01-18 RX ORDER — HYDROCODONE BITARTRATE AND ACETAMINOPHEN 5; 325 MG/1; MG/1
1 TABLET ORAL 2 TIMES DAILY PRN
Qty: 10 TABLET | Refills: 0 | Status: SHIPPED | OUTPATIENT
Start: 2022-01-18 | End: 2022-02-17

## 2022-01-18 NOTE — PROGRESS NOTES
Anne is a 41 year old who is being evaluated via a billable video visit.      How would you like to obtain your AVS? Demetri  If the video visit is dropped, the invitation should be resent by: Demetri  Will anyone else be joining your video visit? No      Video Start Time: 1:06 PM    Assessment & Plan     Acute bilateral low back pain with left-sided sciatica  Acute, continued low back pain with left-sided sciatica.  Started taking meloxicam again, Voltaren was not helpful.  Feels prednisone was of no help either.  Reviewed MRI results of lumbar spine, noting central disc protrusions with some disc bulge and mild to moderate multilevel degenerative changes.  Recommend course of physical therapy as well as orthopedic spine follow-up.  Referrals placed for both.  Hydrocodone refilled to use sparingly as needed.  Encouraged to continue supportive care such as heat, stretching and meloxicam.  - HYDROcodone-acetaminophen (NORCO) 5-325 MG tablet; Take 1 tablet by mouth 2 times daily as needed for pain  - Physical Therapy Referral; Future  - Spine Referral; Future    Pain and swelling of ankle, right  Acute on chronic right ankle and foot pain.  Reports has had in the past and is seeing orthopedics.  Has had injections in the past with good relief.  Has been using heat, ice and meloxicam without much improvement.  Referral to podiatry for further evaluation.  - Orthopedic  Referral; Future    Uterine leiomyoma, unspecified location  Has had abnormal uterine bleeding since December.  Finished course of tranexamic acid.  Had pelvic ultrasound completed which noted a presumed uterine fibroid.  Discussed with patient would like GYN follow-up for further evaluation.  Referral placed.  - Ob/Gyn Referral; Future          See Patient Instructions    Return for Recheck as needed.    Soo Briones, LILIANA, APRN-CNP    [facility]    Subjective   Anne is a 41 year old who presents for the following  health issues:    HPI     Discuss recent MRI and Ultrasound  EXAM: MR LUMBAR SPINE W/O CONTRAST  LOCATION: [facility]  DATE/TIME: 1/17/2022 5:33 PM     INDICATION: Low back pain, > 6 wks  COMPARISON: None.  TECHNIQUE: Routine Lumbar Spine MRI without IV contrast.     FINDINGS:      Nomenclature is based on 5 lumbar type vertebral bodies. The alignment of the lumbar spine is normal. The vertebral body heights are maintained. No aggressive marrow replacing lesions are identified in the lumbar spine.     The conus medullaris appears normal, terminating at the L1 level.     There is mild to moderate multilevel intervertebral disc space narrowing and endplate degenerative change. A Schmorl's node is seen along the superior T12 endplate. Modic type II endplate changes are seen at L5-S1.     The visualized paraspinal soft tissues are unremarkable.     T12-L1: No significant posterior disc abnormality, spinal canal, or neural foraminal stenosis.      L1-L2: No significant posterior disc abnormality, spinal canal, or neural foraminal stenosis.     L2-L3: Mild diffuse posterior disc bulge without significant spinal canal or neural foraminal stenosis.      L3-L4: Left foraminal disc protrusion results in mild to moderate left neural foraminal stenosis. Superimposed diffuse posterior disc bulge without significant spinal canal or right neural foraminal stenosis.     L4-L5: Mild diffuse posterior disc bulge without significant spinal canal stenosis. Bilateral facet arthropathy contributes to mild left and no significant right neural foraminal stenosis.     L5-S1: Central disc protrusion with superimposed diffuse posterior disc bulge without significant spinal canal stenosis. There is moderate to severe left and mild right neural foraminal stenosis.                                                                      IMPRESSION:  1.  At L5-S1, a central disc protrusion with superimposed diffuse  posterior disc bulge results in moderate to severe left and mild right neural foraminal stenosis. No significant spinal canal stenosis at this level.  2.  Additional more mild to moderate multilevel degenerative change is detailed above.    Started taking the Meloxicam - the Voltaren didn't help   Didn't feel like anything helped at all        EXAM: US PELVIC TRANSABDOMINAL AND TRANSVAGINAL  LOCATION: [facility]  DATE/TIME: 1/17/2022 4:50 PM     INDICATION:  Abnormal uterine bleeding, Menorrhagia with regular cycle  COMPARISON: CT 12/10/2020  TECHNIQUE: Transabdominal scans were performed. Endovaginal ultrasound was performed to better visualize the adnexa.     FINDINGS:     UTERUS: 10.6 x 5.1 x 3.9 cm. Minimally complex nabothian cyst. 2.4 x 2.2 x 1.8 cm presumed fibroid left aspect of the uterine myometrium which contacts but does not appear to distort or involve the endometrium.     ENDOMETRIUM: 1 mm. Normal smooth endometrium.     RIGHT OVARY: 3.3 x 1.6 x 1.9 cm. Normal.     LEFT OVARY: 2.2 x 1.9 x 1.5 cm. Normal.     No significant free fluid.                                                                      IMPRESSION:  1.  Presumed uterine fibroid which does not appear to involve the endometrium which is normal in appearance.           Pain History:  When did you first notice your pain? - Less than 1 week   Have you seen anyone else for your pain? No  Where in your body do you have pain? Musculoskeletal problem/pain  Onset/Duration: 4 days  Description  Location: ankle - left  Joint Swelling: YES  Redness: no  Pain: YES  Warmth: no  Intensity:  moderate  Progression of Symptoms:  worsening  Accompanying signs and symptoms:   Fevers: no  Numbness/tingling/weakness: YES- stabbing, pinching, burning in ankle  History  Trauma to the area: slipped on ice a couple times  Recent illness:  no  Previous similar problem: YES- few years - has used boots, braces  Previous evaluation:   no  Precipitating or alleviating factors:  Aggravating factors include: standing and walking  Therapies tried and outcome: heat, ice and meloxicam    Has had in the past   Has had injections in the past and boots - did help       Review of Systems   Constitutional, HEENT, cardiovascular, pulmonary, gi and gu systems are negative, except as otherwise noted.      Objective           Vitals:  No vitals were obtained today due to virtual visit.    Physical Exam   GENERAL: Healthy, alert and no distress  EYES: Eyes grossly normal to inspection.  No discharge or erythema, or obvious scleral/conjunctival abnormalities.  RESP: No audible wheeze, cough, or visible cyanosis.  No visible retractions or increased work of breathing.    SKIN: Visible skin clear. No significant rash, abnormal pigmentation or lesions.  NEURO: Cranial nerves grossly intact.  Mentation and speech appropriate for age.  PSYCH: Mentation appears normal, affect normal/bright, judgement and insight intact, normal speech and appearance well-groomed.    Diagnostic Test Results:  Reviewed Labs with patient            Video-Visit Details    Type of service:  Video Visit    Video End Time:1:23 PM    Originating Location (pt. Location): Home    Distant Location (provider location):  [facility]     Platform used for Video Visit: O[employer]P Pro     Chart documentation with Dragon Voice recognition Software. Although reviewed after completion, some words and grammatical errors may remain.

## 2022-01-25 NOTE — PATIENT INSTRUCTIONS
Acute bilateral low back pain with left-sided sciatica  Acute, continued low back pain with left-sided sciatica.  Started taking meloxicam again, Voltaren was not helpful.  Feels prednisone was of no help either.  Reviewed MRI results of lumbar spine, noting central disc protrusions with some disc bulge and mild to moderate multilevel degenerative changes.  Recommend course of physical therapy as well as orthopedic spine follow-up.  Referrals placed for both.  Hydrocodone refilled to use sparingly as needed.  Encouraged to continue supportive care such as heat, stretching and meloxicam.  - HYDROcodone-acetaminophen (NORCO) 5-325 MG tablet; Take 1 tablet by mouth 2 times daily as needed for pain  - Physical Therapy Referral; Future  - Spine Referral; Future    Pain and swelling of ankle, right  Acute on chronic right ankle and foot pain.  Reports has had in the past and is seeing orthopedics.  Has had injections in the past with good relief.  Has been using heat, ice and meloxicam without much improvement.  Referral to podiatry for further evaluation.  - Orthopedic  Referral; Future    Uterine leiomyoma, unspecified location  Has had abnormal uterine bleeding since December.  Finished course of tranexamic acid.  Had pelvic ultrasound completed which noted a presumed uterine fibroid.  Discussed with patient would like GYN follow-up for further evaluation.  Referral placed.  - Ob/Gyn Referral; Future

## 2022-02-09 DIAGNOSIS — J45.20 MILD INTERMITTENT ASTHMA WITHOUT COMPLICATION: ICD-10-CM

## 2022-02-10 RX ORDER — MONTELUKAST SODIUM 10 MG/1
TABLET ORAL
Qty: 30 TABLET | Refills: 5 | Status: SHIPPED | OUTPATIENT
Start: 2022-02-10 | End: 2022-08-22

## 2022-02-10 NOTE — TELEPHONE ENCOUNTER
MyChart ACT sent.  Routing refill request to provider for review/approval because:  ACT not current.  CHANELLE Ramsay RN

## 2022-02-16 ENCOUNTER — HOSPITAL ENCOUNTER (EMERGENCY)
Facility: CLINIC | Age: 42
Discharge: HOME | End: 2022-02-16
Attending: NURSE PRACTITIONER | Admitting: NURSE PRACTITIONER
Payer: COMMERCIAL

## 2022-02-16 ENCOUNTER — APPOINTMENT (OUTPATIENT)
Dept: GENERAL RADIOLOGY | Facility: CLINIC | Age: 42
End: 2022-02-16
Attending: NURSE PRACTITIONER
Payer: COMMERCIAL

## 2022-02-16 VITALS
WEIGHT: 200 LBS | OXYGEN SATURATION: 97 % | DIASTOLIC BLOOD PRESSURE: 82 MMHG | SYSTOLIC BLOOD PRESSURE: 133 MMHG | RESPIRATION RATE: 20 BRPM | HEART RATE: 71 BPM | TEMPERATURE: 98.6 F | BODY MASS INDEX: 34.06 KG/M2

## 2022-02-16 DIAGNOSIS — R10.2 PELVIC PRESSURE IN FEMALE: ICD-10-CM

## 2022-02-16 DIAGNOSIS — R07.9 CHEST PAIN: ICD-10-CM

## 2022-02-16 LAB
ALBUMIN UR-MCNC: ABNORMAL MG/DL
ANION GAP SERPL CALCULATED.3IONS-SCNC: 5 MMOL/L (ref 3–14)
APPEARANCE UR: CLEAR
BASOPHILS # BLD AUTO: 0 10E3/UL (ref 0–0.2)
BASOPHILS NFR BLD AUTO: 0 %
BILIRUB UR QL STRIP: NEGATIVE
BUN SERPL-MCNC: 12 MG/DL (ref 7–30)
CALCIUM SERPL-MCNC: 9 MG/DL (ref 8.5–10.1)
CHLORIDE BLD-SCNC: 109 MMOL/L (ref 94–109)
CO2 SERPL-SCNC: 25 MMOL/L (ref 20–32)
COLOR UR AUTO: YELLOW
CREAT SERPL-MCNC: 0.59 MG/DL (ref 0.52–1.04)
EOSINOPHIL # BLD AUTO: 0.2 10E3/UL (ref 0–0.7)
EOSINOPHIL NFR BLD AUTO: 2 %
ERYTHROCYTE [DISTWIDTH] IN BLOOD BY AUTOMATED COUNT: 13.3 % (ref 10–15)
GFR SERPL CREATININE-BSD FRML MDRD: >90 ML/MIN/1.73M2
GLUCOSE BLD-MCNC: 103 MG/DL (ref 70–99)
GLUCOSE UR STRIP-MCNC: NEGATIVE MG/DL
HCT VFR BLD AUTO: 37 % (ref 35–47)
HGB BLD-MCNC: 12 G/DL (ref 11.7–15.7)
HGB UR QL STRIP: ABNORMAL
IMM GRANULOCYTES # BLD: 0 10E3/UL
IMM GRANULOCYTES NFR BLD: 0 %
KETONES UR STRIP-MCNC: NEGATIVE MG/DL
LEUKOCYTE ESTERASE UR QL STRIP: NEGATIVE
LYMPHOCYTES # BLD AUTO: 3 10E3/UL (ref 0.8–5.3)
LYMPHOCYTES NFR BLD AUTO: 30 %
MCH RBC QN AUTO: 29.6 PG (ref 26.5–33)
MCHC RBC AUTO-ENTMCNC: 32.4 G/DL (ref 31.5–36.5)
MCV RBC AUTO: 91 FL (ref 78–100)
MONOCYTES # BLD AUTO: 0.6 10E3/UL (ref 0–1.3)
MONOCYTES NFR BLD AUTO: 6 %
MUCOUS THREADS #/AREA URNS LPF: PRESENT /LPF
NEUTROPHILS # BLD AUTO: 6 10E3/UL (ref 1.6–8.3)
NEUTROPHILS NFR BLD AUTO: 62 %
NITRATE UR QL: NEGATIVE
NRBC # BLD AUTO: 0 10E3/UL
NRBC BLD AUTO-RTO: 0 /100
PH UR STRIP: 6 [PH] (ref 5–7)
PLATELET # BLD AUTO: 299 10E3/UL (ref 150–450)
POTASSIUM BLD-SCNC: 4 MMOL/L (ref 3.4–5.3)
RBC # BLD AUTO: 4.06 10E6/UL (ref 3.8–5.2)
RBC URINE: 4 /HPF
SODIUM SERPL-SCNC: 139 MMOL/L (ref 133–144)
SP GR UR STRIP: 1.02 (ref 1–1.03)
SQUAMOUS EPITHELIAL: 4 /HPF
TROPONIN I SERPL HS-MCNC: 5 NG/L
UROBILINOGEN UR STRIP-MCNC: NORMAL MG/DL
WBC # BLD AUTO: 9.9 10E3/UL (ref 4–11)
WBC URINE: 3 /HPF

## 2022-02-16 PROCEDURE — 71046 X-RAY EXAM CHEST 2 VIEWS: CPT

## 2022-02-16 PROCEDURE — 93010 ELECTROCARDIOGRAM REPORT: CPT | Performed by: NURSE PRACTITIONER

## 2022-02-16 PROCEDURE — 99285 EMERGENCY DEPT VISIT HI MDM: CPT | Mod: 25 | Performed by: NURSE PRACTITIONER

## 2022-02-16 PROCEDURE — 82310 ASSAY OF CALCIUM: CPT | Performed by: NURSE PRACTITIONER

## 2022-02-16 PROCEDURE — 250N000011 HC RX IP 250 OP 636: Performed by: NURSE PRACTITIONER

## 2022-02-16 PROCEDURE — 81001 URINALYSIS AUTO W/SCOPE: CPT | Performed by: NURSE PRACTITIONER

## 2022-02-16 PROCEDURE — 85025 COMPLETE CBC W/AUTO DIFF WBC: CPT | Performed by: NURSE PRACTITIONER

## 2022-02-16 PROCEDURE — 84484 ASSAY OF TROPONIN QUANT: CPT | Performed by: NURSE PRACTITIONER

## 2022-02-16 PROCEDURE — 93005 ELECTROCARDIOGRAM TRACING: CPT | Performed by: NURSE PRACTITIONER

## 2022-02-16 PROCEDURE — 36415 COLL VENOUS BLD VENIPUNCTURE: CPT | Performed by: NURSE PRACTITIONER

## 2022-02-16 RX ORDER — KETOROLAC TROMETHAMINE 30 MG/ML
30 INJECTION, SOLUTION INTRAMUSCULAR; INTRAVENOUS ONCE
Status: COMPLETED | OUTPATIENT
Start: 2022-02-16 | End: 2022-02-16

## 2022-02-16 RX ORDER — OXYCODONE HYDROCHLORIDE 5 MG/1
5 TABLET ORAL EVERY 6 HOURS PRN
Qty: 8 TABLET | Refills: 0 | Status: SHIPPED | OUTPATIENT
Start: 2022-02-16 | End: 2022-02-19

## 2022-02-16 RX ADMIN — KETOROLAC TROMETHAMINE 30 MG: 30 INJECTION, SOLUTION INTRAMUSCULAR at 10:40

## 2022-02-16 ASSESSMENT — ENCOUNTER SYMPTOMS
COUGH: 0
ARTHRALGIAS: 0
MYALGIAS: 0
RHINORRHEA: 0
SHORTNESS OF BREATH: 0
DYSURIA: 0
LIGHT-HEADEDNESS: 0
CHILLS: 0
PALPITATIONS: 0
NUMBNESS: 0
WEAKNESS: 0
SINUS PRESSURE: 0
JOINT SWELLING: 0
NAUSEA: 0
EYE REDNESS: 0
ABDOMINAL PAIN: 0
FATIGUE: 0
TROUBLE SWALLOWING: 0
SINUS PAIN: 0
FEVER: 0
HEADACHES: 0
DIZZINESS: 0
EYE DISCHARGE: 0
FREQUENCY: 0
SORE THROAT: 0
VOMITING: 0
FLANK PAIN: 0

## 2022-02-16 NOTE — ED TRIAGE NOTES
Pt states had upper chest pain starting a few days ago states she fell and thought she had a pulled muscle. Pt states has pelvic pain/pressure that started this AM, states is right lower side and above pubic bone.

## 2022-02-16 NOTE — ED PROVIDER NOTES
History     Chief Complaint   Patient presents with     Pelvic Pain     Chest Pain     HPI  Anne Ferreira is a 41 year old female who presents to the emergency department for evaluation of chest pain and pelvic pain. Yesterday patient tripped while walking up the steps and felt as though she pulled upper back/chest muscles. She has since been having right anterior 'tightness' and 'pressure' that is worse with some movements and palpitation. This morning she awoke with midline pelvic 'heavy pressure'. Currently menstruating, has been having increased frequency of periods. Denies precipitating symptoms to the fall. No headache, dizziness, vision changes, numbness, tingling, shortness of breath, diaphoresis, nausea, vomiting, diarrhea, abnormal vaginal discharge, dysuria and hematuria. No medications prior to arrival.     Allergies:  Allergies   Allergen Reactions     Bupropion Nausea     Sulfa Drugs      Problem List:    Patient Active Problem List    Diagnosis Date Noted     COPD (chronic obstructive pulmonary disease) (H) 07/27/2021     Priority: Medium     Moderate episode of recurrent major depressive disorder (H) 05/21/2021     Priority: Medium     Symptomatic cholelithiasis 12/15/2020     Priority: Medium     Added automatically from request for surgery [id number]       Morbid obesity (H) 12/11/2020     Priority: Medium     Dyslipidemia 11/10/2020     Priority: Medium     Dysmenorrhea 11/10/2020     Priority: Medium     treated with continuous OCPs       GERD (gastroesophageal reflux disease) 11/10/2020     Priority: Medium     Thoracic back pain 11/10/2020     Priority: Medium     left, approx T10       H/O LEEP      Priority: Medium     1998 LEEP- Unknown results.  2010 NIL pap.  2/2/12 COLP and ECC- DARREN 1.  2013 NIL pap  4/7/15 LSIL pap, + HR HPV   8/13/15 COLP- DARREN 1, ECC- Negative.  6/10/16 NIL pap, Neg HPV.  5/14/19 NIL pap, Neg HPV.  Above results found in [facility]  10/15/20 NIL pap, Neg HPV. Plan cotest in 3  years.        Cervical spinal stenosis 07/01/2019     Priority: Medium     7/2019 MRI - At C5-6, broad-based central disc extrusion demonstrating caudal migration mildly deforms the cord and severely narrows the spinal canal. Uncovertebral joint spurring contributes to mild to moderate left neural foraminal narrowing with potential left C6 nerve root encroachment.       Herniation of cervical intervertebral disc with radiculopathy 07/01/2019     Priority: Medium     see MRI 7/2019       Attention deficit hyperactivity disorder, combined type, mild 08/29/2018     Priority: Medium     Generalized anxiety disorder 08/29/2018     Priority: Medium     Allergic rhinitis 04/01/2008     Priority: Medium        Past Medical History:    Past Medical History:   Diagnosis Date     Abnormal Pap smear of cervix        Past Surgical History:    Past Surgical History:   Procedure Laterality Date     LAPAROSCOPIC CHOLECYSTECTOMY N/A 12/21/2020    Procedure: Laparoscopic cholecystectomy;  Surgeon: Manuel Durham DO;  Location: [facility] OR     Family History:    Family History   Problem Relation Age of Onset     Hypertension Father      Hypertension Brother      Hypertension Sister      Hypertension Brother      Social History:  Marital Status:   [4]  Social History     Tobacco Use     Smoking status: Current Every Day Smoker     Types: Cigarettes     Smokeless tobacco: Never Used   Vaping Use     Vaping Use: Never used   Substance Use Topics     Alcohol use: Yes     Drug use: Never      Medications:    oxyCODONE (ROXICODONE) 5 MG tablet  ADVAIR DISKUS 250-50 MCG/DOSE inhaler  albuterol (PROAIR HFA/PROVENTIL HFA/VENTOLIN HFA) 108 (90 Base) MCG/ACT inhaler  cyclobenzaprine (FLEXERIL) 10 MG tablet  dexmethylphenidate (FOCALIN) 10 MG tablet  diclofenac (VOLTAREN) 75 MG EC tablet  diphenhydrAMINE (BENADRYL) 25 MG tablet  escitalopram (LEXAPRO) 10 MG tablet  escitalopram (LEXAPRO) 20 MG tablet  ethynodiol-ethinyl estradiol  (ZOVIA) 1-50 MG-MCG tablet  HYDROcodone-acetaminophen (NORCO) 5-325 MG tablet  hydrOXYzine (ATARAX) 25 MG tablet  ipratropium - albuterol 0.5 mg/2.5 mg/3 mL (DUONEB) 0.5-2.5 (3) MG/3ML neb solution  lisinopril (ZESTRIL) 20 MG tablet  LORazepam (ATIVAN) 1 MG tablet  meloxicam (MOBIC) 7.5 MG tablet  methocarbamol (ROBAXIN) 500 MG tablet  montelukast (SINGULAIR) 10 MG tablet  omeprazole (PRILOSEC) 20 MG DR capsule  propranolol (INDERAL) 80 MG tablet      Review of Systems   Constitutional: Negative for chills, fatigue and fever.   HENT: Negative for congestion, ear discharge, ear pain, rhinorrhea, sinus pressure, sinus pain, sore throat and trouble swallowing.    Eyes: Negative for discharge and redness.   Respiratory: Negative for cough and shortness of breath.    Cardiovascular: Positive for chest pain. Negative for palpitations and leg swelling.   Gastrointestinal: Negative for abdominal pain, nausea and vomiting.   Genitourinary: Positive for pelvic pain. Negative for dysuria, flank pain, frequency and vaginal discharge.   Musculoskeletal: Negative for arthralgias, joint swelling and myalgias.   Skin: Negative for rash.   Neurological: Negative for dizziness, weakness, light-headedness, numbness and headaches.   All other systems reviewed and are negative.    Physical Exam   BP: (!) 142/89  Pulse: 70  Temp: 98.6  F (37  C)  Resp: 20  Weight: 90.7 kg (200 lb)  SpO2: 98 %    Physical Exam  Constitutional:       General: She is not in acute distress.     Appearance: She is well-developed. She is not diaphoretic.   HENT:      Head: Normocephalic.   Eyes:      Conjunctiva/sclera: Conjunctivae normal.      Pupils: Pupils are equal, round, and reactive to light.   Cardiovascular:      Rate and Rhythm: Normal rate and regular rhythm.  No extrasystoles are present.     Pulses: Normal pulses.      Heart sounds: Normal heart sounds.   Pulmonary:      Effort: Pulmonary effort is normal. No respiratory distress.      Breath  sounds: Normal breath sounds and air entry. No decreased air movement. No decreased breath sounds, wheezing or rhonchi.   Chest:          Comments: Tenderness to palpation to area indicated above. Pulses and perfusion are equal bilaterally. No overlying erythema, edema, abrasions, or ecchymosis.   Abdominal:      General: There is no distension.      Palpations: Abdomen is soft.      Tenderness: There is abdominal tenderness in the suprapubic area. There is no right CVA tenderness, left CVA tenderness, guarding or rebound. Negative signs include Ortega's sign and McBurney's sign.   Musculoskeletal:         General: Normal range of motion.      Cervical back: Normal range of motion and neck supple.      Right lower leg: No edema.      Left lower leg: No edema.   Skin:     General: Skin is warm.      Capillary Refill: Capillary refill takes less than 2 seconds.   Neurological:      General: No focal deficit present.      Mental Status: She is alert and oriented to person, place, and time.   Psychiatric:         Mood and Affect: Mood normal.         ED Course           Procedures              EKG Interpretation:      Interpreted by FRANKLYN Mclean CNP, Dr.  Time reviewed: 1026  Symptoms at time of EKG: chest pain/pressure   Rhythm: normal sinus   Rate: normal  Axis: normal  Ectopy: none  Conduction: normal  ST Segments/ T Waves: No ST-T wave changes  Q Waves: none  Comparison to prior: Unchanged from 12/08/2020    Clinical Impression: normal EKG    Results for orders placed or performed during the hospital encounter of 02/16/22 (from the past 24 hour(s))   UA with Microscopic reflex to Culture    Specimen: Urine, Midstream   Result Value Ref Range    Color Urine Yellow Colorless, Straw, Light Yellow, Yellow    Appearance Urine Clear Clear    Glucose Urine Negative Negative mg/dL    Bilirubin Urine Negative Negative    Ketones Urine Negative Negative mg/dL    Specific Gravity Urine 1.025 1.003 - 1.035    Blood  Urine Large (A) Negative    pH Urine 6.0 5.0 - 7.0    Protein Albumin Urine Trace (A) Negative mg/dL    Urobilinogen Urine Normal Normal, 2.0 mg/dL    Nitrite Urine Negative Negative    Leukocyte Esterase Urine Negative Negative    Mucus Urine Present (A) None Seen /LPF    RBC Urine 4 (H) <=2 /HPF    WBC Urine 3 <=5 /HPF    Squamous Epithelials Urine 4 (H) <=1 /HPF    Narrative    Urine Culture not indicated   CBC with platelets, differential    Narrative    The following orders were created for panel order CBC with platelets, differential.  Procedure                               Abnormality         Status                     ---------                               -----------         ------                     CBC with platelets and d...[[id number]]                      Final result                 Please view results for these tests on the individual orders.   Basic metabolic panel   Result Value Ref Range    Sodium 139 133 - 144 mmol/L    Potassium 4.0 3.4 - 5.3 mmol/L    Chloride 109 94 - 109 mmol/L    Carbon Dioxide (CO2) 25 20 - 32 mmol/L    Anion Gap 5 3 - 14 mmol/L    Urea Nitrogen 12 7 - 30 mg/dL    Creatinine 0.59 0.52 - 1.04 mg/dL    Calcium 9.0 8.5 - 10.1 mg/dL    Glucose 103 (H) 70 - 99 mg/dL    GFR Estimate >90 >60 mL/min/1.73m2   Troponin I   Result Value Ref Range    Troponin I High Sensitivity 5 <54 ng/L   CBC with platelets and differential   Result Value Ref Range    WBC Count 9.9 4.0 - 11.0 10e3/uL    RBC Count 4.06 3.80 - 5.20 10e6/uL    Hemoglobin 12.0 11.7 - 15.7 g/dL    Hematocrit 37.0 35.0 - 47.0 %    MCV 91 78 - 100 fL    MCH 29.6 26.5 - 33.0 pg    MCHC 32.4 31.5 - 36.5 g/dL    RDW 13.3 10.0 - 15.0 %    Platelet Count 299 150 - 450 10e3/uL    % Neutrophils 62 %    % Lymphocytes 30 %    % Monocytes 6 %    % Eosinophils 2 %    % Basophils 0 %    % Immature Granulocytes 0 %    NRBCs per 100 WBC 0 <1 /100    Absolute Neutrophils 6.0 1.6 - 8.3 10e3/uL    Absolute Lymphocytes 3.0 0.8 - 5.3  10e3/uL    Absolute Monocytes 0.6 0.0 - 1.3 10e3/uL    Absolute Eosinophils 0.2 0.0 - 0.7 10e3/uL    Absolute Basophils 0.0 0.0 - 0.2 10e3/uL    Absolute Immature Granulocytes 0.0 <=0.4 10e3/uL    Absolute NRBCs 0.0 10e3/uL   Chest XR,  PA & LAT    Narrative    XR CHEST 2 VW 2/16/2022 11:12 AM    HISTORY: chest pain/fall    COMPARISON: 5/12/2021    FINDINGS/    Impression    IMPRESSION: Negative chest. Cervical spinal fusion hardware.  Cholecystectomy clips in the right upper quadrant.    LANDY URENA MD         SYSTEM ID:  [id number]       Medications   ketorolac (TORADOL) injection 30 mg (30 mg Intravenous Given 2/16/22 1040)       Assessments & Plan (with Medical Decision Making)   Anne Ferreira is a 41 year old female who presents to the emergency department for evaluation of chest pain and pelvic pain. Yesterday patient tripped while walking up the steps and felt as though she pulled upper back/chest muscles. She has since been having right anterior 'tightness' and 'pressure' that is worse with some movements and palpitation. This morning she awoke with midline pelvic 'heavy pressure'. Slightly hypertensive, remaining vitas normal.  Exam as above. EKG and troponin normal, given easily reproducible pain it is more likely muscular and not cardiac in origin of symptoms. CBC and BMP normal. Urinalysis without sign of infection, reflective of currently menstruating. Pelvic pain improved. Chest xray negative. Updated patient on reassuring findings. She would like to discharge home, limited Rx of oxycodone sent for pain however should use OTC options first. Discussed safe use of narcotics. Return precautions reviewed, all questions answered. Patient is agreeable to plan of care and discharged in no acute distress.     I have reviewed the nursing notes.    I have reviewed the findings, diagnosis, plan and need for follow up with the patient.  Discharge Medication List as of 2/16/2022 12:39 PM      START taking these  medications    Details   oxyCODONE (ROXICODONE) 5 MG tablet Take 1 tablet (5 mg) by mouth every 6 hours as needed for pain, Disp-8 tablet, R-0, E-Prescribe           Final diagnoses:   Pelvic pressure in female   Chest pain       2/16/2022   [facility] EMERGENCY DEPT     Chelly Foreman, APRN CNP  02/16/22 [id number]

## 2022-02-17 ENCOUNTER — VIRTUAL VISIT (OUTPATIENT)
Dept: FAMILY MEDICINE | Facility: CLINIC | Age: 42
End: 2022-02-17
Payer: COMMERCIAL

## 2022-02-17 DIAGNOSIS — F90.2 ATTENTION DEFICIT HYPERACTIVITY DISORDER (ADHD), COMBINED TYPE: ICD-10-CM

## 2022-02-17 DIAGNOSIS — F41.1 GAD (GENERALIZED ANXIETY DISORDER): Primary | ICD-10-CM

## 2022-02-17 PROCEDURE — 99214 OFFICE O/P EST MOD 30 MIN: CPT | Mod: GT | Performed by: NURSE PRACTITIONER

## 2022-02-17 RX ORDER — DEXMETHYLPHENIDATE HYDROCHLORIDE 10 MG/1
10 TABLET ORAL 2 TIMES DAILY
Qty: 60 TABLET | Refills: 0 | Status: ON HOLD | OUTPATIENT
Start: 2022-04-17 | End: 2022-04-25

## 2022-02-17 RX ORDER — HYDROXYZINE HYDROCHLORIDE 25 MG/1
25-50 TABLET, FILM COATED ORAL EVERY 6 HOURS PRN
Qty: 60 TABLET | Refills: 0 | Status: SHIPPED | OUTPATIENT
Start: 2022-02-17 | End: 2022-07-14

## 2022-02-17 RX ORDER — DEXMETHYLPHENIDATE HYDROCHLORIDE 10 MG/1
10 TABLET ORAL 2 TIMES DAILY
Qty: 60 TABLET | Refills: 0 | Status: SHIPPED | OUTPATIENT
Start: 2022-03-17 | End: 2022-04-21

## 2022-02-17 RX ORDER — DEXMETHYLPHENIDATE HYDROCHLORIDE 10 MG/1
10 TABLET ORAL 2 TIMES DAILY
Qty: 60 TABLET | Refills: 0 | Status: SHIPPED | OUTPATIENT
Start: 2022-02-17 | End: 2022-05-19

## 2022-02-17 ASSESSMENT — PATIENT HEALTH QUESTIONNAIRE - PHQ9: SUM OF ALL RESPONSES TO PHQ QUESTIONS 1-9: 6

## 2022-02-17 NOTE — PROGRESS NOTES
Anne is a 41 year old who is being evaluated via a billable video visit.      How would you like to obtain your AVS? MyChart  If the video visit is dropped, the invitation should be resent by: Text to cell phone: [phone]  Will anyone else be joining your video visit? No    Video Start Time: 5:05 PM    Assessment & Plan     (F41.1) MILES (generalized anxiety disorder)  (primary encounter diagnosis)  Comment:  Controlled no change in treatment plan  Plan: hydrOXYzine (ATARAX) 25 MG tablet            (F90.2) Attention deficit hyperactivity disorder (ADHD), combined type  Comment:  Controlled no change in treatment plan  Plan: dexmethylphenidate (FOCALIN) 10 MG tablet,         dexmethylphenidate (FOCALIN) 10 MG tablet,         dexmethylphenidate (FOCALIN) 10 MG tablet          No follow-ups on file.    FRANKLYN Christianson [facility]    Justin Rodríguez is a 41 year old who presents for the following health issues     HPI     Medication Followup of Focalin    Taking Medication as prescribed: yes    Side Effects:  None    Medication Helping Symptoms:  yes         Review of Systems   Constitutional, HEENT, cardiovascular, pulmonary, gi and gu systems are negative, except as otherwise noted.      Objective           Vitals:  No vitals were obtained today due to virtual visit.    Physical Exam   GENERAL: Healthy, alert and no distress  EYES: Eyes grossly normal to inspection.  No discharge or erythema, or obvious scleral/conjunctival abnormalities.  RESP: No audible wheeze, cough, or visible cyanosis.  No visible retractions or increased work of breathing.    SKIN: Visible skin clear. No significant rash, abnormal pigmentation or lesions.  NEURO: Cranial nerves grossly intact.  Mentation and speech appropriate for age.  PSYCH: Mentation appears normal, affect normal/bright, judgement and insight intact, normal speech and appearance well-groomed.    No results found for any visits on  02/17/22.            Video-Visit Details    Type of service:  Video Visit    Video End Time:5:16 PM    Originating Location (pt. Location): Home    Distant Location (provider location):  [facility]     Platform used for Video Visit: [employer]

## 2022-02-21 ENCOUNTER — MYC MEDICAL ADVICE (OUTPATIENT)
Dept: FAMILY MEDICINE | Facility: CLINIC | Age: 42
End: 2022-02-21
Payer: COMMERCIAL

## 2022-02-22 ENCOUNTER — VIRTUAL VISIT (OUTPATIENT)
Dept: FAMILY MEDICINE | Facility: CLINIC | Age: 42
End: 2022-02-22
Payer: COMMERCIAL

## 2022-02-22 DIAGNOSIS — M51.26 POSTERIOR LUMBAR DISC PROLAPSE: Primary | ICD-10-CM

## 2022-02-22 PROCEDURE — 99213 OFFICE O/P EST LOW 20 MIN: CPT | Mod: GT | Performed by: PHYSICIAN ASSISTANT

## 2022-02-22 RX ORDER — OXYCODONE HYDROCHLORIDE 5 MG/1
5 TABLET ORAL EVERY 6 HOURS PRN
Qty: 12 TABLET | Refills: 0 | Status: SHIPPED | OUTPATIENT
Start: 2022-02-22 | End: 2022-02-25

## 2022-02-22 RX ORDER — PREDNISONE 20 MG/1
40 TABLET ORAL DAILY
Qty: 10 TABLET | Refills: 0 | Status: SHIPPED | OUTPATIENT
Start: 2022-02-22 | End: 2022-02-27

## 2022-02-22 NOTE — PROGRESS NOTES
Anne is a 41 year old who is being evaluated via a billable video visit.      How would you like to obtain your AVS? MyChart  If the video visit is dropped, the invitation should be resent by:   Will anyone else be joining your video visit? No    Video Start Time: 7:26 AM    Assessment & Plan   Problem List Items Addressed This Visit     None      Visit Diagnoses     Posterior lumbar disc prolapse    -  Primary    Relevant Medications    oxyCODONE (ROXICODONE) 5 MG tablet    predniSONE (DELTASONE) 20 MG tablet         Continue Methocarbamol 750 mg three times a day as needed  oxycodone 5 mg every 6-8 hours as needed  Prednisone 40 mg daily for 5 days  Patient has appointment with spinal specialist on 2/24/22     minutes spent on the date of the encounter doing chart review, history and exam, documentation and further activities per the note       Return in about 3 days (around 2/25/2022) for spinal specialist .    Mary Stanford PA-C  [facility]    Justin Rodríguez is a 41 year old who presents for the following health issues     HPI     Concern - bulging disk in lumbar spine. Patient fell on ice hurt her buttock area 2 days ago again  Onset: acute on chronic 2 days ago post fall   Description: lumbar area prolapsed disk, acute fall on buttocks 2 days ago exacerbated the pain   Intensity: moderate  Progression of Symptoms:  same  Accompanying Signs & Symptoms: pain shoots to the legs occasionally  When patient tries to walk . No weakness or tingling in the lower legs   Previous history of similar problem: yes  Precipitating factors:        Worsened by: movement   Alleviating factors:        Improved by: rest  Therapies tried and outcome: tried Oxycodone in the past and it worked well         Review of Systems   Constitutional, HEENT, cardiovascular, pulmonary, gi and gu systems are negative, except as otherwise noted.      Objective           Vitals:  No vitals were obtained today  due to virtual visit.    Physical Exam   GENERAL: Healthy, alert and no distress  EYES: Eyes grossly normal to inspection.  No discharge or erythema, or obvious scleral/conjunctival abnormalities.  RESP: No audible wheeze, cough, or visible cyanosis.  No visible retractions or increased work of breathing.    SKIN: Visible skin clear. No significant rash, abnormal pigmentation or lesions.  NEURO: Cranial nerves grossly intact.  Mentation and speech appropriate for age.  PSYCH: Mentation appears normal, affect normal/bright, judgement and insight intact, normal speech and appearance well-groomed.            Video-Visit Details    Type of service:  Video Visit    Video End Time:7:39 AM    Originating Location (pt. Location): Home    Distant Location (provider location):  [facility]     Platform used for Video Visit: [employer]    Answers for HPI/ROS submitted by the patient on 2/22/2022  Your back pain is: chronic  Where is your back pain located? : right lower back, left lower back, right middle of back, left middle of back, left upper back, left side of neck, left shoulder  How would you describe your back pain? : burning, dull ache, shooting  Where does your back pain spread? : left buttocks, left thigh, left knee, left foot, left shoulder  Since you noticed your back pain, how has it changed? : gradually worsening  Does your back pain interfere with your job?: Yes  If yes, which:: acetaminophen (Tylenol), activity or exercise, cold, heat, massage, muscle relaxants, opioids, rest, stretching  How many servings of fruits and vegetables do you eat daily?: 2-3  On average, how many sweetened beverages do you drink each day (Examples: soda, juice, sweet tea, etc.  Do NOT count diet or artificially sweetened beverages)?: 3  How many minutes a day do you exercise enough to make your heart beat faster?: 9 or less  How many days a week do you exercise enough to make your heart beat faster?: 3 or  less  How many days per week do you miss taking your medication?: 0

## 2022-03-02 ENCOUNTER — HOSPITAL ENCOUNTER (EMERGENCY)
Facility: CLINIC | Age: 42
Discharge: HOME | End: 2022-03-02
Attending: NURSE PRACTITIONER | Admitting: NURSE PRACTITIONER
Payer: COMMERCIAL

## 2022-03-02 VITALS
WEIGHT: 204 LBS | SYSTOLIC BLOOD PRESSURE: 131 MMHG | OXYGEN SATURATION: 98 % | DIASTOLIC BLOOD PRESSURE: 79 MMHG | TEMPERATURE: 97.1 F | RESPIRATION RATE: 16 BRPM | HEIGHT: 64 IN | HEART RATE: 69 BPM | BODY MASS INDEX: 34.83 KG/M2

## 2022-03-02 DIAGNOSIS — R19.7 VOMITING AND DIARRHEA: ICD-10-CM

## 2022-03-02 DIAGNOSIS — R11.10 VOMITING AND DIARRHEA: ICD-10-CM

## 2022-03-02 DIAGNOSIS — R11.2 NAUSEA AND VOMITING: ICD-10-CM

## 2022-03-02 LAB
ALBUMIN UR-MCNC: ABNORMAL MG/DL
ANION GAP SERPL CALCULATED.3IONS-SCNC: 5 MMOL/L (ref 3–14)
APPEARANCE UR: CLEAR
BASOPHILS # BLD AUTO: 0 10E3/UL (ref 0–0.2)
BASOPHILS NFR BLD AUTO: 0 %
BILIRUB UR QL STRIP: NEGATIVE
BUN SERPL-MCNC: 11 MG/DL (ref 7–30)
CALCIUM SERPL-MCNC: 8.6 MG/DL (ref 8.5–10.1)
CHLORIDE BLD-SCNC: 108 MMOL/L (ref 94–109)
CO2 SERPL-SCNC: 23 MMOL/L (ref 20–32)
COLOR UR AUTO: YELLOW
CREAT SERPL-MCNC: 0.54 MG/DL (ref 0.52–1.04)
EOSINOPHIL # BLD AUTO: 0.1 10E3/UL (ref 0–0.7)
EOSINOPHIL NFR BLD AUTO: 2 %
ERYTHROCYTE [DISTWIDTH] IN BLOOD BY AUTOMATED COUNT: 13.4 % (ref 10–15)
GFR SERPL CREATININE-BSD FRML MDRD: >90 ML/MIN/1.73M2
GLUCOSE BLD-MCNC: 110 MG/DL (ref 70–99)
GLUCOSE UR STRIP-MCNC: NEGATIVE MG/DL
HCT VFR BLD AUTO: 39.3 % (ref 35–47)
HGB BLD-MCNC: 13 G/DL (ref 11.7–15.7)
HGB UR QL STRIP: ABNORMAL
HYALINE CASTS: 1 /LPF
IMM GRANULOCYTES # BLD: 0 10E3/UL
IMM GRANULOCYTES NFR BLD: 0 %
KETONES UR STRIP-MCNC: NEGATIVE MG/DL
LEUKOCYTE ESTERASE UR QL STRIP: NEGATIVE
LYMPHOCYTES # BLD AUTO: 2.1 10E3/UL (ref 0.8–5.3)
LYMPHOCYTES NFR BLD AUTO: 29 %
MCH RBC QN AUTO: 29.5 PG (ref 26.5–33)
MCHC RBC AUTO-ENTMCNC: 33.1 G/DL (ref 31.5–36.5)
MCV RBC AUTO: 89 FL (ref 78–100)
MONOCYTES # BLD AUTO: 0.6 10E3/UL (ref 0–1.3)
MONOCYTES NFR BLD AUTO: 9 %
MUCOUS THREADS #/AREA URNS LPF: PRESENT /LPF
NEUTROPHILS # BLD AUTO: 4.3 10E3/UL (ref 1.6–8.3)
NEUTROPHILS NFR BLD AUTO: 60 %
NITRATE UR QL: NEGATIVE
NRBC # BLD AUTO: 0 10E3/UL
NRBC BLD AUTO-RTO: 0 /100
PH UR STRIP: 6 [PH] (ref 5–7)
PLATELET # BLD AUTO: 294 10E3/UL (ref 150–450)
POTASSIUM BLD-SCNC: 3.4 MMOL/L (ref 3.4–5.3)
RBC # BLD AUTO: 4.4 10E6/UL (ref 3.8–5.2)
RBC URINE: 12 /HPF
SODIUM SERPL-SCNC: 136 MMOL/L (ref 133–144)
SP GR UR STRIP: 1.02 (ref 1–1.03)
SQUAMOUS EPITHELIAL: 3 /HPF
UROBILINOGEN UR STRIP-MCNC: NORMAL MG/DL
WBC # BLD AUTO: 7.2 10E3/UL (ref 4–11)
WBC URINE: 6 /HPF

## 2022-03-02 PROCEDURE — 96374 THER/PROPH/DIAG INJ IV PUSH: CPT | Performed by: NURSE PRACTITIONER

## 2022-03-02 PROCEDURE — 36415 COLL VENOUS BLD VENIPUNCTURE: CPT | Performed by: NURSE PRACTITIONER

## 2022-03-02 PROCEDURE — 250N000011 HC RX IP 250 OP 636: Performed by: NURSE PRACTITIONER

## 2022-03-02 PROCEDURE — 81001 URINALYSIS AUTO W/SCOPE: CPT | Performed by: NURSE PRACTITIONER

## 2022-03-02 PROCEDURE — 99284 EMERGENCY DEPT VISIT MOD MDM: CPT | Performed by: NURSE PRACTITIONER

## 2022-03-02 PROCEDURE — 85025 COMPLETE CBC W/AUTO DIFF WBC: CPT | Performed by: NURSE PRACTITIONER

## 2022-03-02 PROCEDURE — 258N000003 HC RX IP 258 OP 636: Performed by: NURSE PRACTITIONER

## 2022-03-02 PROCEDURE — 96376 TX/PRO/DX INJ SAME DRUG ADON: CPT | Performed by: NURSE PRACTITIONER

## 2022-03-02 PROCEDURE — 87086 URINE CULTURE/COLONY COUNT: CPT | Performed by: NURSE PRACTITIONER

## 2022-03-02 PROCEDURE — 80048 BASIC METABOLIC PNL TOTAL CA: CPT | Performed by: NURSE PRACTITIONER

## 2022-03-02 PROCEDURE — 96375 TX/PRO/DX INJ NEW DRUG ADDON: CPT | Performed by: NURSE PRACTITIONER

## 2022-03-02 PROCEDURE — 99284 EMERGENCY DEPT VISIT MOD MDM: CPT | Mod: 25 | Performed by: NURSE PRACTITIONER

## 2022-03-02 RX ORDER — PROCHLORPERAZINE MALEATE 10 MG
10 TABLET ORAL EVERY 8 HOURS PRN
Qty: 10 TABLET | Refills: 0 | Status: SHIPPED | OUTPATIENT
Start: 2022-03-02 | End: 2022-05-17

## 2022-03-02 RX ORDER — ONDANSETRON 4 MG/1
4-8 TABLET, ORALLY DISINTEGRATING ORAL EVERY 8 HOURS PRN
Qty: 10 TABLET | Refills: 0 | Status: SHIPPED | OUTPATIENT
Start: 2022-03-02 | End: 2022-03-05

## 2022-03-02 RX ORDER — ONDANSETRON 2 MG/ML
4 INJECTION INTRAMUSCULAR; INTRAVENOUS EVERY 30 MIN PRN
Status: COMPLETED | OUTPATIENT
Start: 2022-03-02 | End: 2022-03-02

## 2022-03-02 RX ORDER — KETOROLAC TROMETHAMINE 15 MG/ML
15 INJECTION, SOLUTION INTRAMUSCULAR; INTRAVENOUS ONCE
Status: COMPLETED | OUTPATIENT
Start: 2022-03-02 | End: 2022-03-02

## 2022-03-02 RX ADMIN — SODIUM CHLORIDE, POTASSIUM CHLORIDE, SODIUM LACTATE AND CALCIUM CHLORIDE 1000 ML: 600; 310; 30; 20 INJECTION, SOLUTION INTRAVENOUS at 11:40

## 2022-03-02 RX ADMIN — ONDANSETRON 4 MG: 2 INJECTION INTRAMUSCULAR; INTRAVENOUS at 12:26

## 2022-03-02 RX ADMIN — ONDANSETRON 4 MG: 2 INJECTION INTRAMUSCULAR; INTRAVENOUS at 11:41

## 2022-03-02 RX ADMIN — SODIUM CHLORIDE, POTASSIUM CHLORIDE, SODIUM LACTATE AND CALCIUM CHLORIDE 1000 ML: 600; 310; 30; 20 INJECTION, SOLUTION INTRAVENOUS at 11:39

## 2022-03-02 RX ADMIN — ONDANSETRON 4 MG: 2 INJECTION INTRAMUSCULAR; INTRAVENOUS at 14:04

## 2022-03-02 RX ADMIN — KETOROLAC TROMETHAMINE 15 MG: 15 INJECTION, SOLUTION INTRAMUSCULAR; INTRAVENOUS at 12:00

## 2022-03-02 NOTE — ED PROVIDER NOTES
"  History     Chief Complaint   Patient presents with     Nausea & Vomiting     n/v/d since Monday.   pt reports \"stomach flu\" going around work     Diarrhea     HPI  Anne Ferreira is a 41 year old female who presents with 5 day history of N/V/D.  Pt reports onset of nausea on Saturday and on Monday developed diarrhea and onset of vomiting around 2 am today.  Reports feeling tired, weak, generalized body aches, and fevers.  Pt developed a fever on Monday and states highest temperature was 103 and last temperature was last night and noted to be 101.  Pt has treated symptoms with omeprazole this am, has been drinking water and 7-up, yesterday ate toast and this stayed down.  Pt ate donut this am and subsequent emesis.  Pt has been taking tylenol regularly.  Pt has received influenza vaccine.  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  LMP today    Allergies:  Allergies   Allergen Reactions     Bupropion Nausea     Sulfa Drugs        Problem List:    Patient Active Problem List    Diagnosis Date Noted     COPD (chronic obstructive pulmonary disease) (H) 07/27/2021     Priority: Medium     Moderate episode of recurrent major depressive disorder (H) 05/21/2021     Priority: Medium     Symptomatic cholelithiasis 12/15/2020     Priority: Medium     Added automatically from request for surgery [id number]       Morbid obesity (H) 12/11/2020     Priority: Medium     Dyslipidemia 11/10/2020     Priority: Medium     Dysmenorrhea 11/10/2020     Priority: Medium     treated with continuous OCPs       GERD (gastroesophageal reflux disease) 11/10/2020     Priority: Medium     Thoracic back pain 11/10/2020     Priority: Medium     left, approx T10       H/O LEEP      Priority: Medium     1998 LEEP- Unknown results.  2010 NIL pap.  2/2/12 COLP and ECC- DARREN 1.  2013 NIL pap  4/7/15 LSIL pap, + HR HPV   8/13/15 COLP- DARREN 1, ECC- " Negative.  6/10/16 NIL pap, Neg HPV.  5/14/19 NIL pap, Neg HPV.  Above results found in [facility]  10/15/20 NIL pap, Neg HPV. Plan cotest in 3 years.        Cervical spinal stenosis 07/01/2019     Priority: Medium     7/2019 MRI - At C5-6, broad-based central disc extrusion demonstrating caudal migration mildly deforms the cord and severely narrows the spinal canal. Uncovertebral joint spurring contributes to mild to moderate left neural foraminal narrowing with potential left C6 nerve root encroachment.       Herniation of cervical intervertebral disc with radiculopathy 07/01/2019     Priority: Medium     see MRI 7/2019       Attention deficit hyperactivity disorder, combined type, mild 08/29/2018     Priority: Medium     Generalized anxiety disorder 08/29/2018     Priority: Medium     Allergic rhinitis 04/01/2008     Priority: Medium        Past Medical History:    Past Medical History:   Diagnosis Date     Abnormal Pap smear of cervix        Past Surgical History:    Past Surgical History:   Procedure Laterality Date     LAPAROSCOPIC CHOLECYSTECTOMY N/A 12/21/2020    Procedure: Laparoscopic cholecystectomy;  Surgeon: Manuel Durham DO;  Location: [facility] OR       Family History:    Family History   Problem Relation Age of Onset     Hypertension Father      Hypertension Brother      Hypertension Sister      Hypertension Brother        Social History:  Marital Status:   [4]  Social History     Tobacco Use     Smoking status: Current Every Day Smoker     Types: Cigarettes     Smokeless tobacco: Never Used   Vaping Use     Vaping Use: Never used   Substance Use Topics     Alcohol use: Yes     Drug use: Never        Medications:    ondansetron (ZOFRAN ODT) 4 MG ODT tab  prochlorperazine (COMPAZINE) 10 MG tablet  ADVAIR DISKUS 250-50 MCG/DOSE inhaler  albuterol (PROAIR HFA/PROVENTIL HFA/VENTOLIN HFA) 108 (90 Base) MCG/ACT inhaler  cyclobenzaprine (FLEXERIL) 10 MG tablet  dexmethylphenidate (FOCALIN) 10 MG  "tablet  [START ON 3/17/2022] dexmethylphenidate (FOCALIN) 10 MG tablet  [START ON 4/17/2022] dexmethylphenidate (FOCALIN) 10 MG tablet  diphenhydrAMINE (BENADRYL) 25 MG tablet  escitalopram (LEXAPRO) 10 MG tablet  escitalopram (LEXAPRO) 20 MG tablet  ethynodiol-ethinyl estradiol (ZOVIA) 1-50 MG-MCG tablet  hydrOXYzine (ATARAX) 25 MG tablet  hydrOXYzine (ATARAX) 25 MG tablet  ipratropium - albuterol 0.5 mg/2.5 mg/3 mL (DUONEB) 0.5-2.5 (3) MG/3ML neb solution  lisinopril (ZESTRIL) 20 MG tablet  LORazepam (ATIVAN) 1 MG tablet  meloxicam (MOBIC) 7.5 MG tablet  methocarbamol (ROBAXIN) 500 MG tablet  montelukast (SINGULAIR) 10 MG tablet  omeprazole (PRILOSEC) 20 MG DR capsule  propranolol (INDERAL) 80 MG tablet        Review of Systems  As mentioned above in the history present illness. All other systems were reviewed and are negative.    Physical Exam   BP: (!) 157/89  Pulse: 95  Temp: 97.1  F (36.2  C)  Resp: 18  Height: 162.6 cm (5' 4\")  Weight: 92.5 kg (204 lb)  SpO2: 98 %      Physical Exam  Vitals and nursing note reviewed.   Constitutional:       General: She is not in acute distress.     Appearance: She is well-developed. She is obese. She is ill-appearing. She is not toxic-appearing or diaphoretic.   HENT:      Head: Normocephalic and atraumatic.      Right Ear: Hearing, tympanic membrane, ear canal and external ear normal.      Left Ear: Hearing, tympanic membrane, ear canal and external ear normal.      Nose: Nose normal.      Mouth/Throat:      Mouth: Mucous membranes are moist.      Pharynx: Uvula midline. No oropharyngeal exudate or posterior oropharyngeal erythema.      Tonsils: No tonsillar exudate.   Eyes:      General: No scleral icterus.        Right eye: No discharge.         Left eye: No discharge.      Conjunctiva/sclera: Conjunctivae normal.   Cardiovascular:      Rate and Rhythm: Normal rate and regular rhythm.      Heart sounds: Normal heart sounds. No murmur heard.    No friction rub. "   Pulmonary:      Effort: Pulmonary effort is normal. No respiratory distress.      Breath sounds: Normal breath sounds. No stridor. No wheezing or rales.   Chest:      Chest wall: No tenderness.   Abdominal:      General: Bowel sounds are normal. There is no distension.      Palpations: Abdomen is soft. There is no mass.      Tenderness: There is generalized abdominal tenderness. There is no guarding or rebound. Negative signs include psoas sign and obturator sign.      Hernia: No hernia is present.   Musculoskeletal:      Cervical back: Normal range of motion and neck supple.   Skin:     General: Skin is warm and dry.      Coloration: Skin is not pale.      Findings: No erythema or rash.   Neurological:      Mental Status: She is alert and oriented to person, place, and time.      Deep Tendon Reflexes: Reflexes normal.   Psychiatric:         Mood and Affect: Mood normal.         ED Course              ED Course as of 03/02/22 1642   Wed Mar 02, 2022   1221 Reviewed labs and reassurances provided.  Pt states pain is improved but still feeling slightly nauseous, requested patient received another dose of zofran.  IV fluids infusing without difficulty.     Procedures      Results for orders placed or performed during the hospital encounter of 03/02/22 (from the past 24 hour(s))   UA with Microscopic reflex to Culture    Specimen: Urine, Clean Catch   Result Value Ref Range    Color Urine Yellow Colorless, Straw, Light Yellow, Yellow    Appearance Urine Clear Clear    Glucose Urine Negative Negative mg/dL    Bilirubin Urine Negative Negative    Ketones Urine Negative Negative mg/dL    Specific Gravity Urine 1.025 1.003 - 1.035    Blood Urine Moderate (A) Negative    pH Urine 6.0 5.0 - 7.0    Protein Albumin Urine Trace (A) Negative mg/dL    Urobilinogen Urine Normal Normal, 2.0 mg/dL    Nitrite Urine Negative Negative    Leukocyte Esterase Urine Negative Negative    Mucus Urine Present (A) None Seen /LPF    RBC Urine  12 (H) <=2 /HPF    WBC Urine 6 (H) <=5 /HPF    Squamous Epithelials Urine 3 (H) <=1 /HPF    Hyaline Casts Urine 1 <=2 /LPF    Narrative    Urine Culture not indicated   CBC with platelets differential    Narrative    The following orders were created for panel order CBC with platelets differential.  Procedure                               Abnormality         Status                     ---------                               -----------         ------                     CBC with platelets and d...[[id number]]                      Final result                 Please view results for these tests on the individual orders.   Basic metabolic panel   Result Value Ref Range    Sodium 136 133 - 144 mmol/L    Potassium 3.4 3.4 - 5.3 mmol/L    Chloride 108 94 - 109 mmol/L    Carbon Dioxide (CO2) 23 20 - 32 mmol/L    Anion Gap 5 3 - 14 mmol/L    Urea Nitrogen 11 7 - 30 mg/dL    Creatinine 0.54 0.52 - 1.04 mg/dL    Calcium 8.6 8.5 - 10.1 mg/dL    Glucose 110 (H) 70 - 99 mg/dL    GFR Estimate >90 >60 mL/min/1.73m2   CBC with platelets and differential   Result Value Ref Range    WBC Count 7.2 4.0 - 11.0 10e3/uL    RBC Count 4.40 3.80 - 5.20 10e6/uL    Hemoglobin 13.0 11.7 - 15.7 g/dL    Hematocrit 39.3 35.0 - 47.0 %    MCV 89 78 - 100 fL    MCH 29.5 26.5 - 33.0 pg    MCHC 33.1 31.5 - 36.5 g/dL    RDW 13.4 10.0 - 15.0 %    Platelet Count 294 150 - 450 10e3/uL    % Neutrophils 60 %    % Lymphocytes 29 %    % Monocytes 9 %    % Eosinophils 2 %    % Basophils 0 %    % Immature Granulocytes 0 %    NRBCs per 100 WBC 0 <1 /100    Absolute Neutrophils 4.3 1.6 - 8.3 10e3/uL    Absolute Lymphocytes 2.1 0.8 - 5.3 10e3/uL    Absolute Monocytes 0.6 0.0 - 1.3 10e3/uL    Absolute Eosinophils 0.1 0.0 - 0.7 10e3/uL    Absolute Basophils 0.0 0.0 - 0.2 10e3/uL    Absolute Immature Granulocytes 0.0 <=0.4 10e3/uL    Absolute NRBCs 0.0 10e3/uL       Medications   prochlorperazine (COMPAZINE) injection 5 mg (0 mg Intravenous Hold 3/2/22 8221)    lactated ringers BOLUS 1,000 mL (0 mLs Intravenous Stopped 3/2/22 1358)     Followed by   lactated ringers BOLUS 1,000 mL (0 mLs Intravenous Stopped 3/2/22 1428)   ondansetron (ZOFRAN) injection 4 mg (4 mg Intravenous Given 3/2/22 1404)   ketorolac (TORADOL) injection 15 mg (15 mg Intravenous Given 3/2/22 1200)       Assessments & Plan (with Medical Decision Making)  41-year-old female presents with a 5-day history of nausea, vomiting, diarrhea.  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  Patient states most of the residents and staff that have gotten this illness have gotten over it rather quickly in the course of 2 to 3 days and she reports she is concerned because her symptoms are persistent at 5 days.  Patient has tried self treatment with omeprazole, more liquids but still maintaining eating regular food.  Patient reports she had a donut this morning and threw that back up.  Patient concerned about dehydration and wonders also about persistent diarrhea and when should cultures be obtained.  On exam patient is vitally stable with a blood pressure 131/79, temp 97.1, heart rate 69, O2 saturation 98% respiratory rate of 16.  Patient has generalized abdominal tenderness without rebound, guarding.  Differential diagnosis viral gastroenteritis, low suspicion for acute abdomen, diverticulitis, cholecystitis, appendicitis.  Patient denies history of ulcerative colitis, Crohn's disease.  Work-up is reassuring for negative acute abdomen, diverticulitis, cholecystitis, appendicitis.  Patient responsive to fluids and did report persistent nausea after 3 doses of Zofran.  Initially patient had reported feeling better.  Patient subsequently declined Compazine and states she believes that she just feels lousy because the beds are so uncomfortable.  Patient states she believes she will feel better when she gets home.  Prescriptions sent for Zofran,  Compazine, note provided for work.  Instructed on clear liquid diet and discussed worrisome reasons to return to the emergency room.  Patient verbalized understanding and discharged in stable condition     I have reviewed the nursing notes.    I have reviewed the findings, diagnosis, plan and need for follow up with the patient.    Discharge Medication List as of 3/2/2022  3:14 PM      START taking these medications    Details   ondansetron (ZOFRAN ODT) 4 MG ODT tab Take 1-2 tablets (4-8 mg) by mouth every 8 hours as needed for nausea or vomiting, Disp-10 tablet, R-0, E-Prescribe      prochlorperazine (COMPAZINE) 10 MG tablet Take 1 tablet (10 mg) by mouth every 8 hours as needed for nausea or vomiting, Disp-10 tablet, R-0, E-Prescribe             Final diagnoses:   Nausea and vomiting   Vomiting and diarrhea       3/2/2022   [facility] EMERGENCY DEPT     Garrett, Janeen Blanco, FRANKLYN CNP  03/02/22 7915

## 2022-03-02 NOTE — DISCHARGE INSTRUCTIONS
I recommend clear liquid diet for 12 to 24 hours followed by a bland diet.  You may take Compazine 10 mg or half tablet which is 5 mg by mouth every 8 hours for nausea and/or vomiting.  You also may take Zofran 4 to 8 mg every 8 hours for nausea and/or vomiting.  Follow-up with primary care if no improvement in the next 5 days if no improvement.  Return to the emergency room if you are unable to void, concerned of dehydration.

## 2022-03-02 NOTE — ED TRIAGE NOTES
"Pt here with c/o n/v/d that started Saturday. 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   Pt states that she is able to hold some water down. No blood in emesis or stool.   "

## 2022-03-02 NOTE — LETTER
March 2, 2022      To Whom It May Concern:      Anne Ferreira was seen in our Emergency Department today, 03/02/22.  Please excuse patient from work today and tomorrow due to illness.  As long as patient is feeling better she may resume work on March 4.    Sincerely,        FRANKLYN Daniels CNP

## 2022-03-04 LAB — BACTERIA UR CULT: NORMAL

## 2022-03-09 ENCOUNTER — OFFICE VISIT (OUTPATIENT)
Dept: OBGYN | Facility: CLINIC | Age: 42
End: 2022-03-09
Payer: COMMERCIAL

## 2022-03-09 VITALS
TEMPERATURE: 98.7 F | BODY MASS INDEX: 35.63 KG/M2 | SYSTOLIC BLOOD PRESSURE: 141 MMHG | DIASTOLIC BLOOD PRESSURE: 71 MMHG | HEART RATE: 65 BPM | WEIGHT: 208.7 LBS | RESPIRATION RATE: 16 BRPM | HEIGHT: 64 IN

## 2022-03-09 DIAGNOSIS — Z98.890 H/O LEEP: ICD-10-CM

## 2022-03-09 DIAGNOSIS — N94.6 DYSMENORRHEA: ICD-10-CM

## 2022-03-09 DIAGNOSIS — D25.1 INTRAMURAL AND SUBMUCOUS LEIOMYOMA OF UTERUS: Primary | ICD-10-CM

## 2022-03-09 DIAGNOSIS — D25.0 INTRAMURAL AND SUBMUCOUS LEIOMYOMA OF UTERUS: Primary | ICD-10-CM

## 2022-03-09 DIAGNOSIS — N92.0 MENORRHAGIA WITH REGULAR CYCLE: ICD-10-CM

## 2022-03-09 PROCEDURE — 99204 OFFICE O/P NEW MOD 45 MIN: CPT | Performed by: OBSTETRICS & GYNECOLOGY

## 2022-03-09 NOTE — PROGRESS NOTES
CC:  Consult from Bertha Loya  for abnormal pelvic US  HPI:  Anne Ferreira is a 41 year old female is a   .  Patient's last menstrual period was 03/02/2022.  Menses are regular q 3-4 weeks, lasting 6 days. Over the last 6 months , the periods have been becoming heavier and painful with Cramping and large clots requiring the changing of Pad and Tampon hourly.  This is accompanied with fatigue  And the need to take a change of clothes to work.  She has no interest in future childbearing  Her paps have been normal and HPV negative.  She has no other bleeding diatheses.  We reviewed the ULTRASOUND films showing a submucosal /intramural fibroid.  She is interested in a definitive procedure.  Patients records are available and reviewed at today's visit.    Past GYN history:  No STD history       Last PAP smear:  Normal  Last TSH:   TSH   Date Value Ref Range Status   10/15/2020 4.93 (H) 0.40 - 4.00 mU/L Final     , normal?  No    Past Medical History:   Diagnosis Date     Abnormal Pap smear of cervix        Past Surgical History:   Procedure Laterality Date     LAPAROSCOPIC CHOLECYSTECTOMY N/A 12/21/2020    Procedure: Laparoscopic cholecystectomy;  Surgeon: Manuel Durham DO;  Location: [facility] OR       Family History   Problem Relation Age of Onset     Hypertension Father      Hypertension Brother      Hypertension Sister      Hypertension Brother        Allergies: Bupropion and Sulfa drugs    Current Outpatient Medications   Medication Sig Dispense Refill     albuterol (PROAIR HFA/PROVENTIL HFA/VENTOLIN HFA) 108 (90 Base) MCG/ACT inhaler Inhale 2 puffs into the lungs every 6 hours 1 Inhaler 1     cyclobenzaprine (FLEXERIL) 10 MG tablet Take 1 tablet (10 mg) by mouth 2 times daily as needed for muscle spasms 60 tablet 1     dexmethylphenidate (FOCALIN) 10 MG tablet Take 1 tablet (10 mg) by mouth 2 times daily 60 tablet 0     [START ON 3/17/2022] dexmethylphenidate (FOCALIN) 10 MG tablet Take 1 tablet (10 mg)  by mouth 2 times daily 60 tablet 0     [START ON 4/17/2022] dexmethylphenidate (FOCALIN) 10 MG tablet Take 1 tablet (10 mg) by mouth 2 times daily 60 tablet 0     diphenhydrAMINE (BENADRYL) 25 MG tablet Take 1-2 tablets (25-50 mg) by mouth every 6 hours as needed for itching or allergies 30 tablet 1     escitalopram (LEXAPRO) 10 MG tablet TAKE 1 TABLET BY MOUTH EVERY DAY TAKE WITH 20MG FOR TOTAL OF 30MG DAILY 90 tablet 0     escitalopram (LEXAPRO) 20 MG tablet TAKE 1 TABLET BY MOUTH EVERY DAY 90 tablet 3     ethynodiol-ethinyl estradiol (ZOVIA) 1-50 MG-MCG tablet Take 1 tablet by mouth daily 84 tablet 4     hydrOXYzine (ATARAX) 25 MG tablet Take 1-2 tablets (25-50 mg) by mouth every 6 hours as needed for anxiety or other (insomina) 60 tablet 0     hydrOXYzine (ATARAX) 25 MG tablet Take 1 tablet (25 mg) by mouth every 8 hours as needed for anxiety or other (insomina) 60 tablet 0     ipratropium - albuterol 0.5 mg/2.5 mg/3 mL (DUONEB) 0.5-2.5 (3) MG/3ML neb solution Take 1 vial by nebulization every 6 hours as needed for shortness of breath / dyspnea or wheezing       lisinopril (ZESTRIL) 20 MG tablet Take 1 tablet (20 mg) by mouth daily 90 tablet 2     LORazepam (ATIVAN) 1 MG tablet Take 0.5-1 tablets (0.5-1 mg) by mouth 2 times daily as needed for anxiety Max dose with 1 mg a day.  15 tablets must last a month 6 tablet 0     meloxicam (MOBIC) 7.5 MG tablet Take 1 tablet (7.5 mg) by mouth daily 90 tablet 1     methocarbamol (ROBAXIN) 500 MG tablet Take 1 tablet (500 mg) by mouth 4 times daily as needed for muscle spasms 60 tablet 0     montelukast (SINGULAIR) 10 MG tablet TAKE 1 TABLET BY MOUTH EVERYDAY AT BEDTIME 30 tablet 5     omeprazole (PRILOSEC) 20 MG DR capsule Take 20 mg by mouth daily       prochlorperazine (COMPAZINE) 10 MG tablet Take 1 tablet (10 mg) by mouth every 8 hours as needed for nausea or vomiting 10 tablet 0     propranolol (INDERAL) 80 MG tablet Take 1 tablet (80 mg) by mouth daily 90 tablet 1  "    ADVAIR DISKUS 250-50 MCG/DOSE inhaler INHALE 1 PUFF INTO THE LUNGS EVERY 12 HOURS (Patient not taking: Reported on 2/17/2022) 1 each 5       ROS:  C: NEGATIVE for fever, chills, change in weight  I: NEGATIVE for worrisome rashes, moles or lesions  E: NEGATIVE for vision changes or irritation  E/M: NEGATIVE for ear, mouth and throat problems  R: NEGATIVE for significant cough or SOB  CV: NEGATIVE for chest pain, palpitations or peripheral edema  GI: NEGATIVE for nausea, abdominal pain, heartburn, or change in bowel habits  : NEGATIVE for frequency, dysuria, hematuria, vaginal discharge  M: NEGATIVE for significant arthralgias or myalgia  N: NEGATIVE for weakness, dizziness or paresthesias  E: NEGATIVE for temperature intolerance, skin/hair changes  P: NEGATIVE for changes in mood or affect    EXAM:  Blood pressure (!) 141/71, pulse 65, temperature 98.7  F (37.1  C), resp. rate 16, height 1.626 m (5' 4\"), weight 94.7 kg (208 lb 11.2 oz), last menstrual period 03/02/2022.   BMI= Body mass index is 35.82 kg/m .  General - pleasant female in no acute distress.  Abdomen - Obese, soft, nontender, nondistended, no hepatosplenomegaly. Well healed laparoscopy scars  Pelvic - EG: normal adult female,   BUS: within normal limits,   Vagina: well rugated, no discharge, narrow introitus  Cervix: no lesions or CMT,   Uterus: firm, normal sized and nontender,Well supported   Adnexae: no masses or tenderness.  Rectovaginal - deferred.  Musculoskeletal - no gross deformities.  Neurological - normal strength, sensation, and mental status.      ASSESSMENT/PLAN:  (D25.1,  D25.0) Intramural and submucous leiomyoma of uterus  (primary encounter diagnosis)  Comment: worsening bleeding over the last 6 months  Plan: Case Request: Laparoscopic total hysterectomy,         Bilateral salpingectomy, with sparing of         ovaries and cystoscopy            (N92.0) Menorrhagia with regular cycle  Comment: (N94.6) Dysmenorrhea  Plan: Case " Request: Laparoscopic total hysterectomy,         Bilateral salpingectomy, with sparing of         ovaries and cystoscopy                  (Z98.890) H/O LEEP  Comment: remote dysplasia- no longer has any HR HPV      I described the procedures as indicated above. The types of anesthesia were reviewed. The pre and post-op expectations were noted. We discussed the risks and benefits of the above procedures. The risk of bleeding, infection and damage to other organs was reviewed. The possibility of much larger incision(s) was discussed.  No guarantees were made.  She did express understanding and desires to proceed with surgery.    Letter will be sent to the referring provider.    Julius Montejo MD  20 of 40 minutes spent counseling

## 2022-03-10 ENCOUNTER — TELEPHONE (OUTPATIENT)
Dept: OBGYN | Facility: CLINIC | Age: 42
End: 2022-03-10
Payer: COMMERCIAL

## 2022-03-10 NOTE — TELEPHONE ENCOUNTER
"[id number]  Anne Ferreira    You are now scheduled for surgery at [facility].  Below are the details for your surgery.  Please read the \"Preparing for Your Surgery\" instructions and let us know if you have any questions.  Type of surgery: Laparoscopic total hysterectomy, Bilateral salpingectomy, with sparing of ovaries and cystoscopy (Bilateral)   Surgeon:  Julius Montejo MD  Location of surgery: [facility] OR    Date of surgery:  4/25/22     Time: 12:20 Pm    Arrival Time: 11:00am    Time can change, to be confirmed a couple of days prior by pre-op surgery nurse.    Pre-Op Appt Date: Patient to schedule with a PCP or Family Practice Provider within 30 days to the surgery.  Post-Op Appt Date: To be determined by provider     COVID test 2-4 days prior at [facility]   Date 4/22/22  Time 3:00pm    Packet sent out: Yes  Pre-cert/Authorization completed:  TBD by Financial Securing Office.   MA Sterilization/Hysterectomy Acknowledgment Consent signed: Not Applicable    [facility]  [phone]    Fax: [phone]  Same Day Surgery [phone]  Fax: [phone]  [facility] [phone]    "

## 2022-03-12 DIAGNOSIS — Z11.59 ENCOUNTER FOR SCREENING FOR OTHER VIRAL DISEASES: Primary | ICD-10-CM

## 2022-03-14 ENCOUNTER — VIRTUAL VISIT (OUTPATIENT)
Dept: FAMILY MEDICINE | Facility: CLINIC | Age: 42
End: 2022-03-14
Payer: COMMERCIAL

## 2022-03-14 DIAGNOSIS — R50.9 FEVER, UNSPECIFIED FEVER CAUSE: Primary | ICD-10-CM

## 2022-03-14 DIAGNOSIS — R09.81 NASAL CONGESTION: ICD-10-CM

## 2022-03-14 DIAGNOSIS — R11.0 NAUSEA: ICD-10-CM

## 2022-03-14 PROCEDURE — 99213 OFFICE O/P EST LOW 20 MIN: CPT | Mod: GT | Performed by: PHYSICIAN ASSISTANT

## 2022-03-14 NOTE — PROGRESS NOTES
Anne is a 41 year old who is being evaluated via a billable video visit.      How would you like to obtain your AVS? MyChart  If the video visit is dropped, the invitation should be resent by: Text to cell phone: [phone]  Will anyone else be joining your video visit? No    Video Start Time: 11:19 AM    Assessment & Plan     Fever, unspecified fever cause  Nausea  Nasal congestion  Patient is a 41-year-old female who presents to virtual visit for emergency department follow-up.  Patient was evaluated in emergency department approximately 11 days ago due to nausea, vomiting, diarrhea, and fever.  Acute abdomen was ruled out clinically and patient prescribed Compazine for management.  She has also been using Tylenol to manage fevers.  Patient notes ongoing abdominal tenderness as well as fevers.  Diarrhea and vomiting has resolved.  Patient notes generally feeling fatigued and unwell. Recommended In person evaluation, Pedialyte to help with hydration, Tylenol for fevers.  Shared decision making completed and patient will follow up in clinic tomorrow.  Discussed symptoms that warrant urgent/emergent follow-up.       See Patient Instructions    Return in about 1 day (around 3/15/2022) for Reevaluation.    Lauryn Ryder PA-C  [facility] SHASHI Rodríguez is a 41 year old who presents for the following health issues:    HPI     ED/UC Followup:    Facility:  [place]  Date of visit: 3/2/22  Reason for visit: Diarrhea and vomiting  Current Status: Pt reports improvement in diarrhea and vomiting but still has symptoms below.    Onset/Duration: 2 weeks of nausea vomiting, diarrhea, and abdominal pain. Vomiting and diarrhea has resolved. Fatigue is worsening. At highest fever was 103. Recently 99.8-101 with Tylenol. Vomiting and diarrhea have stopped. Patient did a rapid COVID19 test at work which was negative. Patient notes alot of burping and gas production. Patient is fatigued and lightheaded.  Patient is eating toast and drinking water as well as occasional pepsi.   Symptoms:  Fever: YES- 103 highest temp  Chills/Sweats: YES  Headache (location?): YES  Sinus Pressure: YES  Conjunctivitis:  no  Ear Pain: YES: bilateral  Rhinorrhea: YES  Congestion: YES  Sore Throat: no  Cough: YES-baseline   Wheeze: YES-baseline  Decreased Appetite: YES  Nausea: YES  Vomiting: YES-resolved   Diarrhea: YES-resolved   Dysuria/Freq.: YES  Dysuria or Hematuria: no  Fatigue/Achiness: YES  Sick/Strep Exposure: YES- stomach virus at work   Therapies tried and outcome: Compazine and Tylenol      Per chart review, patient evaluated in emergency department 3/2/22 due to 5 days of nausea, vomiting, and diarrhea.  At that time, patient noted weakness, body aches, and fevers.  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  Per exam, patient was noted to be ill-appearing with generalized abdominal tenderness without rebound or guarding.  WBC WNL.  UA without UTI.  Patient was prescribed Zofran/Compazine.        Objective           Vitals:  No vitals were obtained today due to virtual visit.    Physical Exam   GENERAL: Healthy, alert and no distress  EYES: Eyes grossly normal to inspection.  No discharge or erythema, or obvious scleral/conjunctival abnormalities.  RESP: No audible wheeze, cough, or visible cyanosis.  No visible retractions or increased work of breathing.    SKIN: Visible skin clear. No significant rash, abnormal pigmentation or lesions.  NEURO: Cranial nerves grossly intact.  Mentation and speech appropriate for age.  PSYCH: Mentation appears normal, affect normal/bright, judgement and insight intact, normal speech and appearance well-groomed.          Video-Visit Details    Type of service:  Video Visit    Video End Time:11:39 AM    Originating Location (pt. Location): Home    Distant Location (provider location):  [facility]     Platform used for Video Visit:  AmWell

## 2022-03-14 NOTE — LETTER
March 14, 2022      Anne Ferreira  [address] [facility] [place] [address]        To Whom It May Concern:    Anne Ferreira was seen in our clinic. She may return to work 3/16/21 without restrictions.      Sincerely,        Lauryn Ryder PA-C

## 2022-03-14 NOTE — PATIENT INSTRUCTIONS
We have scheduled you for an in person visit tomorrow in clinic for further evaluation.  As discussed, if you develop worsening symptoms such as fevers that you cannot control, worsening pain, persistent dizziness/lightheadedness, chest pain, shortness of breath, or any other severe symptoms, please call 911/go to the emergency department.    Please reach out with questions or concerns.      Patient Education     Unknown Causes of Abdominal Pain (Female)    The exact cause of your belly (abdominal) pain is not clear. This does not mean that this is something to worry about. Everyone likes to know the exact cause of the problem. But sometimes with belly pain, there is no clear-cut cause, and this could be a good thing. The good news is that your symptoms can be treated, and you will feel better.   Your condition does not seem serious now. But sometimes the signs of a serious problem may take more time to appear. For this reason, it is important for you to watch for any new symptoms, problems, or worsening of your condition.  Over the next few days, the abdominal pain may come and go. Or it may be constant. Other common symptoms can include nausea and vomiting. Sometimes it can be difficult to tell if you feel nauseous. You may just feel bad and not connect that feeling to nausea. Constipation, diarrhea, and a fever may go along with the pain.  The pain may continue even if treated correctly over the following days. Depending on how things go, sometimes the cause can become clear and may need more or different treatment. Additional evaluations, medicines, or tests may also be needed.  Home care  Your healthcare provider may prescribe medicine for pain, symptoms, or an infection.  Follow the healthcare provider's instructions for taking these medicines.  General care    Rest as much as you can until your next exam. No strenuous activities.    Try to find positions that ease discomfort. A small pillow placed on the  abdomen may help relieve pain.    Something warm on your abdomen (such as a heating pad) may help, but be careful not to burn yourself.  Diet    Don t force yourself to eat, especially if having cramps, vomiting, or diarrhea.    Water is important so you don't get dehydrated. Soup may also be good. Sports drinks may also help, especially if they are not too acidic. Don't drink sugary drinks as this can make things worse. Take liquids in small amounts. Don t guzzle them.    Caffeine sometimes makes the pain and cramping worse.    Don t take dairy products if you have vomiting or diarrhea.    Don't eat large amounts at a time. Wait a few minutes between bites.    Eat a diet low in fiber (called a low-residue diet). Foods allowed include refined breads, white rice, fruit and vegetable juices without pulp, tender meats. These foods will pass more easily through the intestine.    Don t have whole-grain foods, whole fruits and vegetables, meats, seeds and nuts, fried or fatty foods, dairy, alcohol and spicy foods until your symptoms go away.  Follow-up care  Follow up with your healthcare provider, or as advised, if your pain does not begin to improve in the next 24 hours.  Call 911  Call 911 if any of these occur:    Trouble breathing    Confusion    Fainting or loss of consciousness    Rapid heart rate    Seizure  When to seek medical advice  Call your healthcare provider right away if any of these occur:    Pain gets worse or moves to the right lower abdomen    New or worsening vomiting or diarrhea    Swelling of the abdomen    Unable to pass stool for more than 3 days    Fever of 100.4 F (38 C) or higher, or as directed by your healthcare provider.    Blood in vomit or bowel movements (dark red or black color)    Yellow color of eyes and skin (jaundice)    Weakness, dizziness    Chest, arm, back, neck, or jaw pain    Unexpected vaginal bleeding or missed period    Can't keep down liquids or water and you are getting  dehydrated  StayWell last reviewed this educational content on 6/1/2018 2000-2021 [employer]. All rights reserved. This information is not intended as a substitute for professional medical care. Always follow your healthcare professional's instructions.

## 2022-03-15 ENCOUNTER — OFFICE VISIT (OUTPATIENT)
Dept: FAMILY MEDICINE | Facility: CLINIC | Age: 42
End: 2022-03-15
Payer: COMMERCIAL

## 2022-03-15 VITALS
HEART RATE: 66 BPM | DIASTOLIC BLOOD PRESSURE: 71 MMHG | OXYGEN SATURATION: 97 % | TEMPERATURE: 99.1 F | SYSTOLIC BLOOD PRESSURE: 117 MMHG | RESPIRATION RATE: 18 BRPM

## 2022-03-15 DIAGNOSIS — R30.0 DYSURIA: ICD-10-CM

## 2022-03-15 DIAGNOSIS — R50.9 FEVER, UNSPECIFIED FEVER CAUSE: ICD-10-CM

## 2022-03-15 DIAGNOSIS — R10.11 RUQ ABDOMINAL PAIN: Primary | ICD-10-CM

## 2022-03-15 DIAGNOSIS — R11.0 NAUSEA: ICD-10-CM

## 2022-03-15 LAB
ALBUMIN SERPL-MCNC: 3.2 G/DL (ref 3.4–5)
ALBUMIN UR-MCNC: NEGATIVE MG/DL
ALP SERPL-CCNC: 59 U/L (ref 40–150)
ALT SERPL W P-5'-P-CCNC: 27 U/L (ref 0–50)
ANION GAP SERPL CALCULATED.3IONS-SCNC: 8 MMOL/L (ref 3–14)
APPEARANCE UR: CLEAR
AST SERPL W P-5'-P-CCNC: 16 U/L (ref 0–45)
BACTERIA #/AREA URNS HPF: ABNORMAL /HPF
BILIRUB SERPL-MCNC: 0.2 MG/DL (ref 0.2–1.3)
BILIRUB UR QL STRIP: NEGATIVE
BUN SERPL-MCNC: 9 MG/DL (ref 7–30)
CALCIUM SERPL-MCNC: 9.2 MG/DL (ref 8.5–10.1)
CHLORIDE BLD-SCNC: 105 MMOL/L (ref 94–109)
CO2 SERPL-SCNC: 25 MMOL/L (ref 20–32)
COLOR UR AUTO: YELLOW
CREAT SERPL-MCNC: 0.56 MG/DL (ref 0.52–1.04)
ERYTHROCYTE [DISTWIDTH] IN BLOOD BY AUTOMATED COUNT: 13.9 % (ref 10–15)
FLUAV AG SPEC QL IA: NEGATIVE
FLUBV AG SPEC QL IA: NEGATIVE
GFR SERPL CREATININE-BSD FRML MDRD: >90 ML/MIN/1.73M2
GLUCOSE BLD-MCNC: 110 MG/DL (ref 70–99)
GLUCOSE UR STRIP-MCNC: NEGATIVE MG/DL
HCT VFR BLD AUTO: 37.8 % (ref 35–47)
HGB BLD-MCNC: 12 G/DL (ref 11.7–15.7)
HGB UR QL STRIP: ABNORMAL
KETONES UR STRIP-MCNC: NEGATIVE MG/DL
LEUKOCYTE ESTERASE UR QL STRIP: NEGATIVE
LIPASE SERPL-CCNC: 160 U/L (ref 73–393)
MCH RBC QN AUTO: 29.3 PG (ref 26.5–33)
MCHC RBC AUTO-ENTMCNC: 31.7 G/DL (ref 31.5–36.5)
MCV RBC AUTO: 92 FL (ref 78–100)
MONOCYTES NFR BLD AUTO: NEGATIVE %
NITRATE UR QL: NEGATIVE
PH UR STRIP: 6 [PH] (ref 5–7)
PLATELET # BLD AUTO: 317 10E3/UL (ref 150–450)
POTASSIUM BLD-SCNC: 4.4 MMOL/L (ref 3.4–5.3)
PROT SERPL-MCNC: 6.9 G/DL (ref 6.8–8.8)
RBC # BLD AUTO: 4.1 10E6/UL (ref 3.8–5.2)
RBC #/AREA URNS AUTO: ABNORMAL /HPF
SARS-COV-2 RNA RESP QL NAA+PROBE: NEGATIVE
SODIUM SERPL-SCNC: 138 MMOL/L (ref 133–144)
SP GR UR STRIP: 1.02 (ref 1–1.03)
SQUAMOUS #/AREA URNS AUTO: ABNORMAL /LPF
UROBILINOGEN UR STRIP-ACNC: 0.2 E.U./DL
WBC # BLD AUTO: 12.5 10E3/UL (ref 4–11)
WBC #/AREA URNS AUTO: ABNORMAL /HPF

## 2022-03-15 PROCEDURE — 85027 COMPLETE CBC AUTOMATED: CPT | Performed by: PHYSICIAN ASSISTANT

## 2022-03-15 PROCEDURE — 99214 OFFICE O/P EST MOD 30 MIN: CPT | Performed by: PHYSICIAN ASSISTANT

## 2022-03-15 PROCEDURE — 86308 HETEROPHILE ANTIBODY SCREEN: CPT | Performed by: PHYSICIAN ASSISTANT

## 2022-03-15 PROCEDURE — U0003 INFECTIOUS AGENT DETECTION BY NUCLEIC ACID (DNA OR RNA); SEVERE ACUTE RESPIRATORY SYNDROME CORONAVIRUS 2 (SARS-COV-2) (CORONAVIRUS DISEASE [COVID-19]), AMPLIFIED PROBE TECHNIQUE, MAKING USE OF HIGH THROUGHPUT TECHNOLOGIES AS DESCRIBED BY CMS-2020-01-R: HCPCS | Performed by: PHYSICIAN ASSISTANT

## 2022-03-15 PROCEDURE — 83690 ASSAY OF LIPASE: CPT | Performed by: PHYSICIAN ASSISTANT

## 2022-03-15 PROCEDURE — 80053 COMPREHEN METABOLIC PANEL: CPT | Performed by: PHYSICIAN ASSISTANT

## 2022-03-15 PROCEDURE — U0005 INFEC AGEN DETEC AMPLI PROBE: HCPCS | Performed by: PHYSICIAN ASSISTANT

## 2022-03-15 PROCEDURE — 81001 URINALYSIS AUTO W/SCOPE: CPT | Performed by: PHYSICIAN ASSISTANT

## 2022-03-15 PROCEDURE — 36415 COLL VENOUS BLD VENIPUNCTURE: CPT | Performed by: PHYSICIAN ASSISTANT

## 2022-03-15 PROCEDURE — 87804 INFLUENZA ASSAY W/OPTIC: CPT | Performed by: PHYSICIAN ASSISTANT

## 2022-03-15 RX ORDER — PROCHLORPERAZINE MALEATE 10 MG
10 TABLET ORAL EVERY 12 HOURS PRN
Qty: 10 TABLET | Refills: 0 | Status: SHIPPED | OUTPATIENT
Start: 2022-03-15 | End: 2022-04-21

## 2022-03-15 ASSESSMENT — PAIN SCALES - GENERAL: PAINLEVEL: NO PAIN (0)

## 2022-03-15 NOTE — LETTER
March 15, 2022      Anne Ferreira  [address] [facility] [address]        To Whom It May Concern:    Anne Ferreira was seen in our clinic. She may return to work 3/21/22 without restrictions.      Sincerely,        Lauryn Ryder PA-C

## 2022-03-15 NOTE — PATIENT INSTRUCTIONS
Your flu test is negative and there are no signs of urinary tract infection in your lab work.  For further evaluation of your symptoms, we are completing additional lab work today.  [facility]  You have also been prescribed a refill of Compazine.  As discussed, if you develop any severe symptoms such as high fevers, severe pain, dizziness/lightheadedness, chest pain, shortness of breath, or any other severe symptoms, please go to the emergency department.  Reach out with questions or concerns.      Patient Education     Unknown Causes of Abdominal Pain (Female)    The exact cause of your belly (abdominal) pain is not clear. This does not mean that this is something to worry about. Everyone likes to know the exact cause of the problem. But sometimes with belly pain, there is no clear-cut cause, and this could be a good thing. The good news is that your symptoms can be treated, and you will feel better.   Your condition does not seem serious now. But sometimes the signs of a serious problem may take more time to appear. For this reason, it is important for you to watch for any new symptoms, problems, or worsening of your condition.  Over the next few days, the abdominal pain may come and go. Or it may be constant. Other common symptoms can include nausea and vomiting. Sometimes it can be difficult to tell if you feel nauseous. You may just feel bad and not connect that feeling to nausea. Constipation, diarrhea, and a fever may go along with the pain.  The pain may continue even if treated correctly over the following days. Depending on how things go, sometimes the cause can become clear and may need more or different treatment. Additional evaluations, medicines, or tests may also be needed.  Home care  Your healthcare provider may prescribe medicine for pain, symptoms, or an infection.  Follow the healthcare provider's instructions for taking these medicines.  General care    Rest  as much as you can until your next exam. No strenuous activities.    Try to find positions that ease discomfort. A small pillow placed on the abdomen may help relieve pain.    Something warm on your abdomen (such as a heating pad) may help, but be careful not to burn yourself.  Diet    Don t force yourself to eat, especially if having cramps, vomiting, or diarrhea.    Water is important so you don't get dehydrated. Soup may also be good. Sports drinks may also help, especially if they are not too acidic. Don't drink sugary drinks as this can make things worse. Take liquids in small amounts. Don t guzzle them.    Caffeine sometimes makes the pain and cramping worse.    Don t take dairy products if you have vomiting or diarrhea.    Don't eat large amounts at a time. Wait a few minutes between bites.    Eat a diet low in fiber (called a low-residue diet). Foods allowed include refined breads, white rice, fruit and vegetable juices without pulp, tender meats. These foods will pass more easily through the intestine.    Don t have whole-grain foods, whole fruits and vegetables, meats, seeds and nuts, fried or fatty foods, dairy, alcohol and spicy foods until your symptoms go away.  Follow-up care  Follow up with your healthcare provider, or as advised, if your pain does not begin to improve in the next 24 hours.  Call 911  Call 911 if any of these occur:    Trouble breathing    Confusion    Fainting or loss of consciousness    Rapid heart rate    Seizure  When to seek medical advice  Call your healthcare provider right away if any of these occur:    Pain gets worse or moves to the right lower abdomen    New or worsening vomiting or diarrhea    Swelling of the abdomen    Unable to pass stool for more than 3 days    Fever of 100.4 F (38 C) or higher, or as directed by your healthcare provider.    Blood in vomit or bowel movements (dark red or black color)    Yellow color of eyes and skin (jaundice)    Weakness,  dizziness    Chest, arm, back, neck, or jaw pain    Unexpected vaginal bleeding or missed period    Can't keep down liquids or water and you are getting dehydrated  StayWell last reviewed this educational content on 6/1/2018 2000-2021 [employer]. All rights reserved. This information is not intended as a substitute for professional medical care. Always follow your healthcare professional's instructions.

## 2022-03-15 NOTE — PROGRESS NOTES
Assessment & Plan     Nausea  Dysuria  Fever, unspecified fever cause  RUQ abdominal pain  Patient is a 41-year-old female who presents to clinic due to 2 weeks of intermittent fever, nausea, dysuria, and abdominal pain.  Diarrhea and vomiting have resolved.  Patient notes that her daughter has mono.  Vital signs normal.  Physical exam significant for right upper quadrant tenderness to palpation.    Influenza testing negative.  Urinalysis without signs of UTI.  COVID-19 testing in process.  As daughter has mono will also evaluate for mononucleosis.  Differential diagnosis for right upper quadrant tenderness includes hepatitis, mono, pancreatitis.  Low suspicion for cholecystitis/cholelithiasis as patient has history of cholecystectomy.  Refill of Compazine provided.  Discussed the importance of emergent follow-up with patient develops any severe symptoms.  Return precautions provided.    - Symptomatic; Unknown COVID-19 Virus (Coronavirus) by PCR Nose  - Influenza A/B antigen  - prochlorperazine (COMPAZINE) 10 MG tablet; Take 1 tablet (10 mg) by mouth every 12 hours as needed for nausea or vomiting  - UA macro with reflex to Microscopic and Culture - Clinc Collect  - Urine Microscopic  - CBC with platelets; Future  - Comprehensive metabolic panel (BMP + Alb, Alk Phos, ALT, AST, Total. Bili, TP); Future  - Mononucleosis screen; Future  - Lipase; Future       See Patient Instructions    Return in about 1 week (around 3/22/2022), or if symptoms worsen or fail to improve.    Lauryn Ryder PA-C  [facility] SHASHI Rodríguez is a 41 year old who presents for the following health issues     HPI       Acute Illness  Acute illness concerns: Nausea  Onset/Duration: Follow up after virtual visit completed yesterday. She was seen in ED 3/2/2022. Sx has been ongoing x2 weeks.  Initially, patient had diarrhea and vomiting which has now resolved.  Patient notes ongoing fatigue as symptoms as listed below.  History of cholecystectomy December 2021.   Symptoms:  Fever: YES- 99.8 last night  Chills/Sweats: YES  Headache (location?): YES  Sinus Pressure: no  Conjunctivitis:  no  Ear Pain: YES: right  Rhinorrhea: YES  Congestion: YES  Sore Throat: no  Cough: YES-non-productive  Wheeze: no  Decreased Appetite: YES  Nausea: YES  Vomiting: no  Diarrhea: no  Dysuria/Freq.: YES  Dysuria or Hematuria: no  Fatigue/Achiness: YES- Fatigue and stomach ache  Sick/Strep Exposure: YES- Daughter with mono  Therapies tried and outcome: Tylenol and Compazine   RUQ abdominal fullness         Objective    /71   Pulse 66   Temp 99.1  F (37.3  C) (Tympanic)   Resp 18   LMP 03/02/2022 (LMP Unknown)   SpO2 97%   Breastfeeding No   There is no height or weight on file to calculate BMI.  Physical Exam  Vitals and nursing note reviewed.   Constitutional:       General: She is not in acute distress.     Appearance: Normal appearance.   HENT:      Head: Normocephalic and atraumatic.      Mouth/Throat:      Mouth: Mucous membranes are moist.      Pharynx: Oropharynx is clear. No oropharyngeal exudate or posterior oropharyngeal erythema.   Eyes:      Extraocular Movements: Extraocular movements intact.      Pupils: Pupils are equal, round, and reactive to light.   Cardiovascular:      Rate and Rhythm: Normal rate and regular rhythm.      Heart sounds: Normal heart sounds.   Pulmonary:      Effort: Pulmonary effort is normal. No respiratory distress.      Breath sounds: Normal breath sounds. No wheezing, rhonchi or rales.   Abdominal:      General: Bowel sounds are normal.      Palpations: Abdomen is soft.      Tenderness: There is no abdominal tenderness (RUQ). There is no right CVA tenderness, left CVA tenderness, guarding or rebound.   Musculoskeletal:         General: Normal range of motion.      Cervical back: Normal range of motion.   Lymphadenopathy:      Cervical: No cervical adenopathy.   Skin:     General: Skin is warm and dry.    Neurological:      General: No focal deficit present.      Mental Status: She is alert.   Psychiatric:         Mood and Affect: Mood normal.         Behavior: Behavior normal.

## 2022-03-16 ENCOUNTER — MYC MEDICAL ADVICE (OUTPATIENT)
Dept: FAMILY MEDICINE | Facility: CLINIC | Age: 42
End: 2022-03-16
Payer: COMMERCIAL

## 2022-03-16 DIAGNOSIS — H92.03 OTALGIA OF BOTH EARS: Primary | ICD-10-CM

## 2022-03-23 ENCOUNTER — MYC MEDICAL ADVICE (OUTPATIENT)
Dept: FAMILY MEDICINE | Facility: CLINIC | Age: 42
End: 2022-03-23
Payer: COMMERCIAL

## 2022-03-29 ENCOUNTER — TELEPHONE (OUTPATIENT)
Dept: FAMILY MEDICINE | Facility: CLINIC | Age: 42
End: 2022-03-29
Payer: COMMERCIAL

## 2022-03-29 DIAGNOSIS — G43.009 MIGRAINE WITHOUT AURA AND WITHOUT STATUS MIGRAINOSUS, NOT INTRACTABLE: ICD-10-CM

## 2022-03-29 DIAGNOSIS — I10 BENIGN ESSENTIAL HYPERTENSION: ICD-10-CM

## 2022-03-29 RX ORDER — PROPRANOLOL HYDROCHLORIDE 80 MG/1
80 TABLET ORAL DAILY
Qty: 90 TABLET | Refills: 0 | Status: SHIPPED | OUTPATIENT
Start: 2022-03-29 | End: 2022-04-12

## 2022-04-12 ENCOUNTER — OFFICE VISIT (OUTPATIENT)
Dept: FAMILY MEDICINE | Facility: CLINIC | Age: 42
End: 2022-04-12
Payer: COMMERCIAL

## 2022-04-12 VITALS
WEIGHT: 209.2 LBS | HEIGHT: 64 IN | BODY MASS INDEX: 35.71 KG/M2 | DIASTOLIC BLOOD PRESSURE: 70 MMHG | HEART RATE: 65 BPM | SYSTOLIC BLOOD PRESSURE: 122 MMHG | RESPIRATION RATE: 16 BRPM | TEMPERATURE: 98.3 F | OXYGEN SATURATION: 98 %

## 2022-04-12 DIAGNOSIS — J44.1 CHRONIC OBSTRUCTIVE PULMONARY DISEASE WITH ACUTE EXACERBATION (H): ICD-10-CM

## 2022-04-12 DIAGNOSIS — F41.1 GAD (GENERALIZED ANXIETY DISORDER): ICD-10-CM

## 2022-04-12 DIAGNOSIS — G43.009 MIGRAINE WITHOUT AURA AND WITHOUT STATUS MIGRAINOSUS, NOT INTRACTABLE: ICD-10-CM

## 2022-04-12 DIAGNOSIS — G89.29 CHRONIC BILATERAL LOW BACK PAIN WITHOUT SCIATICA: ICD-10-CM

## 2022-04-12 DIAGNOSIS — M54.50 CHRONIC BILATERAL LOW BACK PAIN WITHOUT SCIATICA: ICD-10-CM

## 2022-04-12 DIAGNOSIS — I10 BENIGN ESSENTIAL HYPERTENSION: ICD-10-CM

## 2022-04-12 DIAGNOSIS — Z01.818 PREOP GENERAL PHYSICAL EXAM: Primary | ICD-10-CM

## 2022-04-12 DIAGNOSIS — E66.01 MORBID OBESITY (H): ICD-10-CM

## 2022-04-12 DIAGNOSIS — F33.1 MODERATE EPISODE OF RECURRENT MAJOR DEPRESSIVE DISORDER (H): ICD-10-CM

## 2022-04-12 DIAGNOSIS — N93.8 DUB (DYSFUNCTIONAL UTERINE BLEEDING): ICD-10-CM

## 2022-04-12 LAB
ANION GAP SERPL CALCULATED.3IONS-SCNC: 9 MMOL/L (ref 3–14)
BUN SERPL-MCNC: 12 MG/DL (ref 7–30)
CALCIUM SERPL-MCNC: 8.6 MG/DL (ref 8.5–10.1)
CHLORIDE BLD-SCNC: 104 MMOL/L (ref 94–109)
CO2 SERPL-SCNC: 23 MMOL/L (ref 20–32)
CREAT SERPL-MCNC: 0.58 MG/DL (ref 0.52–1.04)
ERYTHROCYTE [DISTWIDTH] IN BLOOD BY AUTOMATED COUNT: 13.6 % (ref 10–15)
GFR SERPL CREATININE-BSD FRML MDRD: >90 ML/MIN/1.73M2
GLUCOSE BLD-MCNC: 95 MG/DL (ref 70–99)
HCT VFR BLD AUTO: 39.5 % (ref 35–47)
HGB BLD-MCNC: 12.2 G/DL (ref 11.7–15.7)
MCH RBC QN AUTO: 29.2 PG (ref 26.5–33)
MCHC RBC AUTO-ENTMCNC: 30.9 G/DL (ref 31.5–36.5)
MCV RBC AUTO: 95 FL (ref 78–100)
PLATELET # BLD AUTO: 328 10E3/UL (ref 150–450)
POTASSIUM BLD-SCNC: 4.2 MMOL/L (ref 3.4–5.3)
RBC # BLD AUTO: 4.18 10E6/UL (ref 3.8–5.2)
SODIUM SERPL-SCNC: 136 MMOL/L (ref 133–144)
WBC # BLD AUTO: 10.4 10E3/UL (ref 4–11)

## 2022-04-12 PROCEDURE — 85027 COMPLETE CBC AUTOMATED: CPT | Performed by: NURSE PRACTITIONER

## 2022-04-12 PROCEDURE — 80048 BASIC METABOLIC PNL TOTAL CA: CPT | Performed by: NURSE PRACTITIONER

## 2022-04-12 PROCEDURE — 99215 OFFICE O/P EST HI 40 MIN: CPT | Performed by: NURSE PRACTITIONER

## 2022-04-12 PROCEDURE — 36415 COLL VENOUS BLD VENIPUNCTURE: CPT | Performed by: NURSE PRACTITIONER

## 2022-04-12 RX ORDER — PROPRANOLOL HYDROCHLORIDE 80 MG/1
80 TABLET ORAL DAILY
Qty: 90 TABLET | Refills: 0 | Status: SHIPPED | OUTPATIENT
Start: 2022-04-12 | End: 2022-09-16

## 2022-04-12 RX ORDER — HYDROCODONE BITARTRATE AND ACETAMINOPHEN 5; 325 MG/1; MG/1
1 TABLET ORAL EVERY 12 HOURS PRN
Qty: 20 TABLET | Refills: 0 | Status: SHIPPED | OUTPATIENT
Start: 2022-04-12 | End: 2022-04-15

## 2022-04-12 RX ORDER — HYDROCODONE BITARTRATE AND ACETAMINOPHEN 5; 325 MG/1; MG/1
1 TABLET ORAL EVERY 12 HOURS PRN
Qty: 10 TABLET | Refills: 0 | Status: CANCELLED | OUTPATIENT
Start: 2022-04-12 | End: 2022-04-22

## 2022-04-12 RX ORDER — ESCITALOPRAM OXALATE 10 MG/1
TABLET ORAL
Qty: 90 TABLET | Refills: 0 | Status: SHIPPED | OUTPATIENT
Start: 2022-04-12 | End: 2022-08-22

## 2022-04-12 ASSESSMENT — PAIN SCALES - GENERAL: PAINLEVEL: SEVERE PAIN (6)

## 2022-04-12 NOTE — PROGRESS NOTES
[facility]  [address] [place] [id number]  Phone: [phone]  Fax: [phone]  Primary Provider: Bertha Loya        PREOPERATIVE EVALUATION:  Today's date: 4/12/2022    Anne Ferreira is a 41 year old female who presents for a preoperative evaluation.    Surgical Information:  Surgery/Procedure: Laparoscopic total hysterectomy, Bilateral salpingectomy, with sparing of ovaries and cystoscopy  Surgery Location: [facility]  Surgeon: Julius Montejo MD  Surgery Date: 04/25/2022  Time of Surgery: 9:15 am  Where patient plans to recover: At home with family  Fax number for surgical facility: Note does not need to be faxed, will be available electronically in Epic.    Type of Anesthesia Anticipated: General    Assessment & Plan     The proposed surgical procedure is considered LOW risk.    (Z01.818) Preop general physical exam  (primary encounter diagnosis)  Comment:   Plan: CBC with platelets, Basic metabolic panel  (Ca,        Cl, CO2, Creat, Gluc, K, Na, BUN)      (N93.8) DUB (dysfunctional uterine bleeding)  Comment:   Plan:     (I10) Benign essential hypertension  Comment: Controlled refill today  Plan: propranolol (INDERAL) 80 MG tablet            (G43.009) Migraine without aura and without status migrainosus, not intractable  Comment: Controlled refilled today  Plan: propranolol (INDERAL) 80 MG tablet            (F41.1) MILES (generalized anxiety disorder)  Comment: Controlled refilled today  Plan: escitalopram (LEXAPRO) 10 MG tablet            (F33.1) Moderate episode of recurrent major depressive disorder (H)  Comment: Controlled refilled today  Plan: escitalopram (LEXAPRO) 10 MG tablet               55 minutes spent on the date of the encounter doing chart review, review of outside records, review of test results, patient visit and documentation       Risks and Recommendations:  The patient has the following additional risks and recommendations for  perioperative complications:   - No identified additional risk factors other than previously addressed    Medication Instructions:   - ibuprofen (Advil, Motrin): HOLD 1 day before surgery.     RECOMMENDATION:  APPROVAL GIVEN to proceed with proposed procedure, without further diagnostic evaluation.        Subjective     HPI related to upcoming procedure: Laparoscopic total hysterectomy, Bilateral salpingectomy, with sparing of ovaries and cystoscopy      Preop Questions 4/12/2022   1. Have you ever had a heart attack or stroke? No   2. Have you ever had surgery on your heart or blood vessels, such as a stent placement, a coronary artery bypass, or surgery on an artery in your head, neck, heart, or legs? No   3. Do you have chest pain with activity? No   4. Do you have a history of  heart failure? No   5. Do you currently have a cold, bronchitis or symptoms of other infection? UNKNOWN -    6. Do you have a cough, shortness of breath, or wheezing? YES - recent cold    7. Do you or anyone in your family have previous history of blood clots? No   8. Do you or does anyone in your family have a serious bleeding problem such as prolonged bleeding following surgeries or cuts? No   9. Have you ever had problems with anemia or been told to take iron pills? YES - history of anemia    10. Have you had any abnormal blood loss such as black, tarry or bloody stools, or abnormal vaginal bleeding? YES - DUB   11. Have you ever had a blood transfusion? No   12. Are you willing to have a blood transfusion if it is medically needed before, during, or after your surgery? Yes   13. Have you or any of your relatives ever had problems with anesthesia? No   14. Do you have sleep apnea, excessive snoring or daytime drowsiness? No   15. Do you have any artifical heart valves or other implanted medical devices like a pacemaker, defibrillator, or continuous glucose monitor? No   16. Do you have artificial joints? No   17. Are you allergic to  latex? No   18. Is there any chance that you may be pregnant? No       Health Care Directive:  Patient does not have a Health Care Directive or Living Will: Discussed advance care planning with patient; however, patient declined at this time.    Preoperative Review of :   reviewed - prescribed today       Status of Chronic Conditions:  COPD - Patient has a longstanding history of moderate-severe COPD . Patient has been doing well overall noting NO SYMPTOMS and continues on medication regimen consisting of  without adverse reactions or side effects.    DEPRESSION - Patient has a long history of Depression of moderate severity requiring medication for control with recent symptoms being stable..Current symptoms of depression include none.     HYPERTENSION - Patient has longstanding history of HTN , currently denies any symptoms referable to elevated blood pressure. Specifically denies chest pain, palpitations, dyspnea, orthopnea, PND or peripheral edema. Blood pressure readings have been in normal range. Current medication regimen is as listed below. Patient denies any side effects of medication.       Review of Systems  Constitutional, neuro, ENT, endocrine, pulmonary, cardiac, gastrointestinal, genitourinary, musculoskeletal, integument and psychiatric systems are negative, except as otherwise noted.    Patient Active Problem List    Diagnosis Date Noted     COPD (chronic obstructive pulmonary disease) (H) 07/27/2021     Priority: Medium     Moderate episode of recurrent major depressive disorder (H) 05/21/2021     Priority: Medium     Symptomatic cholelithiasis 12/15/2020     Priority: Medium     Added automatically from request for surgery [id number]       Morbid obesity (H) 12/11/2020     Priority: Medium     Dyslipidemia 11/10/2020     Priority: Medium     Dysmenorrhea 11/10/2020     Priority: Medium     treated with continuous OCPs       GERD (gastroesophageal reflux disease) 11/10/2020     Priority: Medium      Thoracic back pain 11/10/2020     Priority: Medium     left, approx T10       H/O LEEP      Priority: Medium     1998 LEEP- Unknown results.  2010 NIL pap.  2/2/12 COLP and ECC- DARREN 1.  2013 NIL pap  4/7/15 LSIL pap, + HR HPV   8/13/15 COLP- DARREN 1, ECC- Negative.  6/10/16 NIL pap, Neg HPV.  5/14/19 NIL pap, Neg HPV.  Above results found in [facility]  10/15/20 NIL pap, Neg HPV. Plan cotest in 3 years.        Cervical spinal stenosis 07/01/2019     Priority: Medium     7/2019 MRI - At C5-6, broad-based central disc extrusion demonstrating caudal migration mildly deforms the cord and severely narrows the spinal canal. Uncovertebral joint spurring contributes to mild to moderate left neural foraminal narrowing with potential left C6 nerve root encroachment.       Herniation of cervical intervertebral disc with radiculopathy 07/01/2019     Priority: Medium     see MRI 7/2019       Attention deficit hyperactivity disorder, combined type, mild 08/29/2018     Priority: Medium     Generalized anxiety disorder 08/29/2018     Priority: Medium     Allergic rhinitis 04/01/2008     Priority: Medium      Past Medical History:   Diagnosis Date     Abnormal Pap smear of cervix      Past Surgical History:   Procedure Laterality Date     LAPAROSCOPIC CHOLECYSTECTOMY N/A 12/21/2020    Procedure: Laparoscopic cholecystectomy;  Surgeon: Manuel Durham DO;  Location: [facility] OR     Current Outpatient Medications   Medication Sig Dispense Refill     albuterol (PROAIR HFA/PROVENTIL HFA/VENTOLIN HFA) 108 (90 Base) MCG/ACT inhaler Inhale 2 puffs into the lungs every 6 hours 1 Inhaler 1     cyclobenzaprine (FLEXERIL) 10 MG tablet Take 1 tablet (10 mg) by mouth 2 times daily as needed for muscle spasms 60 tablet 1     dexmethylphenidate (FOCALIN) 10 MG tablet Take 1 tablet (10 mg) by mouth 2 times daily 60 tablet 0     dexmethylphenidate (FOCALIN) 10 MG tablet Take 1 tablet (10 mg) by mouth 2 times daily 60 tablet 0     [START ON 4/17/2022]  dexmethylphenidate (FOCALIN) 10 MG tablet Take 1 tablet (10 mg) by mouth 2 times daily 60 tablet 0     diphenhydrAMINE (BENADRYL) 25 MG tablet Take 1-2 tablets (25-50 mg) by mouth every 6 hours as needed for itching or allergies 30 tablet 1     escitalopram (LEXAPRO) 20 MG tablet TAKE 1 TABLET BY MOUTH EVERY DAY 90 tablet 3     ethynodiol-ethinyl estradiol (ZOVIA) 1-50 MG-MCG tablet Take 1 tablet by mouth daily 84 tablet 4     hydrOXYzine (ATARAX) 25 MG tablet Take 1-2 tablets (25-50 mg) by mouth every 6 hours as needed for anxiety or other (insomina) 60 tablet 0     hydrOXYzine (ATARAX) 25 MG tablet Take 1 tablet (25 mg) by mouth every 8 hours as needed for anxiety or other (insomina) 60 tablet 0     ipratropium - albuterol 0.5 mg/2.5 mg/3 mL (DUONEB) 0.5-2.5 (3) MG/3ML neb solution Take 1 vial by nebulization every 6 hours as needed for shortness of breath / dyspnea or wheezing       lisinopril (ZESTRIL) 20 MG tablet Take 1 tablet (20 mg) by mouth daily 90 tablet 2     montelukast (SINGULAIR) 10 MG tablet TAKE 1 TABLET BY MOUTH EVERYDAY AT BEDTIME 30 tablet 5     omeprazole (PRILOSEC) 20 MG DR capsule Take 20 mg by mouth daily       propranolol (INDERAL) 80 MG tablet Take 1 tablet (80 mg) by mouth daily 90 tablet 0     ADVAIR DISKUS 250-50 MCG/DOSE inhaler INHALE 1 PUFF INTO THE LUNGS EVERY 12 HOURS (Patient not taking: No sig reported) 1 each 5     escitalopram (LEXAPRO) 10 MG tablet TAKE 1 TABLET BY MOUTH EVERY DAY TAKE WITH 20MG FOR TOTAL OF 30MG DAILY (Patient not taking: Reported on 4/12/2022) 90 tablet 0     LORazepam (ATIVAN) 1 MG tablet Take 0.5-1 tablets (0.5-1 mg) by mouth 2 times daily as needed for anxiety Max dose with 1 mg a day.  15 tablets must last a month (Patient not taking: Reported on 4/12/2022) 6 tablet 0     meloxicam (MOBIC) 7.5 MG tablet Take 1 tablet (7.5 mg) by mouth daily (Patient not taking: Reported on 4/12/2022) 90 tablet 1     methocarbamol (ROBAXIN) 500 MG tablet Take 1 tablet (500  "mg) by mouth 4 times daily as needed for muscle spasms (Patient not taking: Reported on 4/12/2022) 60 tablet 0     prochlorperazine (COMPAZINE) 10 MG tablet Take 1 tablet (10 mg) by mouth every 12 hours as needed for nausea or vomiting (Patient not taking: Reported on 4/12/2022) 10 tablet 0     prochlorperazine (COMPAZINE) 10 MG tablet Take 1 tablet (10 mg) by mouth every 8 hours as needed for nausea or vomiting (Patient not taking: Reported on 4/12/2022) 10 tablet 0       Allergies   Allergen Reactions     Bupropion Nausea     Sulfa Drugs         Social History     Tobacco Use     Smoking status: Current Every Day Smoker     Packs/day: 0.50     Types: Cigarettes     Smokeless tobacco: Never Used   Substance Use Topics     Alcohol use: Yes     Comment: occassionally     Family History   Problem Relation Age of Onset     Hypertension Father      Hypertension Brother      Hypertension Sister      Hypertension Brother      History   Drug Use Unknown         Objective     /70   Pulse 65   Temp 98.3  F (36.8  C) (Tympanic)   Resp 16   Ht 1.613 m (5' 3.5\")   Wt 94.9 kg (209 lb 3.2 oz)   LMP 03/02/2022 (LMP Unknown)   SpO2 98%   Breastfeeding No   BMI 36.48 kg/m      Physical Exam    GENERAL APPEARANCE: healthy, alert and no distress     EYES: EOMI, PERRL     HENT: ear canals and TM's normal and nose and mouth without ulcers or lesions     NECK: no adenopathy, no asymmetry, masses, or scars and thyroid normal to palpation     RESP: lungs clear to auscultation - no rales, rhonchi or wheezes     CV: regular rates and rhythm, normal S1 S2, no S3 or S4 and no murmur, click or rub     ABDOMEN:  soft, nontender, no HSM or masses and bowel sounds normal     MS: extremities normal- no gross deformities noted, no evidence of inflammation in joints, FROM in all extremities.     SKIN: no suspicious lesions or rashes     NEURO: Normal strength and tone, sensory exam grossly normal, mentation intact and speech normal     " PSYCH: mentation appears normal. and affect normal/bright     LYMPHATICS: No cervical adenopathy    Recent Labs   Lab Test 03/15/22  1016 03/02/22  1138   HGB 12.0 13.0    294    136   POTASSIUM 4.4 3.4   CR 0.56 0.54        Diagnostics:  Recent Results (from the past 48 hour(s))   CBC with platelets    Collection Time: 04/12/22  3:30 PM   Result Value Ref Range    WBC Count 10.4 4.0 - 11.0 10e3/uL    RBC Count 4.18 3.80 - 5.20 10e6/uL    Hemoglobin 12.2 11.7 - 15.7 g/dL    Hematocrit 39.5 35.0 - 47.0 %    MCV 95 78 - 100 fL    MCH 29.2 26.5 - 33.0 pg    MCHC 30.9 (L) 31.5 - 36.5 g/dL    RDW 13.6 10.0 - 15.0 %    Platelet Count 328 150 - 450 10e3/uL   Basic metabolic panel  (Ca, Cl, CO2, Creat, Gluc, K, Na, BUN)    Collection Time: 04/12/22  3:30 PM   Result Value Ref Range    Sodium 136 133 - 144 mmol/L    Potassium 4.2 3.4 - 5.3 mmol/L    Chloride 104 94 - 109 mmol/L    Carbon Dioxide (CO2) 23 20 - 32 mmol/L    Anion Gap 9 3 - 14 mmol/L    Urea Nitrogen 12 7 - 30 mg/dL    Creatinine 0.58 0.52 - 1.04 mg/dL    Calcium 8.6 8.5 - 10.1 mg/dL    Glucose 95 70 - 99 mg/dL    GFR Estimate >90 >60 mL/min/1.73m2      No EKG required, no history of coronary heart disease, significant arrhythmia, peripheral arterial disease or other structural heart disease.    Revised Cardiac Risk Index (RCRI):  The patient has the following serious cardiovascular risks for perioperative complications:   - No serious cardiac risks = 0 points     RCRI Interpretation: 0 points: Class I (very low risk - 0.4% complication rate)        Signed Electronically by: FRAKNLYN Christianson CNP  Copy of this evaluation report is provided to requesting physician.

## 2022-04-12 NOTE — H&P (VIEW-ONLY)
[facility]  [address] [place] [id number]  Phone: [phone]  Fax: [phone]  Primary Provider: Bertha Loya        PREOPERATIVE EVALUATION:  Today's date: 4/12/2022    Anne Ferreira is a 41 year old female who presents for a preoperative evaluation.    Surgical Information:  Surgery/Procedure: Laparoscopic total hysterectomy, Bilateral salpingectomy, with sparing of ovaries and cystoscopy  Surgery Location: [facility]  Surgeon: Julius Montejo MD  Surgery Date: 04/25/2022  Time of Surgery: 9:15 am  Where patient plans to recover: At home with family  Fax number for surgical facility: Note does not need to be faxed, will be available electronically in Epic.    Type of Anesthesia Anticipated: General    Assessment & Plan     The proposed surgical procedure is considered LOW risk.    (Z01.818) Preop general physical exam  (primary encounter diagnosis)  Comment:   Plan: CBC with platelets, Basic metabolic panel  (Ca,        Cl, CO2, Creat, Gluc, K, Na, BUN)      (N93.8) DUB (dysfunctional uterine bleeding)  Comment:   Plan:     (I10) Benign essential hypertension  Comment: Controlled refill today  Plan: propranolol (INDERAL) 80 MG tablet            (G43.009) Migraine without aura and without status migrainosus, not intractable  Comment: Controlled refilled today  Plan: propranolol (INDERAL) 80 MG tablet            (F41.1) MILES (generalized anxiety disorder)  Comment: Controlled refilled today  Plan: escitalopram (LEXAPRO) 10 MG tablet            (F33.1) Moderate episode of recurrent major depressive disorder (H)  Comment: Controlled refilled today  Plan: escitalopram (LEXAPRO) 10 MG tablet               55 minutes spent on the date of the encounter doing chart review, review of outside records, review of test results, patient visit and documentation       Risks and Recommendations:  The patient has the following additional risks and recommendations for  perioperative complications:   - No identified additional risk factors other than previously addressed    Medication Instructions:   - ibuprofen (Advil, Motrin): HOLD 1 day before surgery.     RECOMMENDATION:  APPROVAL GIVEN to proceed with proposed procedure, without further diagnostic evaluation.        Subjective     HPI related to upcoming procedure: Laparoscopic total hysterectomy, Bilateral salpingectomy, with sparing of ovaries and cystoscopy      Preop Questions 4/12/2022   1. Have you ever had a heart attack or stroke? No   2. Have you ever had surgery on your heart or blood vessels, such as a stent placement, a coronary artery bypass, or surgery on an artery in your head, neck, heart, or legs? No   3. Do you have chest pain with activity? No   4. Do you have a history of  heart failure? No   5. Do you currently have a cold, bronchitis or symptoms of other infection? UNKNOWN -    6. Do you have a cough, shortness of breath, or wheezing? YES - recent cold    7. Do you or anyone in your family have previous history of blood clots? No   8. Do you or does anyone in your family have a serious bleeding problem such as prolonged bleeding following surgeries or cuts? No   9. Have you ever had problems with anemia or been told to take iron pills? YES - history of anemia    10. Have you had any abnormal blood loss such as black, tarry or bloody stools, or abnormal vaginal bleeding? YES - DUB   11. Have you ever had a blood transfusion? No   12. Are you willing to have a blood transfusion if it is medically needed before, during, or after your surgery? Yes   13. Have you or any of your relatives ever had problems with anesthesia? No   14. Do you have sleep apnea, excessive snoring or daytime drowsiness? No   15. Do you have any artifical heart valves or other implanted medical devices like a pacemaker, defibrillator, or continuous glucose monitor? No   16. Do you have artificial joints? No   17. Are you allergic to  latex? No   18. Is there any chance that you may be pregnant? No       Health Care Directive:  Patient does not have a Health Care Directive or Living Will: Discussed advance care planning with patient; however, patient declined at this time.    Preoperative Review of :   reviewed - prescribed today       Status of Chronic Conditions:  COPD - Patient has a longstanding history of moderate-severe COPD . Patient has been doing well overall noting NO SYMPTOMS and continues on medication regimen consisting of  without adverse reactions or side effects.    DEPRESSION - Patient has a long history of Depression of moderate severity requiring medication for control with recent symptoms being stable..Current symptoms of depression include none.     HYPERTENSION - Patient has longstanding history of HTN , currently denies any symptoms referable to elevated blood pressure. Specifically denies chest pain, palpitations, dyspnea, orthopnea, PND or peripheral edema. Blood pressure readings have been in normal range. Current medication regimen is as listed below. Patient denies any side effects of medication.       Review of Systems  Constitutional, neuro, ENT, endocrine, pulmonary, cardiac, gastrointestinal, genitourinary, musculoskeletal, integument and psychiatric systems are negative, except as otherwise noted.    Patient Active Problem List    Diagnosis Date Noted     COPD (chronic obstructive pulmonary disease) (H) 07/27/2021     Priority: Medium     Moderate episode of recurrent major depressive disorder (H) 05/21/2021     Priority: Medium     Symptomatic cholelithiasis 12/15/2020     Priority: Medium     Added automatically from request for surgery [id number]       Morbid obesity (H) 12/11/2020     Priority: Medium     Dyslipidemia 11/10/2020     Priority: Medium     Dysmenorrhea 11/10/2020     Priority: Medium     treated with continuous OCPs       GERD (gastroesophageal reflux disease) 11/10/2020     Priority: Medium      Thoracic back pain 11/10/2020     Priority: Medium     left, approx T10       H/O LEEP      Priority: Medium     1998 LEEP- Unknown results.  2010 NIL pap.  2/2/12 COLP and ECC- DARREN 1.  2013 NIL pap  4/7/15 LSIL pap, + HR HPV   8/13/15 COLP- DARREN 1, ECC- Negative.  6/10/16 NIL pap, Neg HPV.  5/14/19 NIL pap, Neg HPV.  Above results found in CE  10/15/20 NIL pap, Neg HPV. Plan cotest in 3 years.        Cervical spinal stenosis 07/01/2019     Priority: Medium     7/2019 MRI - At C5-6, broad-based central disc extrusion demonstrating caudal migration mildly deforms the cord and severely narrows the spinal canal. Uncovertebral joint spurring contributes to mild to moderate left neural foraminal narrowing with potential left C6 nerve root encroachment.       Herniation of cervical intervertebral disc with radiculopathy 07/01/2019     Priority: Medium     see MRI 7/2019       Attention deficit hyperactivity disorder, combined type, mild 08/29/2018     Priority: Medium     Generalized anxiety disorder 08/29/2018     Priority: Medium     Allergic rhinitis 04/01/2008     Priority: Medium      Past Medical History:   Diagnosis Date     Abnormal Pap smear of cervix      Past Surgical History:   Procedure Laterality Date     LAPAROSCOPIC CHOLECYSTECTOMY N/A 12/21/2020    Procedure: Laparoscopic cholecystectomy;  Surgeon: Manuel Durham DO;  Location: [facility]     Current Outpatient Medications   Medication Sig Dispense Refill     albuterol (PROAIR HFA/PROVENTIL HFA/VENTOLIN HFA) 108 (90 Base) MCG/ACT inhaler Inhale 2 puffs into the lungs every 6 hours 1 Inhaler 1     cyclobenzaprine (FLEXERIL) 10 MG tablet Take 1 tablet (10 mg) by mouth 2 times daily as needed for muscle spasms 60 tablet 1     dexmethylphenidate (FOCALIN) 10 MG tablet Take 1 tablet (10 mg) by mouth 2 times daily 60 tablet 0     dexmethylphenidate (FOCALIN) 10 MG tablet Take 1 tablet (10 mg) by mouth 2 times daily 60 tablet 0     [START ON 4/17/2022]  dexmethylphenidate (FOCALIN) 10 MG tablet Take 1 tablet (10 mg) by mouth 2 times daily 60 tablet 0     diphenhydrAMINE (BENADRYL) 25 MG tablet Take 1-2 tablets (25-50 mg) by mouth every 6 hours as needed for itching or allergies 30 tablet 1     escitalopram (LEXAPRO) 20 MG tablet TAKE 1 TABLET BY MOUTH EVERY DAY 90 tablet 3     ethynodiol-ethinyl estradiol (ZOVIA) 1-50 MG-MCG tablet Take 1 tablet by mouth daily 84 tablet 4     hydrOXYzine (ATARAX) 25 MG tablet Take 1-2 tablets (25-50 mg) by mouth every 6 hours as needed for anxiety or other (insomina) 60 tablet 0     hydrOXYzine (ATARAX) 25 MG tablet Take 1 tablet (25 mg) by mouth every 8 hours as needed for anxiety or other (insomina) 60 tablet 0     ipratropium - albuterol 0.5 mg/2.5 mg/3 mL (DUONEB) 0.5-2.5 (3) MG/3ML neb solution Take 1 vial by nebulization every 6 hours as needed for shortness of breath / dyspnea or wheezing       lisinopril (ZESTRIL) 20 MG tablet Take 1 tablet (20 mg) by mouth daily 90 tablet 2     montelukast (SINGULAIR) 10 MG tablet TAKE 1 TABLET BY MOUTH EVERYDAY AT BEDTIME 30 tablet 5     omeprazole (PRILOSEC) 20 MG DR capsule Take 20 mg by mouth daily       propranolol (INDERAL) 80 MG tablet Take 1 tablet (80 mg) by mouth daily 90 tablet 0     ADVAIR DISKUS 250-50 MCG/DOSE inhaler INHALE 1 PUFF INTO THE LUNGS EVERY 12 HOURS (Patient not taking: No sig reported) 1 each 5     escitalopram (LEXAPRO) 10 MG tablet TAKE 1 TABLET BY MOUTH EVERY DAY TAKE WITH 20MG FOR TOTAL OF 30MG DAILY (Patient not taking: Reported on 4/12/2022) 90 tablet 0     LORazepam (ATIVAN) 1 MG tablet Take 0.5-1 tablets (0.5-1 mg) by mouth 2 times daily as needed for anxiety Max dose with 1 mg a day.  15 tablets must last a month (Patient not taking: Reported on 4/12/2022) 6 tablet 0     meloxicam (MOBIC) 7.5 MG tablet Take 1 tablet (7.5 mg) by mouth daily (Patient not taking: Reported on 4/12/2022) 90 tablet 1     methocarbamol (ROBAXIN) 500 MG tablet Take 1 tablet (500  "mg) by mouth 4 times daily as needed for muscle spasms (Patient not taking: Reported on 4/12/2022) 60 tablet 0     prochlorperazine (COMPAZINE) 10 MG tablet Take 1 tablet (10 mg) by mouth every 12 hours as needed for nausea or vomiting (Patient not taking: Reported on 4/12/2022) 10 tablet 0     prochlorperazine (COMPAZINE) 10 MG tablet Take 1 tablet (10 mg) by mouth every 8 hours as needed for nausea or vomiting (Patient not taking: Reported on 4/12/2022) 10 tablet 0       Allergies   Allergen Reactions     Bupropion Nausea     Sulfa Drugs         Social History     Tobacco Use     Smoking status: Current Every Day Smoker     Packs/day: 0.50     Types: Cigarettes     Smokeless tobacco: Never Used   Substance Use Topics     Alcohol use: Yes     Comment: occassionally     Family History   Problem Relation Age of Onset     Hypertension Father      Hypertension Brother      Hypertension Sister      Hypertension Brother      History   Drug Use Unknown         Objective     /70   Pulse 65   Temp 98.3  F (36.8  C) (Tympanic)   Resp 16   Ht 1.613 m (5' 3.5\")   Wt 94.9 kg (209 lb 3.2 oz)   LMP 03/02/2022 (LMP Unknown)   SpO2 98%   Breastfeeding No   BMI 36.48 kg/m      Physical Exam    GENERAL APPEARANCE: healthy, alert and no distress     EYES: EOMI, PERRL     HENT: ear canals and TM's normal and nose and mouth without ulcers or lesions     NECK: no adenopathy, no asymmetry, masses, or scars and thyroid normal to palpation     RESP: lungs clear to auscultation - no rales, rhonchi or wheezes     CV: regular rates and rhythm, normal S1 S2, no S3 or S4 and no murmur, click or rub     ABDOMEN:  soft, nontender, no HSM or masses and bowel sounds normal     MS: extremities normal- no gross deformities noted, no evidence of inflammation in joints, FROM in all extremities.     SKIN: no suspicious lesions or rashes     NEURO: Normal strength and tone, sensory exam grossly normal, mentation intact and speech normal     " PSYCH: mentation appears normal. and affect normal/bright     LYMPHATICS: No cervical adenopathy    Recent Labs   Lab Test 03/15/22  1016 03/02/22  1138   HGB 12.0 13.0    294    136   POTASSIUM 4.4 3.4   CR 0.56 0.54        Diagnostics:  Recent Results (from the past 48 hour(s))   CBC with platelets    Collection Time: 04/12/22  3:30 PM   Result Value Ref Range    WBC Count 10.4 4.0 - 11.0 10e3/uL    RBC Count 4.18 3.80 - 5.20 10e6/uL    Hemoglobin 12.2 11.7 - 15.7 g/dL    Hematocrit 39.5 35.0 - 47.0 %    MCV 95 78 - 100 fL    MCH 29.2 26.5 - 33.0 pg    MCHC 30.9 (L) 31.5 - 36.5 g/dL    RDW 13.6 10.0 - 15.0 %    Platelet Count 328 150 - 450 10e3/uL   Basic metabolic panel  (Ca, Cl, CO2, Creat, Gluc, K, Na, BUN)    Collection Time: 04/12/22  3:30 PM   Result Value Ref Range    Sodium 136 133 - 144 mmol/L    Potassium 4.2 3.4 - 5.3 mmol/L    Chloride 104 94 - 109 mmol/L    Carbon Dioxide (CO2) 23 20 - 32 mmol/L    Anion Gap 9 3 - 14 mmol/L    Urea Nitrogen 12 7 - 30 mg/dL    Creatinine 0.58 0.52 - 1.04 mg/dL    Calcium 8.6 8.5 - 10.1 mg/dL    Glucose 95 70 - 99 mg/dL    GFR Estimate >90 >60 mL/min/1.73m2      No EKG required, no history of coronary heart disease, significant arrhythmia, peripheral arterial disease or other structural heart disease.    Revised Cardiac Risk Index (RCRI):  The patient has the following serious cardiovascular risks for perioperative complications:   - No serious cardiac risks = 0 points     RCRI Interpretation: 0 points: Class I (very low risk - 0.4% complication rate)        Signed Electronically by: FRANKLYN Christianson CNP  Copy of this evaluation report is provided to requesting physician.

## 2022-04-12 NOTE — PATIENT INSTRUCTIONS
Preparing for Your Surgery  Getting started  A nurse will call you to review your health history and instructions. They will give you an arrival time based on your scheduled surgery time. Please be ready to share:    Your doctor's clinic name and phone number    Your medical, surgical and anesthesia history    A list of allergies and sensitivities    A list of medicines, including herbal treatments and over-the-counter drugs    Whether the patient has a legal guardian (ask how to send us the papers in advance)  Please tell us if you're pregnant--or if there's any chance you might be pregnant. Some surgeries may injure a fetus (unborn baby), so they require a pregnancy test. Surgeries that are safe for a fetus don't always need a test, and you can choose whether to have one.   If you have a child who's having surgery, please ask for a copy of Preparing for Your Child's Surgery.    Preparing for surgery    Within 30 days of surgery: Have a pre-op exam (sometimes called an H&P, or History and Physical). This can be done at a clinic or pre-operative center.  ? If you're having a , you may not need this exam. Talk to your care team.    At your pre-op exam, talk to your care team about all medicines you take. If you need to stop any medicines before surgery, ask when to start taking them again.  ? We do this for your safety. Many medicines can make you bleed too much during surgery. Some change how well surgery (anesthesia) drugs work.    Call your insurance company to let them know you're having surgery. (If you don't have insurance, call [phone].)    Call your clinic if there's any change in your health. This includes signs of a cold or flu (sore throat, runny nose, cough, rash, fever). It also includes a scrape or scratch near the surgery site.    If you have questions on the day of surgery, call your hospital or surgery center.  COVID testing  You may need to be tested for COVID-19 before having  surgery. If so, your surgical team will give you instructions for scheduling this test, separate from your preoperative history and physical.  Eating and drinking guidelines  For your safety: Unless your surgeon tells you otherwise, follow the guidelines below.    Eat and drink as usual until 8 hours before surgery. After that, no food or milk.    Drink clear liquids until 2 hours before surgery. These are liquids you can see through, like water, Gatorade and Propel Water. You may also have black coffee and tea (no cream or milk).    Nothing by mouth within 2 hours of surgery. This includes gum, candy and breath mints.    If you drink alcohol: Stop drinking it the night before surgery.    If your care team tells you to take medicine on the morning of surgery, it's okay to take it with a sip of water.  Preventing infection    Shower or bathe the night before and morning of your surgery. Follow the instructions your clinic gave you. (If no instructions, use regular soap.)    Don't shave or clip hair near your surgery site. We'll remove the hair if needed.    Don't smoke or vape the morning of surgery. You may chew nicotine gum up to 2 hours before surgery. A nicotine patch is okay.  ? Note: Some surgeries require you to completely quit smoking and nicotine. Check with your surgeon.    Your care team will make every effort to keep you safe from infection. We will:  ? Clean our hands often with soap and water (or an alcohol-based hand rub).  ? Clean the skin at your surgery site with a special soap that kills germs.  ? Give you a special gown to keep you warm. (Cold raises the risk of infection.)  ? Wear special hair covers, masks, gowns and gloves during surgery.  ? Give antibiotic medicine, if prescribed. Not all surgeries need antibiotics.  What to bring on the day of surgery    Photo ID and insurance card    Copy of your health care directive, if you have one    Glasses and hearing aides (bring cases)  ? You can't  wear contacts during surgery    Inhaler and eye drops, if you use them (tell us about these when you arrive)    CPAP machine or breathing device, if you use them    A few personal items, if spending the night    If you have . . .  ? A pacemaker, ICD (cardiac defibrillator) or other implant: Bring the ID card.  ? An implanted stimulator: Bring the remote control.  ? A legal guardian: Bring a copy of the certified (court-stamped) guardianship papers.  Please remove any jewelry, including body piercings. Leave jewelry and other valuables at home.  If you're going home the day of surgery    You must have a responsible adult drive you home. They should stay with you overnight as well.    If you don't have someone to stay with you, and you aren't safe to go home alone, we may keep you overnight. Insurance often won't pay for this.  After surgery  If it's hard to control your pain or you need more pain medicine, please call your surgeon's office.  Questions?   If you have any questions for your care team, list them here: _________________________________________________________________________________________________________________________________________________________________________ ____________________________________ ____________________________________ ____________________________________  For informational purposes only. Not to replace the advice of your health care provider. Copyright   2003, 2019 [facility]. All rights reserved. Clinically reviewed by Atiya Kennedy MD. [employer] [id number] - REV 07/21.

## 2022-04-21 ENCOUNTER — ANESTHESIA EVENT (OUTPATIENT)
Dept: SURGERY | Facility: CLINIC | Age: 42
End: 2022-04-21
Payer: COMMERCIAL

## 2022-04-21 ASSESSMENT — COPD QUESTIONNAIRES: COPD: 1

## 2022-04-21 NOTE — ANESTHESIA PREPROCEDURE EVALUATION
Anesthesia Pre-Procedure Evaluation    Patient: Anne Ferreira   MRN: [id number] : 1980        Procedure : Procedure(s):  Laparoscopic total hysterectomy, Bilateral salpingectomy, with sparing of ovaries and cystoscopy  CYSTOSCOPY          Past Medical History:   Diagnosis Date     Abnormal Pap smear of cervix       Past Surgical History:   Procedure Laterality Date     LAPAROSCOPIC CHOLECYSTECTOMY N/A 2020    Procedure: Laparoscopic cholecystectomy;  Surgeon: Manuel Durham DO;  Location: [facility] OR      Allergies   Allergen Reactions     Bupropion Nausea     Sulfa Drugs       Social History     Tobacco Use     Smoking status: Current Every Day Smoker     Packs/day: 0.50     Types: Cigarettes     Smokeless tobacco: Never Used   Substance Use Topics     Alcohol use: Yes     Comment: occassionally      Wt Readings from Last 1 Encounters:   22 94.9 kg (209 lb 3.2 oz)        Anesthesia Evaluation   Pt has had prior anesthetic. Type: General.    No history of anesthetic complications       ROS/MED HX  ENT/Pulmonary:     (+) FARHAD risk factors, obese, allergic rhinitis, moderate,  COPD,     Neurologic:     (+) migraines,     Cardiovascular:     (+) Dyslipidemia -----Previous cardiac testing   Echo: Date: Results:    Stress Test: Date: Results:    ECG Reviewed: Date:  Results:  Sinus Rhythm   WITHIN NORMAL LIMITS  Cath: Date: Results:      METS/Exercise Tolerance:     Hematologic:  - neg hematologic  ROS     Musculoskeletal: Comment: Thoracic DDD/pain  Cervical spinal stenosis      GI/Hepatic:     (+) GERD, Asymptomatic on medication,     Renal/Genitourinary:  - neg Renal ROS     Endo: Comment: morbid obesity    (+) Obesity,     Psychiatric/Substance Use:     (+) psychiatric history anxiety, depression and other (comment)     Infectious Disease:  - neg infectious disease ROS     Malignancy:  - neg malignancy ROS     Other:  - neg other ROS    (+) , H/O Chronic Pain, (-) Any chance pregnant        Physical Exam    Airway        Mallampati: III   TM distance: > 3 FB   Neck ROM: limited   Mouth opening: < 3 cm    Respiratory Devices and Support         Dental  no notable dental history         Cardiovascular   cardiovascular exam normal          Pulmonary   pulmonary exam normal                OUTSIDE LABS:  CBC:   Lab Results   Component Value Date    WBC 10.4 04/12/2022    WBC 12.5 (H) 03/15/2022    HGB 12.2 04/12/2022    HGB 12.0 03/15/2022    HCT 39.5 04/12/2022    HCT 37.8 03/15/2022     04/12/2022     03/15/2022     BMP:   Lab Results   Component Value Date     04/12/2022     03/15/2022    POTASSIUM 4.2 04/12/2022    POTASSIUM 4.4 03/15/2022    CHLORIDE 104 04/12/2022    CHLORIDE 105 03/15/2022    CO2 23 04/12/2022    CO2 25 03/15/2022    BUN 12 04/12/2022    BUN 9 03/15/2022    CR 0.58 04/12/2022    CR 0.56 03/15/2022    GLC 95 04/12/2022     (H) 03/15/2022     COAGS: No results found for: PTT, INR, FIBR  POC:   Lab Results   Component Value Date    HCG Negative 12/21/2020    HCGS Negative 12/14/2021     HEPATIC:   Lab Results   Component Value Date    ALBUMIN 3.2 (L) 03/15/2022    PROTTOTAL 6.9 03/15/2022    ALT 27 03/15/2022    AST 16 03/15/2022    ALKPHOS 59 03/15/2022    BILITOTAL 0.2 03/15/2022     OTHER:   Lab Results   Component Value Date    LACT 1.9 12/14/2021    JAIME 8.6 04/12/2022    LIPASE 160 03/15/2022    TSH 4.93 (H) 10/15/2020    T4 0.80 10/15/2020       Anesthesia Plan    ASA Status:  3   NPO Status:  NPO Appropriate    Anesthesia Type: General.     - Airway: ETT   Induction: Intravenous.   Maintenance: Balanced.        Consents    Anesthesia Plan(s) and associated risks, benefits, and realistic alternatives discussed. Questions answered and patient/representative(s) expressed understanding.     - Discussed: Risks, Benefits and Alternatives for BOTH SEDATION and the PROCEDURE were discussed     - Discussed with:  Patient         Postoperative  Care    Pain management: IV analgesics, Oral pain medications, Multi-modal analgesia.   PONV prophylaxis: Ondansetron (or other 5HT-3), Dexamethasone or Solumedrol, Background Propofol Infusion     Comments:                FRANKLYN Sy CRNA

## 2022-04-22 ENCOUNTER — LAB (OUTPATIENT)
Dept: LAB | Facility: CLINIC | Age: 42
End: 2022-04-22
Payer: COMMERCIAL

## 2022-04-22 DIAGNOSIS — Z11.59 ENCOUNTER FOR SCREENING FOR OTHER VIRAL DISEASES: ICD-10-CM

## 2022-04-22 PROCEDURE — U0003 INFECTIOUS AGENT DETECTION BY NUCLEIC ACID (DNA OR RNA); SEVERE ACUTE RESPIRATORY SYNDROME CORONAVIRUS 2 (SARS-COV-2) (CORONAVIRUS DISEASE [COVID-19]), AMPLIFIED PROBE TECHNIQUE, MAKING USE OF HIGH THROUGHPUT TECHNOLOGIES AS DESCRIBED BY CMS-2020-01-R: HCPCS

## 2022-04-22 PROCEDURE — U0005 INFEC AGEN DETEC AMPLI PROBE: HCPCS

## 2022-04-23 LAB — SARS-COV-2 RNA RESP QL NAA+PROBE: NEGATIVE

## 2022-04-25 ENCOUNTER — ANESTHESIA (OUTPATIENT)
Dept: SURGERY | Facility: CLINIC | Age: 42
End: 2022-04-25
Payer: COMMERCIAL

## 2022-04-25 ENCOUNTER — HOSPITAL ENCOUNTER (OUTPATIENT)
Facility: CLINIC | Age: 42
Discharge: HOME | End: 2022-04-25
Attending: OBSTETRICS & GYNECOLOGY | Admitting: OBSTETRICS & GYNECOLOGY
Payer: COMMERCIAL

## 2022-04-25 VITALS
HEIGHT: 64 IN | BODY MASS INDEX: 35.71 KG/M2 | SYSTOLIC BLOOD PRESSURE: 114 MMHG | DIASTOLIC BLOOD PRESSURE: 71 MMHG | HEART RATE: 72 BPM | WEIGHT: 209.2 LBS | TEMPERATURE: 98.2 F | RESPIRATION RATE: 14 BRPM | OXYGEN SATURATION: 99 %

## 2022-04-25 DIAGNOSIS — Z90.710 STATUS POST LAPAROSCOPIC HYSTERECTOMY: Primary | ICD-10-CM

## 2022-04-25 LAB — HCG UR QL: NEGATIVE

## 2022-04-25 PROCEDURE — 272N000001 HC OR GENERAL SUPPLY STERILE: Performed by: OBSTETRICS & GYNECOLOGY

## 2022-04-25 PROCEDURE — 250N000013 HC RX MED GY IP 250 OP 250 PS 637: Performed by: NURSE ANESTHETIST, CERTIFIED REGISTERED

## 2022-04-25 PROCEDURE — 250N000011 HC RX IP 250 OP 636: Performed by: OBSTETRICS & GYNECOLOGY

## 2022-04-25 PROCEDURE — 250N000009 HC RX 250: Performed by: NURSE ANESTHETIST, CERTIFIED REGISTERED

## 2022-04-25 PROCEDURE — 710N000012 HC RECOVERY PHASE 2, PER MINUTE: Performed by: OBSTETRICS & GYNECOLOGY

## 2022-04-25 PROCEDURE — 258N000001 HC RX 258: Performed by: OBSTETRICS & GYNECOLOGY

## 2022-04-25 PROCEDURE — 88307 TISSUE EXAM BY PATHOLOGIST: CPT | Mod: TC | Performed by: OBSTETRICS & GYNECOLOGY

## 2022-04-25 PROCEDURE — 250N000013 HC RX MED GY IP 250 OP 250 PS 637: Performed by: OBSTETRICS & GYNECOLOGY

## 2022-04-25 PROCEDURE — 370N000017 HC ANESTHESIA TECHNICAL FEE, PER MIN: Performed by: OBSTETRICS & GYNECOLOGY

## 2022-04-25 PROCEDURE — 250N000025 HC SEVOFLURANE, PER MIN: Performed by: OBSTETRICS & GYNECOLOGY

## 2022-04-25 PROCEDURE — 250N000009 HC RX 250: Performed by: OBSTETRICS & GYNECOLOGY

## 2022-04-25 PROCEDURE — 250N000011 HC RX IP 250 OP 636: Performed by: NURSE ANESTHETIST, CERTIFIED REGISTERED

## 2022-04-25 PROCEDURE — 258N000003 HC RX IP 258 OP 636: Performed by: NURSE ANESTHETIST, CERTIFIED REGISTERED

## 2022-04-25 PROCEDURE — 999N000141 HC STATISTIC PRE-PROCEDURE NURSING ASSESSMENT: Performed by: OBSTETRICS & GYNECOLOGY

## 2022-04-25 PROCEDURE — 81025 URINE PREGNANCY TEST: CPT | Performed by: OBSTETRICS & GYNECOLOGY

## 2022-04-25 PROCEDURE — 58571 TLH W/T/O 250 G OR LESS: CPT | Performed by: OBSTETRICS & GYNECOLOGY

## 2022-04-25 PROCEDURE — 58571 TLH W/T/O 250 G OR LESS: CPT | Mod: AS | Performed by: PHYSICIAN ASSISTANT

## 2022-04-25 PROCEDURE — 710N000009 HC RECOVERY PHASE 1, LEVEL 1, PER MIN: Performed by: OBSTETRICS & GYNECOLOGY

## 2022-04-25 PROCEDURE — 271N000001 HC OR GENERAL SUPPLY NON-STERILE: Performed by: OBSTETRICS & GYNECOLOGY

## 2022-04-25 PROCEDURE — 360N000077 HC SURGERY LEVEL 4, PER MIN: Performed by: OBSTETRICS & GYNECOLOGY

## 2022-04-25 RX ORDER — FENTANYL CITRATE 50 UG/ML
INJECTION, SOLUTION INTRAMUSCULAR; INTRAVENOUS PRN
Status: DISCONTINUED | OUTPATIENT
Start: 2022-04-25 | End: 2022-04-25

## 2022-04-25 RX ORDER — MAGNESIUM HYDROXIDE 1200 MG/15ML
LIQUID ORAL PRN
Status: DISCONTINUED | OUTPATIENT
Start: 2022-04-25 | End: 2022-04-25 | Stop reason: HOSPADM

## 2022-04-25 RX ORDER — MAGNESIUM SULFATE HEPTAHYDRATE 40 MG/ML
2 INJECTION, SOLUTION INTRAVENOUS ONCE
Status: COMPLETED | OUTPATIENT
Start: 2022-04-25 | End: 2022-04-25

## 2022-04-25 RX ORDER — HYDROXYZINE HYDROCHLORIDE 50 MG/1
50 TABLET, FILM COATED ORAL EVERY 6 HOURS PRN
Status: DISCONTINUED | OUTPATIENT
Start: 2022-04-25 | End: 2022-04-25 | Stop reason: HOSPADM

## 2022-04-25 RX ORDER — IBUPROFEN 200 MG
800 TABLET ORAL EVERY 6 HOURS PRN
COMMUNITY
Start: 2022-04-25

## 2022-04-25 RX ORDER — GLYCOPYRROLATE 0.2 MG/ML
INJECTION, SOLUTION INTRAMUSCULAR; INTRAVENOUS PRN
Status: DISCONTINUED | OUTPATIENT
Start: 2022-04-25 | End: 2022-04-25

## 2022-04-25 RX ORDER — OXYCODONE HYDROCHLORIDE 5 MG/1
5 TABLET ORAL
Status: COMPLETED | OUTPATIENT
Start: 2022-04-25 | End: 2022-04-25

## 2022-04-25 RX ORDER — SODIUM CHLORIDE, SODIUM LACTATE, POTASSIUM CHLORIDE, CALCIUM CHLORIDE 600; 310; 30; 20 MG/100ML; MG/100ML; MG/100ML; MG/100ML
INJECTION, SOLUTION INTRAVENOUS CONTINUOUS
Status: DISCONTINUED | OUTPATIENT
Start: 2022-04-25 | End: 2022-04-25 | Stop reason: HOSPADM

## 2022-04-25 RX ORDER — CEFAZOLIN SODIUM/WATER 2 G/20 ML
2 SYRINGE (ML) INTRAVENOUS
Status: COMPLETED | OUTPATIENT
Start: 2022-04-25 | End: 2022-04-25

## 2022-04-25 RX ORDER — DEXAMETHASONE SODIUM PHOSPHATE 4 MG/ML
INJECTION, SOLUTION INTRA-ARTICULAR; INTRALESIONAL; INTRAMUSCULAR; INTRAVENOUS; SOFT TISSUE PRN
Status: DISCONTINUED | OUTPATIENT
Start: 2022-04-25 | End: 2022-04-25

## 2022-04-25 RX ORDER — LIDOCAINE 40 MG/G
CREAM TOPICAL
Status: DISCONTINUED | OUTPATIENT
Start: 2022-04-25 | End: 2022-04-25 | Stop reason: HOSPADM

## 2022-04-25 RX ORDER — EPHEDRINE SULFATE 50 MG/ML
INJECTION, SOLUTION INTRAMUSCULAR; INTRAVENOUS; SUBCUTANEOUS PRN
Status: DISCONTINUED | OUTPATIENT
Start: 2022-04-25 | End: 2022-04-25

## 2022-04-25 RX ORDER — BUPIVACAINE HYDROCHLORIDE AND EPINEPHRINE 5; 5 MG/ML; UG/ML
INJECTION, SOLUTION PERINEURAL PRN
Status: DISCONTINUED | OUTPATIENT
Start: 2022-04-25 | End: 2022-04-25 | Stop reason: HOSPADM

## 2022-04-25 RX ORDER — ALBUTEROL SULFATE 90 UG/1
AEROSOL, METERED RESPIRATORY (INHALATION) PRN
Status: DISCONTINUED | OUTPATIENT
Start: 2022-04-25 | End: 2022-04-25

## 2022-04-25 RX ORDER — DIMENHYDRINATE 50 MG/ML
25 INJECTION, SOLUTION INTRAMUSCULAR; INTRAVENOUS
Status: DISCONTINUED | OUTPATIENT
Start: 2022-04-25 | End: 2022-04-25 | Stop reason: HOSPADM

## 2022-04-25 RX ORDER — PROPOFOL 10 MG/ML
INJECTION, EMULSION INTRAVENOUS CONTINUOUS PRN
Status: DISCONTINUED | OUTPATIENT
Start: 2022-04-25 | End: 2022-04-25

## 2022-04-25 RX ORDER — PROPOFOL 10 MG/ML
INJECTION, EMULSION INTRAVENOUS PRN
Status: DISCONTINUED | OUTPATIENT
Start: 2022-04-25 | End: 2022-04-25

## 2022-04-25 RX ORDER — LIDOCAINE HYDROCHLORIDE 10 MG/ML
INJECTION, SOLUTION INFILTRATION; PERINEURAL PRN
Status: DISCONTINUED | OUTPATIENT
Start: 2022-04-25 | End: 2022-04-25

## 2022-04-25 RX ORDER — HYDROMORPHONE HCL IN WATER/PF 6 MG/30 ML
0.4 PATIENT CONTROLLED ANALGESIA SYRINGE INTRAVENOUS EVERY 5 MIN PRN
Status: DISCONTINUED | OUTPATIENT
Start: 2022-04-25 | End: 2022-04-25 | Stop reason: HOSPADM

## 2022-04-25 RX ORDER — CEFAZOLIN SODIUM/WATER 2 G/20 ML
2 SYRINGE (ML) INTRAVENOUS SEE ADMIN INSTRUCTIONS
Status: DISCONTINUED | OUTPATIENT
Start: 2022-04-25 | End: 2022-04-25 | Stop reason: HOSPADM

## 2022-04-25 RX ORDER — ACETAMINOPHEN 325 MG/1
975 TABLET ORAL ONCE
Status: COMPLETED | OUTPATIENT
Start: 2022-04-25 | End: 2022-04-25

## 2022-04-25 RX ORDER — FENTANYL CITRATE 50 UG/ML
50 INJECTION, SOLUTION INTRAMUSCULAR; INTRAVENOUS EVERY 5 MIN PRN
Status: DISCONTINUED | OUTPATIENT
Start: 2022-04-25 | End: 2022-04-25 | Stop reason: HOSPADM

## 2022-04-25 RX ORDER — ONDANSETRON 2 MG/ML
INJECTION INTRAMUSCULAR; INTRAVENOUS PRN
Status: DISCONTINUED | OUTPATIENT
Start: 2022-04-25 | End: 2022-04-25

## 2022-04-25 RX ORDER — ACETAMINOPHEN 325 MG/1
975 TABLET ORAL ONCE
Status: DISCONTINUED | OUTPATIENT
Start: 2022-04-25 | End: 2022-04-25

## 2022-04-25 RX ORDER — BUPIVACAINE HYDROCHLORIDE 2.5 MG/ML
INJECTION, SOLUTION INFILTRATION; PERINEURAL PRN
Status: DISCONTINUED | OUTPATIENT
Start: 2022-04-25 | End: 2022-04-25 | Stop reason: HOSPADM

## 2022-04-25 RX ORDER — OXYCODONE HYDROCHLORIDE 5 MG/1
10 TABLET ORAL EVERY 4 HOURS PRN
Status: DISCONTINUED | OUTPATIENT
Start: 2022-04-25 | End: 2022-04-25 | Stop reason: HOSPADM

## 2022-04-25 RX ORDER — IBUPROFEN 200 MG
800 TABLET ORAL ONCE
Status: DISCONTINUED | OUTPATIENT
Start: 2022-04-25 | End: 2022-04-25 | Stop reason: HOSPADM

## 2022-04-25 RX ORDER — PHENAZOPYRIDINE HYDROCHLORIDE 200 MG/1
200 TABLET, FILM COATED ORAL ONCE
Status: COMPLETED | OUTPATIENT
Start: 2022-04-25 | End: 2022-04-25

## 2022-04-25 RX ORDER — OXYCODONE HCL 10 MG/1
10 TABLET, FILM COATED, EXTENDED RELEASE ORAL ONCE
Status: COMPLETED | OUTPATIENT
Start: 2022-04-25 | End: 2022-04-25

## 2022-04-25 RX ORDER — ONDANSETRON 2 MG/ML
4 INJECTION INTRAMUSCULAR; INTRAVENOUS EVERY 30 MIN PRN
Status: DISCONTINUED | OUTPATIENT
Start: 2022-04-25 | End: 2022-04-25 | Stop reason: HOSPADM

## 2022-04-25 RX ORDER — OXYCODONE HYDROCHLORIDE 5 MG/1
5-10 TABLET ORAL EVERY 4 HOURS PRN
Qty: 20 TABLET | Refills: 0 | Status: SHIPPED | OUTPATIENT
Start: 2022-04-25 | End: 2022-04-26

## 2022-04-25 RX ORDER — MEPERIDINE HYDROCHLORIDE 25 MG/ML
12.5 INJECTION INTRAMUSCULAR; INTRAVENOUS; SUBCUTANEOUS
Status: DISCONTINUED | OUTPATIENT
Start: 2022-04-25 | End: 2022-04-25 | Stop reason: HOSPADM

## 2022-04-25 RX ORDER — ACETAMINOPHEN 325 MG/1
975 TABLET ORAL ONCE
Status: DISCONTINUED | OUTPATIENT
Start: 2022-04-25 | End: 2022-04-25 | Stop reason: HOSPADM

## 2022-04-25 RX ORDER — ACETAMINOPHEN 325 MG/1
975 TABLET ORAL EVERY 6 HOURS PRN
Qty: 50 TABLET | Refills: 0 | COMMUNITY
Start: 2022-04-25

## 2022-04-25 RX ORDER — ONDANSETRON 4 MG/1
4 TABLET, ORALLY DISINTEGRATING ORAL EVERY 30 MIN PRN
Status: DISCONTINUED | OUTPATIENT
Start: 2022-04-25 | End: 2022-04-25 | Stop reason: HOSPADM

## 2022-04-25 RX ORDER — HYDROXYZINE HYDROCHLORIDE 25 MG/1
25 TABLET, FILM COATED ORAL EVERY 6 HOURS PRN
Status: DISCONTINUED | OUTPATIENT
Start: 2022-04-25 | End: 2022-04-25 | Stop reason: HOSPADM

## 2022-04-25 RX ORDER — ALBUTEROL SULFATE 0.83 MG/ML
2.5 SOLUTION RESPIRATORY (INHALATION) ONCE
Status: COMPLETED | OUTPATIENT
Start: 2022-04-25 | End: 2022-04-25

## 2022-04-25 RX ADMIN — GLYCOPYRROLATE 0.1 MG: 0.2 INJECTION, SOLUTION INTRAMUSCULAR; INTRAVENOUS at 10:19

## 2022-04-25 RX ADMIN — PROPOFOL 75 MCG/KG/MIN: 10 INJECTION, EMULSION INTRAVENOUS at 10:29

## 2022-04-25 RX ADMIN — PHENYLEPHRINE HYDROCHLORIDE 100 MCG: 10 INJECTION INTRAVENOUS at 11:38

## 2022-04-25 RX ADMIN — LIDOCAINE HYDROCHLORIDE 0.1 ML: 10 INJECTION, SOLUTION EPIDURAL; INFILTRATION; INTRACAUDAL; PERINEURAL at 08:11

## 2022-04-25 RX ADMIN — PHENAZOPYRIDINE HYDROCHLORIDE 200 MG: 200 TABLET ORAL at 08:05

## 2022-04-25 RX ADMIN — PHENYLEPHRINE HYDROCHLORIDE 100 MCG: 10 INJECTION INTRAVENOUS at 11:59

## 2022-04-25 RX ADMIN — LIDOCAINE HYDROCHLORIDE 100 MG: 10 INJECTION, SOLUTION INFILTRATION; PERINEURAL at 10:19

## 2022-04-25 RX ADMIN — SODIUM CHLORIDE, POTASSIUM CHLORIDE, SODIUM LACTATE AND CALCIUM CHLORIDE: 600; 310; 30; 20 INJECTION, SOLUTION INTRAVENOUS at 08:11

## 2022-04-25 RX ADMIN — OXYCODONE HYDROCHLORIDE 5 MG: 5 TABLET ORAL at 13:35

## 2022-04-25 RX ADMIN — ALBUTEROL SULFATE 6 PUFF: 90 AEROSOL, METERED RESPIRATORY (INHALATION) at 12:02

## 2022-04-25 RX ADMIN — PHENYLEPHRINE HYDROCHLORIDE 100 MCG: 10 INJECTION INTRAVENOUS at 11:06

## 2022-04-25 RX ADMIN — SUGAMMADEX 200 MG: 100 INJECTION, SOLUTION INTRAVENOUS at 11:53

## 2022-04-25 RX ADMIN — Medication 5 MG: at 11:43

## 2022-04-25 RX ADMIN — ROCURONIUM BROMIDE 50 MG: 50 INJECTION, SOLUTION INTRAVENOUS at 11:06

## 2022-04-25 RX ADMIN — FENTANYL CITRATE 50 MCG: 50 INJECTION, SOLUTION INTRAMUSCULAR; INTRAVENOUS at 12:52

## 2022-04-25 RX ADMIN — SODIUM CHLORIDE, POTASSIUM CHLORIDE, SODIUM LACTATE AND CALCIUM CHLORIDE: 600; 310; 30; 20 INJECTION, SOLUTION INTRAVENOUS at 12:34

## 2022-04-25 RX ADMIN — GLYCOPYRROLATE 0.1 MG: 0.2 INJECTION, SOLUTION INTRAMUSCULAR; INTRAVENOUS at 10:15

## 2022-04-25 RX ADMIN — ACETAMINOPHEN 975 MG: 325 TABLET ORAL at 08:05

## 2022-04-25 RX ADMIN — MAGNESIUM SULFATE HEPTAHYDRATE 2 G: 40 INJECTION, SOLUTION INTRAVENOUS at 10:15

## 2022-04-25 RX ADMIN — PHENYLEPHRINE HYDROCHLORIDE 100 MCG: 10 INJECTION INTRAVENOUS at 10:50

## 2022-04-25 RX ADMIN — Medication 2 G: at 09:59

## 2022-04-25 RX ADMIN — PROPOFOL 200 MG: 10 INJECTION, EMULSION INTRAVENOUS at 10:19

## 2022-04-25 RX ADMIN — HYDROXYZINE HYDROCHLORIDE 50 MG: 50 TABLET, FILM COATED ORAL at 12:47

## 2022-04-25 RX ADMIN — Medication 5 MG: at 11:34

## 2022-04-25 RX ADMIN — PHENYLEPHRINE HYDROCHLORIDE 100 MCG: 10 INJECTION INTRAVENOUS at 10:33

## 2022-04-25 RX ADMIN — PHENYLEPHRINE HYDROCHLORIDE 100 MCG: 10 INJECTION INTRAVENOUS at 11:21

## 2022-04-25 RX ADMIN — ALBUTEROL SULFATE 2.5 MG: 2.5 SOLUTION RESPIRATORY (INHALATION) at 08:47

## 2022-04-25 RX ADMIN — FENTANYL CITRATE 50 MCG: 50 INJECTION, SOLUTION INTRAMUSCULAR; INTRAVENOUS at 12:14

## 2022-04-25 RX ADMIN — Medication 5 MG: at 11:32

## 2022-04-25 RX ADMIN — ROCURONIUM BROMIDE 20 MG: 50 INJECTION, SOLUTION INTRAVENOUS at 11:34

## 2022-04-25 RX ADMIN — PHENYLEPHRINE HYDROCHLORIDE 100 MCG: 10 INJECTION INTRAVENOUS at 10:37

## 2022-04-25 RX ADMIN — PHENYLEPHRINE HYDROCHLORIDE 100 MCG: 10 INJECTION INTRAVENOUS at 11:43

## 2022-04-25 RX ADMIN — DEXAMETHASONE SODIUM PHOSPHATE 8 MG: 4 INJECTION, SOLUTION INTRA-ARTICULAR; INTRALESIONAL; INTRAMUSCULAR; INTRAVENOUS; SOFT TISSUE at 10:19

## 2022-04-25 RX ADMIN — Medication 10 MG: at 11:06

## 2022-04-25 RX ADMIN — Medication 5 MG: at 11:00

## 2022-04-25 RX ADMIN — GLYCOPYRROLATE 0.2 MG: 0.2 INJECTION, SOLUTION INTRAMUSCULAR; INTRAVENOUS at 11:59

## 2022-04-25 RX ADMIN — PHENYLEPHRINE HYDROCHLORIDE 100 MCG: 10 INJECTION INTRAVENOUS at 11:15

## 2022-04-25 RX ADMIN — PHENYLEPHRINE HYDROCHLORIDE 100 MCG: 10 INJECTION INTRAVENOUS at 11:32

## 2022-04-25 RX ADMIN — PHENYLEPHRINE HYDROCHLORIDE 100 MCG: 10 INJECTION INTRAVENOUS at 11:00

## 2022-04-25 RX ADMIN — ROCURONIUM BROMIDE 50 MG: 50 INJECTION, SOLUTION INTRAVENOUS at 10:19

## 2022-04-25 RX ADMIN — MIDAZOLAM 2 MG: 1 INJECTION INTRAMUSCULAR; INTRAVENOUS at 10:15

## 2022-04-25 RX ADMIN — Medication 5 MG: at 11:27

## 2022-04-25 RX ADMIN — Medication 5 MG: at 11:14

## 2022-04-25 RX ADMIN — FENTANYL CITRATE 150 MCG: 50 INJECTION, SOLUTION INTRAMUSCULAR; INTRAVENOUS at 10:19

## 2022-04-25 RX ADMIN — FENTANYL CITRATE 50 MCG: 50 INJECTION, SOLUTION INTRAMUSCULAR; INTRAVENOUS at 11:51

## 2022-04-25 RX ADMIN — Medication 5 MG: at 11:21

## 2022-04-25 RX ADMIN — OXYCODONE HYDROCHLORIDE 10 MG: 10 TABLET, FILM COATED, EXTENDED RELEASE ORAL at 08:05

## 2022-04-25 RX ADMIN — ONDANSETRON 4 MG: 2 INJECTION INTRAMUSCULAR; INTRAVENOUS at 11:53

## 2022-04-25 RX ADMIN — PHENYLEPHRINE HYDROCHLORIDE 200 MCG: 10 INJECTION INTRAVENOUS at 10:40

## 2022-04-25 RX ADMIN — Medication 5 MG: at 11:59

## 2022-04-25 ASSESSMENT — COPD QUESTIONNAIRES: CAT_SEVERITY: MODERATE

## 2022-04-25 NOTE — OP NOTE
[facility] Gynecology  Operative Note    Pre-operative diagnosis: Intramural and submucous leiomyoma of uterus [D25.1, D25.0]  Menorrhagia with regular cycle [N92.0]   Post-operative diagnosis: Same   Procedure: Laparoscopic total hysterectomy  Bilateral salpingectomy  cystoscopy   Surgeon: Julius Montejo MD   Assistant(s): Marquis Rice PA-C  A surgical assistant was required for this surgery for his experience with retraction, achievement of hemostasis, and wound closure     Anesthesia: General Endotracheal Anesthesia   Estimated blood loss: 20 ml   Total IV fluids: (See anesthesia record)  1000ml   Blood transfusion: No transfusion was given during surgery   Total urine output: (See anesthesia record)  250ml   Drains: None   Specimens: Uterus, cervix and bilateral fallopian tubes   Findings: Irregularly shaped multiparous uterus, normal cervix, fallopian tubes, bladder mucosa with bilateral ureteral efflux, normal appendix  Fatty liver   Complications: None   Condition: Stable   Comments: Anne WOODROW Ferreira   1980  [id number]      Anne TROY Hanna  presented for the above procedure.  She has heavy bleeding associated with a fibroid.  I met with Anne   and discussed the planned procedure as well as the expected post operative course.  Risks of complications were noted and postoperative signs to watch for outlined.  Questions were answered and consent signed.  She was taken to the [facility] where she was placed in the supine position. She underwent General anesthesia with endotracheal intubation.  She was then placed in the Dorso-lithotomy position in [facility].  An examination under anesthesia was performed that showed: short vaginal vault  Multiparous uterus  No adnexal masses   She was prepped and draped.  A timeout was held confirming her identity and proposed procedures. All were in agreement.    Speculum placed in the vagina cervix was isolated.  The cervix measured 3 cm in  diameter.  The single-tooth tenaculum was placed at 12:00.  Uterus was sounded to 8 cm.  A Theresa uterine manipulator with an 8 8 cm obturator and a 3 cm ring was then placed and secured with the balloon.  The tenaculum and speculum were removed.    A Nowak catheter was placed into the bladder to straight drainage.  This drained Pyridium stained urine.    Attention was turned to the abdomen.  Each abdominal incision was [place] was 0.25% Marcaine.  An umbilical incision was made the Veress needle was placed through the incision with opening pressure of 7 mmHg.  3 L of CO2 were insufflated the Veress was removed and a 5 mm trocar cannula was placed through the same incision using grasping the anterior abdominal wall for countertraction.  Patient was documented visually.  5 mm ports were placed in each of the lower quadrants under direct vision.  An 8 mm port was placed in the suprapubic area also under direct vision.    Findings are as noted above.    The distal left fallopian tube was grasped and elevated.  The attachment to the ovary was crossclamped cauterized and transected.  The mesosalpinx was linearly transected up to the cornu.  The ovarian ligament then crossclamped cauterized and transected.  Dissection was carried down along the broad ligament to the cardinal ligament.  The anterior and posterior leaves were  in the anterior transected to form a bladder flap.  The ascending vessels were crossclamped and cauterized but not yet transected.    The identical procedure carried out on the right culminating in the completion of the bladder flap.  The ascending vessels on the right was crossclamped cauterized and transected.  The left were then left ascending vessels were then cauterized and transected as well.  The placement of the Theresa ring was identified.  A colpotomy incision was made using L-hook monopolar cautery and the midline anterior.  Once this was completed the LigaSure was used for the  lateral dissection  using the cautery to ensure hemostasis.  Once uterus was freed from the vagina was removed through the vagina and a blue nasal suction bulb placed into the vagina to maintain pneumoperitoneum.  Attention returned to the abdomen.  The vaginal cuff was closed with figure-of-eight sutures of 0 Vicryl using extracorporeal knot tying technique.  Once vagina was closed and hemostatic, a butterfly needle was used to still 0.5% bupivacaine with epinephrine into each of the suspensory ligaments of uterus for postoperative pain control.  At this point the pneumoperitoneum was reduced all instruments removed incisions were closed by Marquis Rice PA-C using 4-0 Vicryl suture.  Dermabond was placed subsequently.    My attention was turned to the vagina.  The blue nasal suction bulb was removed and the vaginal cuff visualized.  It was well supported and hemostatic.  The Nowak catheter was removed from the bladder and cystoscopy was performed.  There is brisk flow of urine from each of the ureteral orifices indicating patency.  At this point the procedure was complete.  Sponge needle counts were correct x3.  Patient was awakened and taken the PACU in good condition.

## 2022-04-25 NOTE — ANESTHESIA CARE TRANSFER NOTE
Patient: Anne Ferreira    Procedure: Procedure(s):  Laparoscopic total hysterectomy, Bilateral salpingectomy, with sparing of ovaries and cystoscopy  CYSTOSCOPY       Diagnosis: Intramural and submucous leiomyoma of uterus [D25.1, D25.0]  Menorrhagia with regular cycle [N92.0]  Diagnosis Additional Information: No value filed.    Anesthesia Type:   General     Note:    Oropharynx: oropharynx clear of all foreign objects and spontaneously breathing  Level of Consciousness: drowsy  Oxygen Supplementation: nasal cannula    Independent Airway: airway patency satisfactory and stable  Dentition: dentition unchanged  Vital Signs Stable: post-procedure vital signs reviewed and stable  Report to RN Given: handoff report given  Patient transferred to: PACU    Handoff Report: Identifed the Patient, Identified the Reponsible Provider, Reviewed the pertinent medical history, Discussed the surgical course, Reviewed Intra-OP anesthesia mangement and issues during anesthesia, Set expectations for post-procedure period and Allowed opportunity for questions and acknowledgement of understanding      Vitals:  Vitals Value Taken Time   /90 04/25/22 1215   Temp     Pulse 92 04/25/22 1218   Resp 23 04/25/22 1218   SpO2 96 % 04/25/22 1218   Vitals shown include unvalidated device data.    Electronically Signed By: FRANKLYN Joyce CRNA  April 25, 2022  12:19 PM

## 2022-04-25 NOTE — DISCHARGE INSTRUCTIONS
Same Day Surgery Discharge Instructions  Special Precautions After Surgery - Adult    It is not unusual to feel lightheaded or faint, up to 24 hours after surgery or while taking pain medication.  If you have these symptoms; sit for a few minutes before standing and have someone assist you when getting up.  You should rest and relax for the next 24 hours and must have someone stay with you for at least 24 hours after your discharge.  DO NOT DRIVE any vehicle or operate mechanical equipment for 24 hours following the end of your surgery.  DO NOT DRIVE while taking narcotic pain medications that have been prescribed by your physician.  If you had a limb operated on, you must be able to use it fully to drive.  DO NOT drink alcoholic beverages for 24 hours following surgery or while taking prescription pain medication.  Drink clear liquids (apple juice, ginger ale, broth, 7-Up, etc.).  Progress to your regular diet as you feel able.  Any questions call your physician and do not make important decisions for 24 hours.    __________________________________________________________________________________________________________________________________  IMPORTANT NUMBERS:    [facility] Main Number:  [phone], [phone]  Pharmacy:  [phone]  Same Day Surgery:  [phone], Monday - Friday until 8:30 p.m.  Urgent Care:  [phone]  Emergency Room:  [phone]      [facility]:  [phone]                                                                             [facility]:  [phone] option 1  [facility]:  [phone]     OB Clinic:  [phone]   [facility]:  [phone]   Home Medical Equipment: [phone]  [facility]:  [phone]

## 2022-04-25 NOTE — ANESTHESIA POSTPROCEDURE EVALUATION
Patient: Anne Ferreira    Procedure: Procedure(s):  Laparoscopic total hysterectomy, Bilateral salpingectomy, with sparing of ovaries and cystoscopy  CYSTOSCOPY       Anesthesia Type:  General    Note:  Disposition: Outpatient   Postop Pain Control: Uneventful            Sign Out: Well controlled pain   PONV: No   Neuro/Psych: Uneventful            Sign Out: Acceptable/Baseline neuro status   Airway/Respiratory: Uneventful            Sign Out: Acceptable/Baseline resp. status   CV/Hemodynamics: Uneventful            Sign Out: Acceptable CV status; No obvious hypovolemia; No obvious fluid overload   Other NRE: NONE   DID A NON-ROUTINE EVENT OCCUR? No           Last vitals:  Vitals Value Taken Time   /70 04/25/22 1300   Temp 36.8  C (98.2  F) 04/25/22 1245   Pulse 69 04/25/22 1313   Resp 7 04/25/22 1313   SpO2 96 % 04/25/22 1313   Vitals shown include unvalidated device data.    Electronically Signed By: FRANKLYN Joyce CRNA  April 25, 2022  2:38 PM

## 2022-04-25 NOTE — INTERVAL H&P NOTE
"I have reviewed the surgical (or preoperative) H&P that is linked to this encounter, and examined the patient. There are no significant changes    Clinical Conditions Present on Arrival:  Clinically Significant Risk Factors Present on Admission                   # Obesity: Estimated body mass index is 36.48 kg/m  as calculated from the following:    Height as of this encounter: 1.613 m (5' 3.5\").    Weight as of this encounter: 94.9 kg (209 lb 3.2 oz).       "

## 2022-04-26 DIAGNOSIS — Z90.710 STATUS POST LAPAROSCOPIC HYSTERECTOMY: ICD-10-CM

## 2022-04-26 RX ORDER — OXYCODONE HYDROCHLORIDE 5 MG/1
5-10 TABLET ORAL EVERY 4 HOURS PRN
Qty: 16 TABLET | Refills: 0 | Status: SHIPPED | OUTPATIENT
Start: 2022-04-26 | End: 2022-04-29

## 2022-04-27 LAB
PATH REPORT.COMMENTS IMP SPEC: NORMAL
PATH REPORT.COMMENTS IMP SPEC: NORMAL
PATH REPORT.FINAL DX SPEC: NORMAL
PATH REPORT.GROSS SPEC: NORMAL
PATH REPORT.MICROSCOPIC SPEC OTHER STN: NORMAL
PATH REPORT.RELEVANT HX SPEC: NORMAL
PHOTO IMAGE: NORMAL

## 2022-04-27 PROCEDURE — 88307 TISSUE EXAM BY PATHOLOGIST: CPT | Mod: 26 | Performed by: PATHOLOGY

## 2022-04-29 DIAGNOSIS — Z90.710 STATUS POST LAPAROSCOPIC HYSTERECTOMY: ICD-10-CM

## 2022-04-29 RX ORDER — OXYCODONE HYDROCHLORIDE 5 MG/1
5 TABLET ORAL EVERY 4 HOURS PRN
Qty: 15 TABLET | Refills: 0 | Status: SHIPPED | OUTPATIENT
Start: 2022-04-29 | End: 2022-05-03

## 2022-04-29 NOTE — TELEPHONE ENCOUNTER
S/P lap hysterectomy 4/25/22. Rating pain at rest 5/10. Has two 2 tabs left. Alternating Tylenol and Ibuprofen.    Requesting refill for over the weekend.    Routed to physician to review and advise.    Martha Hamm RN on 4/29/2022 at 9:33 AM

## 2022-04-29 NOTE — TELEPHONE ENCOUNTER
Reason for Call:  Other call back    Detailed comments: Pt is calling to get a refill on her pain medication, Pt states she had surgery on Mon.     Phone Number Patient can be reached at: Cell number on file:    Telephone Information:   Mobile [phone]       Best Time:       Can we leave a detailed message on this number? YES    Call taken on 4/29/2022 at 8:43 AM by Daphne Griffith MA  ]

## 2022-05-03 DIAGNOSIS — Z90.710 STATUS POST LAPAROSCOPIC HYSTERECTOMY: ICD-10-CM

## 2022-05-03 RX ORDER — OXYCODONE HYDROCHLORIDE 5 MG/1
5 TABLET ORAL EVERY 6 HOURS PRN
Qty: 12 TABLET | Refills: 0 | Status: SHIPPED | OUTPATIENT
Start: 2022-05-03 | End: 2022-05-11

## 2022-05-09 ENCOUNTER — TELEPHONE (OUTPATIENT)
Dept: FAMILY MEDICINE | Facility: CLINIC | Age: 42
End: 2022-05-09
Payer: COMMERCIAL

## 2022-05-09 ENCOUNTER — HOSPITAL ENCOUNTER (EMERGENCY)
Facility: CLINIC | Age: 42
Discharge: HOME | End: 2022-05-09
Attending: NURSE PRACTITIONER | Admitting: NURSE PRACTITIONER
Payer: COMMERCIAL

## 2022-05-09 VITALS
WEIGHT: 202 LBS | HEIGHT: 64 IN | TEMPERATURE: 97.8 F | BODY MASS INDEX: 34.49 KG/M2 | HEART RATE: 90 BPM | OXYGEN SATURATION: 98 % | SYSTOLIC BLOOD PRESSURE: 174 MMHG | DIASTOLIC BLOOD PRESSURE: 117 MMHG | RESPIRATION RATE: 16 BRPM

## 2022-05-09 DIAGNOSIS — R10.2 PELVIC PRESSURE IN FEMALE: ICD-10-CM

## 2022-05-09 LAB
ALBUMIN UR-MCNC: NEGATIVE MG/DL
APPEARANCE UR: CLEAR
BILIRUB UR QL STRIP: NEGATIVE
COLOR UR AUTO: ABNORMAL
GLUCOSE UR STRIP-MCNC: NEGATIVE MG/DL
HGB UR QL STRIP: ABNORMAL
KETONES UR STRIP-MCNC: NEGATIVE MG/DL
LEUKOCYTE ESTERASE UR QL STRIP: ABNORMAL
MUCOUS THREADS #/AREA URNS LPF: PRESENT /LPF
NITRATE UR QL: NEGATIVE
PH UR STRIP: 7 [PH] (ref 5–7)
RBC URINE: 1 /HPF
SP GR UR STRIP: 1 (ref 1–1.03)
SQUAMOUS EPITHELIAL: 3 /HPF
UROBILINOGEN UR STRIP-MCNC: NORMAL MG/DL
WBC URINE: 1 /HPF

## 2022-05-09 PROCEDURE — 99213 OFFICE O/P EST LOW 20 MIN: CPT | Performed by: NURSE PRACTITIONER

## 2022-05-09 PROCEDURE — G0463 HOSPITAL OUTPT CLINIC VISIT: HCPCS | Performed by: NURSE PRACTITIONER

## 2022-05-09 PROCEDURE — 81003 URINALYSIS AUTO W/O SCOPE: CPT | Performed by: NURSE PRACTITIONER

## 2022-05-09 PROCEDURE — 87088 URINE BACTERIA CULTURE: CPT | Performed by: NURSE PRACTITIONER

## 2022-05-09 RX ORDER — HYDROCODONE BITARTRATE AND ACETAMINOPHEN 5; 325 MG/1; MG/1
1 TABLET ORAL EVERY 6 HOURS PRN
Qty: 4 TABLET | Refills: 0 | Status: SHIPPED | OUTPATIENT
Start: 2022-05-09 | End: 2022-07-19

## 2022-05-09 RX ORDER — NITROFURANTOIN 25; 75 MG/1; MG/1
100 CAPSULE ORAL 2 TIMES DAILY
Qty: 14 CAPSULE | Refills: 0 | Status: SHIPPED | OUTPATIENT
Start: 2022-05-09 | End: 2022-05-11

## 2022-05-09 ASSESSMENT — ENCOUNTER SYMPTOMS: DIFFICULTY URINATING: 1

## 2022-05-09 NOTE — ED PROVIDER NOTES
History     Chief Complaint   Patient presents with     Rule out Urinary Tract Infection     HPI  Anne Ferreira is a 41 year old female who presents to the urgent care with concern of urinary tract infection.  Patient had hysterectomy 2 weeks ago and has since been complaining of a pelvic pressure and feeling of incomplete emptying.  Also suffers from acute on chronic low back pain.  No fevers, abdominal pain, nausea, vomiting, diarrhea, dysuria, vaginal bleeding, vaginal discharge and hematuria.  Does not typically suffer UTIs.    Allergies:  Allergies   Allergen Reactions     Bupropion Nausea     Sulfa Drugs        Problem List:    Patient Active Problem List    Diagnosis Date Noted     COPD (chronic obstructive pulmonary disease) (H) 07/27/2021     Priority: Medium     Moderate episode of recurrent major depressive disorder (H) 05/21/2021     Priority: Medium     Symptomatic cholelithiasis 12/15/2020     Priority: Medium     Added automatically from request for surgery [id number]       Morbid obesity (H) 12/11/2020     Priority: Medium     Dyslipidemia 11/10/2020     Priority: Medium     Dysmenorrhea 11/10/2020     Priority: Medium     treated with continuous OCPs       GERD (gastroesophageal reflux disease) 11/10/2020     Priority: Medium     Thoracic back pain 11/10/2020     Priority: Medium     left, approx T10       H/O LEEP      Priority: Medium     1998 LEEP- Unknown results.  2010 NIL pap.  2/2/12 COLP and ECC- DARREN 1.  2013 NIL pap  4/7/15 LSIL pap, + HR HPV   8/13/15 COLP- DARREN 1, ECC- Negative.  6/10/16 NIL pap, Neg HPV.  5/14/19 NIL pap, Neg HPV.  Above results found in [facility]  10/15/20 NIL pap, Neg HPV. Plan cotest in 3 years.        Cervical spinal stenosis 07/01/2019     Priority: Medium     7/2019 MRI - At C5-6, broad-based central disc extrusion demonstrating caudal migration mildly deforms the cord and severely narrows the spinal canal. Uncovertebral joint spurring contributes to mild to moderate left  neural foraminal narrowing with potential left C6 nerve root encroachment.       Herniation of cervical intervertebral disc with radiculopathy 07/01/2019     Priority: Medium     see MRI 7/2019       Attention deficit hyperactivity disorder, combined type, mild 08/29/2018     Priority: Medium     Generalized anxiety disorder 08/29/2018     Priority: Medium     Allergic rhinitis 04/01/2008     Priority: Medium        Past Medical History:    Past Medical History:   Diagnosis Date     Abnormal Pap smear of cervix        Past Surgical History:    Past Surgical History:   Procedure Laterality Date     CYSTOSCOPY N/A 4/25/2022    Procedure: CYSTOSCOPY;  Surgeon: Julius Montejo MD;  Location: [facility] OR     LAPAROSCOPIC CHOLECYSTECTOMY N/A 12/21/2020    Procedure: Laparoscopic cholecystectomy;  Surgeon: Manuel Durham DO;  Location: [facility] OR     LAPAROSCOPIC HYSTERECTOMY TOTAL, BILATERAL SALPINGO-OOPHORECTOMY, COMBINED Bilateral 4/25/2022    Procedure: Laparoscopic total hysterectomy, Bilateral salpingectomy, with sparing of ovaries;  Surgeon: Julius Montejo MD;  Location: [facility] OR       Family History:    Family History   Problem Relation Age of Onset     Hypertension Father      Hypertension Brother      Hypertension Sister      Hypertension Brother        Social History:  Marital Status:   [4]  Social History     Tobacco Use     Smoking status: Current Every Day Smoker     Packs/day: 0.50     Types: Cigarettes     Smokeless tobacco: Never Used   Vaping Use     Vaping Use: Never used   Substance Use Topics     Alcohol use: Yes     Comment: occassionally     Drug use: Never        Medications:    HYDROcodone-acetaminophen (NORCO) 5-325 MG tablet  nitroFURantoin macrocrystal-monohydrate (MACROBID) 100 MG capsule  acetaminophen (TYLENOL) 325 MG tablet  ADVAIR DISKUS 250-50 MCG/DOSE inhaler  albuterol (PROAIR HFA/PROVENTIL HFA/VENTOLIN HFA) 108 (90 Base) MCG/ACT inhaler  cyclobenzaprine (FLEXERIL) 10 MG  "tablet  dexmethylphenidate (FOCALIN) 10 MG tablet  diphenhydrAMINE (BENADRYL) 25 MG tablet  escitalopram (LEXAPRO) 10 MG tablet  escitalopram (LEXAPRO) 20 MG tablet  hydrOXYzine (ATARAX) 25 MG tablet  hydrOXYzine (ATARAX) 25 MG tablet  ibuprofen (ADVIL/MOTRIN) 200 MG tablet  ipratropium - albuterol 0.5 mg/2.5 mg/3 mL (DUONEB) 0.5-2.5 (3) MG/3ML neb solution  lisinopril (ZESTRIL) 20 MG tablet  methocarbamol (ROBAXIN) 500 MG tablet  montelukast (SINGULAIR) 10 MG tablet  omeprazole (PRILOSEC) 20 MG DR capsule  oxyCODONE (ROXICODONE) 5 MG tablet  prochlorperazine (COMPAZINE) 10 MG tablet  propranolol (INDERAL) 80 MG tablet          Review of Systems   Genitourinary: Positive for difficulty urinating and pelvic pain.   All other systems reviewed and are negative.      Physical Exam   BP: (!) 174/117  Pulse: 90  Temp: 97.8  F (36.6  C)  Resp: 16  Height: 162.6 cm (5' 4\")  Weight: 91.6 kg (202 lb)  SpO2: 98 %      Physical Exam  Constitutional:       General: She is not in acute distress.     Appearance: Normal appearance.   Cardiovascular:      Rate and Rhythm: Normal rate.   Pulmonary:      Effort: Pulmonary effort is normal.   Abdominal:      General: A surgical scar is present.      Palpations: Abdomen is soft.      Tenderness: There is abdominal tenderness in the suprapubic area. There is no right CVA tenderness, left CVA tenderness, guarding or rebound. Negative signs include Ortega's sign and McBurney's sign.   Musculoskeletal:         General: Normal range of motion.   Skin:     General: Skin is warm.      Capillary Refill: Capillary refill takes less than 2 seconds.      Comments: 4 healing lap sites without sign of infection   Neurological:      General: No focal deficit present.      Mental Status: She is alert.         ED Course                 Procedures             Results for orders placed or performed during the hospital encounter of 05/09/22 (from the past 24 hour(s))   UA reflex to Microscopic and Culture "    Specimen: Urine, Clean Catch   Result Value Ref Range    Color Urine Straw Colorless, Straw, Light Yellow, Yellow    Appearance Urine Clear Clear    Glucose Urine Negative Negative mg/dL    Bilirubin Urine Negative Negative    Ketones Urine Negative Negative mg/dL    Specific Gravity Urine 1.003 1.003 - 1.035    Blood Urine Small (A) Negative    pH Urine 7.0 5.0 - 7.0    Protein Albumin Urine Negative Negative mg/dL    Urobilinogen Urine Normal Normal, 2.0 mg/dL    Nitrite Urine Negative Negative    Leukocyte Esterase Urine Trace (A) Negative    Mucus Urine Present (A) None Seen /LPF    RBC Urine 1 <=2 /HPF    WBC Urine 1 <=5 /HPF    Squamous Epithelials Urine 3 (H) <=1 /HPF    Narrative    Urine Culture not indicated       Medications - No data to display    Assessments & Plan (with Medical Decision Making)   Anne Ferreira is a 41 year old female who presents to the urgent care with concern of urinary tract infection.  Patient had hysterectomy 2 weeks ago and has since been complaining of a pelvic pressure and feeling of incomplete emptying.  Physical exam as above.  No sign of acute abdomen.  Urinalysis with only trace leukocyte Estrace but 3 squamous epithelial, urine culture pending.  Patient would like to begin antibiotics while culture is pending, Macrobid sent.  Discussed more thorough work-up including abdominal/pelvic imaging in the ER which patient defers at this time.  Encouraged her to schedule with primary care or OB/GYN soon for further evaluation if needed when to return to the department for new or worsening symptoms.  Discharged in good condition.  I have reviewed the nursing notes.    I have reviewed the findings, diagnosis, plan and need for follow up with the patient.      Discharge Medication List as of 5/9/2022  4:10 PM      START taking these medications    Details   HYDROcodone-acetaminophen (NORCO) 5-325 MG tablet Take 1 tablet by mouth every 6 hours as needed for severe pain, Disp-4  tablet, R-0, E-Prescribe      nitroFURantoin macrocrystal-monohydrate (MACROBID) 100 MG capsule Take 1 capsule (100 mg) by mouth 2 times daily, Disp-14 capsule, R-0, E-Prescribe             Final diagnoses:   Pelvic pressure in female       5/9/2022   [facility] EMERGENCY DEPT     Chelly Foreman, APRN CNP  05/09/22 4336

## 2022-05-09 NOTE — ED TRIAGE NOTES
Pt had hysterectomy 2 weeks ago.  Nurse advice line advised UC for UTI rule out.      Triage Assessment     Row Name 05/09/22 2844       Triage Assessment (Adult)    Airway WDL WDL       Respiratory WDL    Respiratory WDL WDL       Skin Circulation/Temperature WDL    Skin Circulation/Temperature WDL WDL       Cardiac WDL    Cardiac WDL WDL       Peripheral/Neurovascular WDL    Peripheral Neurovascular WDL WDL       Cognitive/Neuro/Behavioral WDL    Cognitive/Neuro/Behavioral WDL WDL

## 2022-05-09 NOTE — TELEPHONE ENCOUNTER
Patient Quality Outreach    Patient is due for the following:   Pain Management    NEXT STEPS:   No follow up needed at this time. Patient is on fail list due to pain medications prescribed for recent surgery.    Type of outreach:    Chart review performed, no outreach needed.      Questions for provider review:    None     Lavonne Richey, CMA

## 2022-05-10 ENCOUNTER — MYC MEDICAL ADVICE (OUTPATIENT)
Dept: FAMILY MEDICINE | Facility: CLINIC | Age: 42
End: 2022-05-10
Payer: COMMERCIAL

## 2022-05-11 ENCOUNTER — TELEPHONE (OUTPATIENT)
Dept: OBGYN | Facility: CLINIC | Age: 42
End: 2022-05-11
Payer: COMMERCIAL

## 2022-05-11 ENCOUNTER — TELEPHONE (OUTPATIENT)
Dept: EMERGENCY MEDICINE | Facility: CLINIC | Age: 42
End: 2022-05-11
Payer: COMMERCIAL

## 2022-05-11 LAB
BACTERIA UR CULT: ABNORMAL
BACTERIA UR CULT: ABNORMAL

## 2022-05-11 RX ORDER — CEFPODOXIME PROXETIL 100 MG/1
100 TABLET, FILM COATED ORAL 2 TIMES DAILY
Qty: 10 TABLET | Refills: 0 | Status: SHIPPED | OUTPATIENT
Start: 2022-05-11 | End: 2022-05-17

## 2022-05-11 NOTE — TELEPHONE ENCOUNTER
Pt is calling about getting a pain medication. Was given hydrocodone in the ER last night, wondering if she could get at least enough until her appt tomorrow,    States she is in extreme pain.

## 2022-05-11 NOTE — TELEPHONE ENCOUNTER
[facility] Lab result notification [Adult-Female]    [place] ED lab result protocol used  Urine culture    Reason for call  Notify of lab results, assess symptoms,  review ED providers recommendations/discharge instructions (if necessary) and advise per ED lab result f/u protocol    Lab Result (including Rx patient on, if applicable)  Final Urine Culture Report on 5/11/22  [facility] Emergency Dept discharge antibiotic prescribed: Nitrofurantoin Macrocrystal-Monohydrate (Macrobid) 100 mg PO capsule, 1 capsule (100 mg) by mouth 2 times daily for 7 days  #1. Bacteria, 10,000 to 50,000 colonies/mL Streptococcus agalactiae B,  is [NOT TESTED] to antibiotic.   Recommendations in treatment per [facility] ED lab result Urine Culture protocol.    Information table from Emergency Dept Provider visit on 5/9/22  Symptoms reported at ED visit (Chief complaint, HPI) Chief Complaint   Patient presents with     Rule out Urinary Tract Infection      HPI  Anne Ferreira is a 41 year old female who presents to the urgent care with concern of urinary tract infection.  Patient had hysterectomy 2 weeks ago and has since been complaining of a pelvic pressure and feeling of incomplete emptying.  Also suffers from acute on chronic low back pain.  No fevers, abdominal pain, nausea, vomiting, diarrhea, dysuria, vaginal bleeding, vaginal discharge and hematuria.  Does not typically suffer UTIs.     Significant Medical hx, if applicable (i.e. CKD, diabetes) Reviewed   Allergies Allergies   Allergen Reactions     Bupropion Nausea     Sulfa Drugs       Weight, if applicable Wt Readings from Last 2 Encounters:   05/09/22 91.6 kg (202 lb)   04/25/22 94.9 kg (209 lb 3.2 oz)      Coumadin/Warfarin [Yes /No] No   Creatinine Level (mg/dl) Creatinine   Date Value Ref Range Status   04/12/2022 0.58 0.52 - 1.04 mg/dL Final   12/10/2020 0.58 0.52 - 1.04 mg/dL Final      Creatinine clearance (ml/min), if applicable  Serum creatinine: 0.58 mg/dL 04/12/22 1530  Estimated creatinine clearance: 138.6 mL/min   Pregnant (Yes/No/NA) Hysterectomy 4/25/22   Breastfeeding (Yes/No/NA) NA   ED providers Impression and Plan (applicable information) Anne Ferreira is a 41 year old female who presents to the urgent care with concern of urinary tract infection.  Patient had hysterectomy 2 weeks ago and has since been complaining of a pelvic pressure and feeling of incomplete emptying.  Physical exam as above.  No sign of acute abdomen.  Urinalysis with only trace leukocyte Estrace but 3 squamous epithelial, urine culture pending.  Patient would like to begin antibiotics while culture is pending, Macrobid sent.  Discussed more thorough work-up including abdominal/pelvic imaging in the ER which patient defers at this time.  Encouraged her to schedule with primary care or OB/GYN soon for further evaluation if needed when to return to the department for new or worsening symptoms.  Discharged in good condition.  I have reviewed the nursing notes.   ED diagnosis Pelvic pressure in female   ED provider Chelly Foreman, APRN CNP      RN Assessment (Patient s current Symptoms), include time called.  [Insert Left message here if message left]  At 12:05P, Left voicemail message requesting a call back to [facility] ED Lab Result RN at [phone].  RN is available every day between 9 a.m. and 5:30 p.m.  See Telephone encounter.    RN Recommendations/Instructions per [place] ED lab result protocol  Await call back.  Cefpodoxime Rx pended    Demetris Mandujano RN  [facility] [employer] [facility]  Emergency Dept Lab Result RN  Ph# [phone]     Copy of Lab result   Urine Culture  Order: [id number]   Status: Final result     Visible to patient: Yes (not seen)    Specimen Information: Urine, Clean Catch         2 Result Notes    Culture 10,000-50,000 CFU/mL Streptococcus agalactiae (Group B Streptococcus) Abnormal     This organism is  susceptible to ampicillin, penicillin, vancomycin and the cephalosporins. If treatment is required AND your patient is allergic to penicillin, contact the Microbiology Lab within 5 days to request susceptibility testing.   This organism may be significant in OB patients, however, it is part of the normal urogenital ranjana.   10,000-50,000 CFU/mL Mixture of urogenital ranjana            Resulting Agency: [facility]           Specimen Collected: 05/09/22  3:37 PM Last Resulted: 05/11/22 11:48 AM

## 2022-05-11 NOTE — RESULT ENCOUNTER NOTE
At 12:05P, Left voicemail message requesting a call back to [facility] ED Lab Result RN at [phone].  RN is available every day between 9 a.m. and 5:30 p.m.  See Telephone encounter

## 2022-05-11 NOTE — RESULT ENCOUNTER NOTE
Patient notified of lab result and treatment recommendations.  Advised to discontinue the Macrobid and switch to Cefpodoxime (Vantin) 100 MG tablet, 1 tablet (100 mg) by mouth 2 times daily for 5 days.  Rx  sent to [Pharmacy - [facility]].

## 2022-05-11 NOTE — TELEPHONE ENCOUNTER
"[facility] Emergency Department/Urgent Care Lab result notification     Patient/parent Name  Anne    RN Assessment (Patient s current Symptoms), include time called.  [Insert Left message here if message left]  \"I'm really not feeling any better.  I continue to have the pelvic pressure.  Having a harder time emptying the bladder.  Body is feeling achy.\"  Inquired if she has a fever and she stated she didn't think so.  Has not check her temp.  No chills  \"I do have an appt with my surgeon tomorrow.\"    Lab result (if applicable):  Final Urine Culture Report on 5/11/22  [facility] Emergency Dept discharge antibiotic prescribed: Nitrofurantoin Macrocrystal-Monohydrate (Macrobid) 100 mg PO capsule, 1 capsule (100 mg) by mouth 2 times daily for 7 days  #1. Bacteria, 10,000 to 50,000 colonies/mL Streptococcus agalactiae B,  is [NOT TESTED] to antibiotic.   Recommendations in treatment per [facility] ED lab result Urine Culture protocol.     RN Recommendations/Instructions per [place] ED lab result protocol  Patient notified of lab result and treatment recommendations.  Advised to discontinue the Macrobid and switch to Cefpodoxime (Vantin) 100 MG tablet, 1 tablet (100 mg) by mouth 2 times daily for 5 days.  Rx  sent to [Pharmacy - [facility]].  RN reviewed information about uti prevention    She requested more pain medication.  She was encouraged to contact surgeon with this request.     Please Contact your PCP clinic or return to the Emergency department if your:    Symptoms do not improve after 3 days on antibiotic.    Symptoms do not resolve after completing antibiotic.    Symptoms worsen or other concerning symptom's.    PCP follow-up Questions asked: YES       Demetris Mandujano RN  [facility] [place]  Emergency Dept Lab Result RN  Ph# [phone]       "

## 2022-05-11 NOTE — TELEPHONE ENCOUNTER
Duplicate encounter.  See patient mychart message.  This was sent to Dr. Montejo to advise on patient request.    Bertha Rose   Ob/Gyn Clinic  [place]

## 2022-05-11 NOTE — RESULT ENCOUNTER NOTE
Final Urine Culture Report on 5/11/22  [facility] Emergency Dept discharge antibiotic prescribed: Nitrofurantoin Macrocrystal-Monohydrate (Macrobid) 100 mg PO capsule, 1 capsule (100 mg) by mouth 2 times daily for 7 days  #1. Bacteria, 10,000 to 50,000 colonies/mL Streptococcus agalactiae B,  is [NOT TESTED] to antibiotic.   Recommendations in treatment per [facility] ED lab result Urine Culture protocol.

## 2022-05-12 ENCOUNTER — TELEPHONE (OUTPATIENT)
Dept: OBGYN | Facility: CLINIC | Age: 42
End: 2022-05-12

## 2022-05-12 ENCOUNTER — OFFICE VISIT (OUTPATIENT)
Dept: OBGYN | Facility: CLINIC | Age: 42
End: 2022-05-12
Payer: COMMERCIAL

## 2022-05-12 VITALS
TEMPERATURE: 98.5 F | RESPIRATION RATE: 16 BRPM | WEIGHT: 207.9 LBS | HEIGHT: 64 IN | DIASTOLIC BLOOD PRESSURE: 70 MMHG | BODY MASS INDEX: 35.49 KG/M2 | SYSTOLIC BLOOD PRESSURE: 130 MMHG

## 2022-05-12 DIAGNOSIS — G89.18 ACUTE POST-OPERATIVE PAIN: Primary | ICD-10-CM

## 2022-05-12 DIAGNOSIS — N76.0 VAGINAL CUFF CELLULITIS: ICD-10-CM

## 2022-05-12 DIAGNOSIS — Z90.710 S/P LAPAROSCOPIC HYSTERECTOMY: ICD-10-CM

## 2022-05-12 PROCEDURE — 99024 POSTOP FOLLOW-UP VISIT: CPT | Performed by: OBSTETRICS & GYNECOLOGY

## 2022-05-12 RX ORDER — METRONIDAZOLE 500 MG/1
500 TABLET ORAL 2 TIMES DAILY
Qty: 10 TABLET | Refills: 0 | Status: SHIPPED | OUTPATIENT
Start: 2022-05-12 | End: 2022-05-13

## 2022-05-12 RX ORDER — HYDROCODONE BITARTRATE AND ACETAMINOPHEN 5; 325 MG/1; MG/1
1 TABLET ORAL EVERY 6 HOURS PRN
Qty: 10 TABLET | Refills: 0 | Status: SHIPPED | OUTPATIENT
Start: 2022-05-12 | End: 2022-05-15

## 2022-05-12 NOTE — PROGRESS NOTES
"EULALIO Ferreira is a 41 year old female presents for post operative check. She is  16  day(s) status post Laparoscopic Total hysterectomy, BSO.  She reports having  significant pain  With bladder being full, no dysuria and end voiding discomfort no significant  bleeding. Pathological findings significant for uterine fibroids  She has had an episode of BV in the remote past     O. .vs /70 (BP Location: Left arm, Patient Position: Chair, Cuff Size: Adult Regular)   Temp 98.5  F (36.9  C)   Resp 16   Ht 1.626 m (5' 4\")   Wt 94.3 kg (207 lb 14.4 oz)   LMP 03/02/2022 (LMP Unknown)   BMI 35.69 kg/m      Abd: soft, non-tender, non-distended. Incisions clear, dry, and intact without evidence of infection.  Pelvic exam: normal vagina and vulva,   adenxa normal in size without tenderness,   uterus surgically absent, post hysterectomy status, vaginal cuff indurated and tender to palpation  , exam chaperoned by nurse.      A. /P.  (G89.18) Acute post-operative pain  (primary encounter diagnosis)  (Z90.710) S/P laparoscopic hysterectomy      (N76.0) Vaginal cuff cellulitis  Comment:   Plan: metroNIDAZOLE (FLAGYL) 500 MG tablet,         HYDROcodone-acetaminophen (NORCO) 5-325 MG         tablet                Follow up prn or when due for next annual exam.    Julius Montejo MD    "

## 2022-05-12 NOTE — TELEPHONE ENCOUNTER
Completed paperwork was given to Dr. Montejo to sign    -Genevieve Hinojosa  Clinic Station [place]

## 2022-05-13 NOTE — TELEPHONE ENCOUNTER
Paperwork completed and signed by Dr. Montejo and faxed to [phone].    Julita Novato   Clinic Station    [facility]  [phone]

## 2022-05-17 ENCOUNTER — OFFICE VISIT (OUTPATIENT)
Dept: NEUROSURGERY | Facility: CLINIC | Age: 42
End: 2022-05-17
Payer: COMMERCIAL

## 2022-05-17 ENCOUNTER — OFFICE VISIT (OUTPATIENT)
Dept: FAMILY MEDICINE | Facility: CLINIC | Age: 42
End: 2022-05-17
Payer: COMMERCIAL

## 2022-05-17 ENCOUNTER — ANCILLARY PROCEDURE (OUTPATIENT)
Dept: GENERAL RADIOLOGY | Facility: CLINIC | Age: 42
End: 2022-05-17
Attending: NURSE PRACTITIONER
Payer: COMMERCIAL

## 2022-05-17 VITALS
HEIGHT: 64 IN | OXYGEN SATURATION: 96 % | HEART RATE: 73 BPM | BODY MASS INDEX: 35.41 KG/M2 | SYSTOLIC BLOOD PRESSURE: 137 MMHG | DIASTOLIC BLOOD PRESSURE: 79 MMHG | WEIGHT: 207.4 LBS

## 2022-05-17 VITALS
TEMPERATURE: 98.7 F | WEIGHT: 206 LBS | HEART RATE: 80 BPM | HEIGHT: 64 IN | BODY MASS INDEX: 35.17 KG/M2 | SYSTOLIC BLOOD PRESSURE: 130 MMHG | DIASTOLIC BLOOD PRESSURE: 80 MMHG | RESPIRATION RATE: 18 BRPM

## 2022-05-17 DIAGNOSIS — M54.42 ACUTE BILATERAL LOW BACK PAIN WITH LEFT-SIDED SCIATICA: ICD-10-CM

## 2022-05-17 DIAGNOSIS — G89.4 CHRONIC PAIN SYNDROME: ICD-10-CM

## 2022-05-17 DIAGNOSIS — G89.29 CHRONIC SI JOINT PAIN: Primary | ICD-10-CM

## 2022-05-17 DIAGNOSIS — M51.369 DDD (DEGENERATIVE DISC DISEASE), LUMBAR: ICD-10-CM

## 2022-05-17 DIAGNOSIS — Z79.899 ENCOUNTER FOR LONG-TERM (CURRENT) USE OF MEDICATIONS: ICD-10-CM

## 2022-05-17 DIAGNOSIS — Z02.89 PAIN MANAGEMENT CONTRACT SIGNED: ICD-10-CM

## 2022-05-17 DIAGNOSIS — Z11.59 NEED FOR HEPATITIS C SCREENING TEST: ICD-10-CM

## 2022-05-17 DIAGNOSIS — M65.949 TENOSYNOVITIS OF HAND: ICD-10-CM

## 2022-05-17 DIAGNOSIS — M53.3 CHRONIC SI JOINT PAIN: Primary | ICD-10-CM

## 2022-05-17 DIAGNOSIS — J44.1 CHRONIC OBSTRUCTIVE PULMONARY DISEASE WITH ACUTE EXACERBATION (H): Primary | ICD-10-CM

## 2022-05-17 DIAGNOSIS — Z11.4 SCREENING FOR HIV (HUMAN IMMUNODEFICIENCY VIRUS): ICD-10-CM

## 2022-05-17 LAB
CANNABINOIDS UR QL SCN: NORMAL
CREAT UR-MCNC: 51 MG/DL

## 2022-05-17 PROCEDURE — 99203 OFFICE O/P NEW LOW 30 MIN: CPT | Performed by: NURSE PRACTITIONER

## 2022-05-17 PROCEDURE — 99214 OFFICE O/P EST MOD 30 MIN: CPT | Performed by: NURSE PRACTITIONER

## 2022-05-17 PROCEDURE — G0480 DRUG TEST DEF 1-7 CLASSES: HCPCS | Performed by: NURSE PRACTITIONER

## 2022-05-17 PROCEDURE — 80307 DRUG TEST PRSMV CHEM ANLYZR: CPT | Performed by: NURSE PRACTITIONER

## 2022-05-17 PROCEDURE — 73130 X-RAY EXAM OF HAND: CPT | Mod: TC | Performed by: RADIOLOGY

## 2022-05-17 RX ORDER — OXYCODONE HYDROCHLORIDE 5 MG/1
5 TABLET ORAL EVERY 8 HOURS PRN
Qty: 90 TABLET | Refills: 0 | Status: SHIPPED | OUTPATIENT
Start: 2022-05-17 | End: 2022-06-15

## 2022-05-17 RX ORDER — OXYCODONE HYDROCHLORIDE 5 MG/1
5 TABLET ORAL EVERY 8 HOURS PRN
Qty: 90 TABLET | Refills: 0 | Status: SHIPPED | OUTPATIENT
Start: 2022-05-17 | End: 2022-05-17

## 2022-05-17 ASSESSMENT — ANXIETY QUESTIONNAIRES
2. NOT BEING ABLE TO STOP OR CONTROL WORRYING: SEVERAL DAYS
3. WORRYING TOO MUCH ABOUT DIFFERENT THINGS: SEVERAL DAYS
GAD7 TOTAL SCORE: 10
5. BEING SO RESTLESS THAT IT IS HARD TO SIT STILL: MORE THAN HALF THE DAYS
7. FEELING AFRAID AS IF SOMETHING AWFUL MIGHT HAPPEN: SEVERAL DAYS
6. BECOMING EASILY ANNOYED OR IRRITABLE: MORE THAN HALF THE DAYS
1. FEELING NERVOUS, ANXIOUS, OR ON EDGE: MORE THAN HALF THE DAYS

## 2022-05-17 ASSESSMENT — PATIENT HEALTH QUESTIONNAIRE - PHQ9
SUM OF ALL RESPONSES TO PHQ QUESTIONS 1-9: 17
10. IF YOU CHECKED OFF ANY PROBLEMS, HOW DIFFICULT HAVE THESE PROBLEMS MADE IT FOR YOU TO DO YOUR WORK, TAKE CARE OF THINGS AT HOME, OR GET ALONG WITH OTHER PEOPLE: VERY DIFFICULT
5. POOR APPETITE OR OVEREATING: SEVERAL DAYS
SUM OF ALL RESPONSES TO PHQ QUESTIONS 1-9: 17

## 2022-05-17 ASSESSMENT — PAIN SCALES - GENERAL
PAINLEVEL: SEVERE PAIN (7)
PAINLEVEL: SEVERE PAIN (7)

## 2022-05-17 ASSESSMENT — ENCOUNTER SYMPTOMS: BACK PAIN: 1

## 2022-05-17 NOTE — PATIENT INSTRUCTIONS
-Bilateral SI joint injections ordered. They will contact you to schedule.  -Physical therapy ordered. They will contact you to schedule.  -Please contact our clinic with questions or concerns at [phone].

## 2022-05-17 NOTE — PROGRESS NOTES
[facility] Neurosurgery  Neurosurgery Clinic Visit      CC: back and groin pain    Primary care Provider: Bertha Loya    Reason For Visit:   I was asked by Soo Briones CNP to consult on the patient for acute bilateral low back pain with left-sided sciatica.    HPI: Anne Ferreira is a 41 year old female with a history of chronic low back pain who presents with 6 months of increased symptoms. She describes bilateral low back pain that radiates to the bilateral groin. She denies any radicular leg pain, paresthesias, or overt weakness. She has not had any formal treatment.    Past Medical History:   Diagnosis Date     Abnormal Pap smear of cervix        Past Medical History reviewed with patient during visit.    Past Surgical History:   Procedure Laterality Date     CYSTOSCOPY N/A 4/25/2022    Procedure: CYSTOSCOPY;  Surgeon: Julius Montejo MD;  Location: [facility] OR     LAPAROSCOPIC CHOLECYSTECTOMY N/A 12/21/2020    Procedure: Laparoscopic cholecystectomy;  Surgeon: Manuel Durham DO;  Location: [facility] OR     LAPAROSCOPIC HYSTERECTOMY TOTAL, BILATERAL SALPINGO-OOPHORECTOMY, COMBINED Bilateral 4/25/2022    Procedure: Laparoscopic total hysterectomy, Bilateral salpingectomy, with sparing of ovaries;  Surgeon: Julius Montejo MD;  Location: [facility] OR     Past Surgical History reviewed with patient during visit.    Current Outpatient Medications   Medication     acetaminophen (TYLENOL) 325 MG tablet     albuterol (PROAIR HFA/PROVENTIL HFA/VENTOLIN HFA) 108 (90 Base) MCG/ACT inhaler     cyclobenzaprine (FLEXERIL) 10 MG tablet     dexmethylphenidate (FOCALIN) 10 MG tablet     diphenhydrAMINE (BENADRYL) 25 MG tablet     escitalopram (LEXAPRO) 10 MG tablet     escitalopram (LEXAPRO) 20 MG tablet     HYDROcodone-acetaminophen (NORCO) 5-325 MG tablet     hydrOXYzine (ATARAX) 25 MG tablet     ibuprofen (ADVIL/MOTRIN) 200 MG tablet     ipratropium - albuterol 0.5 mg/2.5 mg/3 mL (DUONEB) 0.5-2.5 (3) MG/3ML neb  "solution     lisinopril (ZESTRIL) 20 MG tablet     methocarbamol (ROBAXIN) 500 MG tablet     metroNIDAZOLE (FLAGYL) 500 MG tablet     montelukast (SINGULAIR) 10 MG tablet     omeprazole (PRILOSEC) 20 MG DR capsule     oxyCODONE (ROXICODONE) 5 MG tablet     propranolol (INDERAL) 80 MG tablet     No current facility-administered medications for this visit.       Allergies   Allergen Reactions     Bupropion Nausea     Sulfa Drugs        Social History     Socioeconomic History     Marital status:    Tobacco Use     Smoking status: Current Every Day Smoker     Packs/day: 0.50     Types: Cigarettes     Smokeless tobacco: Never Used   Vaping Use     Vaping Use: Never used   Substance and Sexual Activity     Alcohol use: Yes     Comment: occassionally     Drug use: Never     Sexual activity: Not Currently     Partners: Male       Family History   Problem Relation Age of Onset     Hypertension Father      Hypertension Brother      Hypertension Sister      Hypertension Brother          ROS: 10 point ROS neg other than the symptoms noted above in the HPI.    Vital Signs:   /79   Pulse 73   Ht 5' 4\" (1.626 m)   Wt 207 lb 6.4 oz (94.1 kg)   LMP 03/02/2022 (LMP Unknown)   SpO2 96%   BMI 35.60 kg/m        Examination:  Constitutional:  Alert, well nourished, NAD.  Memory: recent and remote memory   HEENT: Normocephalic, atraumatic.   Pulm:  Without shortness of breath   CV:  No pitting edema of BLE.      Neurological:  Awake  Alert  Oriented x 3  Speech clear    Motor exam:   Hip Flexion:                 Right: 5/5  Left:  5/5  Hip Abduction:             Right:  5/5  Left:  5/5  Hip Adduction:             Right:  5/5  Left:  5/5  Plantar Flexion:           Right:  5/5  Left:  5/5  Dorsal Flexion:            Right:  5/5  Left:  5/5  EHL:                            Right:  5/5  Left:  5/5    SI joint testing:  Elroy: positive bilaterally  Distraction: positive bilaterally  Pelvic compression: positive " bilaterally  Dario: positive bilaterally       Sensation normal to bilateral upper and lower extremities  Muscle tone to bilateral upper and lower extremities   Gait: Able to stand from a seated position. Normal non-antalgic, non-myelopathic gait.  Able to heel/toe walk without loss of balance    Cervical examination reveals good range of motion.  No tenderness to palpation of the cervical spine or paraspinous muscles bilaterally.    Lumbar examination reveals no tenderness of the spine or paraspinous muscles.  Hip height is symmetrical. Negative SI joint, sciatic notch or greater trochanteric tenderness to palpation bilaterally.  Straight leg raise is negative bilaterally.      Imaging:   Lumbar MRI 1/17/2022  IMPRESSION:  1.  At L5-S1, a central disc protrusion with superimposed diffuse posterior disc bulge results in moderate to severe left and mild right neural foraminal stenosis. No significant spinal canal stenosis at this level.  2.  Additional more mild to moderate multilevel degenerative change is detailed above.    Assessment/Plan:   Chronic SI joint pain    Will try PT and SIJ injection at this time. She will contact our office if symptoms persist or worsen. She verbalized understanding and agreement.    Patient Instructions   -Bilateral SI joint injections ordered. They will contact you to schedule.  -Physical therapy ordered. They will contact you to schedule.  -Please contact our clinic with questions or concerns at [phone].      Stephanie Buitrago, [facility] Neurosurgery  [address] [place] [id number]  Tel [phone]  Fax [phone]

## 2022-05-17 NOTE — LETTER
My COPD Action Plan     Name: Anne Ferreira    YOB: 1980   Date: 5/17/2022    My doctor: FRANKLYN Christianson CNP   My clinic: [address] [address] [phone]  My Controller Medicine:    Dose:      My Rescue Medicine:    Dose:      My Flare Up Medicine:    Dose:      My COPD Severity:       Use of Oxygen: Oxygen Not Prescribed      Make sure you've had your pneumonia   vaccines.          GREEN ZONE       Doing well today      Usual level of activity and exercise    Usual amount of cough and mucus    No shortness of breath    Usual level of health (thinking clearly, sleeping well, feel like eating) Actions:      Take daily medicines    Use oxygen as prescribed    Follow regular exercise and diet plan    Avoid cigarette smoke and other irritants that harm the lungs           YELLOW ZONE          Having a bad day or flare up      Short of breath more than usual    A lot more sputum (mucus) than usual    Sputum looks yellow, green, tan, brown or bloody    More coughing or wheezing    Fever or chills    Less energy; trouble completing activities    Trouble thinking or focusing    Using quick relief inhaler or nebulizer more often    Poor sleep; symptoms wake me up    Do not feel like eating Actions:      Get plenty of rest    Take daily medicines    Use quick relief inhaler every 4 hours    If you use oxygen, call you doctor to see if you should adjust your oxygen    Do breathing exercises or other things to help you relax    Let a loved one, friend or neighbor know you are feeling worse    Call your care team if you have 2 or more symptoms.  Start taking steroids or antibiotics if directed by your care team           RED ZONE       Need medical care now      Severe shortness of breath (feel you can't breathe)    Fever, chills    Not enough breath to do any activity    Trouble coughing up mucus, walking or talking    Blood in  mucus    Frequent coughing   Rescue medicines are not working    Not able to sleep because of breathing    Feel confused or drowsy    Chest pain    Actions:      Call your health care team.  If you cannot reach your care team, call 911 or go to the emergency room.        Annual Reminders:  Meet with Care Team, Flu Shot every Fall  Pharmacy:    [id number] [place] [facility] - [place], [place] - [address] [facility]  [facility] - [place], [place] - [address] [place] [address]

## 2022-05-17 NOTE — PROGRESS NOTES
Assessment & Plan     (J44.1) Chronic obstructive pulmonary disease with acute exacerbation (H)  (primary encounter diagnosis)  Comment:  Controlled no change in treatment plan  Plan: COPD ACTION PLAN      (M51.36) DDD (degenerative disc disease), lumbar  Comment:  Patient pain contract signed controlled with current dose of oxycodone  Plan: [id number] - Urine Drug Confirmation Panel         (Comprehensive), oxyCODONE (ROXICODONE) 5 MG         tablet, DISCONTINUED: oxyCODONE (ROXICODONE) 5         MG tablet, CANCELED: [id number] - Urine Drug         Confirmation Panel (Comprehensive), CANCELED:         Drug Confirmation Panel Urine with Creat - lab         collect      (G89.4) Chronic pain syndrome  Comment: Patient pain contract signed controlled with current dose of oxycodone  Plan: [id number] - Urine Drug Confirmation Panel         (Comprehensive), oxyCODONE (ROXICODONE) 5 MG         tablet, DISCONTINUED: oxyCODONE (ROXICODONE) 5         MG tablet, CANCELED: [id number] - Urine Drug         Confirmation Panel (Comprehensive), CANCELED:         Drug Confirmation Panel Urine with Creat - lab         collect           (Z02.89) Pain management contract signed  Comment:  Controlled no change in treatment plan  Plan: [id number] - Urine Drug Confirmation Panel         (Comprehensive), CANCELED: [id number] - Urine Drug        Confirmation Panel (Comprehensive)      (M65.9) Tenosynovitis of hand  Comment: Negative symptoms most representative of tendinitis we will have patient follow-up with orthopedic hand specialist  Plan: XR Hand Right G/E 3 Views, Orthopedic         Referral, Wrist/Arm/Hand Supplies Order      (Z79.899) Encounter for long-term (current) use of medications  Comment:   Plan:     (Z11.4) Screening for HIV (human immunodeficiency virus)  Comment: Reviewed with patient declined removed from chart  Plan:     (Z11.59) Need for hepatitis C screening test  Comment: Reviewed with patient declined removed from  "chart  Plan:              BMI:   Estimated body mass index is 35.36 kg/m  as calculated from the following:    Height as of this encounter: 1.626 m (5' 4\").    Weight as of this encounter: 93.4 kg (206 lb).   Weight management plan: Discussed healthy diet and exercise guidelines    See Patient Instructions    No follow-ups on file.    FRANKLYN Christianson CNP  [facility]    Justin Rodríguez is a 41 year old who presents for the following health issues     Back Pain     History of Present Illness       Back Pain:  She presents for follow up of back pain. Patient's back pain is a chronic problem.  Location of back pain:  Right lower back, left lower back, right middle of back, left middle of back, left side of neck, left shoulder, right hip and left hip  Description of back pain: burning, dull ache, sharp, shooting and stabbing  Back pain spreads: left side of neck    Since patient first noticed back pain, pain is: gradually worsening  Does back pain interfere with her job:  Yes      Mental Health Follow-up:                    Today's PHQ-9         PHQ-9 Total Score: 17  PHQ-9 Q9 Thoughts of better off dead/self-harm past 2 weeks :   (P) Not at all    How difficult have these problems made it for you to do your work, take care of things at home, or get along with other people: Very difficult        Reason for visit:  Pain management for back pain and pain in right hand/wrist, knees and left ankle  Symptom onset:  More than a month  Symptoms include:  Pain in lower back and hips.  Muscle spasms up entire back  with shocking pain all over back.  Stiffness in wrist/hand ,knees,leftankle  Symptom intensity:  Severe  Symptom progression:  Worsening  Had these symptoms before:  Yes  Has tried/received treatment for these symptoms:  Yes  Previous treatment was successful:  No  What makes it worse:  Moving around too much,sitting, laying too long  What makes it better:  Not that I ve found    She " "eats 0-1 servings of fruits and vegetables daily.She consumes 2 sweetened beverage(s) daily.She exercises with enough effort to increase her heart rate 9 or less minutes per day.  She exercises with enough effort to increase her heart rate 3 or less days per week.   She is taking medications regularly.         Review of Systems   Musculoskeletal: Positive for back pain.      Constitutional, HEENT, cardiovascular, pulmonary, gi and gu systems are negative, except as otherwise noted.      Objective    LMP 03/02/2022 (LMP Unknown)   Body mass index is 35.36 kg/m . /80 (BP Location: Right arm, Cuff Size: Adult Large)   Pulse 80   Temp 98.7  F (37.1  C) (Tympanic)   Resp 18   Ht 1.626 m (5' 4\")   Wt 93.4 kg (206 lb)   LMP 03/02/2022 (LMP Unknown)   BMI 35.36 kg/m      Physical Exam   GENERAL: healthy, alert and no distress  EYES: Eyes grossly normal to inspection, PERRL and conjunctivae and sclerae normal  HENT: ear canals and TM's normal, nose and mouth without ulcers or lesions  NECK: no adenopathy, no asymmetry, masses, or scars and thyroid normal to palpation  RESP: lungs clear to auscultation - no rales, rhonchi or wheezes  CV: regular rate and rhythm, normal S1 S2, no S3 or S4, no murmur, click or rub, no peripheral edema and peripheral pulses strong  MS: Tenderness to the base of the right thumb  SKIN: no suspicious lesions or rashes  NEURO: Normal strength and tone, mentation intact and speech normal  PSYCH: mentation appears normal, affect normal/bright      Tender:  right parathoracic muscles, lumbar spinous processes  Non-tender:  thoracic spinous processes, thoracic facet joints  Range of Motion:  left lateral thoracic bending   full, right lateral thoracic bending  full, left thoracic rotation  full, right thoracic rotation  full, lumbar flexion  full, lumbar extension  decreased, painful, left lateral lumbar bending  decreased, painful, right lateral lumbar bending  decreased, painful, left " lateral lumbar rotation  decreased, painful, right lateral lumbar rotation  decreased, painful  Strength:  able to heel walk  Special tests:  negative straight leg raises    Hip Exam: Hip ROM decreased              DME (Durable Medical Equipment) Orders and Documentation  Orders Placed This Encounter   Procedures     Wrist/Arm/Hand Supplies Order      The patient was assessed and it was determined the patient is in need of the following listed DME Supplies/Equipment. Please complete supporting documentation below to demonstrate medical necessity.      Wrist/Arm/Hand Bracing Supplies Documentation  Patient requires the use of the ordered bracing device due to following medical need/condition: tendinitis

## 2022-05-17 NOTE — LETTER
5/17/2022         RE: Anne Ferreira  [address] [facility] [address]        Dear Colleague,    Thank you for referring your patient, Anne Ferreira, to the [facility]. Please see a copy of my visit note below.    [facility]  Neurosurgery Clinic Visit      CC: back and groin pain    Primary care Provider: Bertha Loya    Reason For Visit:   I was asked by Soo Briones CNP to consult on the patient for acute bilateral low back pain with left-sided sciatica.    HPI: Anne Ferreira is a 41 year old female with a history of chronic low back pain who presents with 6 months of increased symptoms. She describes bilateral low back pain that radiates to the bilateral groin. She denies any radicular leg pain, paresthesias, or overt weakness. She has not had any formal treatment.    Past Medical History:   Diagnosis Date     Abnormal Pap smear of cervix        Past Medical History reviewed with patient during visit.    Past Surgical History:   Procedure Laterality Date     CYSTOSCOPY N/A 4/25/2022    Procedure: CYSTOSCOPY;  Surgeon: Julius Montejo MD;  Location: [facility] OR     LAPAROSCOPIC CHOLECYSTECTOMY N/A 12/21/2020    Procedure: Laparoscopic cholecystectomy;  Surgeon: Manuel Durham DO;  Location: [facility] OR     LAPAROSCOPIC HYSTERECTOMY TOTAL, BILATERAL SALPINGO-OOPHORECTOMY, COMBINED Bilateral 4/25/2022    Procedure: Laparoscopic total hysterectomy, Bilateral salpingectomy, with sparing of ovaries;  Surgeon: Julius Montejo MD;  Location: [facility] OR     Past Surgical History reviewed with patient during visit.    Current Outpatient Medications   Medication     acetaminophen (TYLENOL) 325 MG tablet     albuterol (PROAIR HFA/PROVENTIL HFA/VENTOLIN HFA) 108 (90 Base) MCG/ACT inhaler     cyclobenzaprine (FLEXERIL) 10 MG tablet     dexmethylphenidate (FOCALIN) 10 MG tablet     diphenhydrAMINE (BENADRYL) 25 MG tablet     escitalopram (LEXAPRO) 10 MG tablet      "escitalopram (LEXAPRO) 20 MG tablet     HYDROcodone-acetaminophen (NORCO) 5-325 MG tablet     hydrOXYzine (ATARAX) 25 MG tablet     ibuprofen (ADVIL/MOTRIN) 200 MG tablet     ipratropium - albuterol 0.5 mg/2.5 mg/3 mL (DUONEB) 0.5-2.5 (3) MG/3ML neb solution     lisinopril (ZESTRIL) 20 MG tablet     methocarbamol (ROBAXIN) 500 MG tablet     metroNIDAZOLE (FLAGYL) 500 MG tablet     montelukast (SINGULAIR) 10 MG tablet     omeprazole (PRILOSEC) 20 MG DR capsule     oxyCODONE (ROXICODONE) 5 MG tablet     propranolol (INDERAL) 80 MG tablet     No current facility-administered medications for this visit.       Allergies   Allergen Reactions     Bupropion Nausea     Sulfa Drugs        Social History     Socioeconomic History     Marital status:    Tobacco Use     Smoking status: Current Every Day Smoker     Packs/day: 0.50     Types: Cigarettes     Smokeless tobacco: Never Used   Vaping Use     Vaping Use: Never used   Substance and Sexual Activity     Alcohol use: Yes     Comment: occassionally     Drug use: Never     Sexual activity: Not Currently     Partners: Male       Family History   Problem Relation Age of Onset     Hypertension Father      Hypertension Brother      Hypertension Sister      Hypertension Brother          ROS: 10 point ROS neg other than the symptoms noted above in the HPI.    Vital Signs:   /79   Pulse 73   Ht 5' 4\" (1.626 m)   Wt 207 lb 6.4 oz (94.1 kg)   LMP 03/02/2022 (LMP Unknown)   SpO2 96%   BMI 35.60 kg/m        Examination:  Constitutional:  Alert, well nourished, NAD.  Memory: recent and remote memory   HEENT: Normocephalic, atraumatic.   Pulm:  Without shortness of breath   CV:  No pitting edema of BLE.      Neurological:  Awake  Alert  Oriented x 3  Speech clear    Motor exam:   Hip Flexion:                 Right: 5/5  Left:  5/5  Hip Abduction:             Right:  5/5  Left:  5/5  Hip Adduction:             Right:  5/5  Left:  5/5  Plantar Flexion:           Right:  " 5/5  Left:  5/5  Dorsal Flexion:            Right:  5/5  Left:  5/5  EHL:                            Right:  5/5  Left:  5/5    SI joint testing:  Elroy: positive bilaterally  Distraction: positive bilaterally  Pelvic compression: positive bilaterally  Dario: positive bilaterally       Sensation normal to bilateral upper and lower extremities  Muscle tone to bilateral upper and lower extremities   Gait: Able to stand from a seated position. Normal non-antalgic, non-myelopathic gait.  Able to heel/toe walk without loss of balance    Cervical examination reveals good range of motion.  No tenderness to palpation of the cervical spine or paraspinous muscles bilaterally.    Lumbar examination reveals no tenderness of the spine or paraspinous muscles.  Hip height is symmetrical. Negative SI joint, sciatic notch or greater trochanteric tenderness to palpation bilaterally.  Straight leg raise is negative bilaterally.      Imaging:   Lumbar MRI 1/17/2022  IMPRESSION:  1.  At L5-S1, a central disc protrusion with superimposed diffuse posterior disc bulge results in moderate to severe left and mild right neural foraminal stenosis. No significant spinal canal stenosis at this level.  2.  Additional more mild to moderate multilevel degenerative change is detailed above.    Assessment/Plan:   Chronic SI joint pain    Will try PT and SIJ injection at this time. She will contact our office if symptoms persist or worsen. She verbalized understanding and agreement.    Patient Instructions   -Bilateral SI joint injections ordered. They will contact you to schedule.  -Physical therapy ordered. They will contact you to schedule.  -Please contact our clinic with questions or concerns at [phone].      Stephanie Buitrago, [address] [address]  Tel [phone]  Fax [phone]        Again, thank you for allowing me to participate in the care of your patient.         Sincerely,        Stephanie Buitrago, NP

## 2022-05-17 NOTE — LETTER
Opioid / Opioid Plus Controlled Substance Agreement    This is an agreement between you and your provider about the safe and appropriate use of controlled substance/opioids prescribed by your care team. Controlled substances are medicines that can cause physical and mental dependence (abuse).    There are strict laws about having and using these medicines. We here at [facility] are committing to working with you in your efforts to get better. To support you in this work, we ll help you schedule regular office appointments for medicine refills. If we must cancel or change your appointment for any reason, we ll make sure you have enough medicine to last until your next appointment.     As a Provider, I will:    Listen carefully to your concerns and treat you with respect.     Recommend a treatment plan that I believe is in your best interest. This plan may involve therapies other than opioid pain medication.     Talk with you often about the possible benefits, and the risk of harm of any medicine that we prescribe for you.     Provide a plan on how to taper (discontinue or go off) using this medicine if the decision is made to stop its use.    As a Patient, I understand that opioid(s):     Are a controlled substance prescribed by my care team to help me function or work and manage my condition(s).     Are strong medicines and can cause serious side effects such as:    Drowsiness, which can seriously affect my driving ability    A lower breathing rate, enough to cause death    Harm to my thinking ability     Depression     Abuse of and addiction to this medicine    Need to be taken exactly as prescribed. Combining opioids with certain medicines or chemicals (such as illegal drugs, sedatives, sleeping pills, and benzodiazepines) can be dangerous or even fatal. If I stop opioids suddenly, I may have severe withdrawal symptoms.    Do not work for all types of pain nor for all patients. If they re not helpful, I may  be asked to stop them.        The risks, benefits and side effects of these medicine(s) were explained to me. I agree that:  1. I will take part in other treatments as advised by my care team. This may be psychiatry or counseling, physical therapy, behavioral therapy, group treatment or a referral to a specialist.     2. I will keep all my appointments. I understand that this is part of the monitoring of opioids. My care team may require an office visit for EVERY opioid/controlled substance refill. If I miss appointments or don t follow instructions, my care team may stop my medicine.    3. I will take my medicines as prescribed. I will not change the dose or schedule unless my care team tells me to. There will be no refills if I run out early.     4. I may be asked to come to the clinic and complete a urine drug test or complete a pill count at any time. If I don t give a urine sample or participate in a pill count, the care team may stop my medicine.    5. I will only receive prescriptions from this clinic for chronic pain. If I am treated by another provider for acute pain issues, I will tell them that I am taking opioid pain medication for chronic pain and that I have a treatment agreement with this provider. I will inform my [facility] care team within one business day if I am given a prescription for any pain medication by another healthcare provider. My [facility] care team can contact other providers and pharmacists about my use of any medicines.    6. It is up to me to make sure that I don t run out of my medicines on weekends or holidays. If my care team is willing to refill my opioid prescription without a visit, I must request refills only during office hours. Refills may take up to 3 business days to process. I will use one pharmacy to fill all my opioid and other controlled substance prescriptions. I will notify the clinic about any changes to my insurance or medication  availability.    7. I am responsible for my prescriptions. If the medicine/prescription is lost, stolen or destroyed, it will not be replaced. I also agree not to share controlled substance medicines with anyone.    8. I am aware I should not use any illegal or recreational drugs. I agree not to drink alcohol unless my care team says I can.       9. If I enroll in the [place] Medical Cannabis program, I will tell my care team prior to my next refill.     10. I will tell my care team right away if I become pregnant, have a new medical problem treated outside of my regular clinic, or have a change in my medications.    11. I understand that this medicine can affect my thinking, judgment and reaction time. Alcohol and drugs affect the brain and body, which can affect the safety of my driving. Being under the influence of alcohol or drugs can affect my decision-making, behaviors, personal safety, and the safety of others. Driving while impaired (DWI) can occur if a person is driving, operating, or in physical control of a car, motorcycle, boat, snowmobile, ATV, motorbike, off-road vehicle, or any other motor vehicle ([place] Statute 169A.20). I understand the risk if I choose to drive or operate any vehicle or machinery.    I understand that if I do not follow any of the conditions above, my prescriptions or treatment may be stopped or changed.          Opioids  What You Need to Know    What are opioids?   Opioids are pain medicines that must be prescribed by a doctor. They are also known as narcotics.     Examples are:   1. morphine (MS Contin, Yarelis)  2. oxycodone (Oxycontin)  3. oxycodone and acetaminophen (Percocet)  4. hydrocodone and acetaminophen (Vicodin, Norco)   5. fentanyl patch (Duragesic)   6. hydromorphone (Dilaudid)   7. methadone  8. codeine (Tylenol #3)     What do opioids do well?   Opioids are best for severe short-term pain such as after a surgery or injury. They may work well for cancer pain. They may  help some people with long-lasting (chronic) pain.     What do opioids NOT do well?   Opioids never get rid of pain entirely, and they don t work well for most patients with chronic pain. Opioids don t reduce swelling, one of the causes of pain.                                    Other ways to manage chronic pain and improve function include:       Treat the health problem that may be causing pain    Anti-inflammation medicines, which reduce swelling and tenderness, such as ibuprofen (Advil, Motrin) or naproxen (Aleve)    Acetaminophen (Tylenol)    Antidepressants and anti-seizure medicines, especially for nerve pain    Topical treatments such as patches or creams    Injections or nerve blocks    Chiropractic or osteopathic treatment    Acupuncture, massage, deep breathing, meditation, visual imagery, aromatherapy    Use heat or ice at the pain site    Physical therapy     Exercise    Stop smoking    Take part in therapy       Risks and side effects     Talk to your doctor before you start or decide to keep taking opioids. Possible side effects include:      Lowering your breathing rate enough to cause death    Overdose, including death, especially if taking higher than prescribed doses    Worse depression symptoms; less pleasure in things you usually enjoy    Feeling tired or sluggish    Slower thoughts or cloudy thinking    Being more sensitive to pain over time; pain is harder to control    Trouble sleeping or restless sleep    Changes in hormone levels (for example, less testosterone)    Changes in sex drive or ability to have sex    Constipation    Unsafe driving    Itching and sweating    Dizziness    Nausea, throwing up and dry mouth    What else should I know about opioids?    Opioids may lead to dependence, tolerance, or addiction.      Dependence means that if you stop or reduce the medicine too quickly, you will have withdrawal symptoms. These include loose poop (diarrhea), jitters, flu-like symptoms,  nervousness and tremors. Dependence is not the same as addiction.                       Tolerance means needing higher doses over time to get the same effect. This may increase the chance of serious side effects.      Addiction is when people improperly use a substance that harms their body, their mind or their relations with others. Use of opiates can cause a relapse of addiction if you have a history of drug or alcohol abuse.      People who have used opioids for a long time may have a lower quality of life, worse depression, higher levels of pain and more visits to doctors.    You can overdose on opioids. Take these steps to lower your risk of overdose:    1. Recognize the signs:  Signs of overdose include decrease or loss of consciousness (blackout), slowed breathing, trouble waking up and blue lips. If someone is worried about overdose, they should call 911.    2. Talk to your doctor about Narcan (naloxone).   If you are at risk for overdose, you may be given a prescription for Narcan. This medicine very quickly reverses the effects of opioids.   If you overdose, a friend or family member can give you Narcan while waiting for the ambulance. They need to know the signs of overdose and how to give Narcan.     3. Don't use alcohol or street drugs.   Taking them with opioids can cause death.    4. Do not take any of these medicines unless your doctor says it s OK. Taking these with opioids can cause death:    Benzodiazepines, such as lorazepam (Ativan), alprazolam (Xanax) or diazepam (Valium)    Muscle relaxers, such as cyclobenzaprine (Flexeril)    Sleeping pills like zolpidem (Ambien)     Other opioids      How to keep you and other people safe while taking opioids:    1. Never share your opioids with others.  Opioid medicines are regulated by the Drug Enforcement Agency (TROY). Selling or sharing medications is a criminal act.    2. Be sure to store opioids in a secure place, locked up if possible. Young children  can easily swallow them and overdose.    3. When you are traveling with your medicines, keep them in the original bottles. If you use a pill box, be sure you also carry a copy of your medicine list from your clinic or pharmacy.    4. Safe disposal of opioids    Most pharmacies have places to get rid of medicine, called disposal kiosks. Medicine disposal options are also available in every [facility]. Search your county and  medication disposal  to find more options. You can find more details at:  [email or web address]-[email or web address]-medications     I agree that my provider, clinic care team, and pharmacy may work with any city, state or federal law enforcement agency that investigates the misuse, sale, or other diversion of my controlled medicine. I will allow my provider to discuss my care with, or share a copy of, this agreement with any other treating provider, pharmacy or emergency room where I receive care.    I have read this agreement and have asked questions about anything I did not understand.    _______________________________________________________  Patient Signature - Anne Ferreira _____________________                   Date     _______________________________________________________  Provider Signature - FRANKLYN Christianson CNP   _____________________                   Date     _______________________________________________________  Witness Signature (required if provider not present while patient signing)   _____________________                   Date

## 2022-05-18 ASSESSMENT — ANXIETY QUESTIONNAIRES: GAD7 TOTAL SCORE: 10

## 2022-05-19 DIAGNOSIS — F90.2 ATTENTION DEFICIT HYPERACTIVITY DISORDER (ADHD), COMBINED TYPE: ICD-10-CM

## 2022-05-19 RX ORDER — DEXMETHYLPHENIDATE HYDROCHLORIDE 10 MG/1
10 TABLET ORAL 2 TIMES DAILY
Qty: 60 TABLET | Refills: 0 | Status: SHIPPED | OUTPATIENT
Start: 2022-05-19 | End: 2022-06-15

## 2022-05-20 LAB
DHC UR CFM-MCNC: 88 NG/ML
DHC/CREAT UR: 173 NG/MG {CREAT}
HYDROCODONE UR CFM-MCNC: 80 NG/ML
HYDROCODONE/CREAT UR: 157 NG/MG {CREAT}
OXYCODONE UR CFM-MCNC: 171 NG/ML
OXYCODONE/CREAT UR: 335 NG/MG {CREAT}

## 2022-05-28 ENCOUNTER — E-VISIT (OUTPATIENT)
Dept: FAMILY MEDICINE | Facility: CLINIC | Age: 42
End: 2022-05-28
Payer: COMMERCIAL

## 2022-05-28 DIAGNOSIS — B37.0 THRUSH: Primary | ICD-10-CM

## 2022-05-28 PROCEDURE — 99421 OL DIG E/M SVC 5-10 MIN: CPT | Performed by: NURSE PRACTITIONER

## 2022-05-31 RX ORDER — NYSTATIN 100000/ML
500000 SUSPENSION, ORAL (FINAL DOSE FORM) ORAL 4 TIMES DAILY
Qty: 140 ML | Refills: 0 | Status: SHIPPED | OUTPATIENT
Start: 2022-05-31 | End: 2022-06-07

## 2022-05-31 NOTE — PATIENT INSTRUCTIONS
Thank you for choosing us for your care. I have placed an order for a prescription so that you can start treatment. View your full visit summary for details by clicking on the link below. Your pharmacist will able to address any questions you may have about the medication.     If you're not feeling better within 5-7 days, please schedule an appointment.  You can schedule an appointment right here in [facility], or call [phone]  If the visit is for the same symptoms as your eVisit, we'll refund the cost of your eVisit if seen within seven days.

## 2022-06-01 ENCOUNTER — MYC MEDICAL ADVICE (OUTPATIENT)
Dept: OBGYN | Facility: CLINIC | Age: 42
End: 2022-06-01
Payer: COMMERCIAL

## 2022-06-01 NOTE — TELEPHONE ENCOUNTER
4/25/2022 Laparoscopic total hysterectomy  Bilateral salpingectomy  Cystoscopy    Last office visit 5/12/2022   Complication of vaginal cuff cellulitis.    Patient would like to return to work on Monday and needs a letter saying she is free of restrictions.     Appointment offered for tomorrow with Dr. Montejo.    Bertha Rose   [facility]  RN

## 2022-06-02 ENCOUNTER — MYC MEDICAL ADVICE (OUTPATIENT)
Dept: OBGYN | Facility: CLINIC | Age: 42
End: 2022-06-02

## 2022-06-02 DIAGNOSIS — N94.6 DYSMENORRHEA: Primary | ICD-10-CM

## 2022-06-02 PROCEDURE — 99207 PR NO CHARGE NURSE ONLY: CPT | Performed by: OBSTETRICS & GYNECOLOGY

## 2022-06-02 NOTE — PROGRESS NOTES
Patient had to cancel her post op appointment for today.  She will be coming in on June 14th for a post op check, but needs a note to return to work on Monday 6/6/22.

## 2022-06-02 NOTE — TELEPHONE ENCOUNTER
Letter printed on letterhead and given to Dr. Montejo to sign.  Spoke to pt she gave us fax number to send after it is signed.    -Genevieve Hinojosa  [facility]

## 2022-06-02 NOTE — LETTER
June 2, 2022      Anne Ferreira  [address] [facility] [address]        To Whom It May Concern:    Anne Ferreira had surgery with us on 4/25/22 . She may return to work without restrictions on 6/6/22.      Sincerely,        Julius Montejo MD

## 2022-06-15 ENCOUNTER — MYC MEDICAL ADVICE (OUTPATIENT)
Dept: FAMILY MEDICINE | Facility: CLINIC | Age: 42
End: 2022-06-15

## 2022-06-15 ENCOUNTER — MYC REFILL (OUTPATIENT)
Dept: FAMILY MEDICINE | Facility: CLINIC | Age: 42
End: 2022-06-15
Payer: COMMERCIAL

## 2022-06-15 DIAGNOSIS — G89.4 CHRONIC PAIN SYNDROME: ICD-10-CM

## 2022-06-15 DIAGNOSIS — M51.369 DDD (DEGENERATIVE DISC DISEASE), LUMBAR: ICD-10-CM

## 2022-06-15 DIAGNOSIS — F90.2 ATTENTION DEFICIT HYPERACTIVITY DISORDER (ADHD), COMBINED TYPE: ICD-10-CM

## 2022-06-16 NOTE — TELEPHONE ENCOUNTER
Routing refill request to provider for review/approval because:  Drug not on the [facility] refill protocol     AGUSTIN Levi

## 2022-06-17 RX ORDER — OXYCODONE HYDROCHLORIDE 5 MG/1
5 TABLET ORAL EVERY 8 HOURS PRN
Qty: 90 TABLET | Refills: 0 | Status: SHIPPED | OUTPATIENT
Start: 2022-06-17 | End: 2022-07-19

## 2022-06-17 RX ORDER — OXYCODONE HYDROCHLORIDE 5 MG/1
5 TABLET ORAL EVERY 8 HOURS PRN
Qty: 90 TABLET | Refills: 0 | OUTPATIENT
Start: 2022-06-17

## 2022-06-17 RX ORDER — DEXMETHYLPHENIDATE HYDROCHLORIDE 10 MG/1
10 TABLET ORAL 2 TIMES DAILY
Qty: 60 TABLET | Refills: 0 | OUTPATIENT
Start: 2022-06-17

## 2022-06-17 RX ORDER — DEXMETHYLPHENIDATE HYDROCHLORIDE 10 MG/1
10 TABLET ORAL 2 TIMES DAILY
Qty: 60 TABLET | Refills: 0 | Status: SHIPPED | OUTPATIENT
Start: 2022-06-17 | End: 2023-01-08

## 2022-07-14 ENCOUNTER — MYC REFILL (OUTPATIENT)
Dept: FAMILY MEDICINE | Facility: CLINIC | Age: 42
End: 2022-07-14

## 2022-07-14 DIAGNOSIS — M62.838 MUSCLE SPASM: ICD-10-CM

## 2022-07-14 DIAGNOSIS — M51.369 DDD (DEGENERATIVE DISC DISEASE), LUMBAR: ICD-10-CM

## 2022-07-14 DIAGNOSIS — F90.2 ATTENTION DEFICIT HYPERACTIVITY DISORDER (ADHD), COMBINED TYPE: ICD-10-CM

## 2022-07-14 DIAGNOSIS — F41.1 GAD (GENERALIZED ANXIETY DISORDER): ICD-10-CM

## 2022-07-14 DIAGNOSIS — G89.4 CHRONIC PAIN SYNDROME: ICD-10-CM

## 2022-07-14 RX ORDER — HYDROXYZINE HYDROCHLORIDE 25 MG/1
TABLET, FILM COATED ORAL
Qty: 60 TABLET | Refills: 0 | Status: SHIPPED | OUTPATIENT
Start: 2022-07-14 | End: 2022-10-14

## 2022-07-14 RX ORDER — CYCLOBENZAPRINE HCL 10 MG
TABLET ORAL
Qty: 60 TABLET | Refills: 0 | Status: SHIPPED | OUTPATIENT
Start: 2022-07-14 | End: 2022-08-22

## 2022-07-14 NOTE — TELEPHONE ENCOUNTER
Requested Prescriptions   Pending Prescriptions Disp Refills     cyclobenzaprine (FLEXERIL) 10 MG tablet [Pharmacy Med Name: Cyclobenzaprine HCl 10 MG Oral Tablet] 60 tablet 0     Sig: Take 1 tablet by mouth twice daily as needed       There is no refill protocol information for this order        Last Written Prescription Date:  1/3/22  Last Fill Quantity: 60,  # refills: 1   Last office visit: 5/17/2022 with prescribing provider:     Future Office Visit:

## 2022-07-15 ENCOUNTER — MYC REFILL (OUTPATIENT)
Dept: FAMILY MEDICINE | Facility: CLINIC | Age: 42
End: 2022-07-15

## 2022-07-15 ENCOUNTER — TELEPHONE (OUTPATIENT)
Dept: FAMILY MEDICINE | Facility: CLINIC | Age: 42
End: 2022-07-15

## 2022-07-15 DIAGNOSIS — M51.369 DDD (DEGENERATIVE DISC DISEASE), LUMBAR: ICD-10-CM

## 2022-07-15 DIAGNOSIS — F90.2 ATTENTION DEFICIT HYPERACTIVITY DISORDER (ADHD), COMBINED TYPE: ICD-10-CM

## 2022-07-15 DIAGNOSIS — G89.4 CHRONIC PAIN SYNDROME: ICD-10-CM

## 2022-07-15 NOTE — TELEPHONE ENCOUNTER
Routing to ordering provider for consideration, not on refill protocol.           Elena Mazariegos     RN MSN

## 2022-07-15 NOTE — TELEPHONE ENCOUNTER
Pt called to see if Prescription refill for Cyclobenzaprine was refilled yet and writer stated it was sent to [facility] in [place] yesterday.     Fabby Singh RN

## 2022-07-18 ENCOUNTER — MYC MEDICAL ADVICE (OUTPATIENT)
Dept: FAMILY MEDICINE | Facility: CLINIC | Age: 42
End: 2022-07-18

## 2022-07-18 RX ORDER — DEXMETHYLPHENIDATE HYDROCHLORIDE 10 MG/1
10 TABLET ORAL 2 TIMES DAILY
Qty: 60 TABLET | Refills: 0 | OUTPATIENT
Start: 2022-07-18

## 2022-07-18 RX ORDER — OXYCODONE HYDROCHLORIDE 5 MG/1
5 TABLET ORAL EVERY 8 HOURS PRN
Qty: 90 TABLET | Refills: 0 | OUTPATIENT
Start: 2022-07-18

## 2022-07-18 NOTE — TELEPHONE ENCOUNTER
Pt is calling and checking on her refills for medications below.    Bobbi Leal  [facility]

## 2022-07-19 ENCOUNTER — VIRTUAL VISIT (OUTPATIENT)
Dept: FAMILY MEDICINE | Facility: CLINIC | Age: 42
End: 2022-07-19
Payer: COMMERCIAL

## 2022-07-19 DIAGNOSIS — G89.4 CHRONIC PAIN SYNDROME: ICD-10-CM

## 2022-07-19 DIAGNOSIS — M51.369 DDD (DEGENERATIVE DISC DISEASE), LUMBAR: ICD-10-CM

## 2022-07-19 DIAGNOSIS — F90.2 ATTENTION DEFICIT HYPERACTIVITY DISORDER (ADHD), COMBINED TYPE: Primary | ICD-10-CM

## 2022-07-19 PROCEDURE — 99214 OFFICE O/P EST MOD 30 MIN: CPT | Mod: GT | Performed by: NURSE PRACTITIONER

## 2022-07-19 RX ORDER — DEXMETHYLPHENIDATE HYDROCHLORIDE 10 MG/1
10 TABLET ORAL 2 TIMES DAILY
Qty: 60 TABLET | Refills: 0 | Status: SHIPPED | OUTPATIENT
Start: 2022-09-19 | End: 2022-09-16

## 2022-07-19 RX ORDER — OXYCODONE HYDROCHLORIDE 5 MG/1
5 TABLET ORAL EVERY 8 HOURS PRN
Qty: 90 TABLET | Refills: 0 | Status: SHIPPED | OUTPATIENT
Start: 2022-07-19 | End: 2022-09-16

## 2022-07-19 RX ORDER — DEXMETHYLPHENIDATE HYDROCHLORIDE 10 MG/1
10 TABLET ORAL 2 TIMES DAILY
Qty: 60 TABLET | Refills: 0 | Status: SHIPPED | OUTPATIENT
Start: 2022-08-19 | End: 2022-09-16

## 2022-07-19 RX ORDER — DEXMETHYLPHENIDATE HYDROCHLORIDE 10 MG/1
10 TABLET ORAL 2 TIMES DAILY
Qty: 60 TABLET | Refills: 0 | Status: SHIPPED | OUTPATIENT
Start: 2022-07-19 | End: 2022-08-18

## 2022-07-19 NOTE — PROGRESS NOTES
Anne is a 41 year old who is being evaluated via a billable video visit.      How would you like to obtain your AVS? MyChart  If the video visit is dropped, the invitation should be resent by: Text to cell phone: [phone]  Will anyone else be joining your video visit? No          Assessment & Plan     (F90.2) Attention deficit hyperactivity disorder (ADHD), combined type  (primary encounter diagnosis)  Comment:  Controlled no change in treatment plan  Plan: dexmethylphenidate (FOCALIN) 10 MG tablet,         dexmethylphenidate (FOCALIN) 10 MG tablet,         dexmethylphenidate (FOCALIN) 10 MG tablet          (G89.4) Chronic pain syndrome  Comment: Controlled patient will have injections in 1 month   Plan: oxyCODONE (ROXICODONE) 5 MG tablet      (M51.36) DDD (degenerative disc disease), lumbar  Comment: Controlled patient will have injections in 1 month   Plan: oxyCODONE (ROXICODONE) 5 MG tablet        No follow-ups on file.    FRANKLYN Christianson [facility]    Subjective   Anne is a 41 year old, presenting for the following health issues:  No chief complaint on file.      HPI     Medication Followup of oxycodone    Taking Medication as prescribed: yes- has been out of it    Side Effects:  None    Medication Helping Symptoms:  yes        Medication Followup of focalin    Taking Medication as prescribed: yes    Side Effects:  None    Medication Helping Symptoms:  yes        Review of Systems         Objective           Vitals:  No vitals were obtained today due to virtual visit.    Physical Exam   GENERAL: Healthy, alert and no distress  EYES: Eyes grossly normal to inspection.  No discharge or erythema, or obvious scleral/conjunctival abnormalities.  RESP: No audible wheeze, cough, or visible cyanosis.  No visible retractions or increased work of breathing.    SKIN: Visible skin clear. No significant rash, abnormal pigmentation or lesions.  NEURO: Cranial nerves grossly intact.   Mentation and speech appropriate for age.  PSYCH: Mentation appears normal, affect normal/bright, judgement and insight intact, normal speech and appearance well-groomed.    No results found for any visits on 07/19/22.            Video-Visit Details    Video Start Time: 10:41 AM    Type of service:  Video Visit    Video End Time:10:56 AM    Originating Location (pt. Location): Home    Distant Location (provider location):  [facility]     Platform used for Video Visit: [employer]    .  ..

## 2022-07-23 ENCOUNTER — NURSE TRIAGE (OUTPATIENT)
Dept: NURSING | Facility: CLINIC | Age: 42
End: 2022-07-23

## 2022-07-23 NOTE — TELEPHONE ENCOUNTER
Pt calling with a cough, fever, and sore throat that started this morning     Daughter positive yesterday for covid     Pt is doing an at home test right now that is negative so far but it has not been 15 minutes yet     Discussed treating symptoms as she typically would and trying a covid test again if symtpoms continue     Advised for pt to quarantine as she was exposed and reviewed symptoms to monitor for that she should call back with     Pt verbalizes understanding and is agreeable to this plan      Reason for Disposition    COVID-19 Testing, questions about    Protocols used: CORONAVIRUS (COVID-19) DIAGNOSED OR [id number] 1.18.2022      Atiya Rucker RN [facility] July 23, 2022 6:36 PM

## 2022-07-28 ENCOUNTER — MYC MEDICAL ADVICE (OUTPATIENT)
Dept: FAMILY MEDICINE | Facility: CLINIC | Age: 42
End: 2022-07-28

## 2022-07-29 NOTE — TELEPHONE ENCOUNTER
With patient patient is stable we will will decrease down to 2 pain medications today then 1 and then discontinue.  Did also review she is not currently suicidal if she has any suicidal idealize she should be seen in the emergency room patient is in agreement with the plan    Bertha Loya CNP

## 2022-08-11 ENCOUNTER — HOSPITAL ENCOUNTER (OUTPATIENT)
Dept: PALLIATIVE MEDICINE | Facility: CLINIC | Age: 42
Discharge: HOME | End: 2022-08-11
Attending: STUDENT IN AN ORGANIZED HEALTH CARE EDUCATION/TRAINING PROGRAM | Admitting: STUDENT IN AN ORGANIZED HEALTH CARE EDUCATION/TRAINING PROGRAM
Payer: COMMERCIAL

## 2022-08-11 VITALS
SYSTOLIC BLOOD PRESSURE: 148 MMHG | HEART RATE: 59 BPM | DIASTOLIC BLOOD PRESSURE: 77 MMHG | TEMPERATURE: 98.3 F | RESPIRATION RATE: 16 BRPM | OXYGEN SATURATION: 100 %

## 2022-08-11 DIAGNOSIS — M53.3 SI (SACROILIAC) JOINT DYSFUNCTION: ICD-10-CM

## 2022-08-11 PROCEDURE — 250N000009 HC RX 250: Performed by: STUDENT IN AN ORGANIZED HEALTH CARE EDUCATION/TRAINING PROGRAM

## 2022-08-11 PROCEDURE — 250N000011 HC RX IP 250 OP 636: Performed by: STUDENT IN AN ORGANIZED HEALTH CARE EDUCATION/TRAINING PROGRAM

## 2022-08-11 PROCEDURE — 27096 INJECT SACROILIAC JOINT: CPT | Mod: 50 | Performed by: STUDENT IN AN ORGANIZED HEALTH CARE EDUCATION/TRAINING PROGRAM

## 2022-08-11 RX ORDER — TRIAMCINOLONE ACETONIDE 40 MG/ML
40 INJECTION, SUSPENSION INTRA-ARTICULAR; INTRAMUSCULAR ONCE
Status: COMPLETED | OUTPATIENT
Start: 2022-08-11 | End: 2022-08-11

## 2022-08-11 RX ORDER — BUPIVACAINE HYDROCHLORIDE 2.5 MG/ML
3 INJECTION, SOLUTION INFILTRATION; PERINEURAL ONCE
Status: COMPLETED | OUTPATIENT
Start: 2022-08-11 | End: 2022-08-11

## 2022-08-11 RX ORDER — IOPAMIDOL 612 MG/ML
1 INJECTION, SOLUTION INTRATHECAL ONCE
Status: COMPLETED | OUTPATIENT
Start: 2022-08-11 | End: 2022-08-11

## 2022-08-11 RX ADMIN — IOPAMIDOL 1 ML: 612 INJECTION, SOLUTION INTRATHECAL at 08:32

## 2022-08-11 RX ADMIN — BUPIVACAINE HYDROCHLORIDE 7.5 MG: 2.5 INJECTION, SOLUTION EPIDURAL; INFILTRATION; INTRACAUDAL; PERINEURAL at 08:33

## 2022-08-11 RX ADMIN — LIDOCAINE HYDROCHLORIDE 2 ML: 10 INJECTION, SOLUTION EPIDURAL; INFILTRATION; INTRACAUDAL; PERINEURAL at 08:33

## 2022-08-11 RX ADMIN — TRIAMCINOLONE ACETONIDE 40 MG: 40 INJECTION, SUSPENSION INTRA-ARTICULAR; INTRAMUSCULAR at 08:33

## 2022-08-11 RX ADMIN — SODIUM BICARBONATE 0.5 MEQ: 84 INJECTION, SOLUTION INTRAVENOUS at 08:33

## 2022-08-11 NOTE — DISCHARGE INSTRUCTIONS
[facility]   Procedure Discharge Instructions    Today you saw:          Dr. Raven Mclaughlin    You had an:    sacroiliac joint injection       Medications used:    Bupivacaine   Dexamethasone Omnipaque  Ropivicaine   Kenalog   Normal saline    Be cautious when walking. Numbness and/or weakness in the lower extremities may occur for up to 6-8 hours after the procedure due to effect of the local anesthetic  Do not drive for 6 hours. The effect of the local anesthetic could slow your reflexes.   You may resume your regular activities after 24 hours  Avoid strenuous activity for the first 24 hours  You may shower, however avoid swimming, tub baths or hot tubs for 24 hours following your procedure  You may have a mild to moderate increase in pain for several days following the injection.  It may take up to 14 days for the steroid medication to start working although you may feel the effect as early as a few days after the procedure.     You may use ice packs for 10-15 minutes, 3 to 4 times a day at the injection site for comfort  Do not use heat to painful areas for 6 to 8 hours. This will give the local anesthetic time to wear off and prevent you from accidentally burning your skin.   Unless you have been directed to avoid the use of anti-inflammatory medications (NSAIDS), you may use medications such as ibuprofen, Aleve or Tylenol for pain control if needed.   Possible side effects of steroids that you may experience include flushing, elevated blood pressure, increased appetite, mild headaches and restlessness.  All of these symptoms will get better with time.  If you experience any of the following, call the Pain Clinic during work hours (Mon-Friday 8-4:30 pm) at [phone] or the Provider Line after hours at [phone]:  -Fever over 100 degree F  -Swelling, bleeding, redness, drainage, warmth at the injection site  -Progressive weakness or numbness in your legs   -Loss of bowel or  bladder function  -Unusual headache that is not relieved by Tylenol or other pain reliever  -Unusual new onset of pain that is not improving

## 2022-08-11 NOTE — PROGRESS NOTES
Pre-procedure Intake  If YES to any questions or NO to having a   Please complete laminated checklist and leave on the computer keyboard for Provider, verbally inform provider if able.    For SCS Trial, RFA's or any sedation procedure:  Have you been fasting? No     If yes, for how long?     Are you taking any any blood thinners such as Coumadin, Warfarin, Jantoven, Pradaxa Xarelto, Eliquis, Edoxaban, Enoxaparin, Lovenox, Heparin, Arixtra, Fondaparinux, or Fragmin? OR Antiplatelet medication such as Plavix, Brilinta, or Effient?   No     If yes, when did you take your last dose?     Do you take aspirin?  No    If cervical procedure, have you held aspirin for 6 days?   No     Do you have any allergies to contrast dye, iodine, steroid and/or numbing medications?  NO    Are you currently taking antibiotics or have an active infection?  NO    Have you had a fever/elevated temperature within the past week? NO    Are you currently taking oral steroids? NO    Do you have a ? Yes    Are you pregnant or breastfeeding?  NO    Have you received the COVID-19 vaccine? Yes    If yes, was it your 1st, 2nd or only dose needed? 3    Date of most recent vaccine: 7/14/22    Notify provider and RNs if systolic BP >170, diastolic BP >100, P >100 or O2 sats < 90%

## 2022-08-11 NOTE — PROGRESS NOTES
"[facility] - Procedure Note    Date of Service: 8/11/2022  Procedure performed: Bilateral sacroiliac joint injection  Diagnosis: Sacroiliac joint pain  : Raven Mclaughlin MD  Anesthesia: none    Indications: Anne Ferreira is a 41 year old female referred by Stephanie Buitrago for a sacroiliac joint injection. The patient describes bilateral buttock pain. Exam shows full strength and sensation in both lower extremities, tenderness of the sacroiliac (SI) joint, and positive FABERE on the left and right. The patient reports minimal improvement with conservative treatment, including medications.    Imaging:  MRI completed on 1/17/2022 showed:  IMPRESSION:  1.  At L5-S1, a central disc protrusion with superimposed diffuse posterior disc bulge results in moderate to severe left and mild right neural foraminal stenosis. No significant spinal canal stenosis at this level.  2.  Additional more mild to moderate multilevel degenerative change is detailed above.    Allergies:      Allergies   Allergen Reactions     Bupropion Nausea     Sulfa Drugs         Vital signs:                         Estimated body mass index is 35.6 kg/m  as calculated from the following:    Height as of 5/17/22: 1.626 m (5' 4\").    Weight as of 5/17/22: 94.1 kg (207 lb 6.4 oz).    Options/alternatives, benefits and risks were discussed with the patient including bleeding, infection, tissue trauma, exposure to radiation, reaction to medications including seizure, nerve injury, weakness, and numbness.  Questions were answered to her satisfaction and she agrees to proceed. Voluntary informed consent was obtained and signed.     Procedure:  After getting informed consent, patient was brought into the procedure suite and was placed in a prone position on the procedure table.   A Pause for the Cause was performed.  Patient was prepped and draped in sterile fashion.     After identifying the left, then right SI joint, " the C-arm was rotated to a obliquely to obtain the best view of the inferior angle of the joint. A 25 gauge 3.5 inch needle was advanced under intermittent fluoroscopy to the posterior border of the joint.  A total of 1 ml of Lidocaine 1%  was used to anesthetize the michael-articular ligaments outside the joint. The needle was then advanced and felt to enter the SI joint.    A total of 1 ml of Isovue 300 was injected, confirming appropriate position, with spread into the intraarticular space, with no intravascular uptake noted.  14 ml of Isovue 300 was wasted. Location was verified in lateral view.    1 ml of 0.25% bupivacaine with 40 mg of kenalog was injected. As the needle was removed the ligaments immediately posterior to the SIJ (posterior sacroiliac ligaments, sacrotuberous ligament) were injected with 1% lidocaine. Hemostasis was achieved, the area was cleaned, and bandaids were placed when appropriate.  The patient tolerated the procedure well, and was taken to the recovery room.    Images were saved to PACS.    Pre-procedure pain score: 8/10  Post-procedure pain score: /10  Following today's procedure, the patient was advised to contact the Pain Management Center for any of the following:   Fever, chills, or night sweats   New onset of pain, numbness, or weakness   Any questions/concerns regarding the procedure    -f/u with the referring provider      Raven Mclaughlin MD   Pain Management

## 2022-08-16 DIAGNOSIS — F41.1 GAD (GENERALIZED ANXIETY DISORDER): ICD-10-CM

## 2022-08-16 DIAGNOSIS — F33.1 MODERATE EPISODE OF RECURRENT MAJOR DEPRESSIVE DISORDER (H): ICD-10-CM

## 2022-08-16 DIAGNOSIS — J45.20 MILD INTERMITTENT ASTHMA WITHOUT COMPLICATION: ICD-10-CM

## 2022-08-16 DIAGNOSIS — I10 BENIGN ESSENTIAL HYPERTENSION: ICD-10-CM

## 2022-08-16 DIAGNOSIS — M62.838 MUSCLE SPASM: ICD-10-CM

## 2022-08-19 ENCOUNTER — MYC MEDICAL ADVICE (OUTPATIENT)
Dept: FAMILY MEDICINE | Facility: CLINIC | Age: 42
End: 2022-08-19

## 2022-08-22 RX ORDER — CYCLOBENZAPRINE HCL 10 MG
TABLET ORAL
Qty: 60 TABLET | Refills: 0 | Status: SHIPPED | OUTPATIENT
Start: 2022-08-22 | End: 2024-09-03

## 2022-08-22 RX ORDER — LISINOPRIL 20 MG/1
TABLET ORAL
Qty: 90 TABLET | Refills: 0 | Status: SHIPPED | OUTPATIENT
Start: 2022-08-22 | End: 2022-09-06

## 2022-08-22 RX ORDER — MONTELUKAST SODIUM 10 MG/1
TABLET ORAL
Qty: 90 TABLET | Refills: 0 | Status: SHIPPED | OUTPATIENT
Start: 2022-08-22 | End: 2022-11-09

## 2022-08-22 RX ORDER — ESCITALOPRAM OXALATE 10 MG/1
TABLET ORAL
Qty: 90 TABLET | Refills: 0 | Status: SHIPPED | OUTPATIENT
Start: 2022-08-22 | End: 2022-09-16

## 2022-09-06 ENCOUNTER — APPOINTMENT (OUTPATIENT)
Dept: MRI IMAGING | Facility: CLINIC | Age: 42
End: 2022-09-06
Attending: FAMILY MEDICINE
Payer: COMMERCIAL

## 2022-09-06 ENCOUNTER — MYC MEDICAL ADVICE (OUTPATIENT)
Dept: FAMILY MEDICINE | Facility: CLINIC | Age: 42
End: 2022-09-06

## 2022-09-06 ENCOUNTER — HOSPITAL ENCOUNTER (EMERGENCY)
Facility: CLINIC | Age: 42
Discharge: HOME | End: 2022-09-06
Attending: FAMILY MEDICINE | Admitting: FAMILY MEDICINE
Payer: COMMERCIAL

## 2022-09-06 VITALS
SYSTOLIC BLOOD PRESSURE: 145 MMHG | RESPIRATION RATE: 7 BRPM | HEART RATE: 54 BPM | BODY MASS INDEX: 32.61 KG/M2 | DIASTOLIC BLOOD PRESSURE: 79 MMHG | WEIGHT: 190 LBS | OXYGEN SATURATION: 96 % | TEMPERATURE: 98 F

## 2022-09-06 DIAGNOSIS — M54.2 CERVICALGIA: ICD-10-CM

## 2022-09-06 DIAGNOSIS — I10 HYPERTENSION, UNSPECIFIED TYPE: ICD-10-CM

## 2022-09-06 DIAGNOSIS — G44.209 TENSION HEADACHE: ICD-10-CM

## 2022-09-06 DIAGNOSIS — R42 DIZZINESS: ICD-10-CM

## 2022-09-06 LAB
ANION GAP SERPL CALCULATED.3IONS-SCNC: 9 MMOL/L (ref 7–15)
BASOPHILS # BLD AUTO: 0.1 10E3/UL (ref 0–0.2)
BASOPHILS NFR BLD AUTO: 1 %
BUN SERPL-MCNC: 9.8 MG/DL (ref 6–20)
CALCIUM SERPL-MCNC: 8.9 MG/DL (ref 8.6–10)
CHLORIDE SERPL-SCNC: 103 MMOL/L (ref 98–107)
CREAT SERPL-MCNC: 0.74 MG/DL (ref 0.51–0.95)
CRP SERPL-MCNC: <3 MG/L
DEPRECATED HCO3 PLAS-SCNC: 26 MMOL/L (ref 22–29)
EOSINOPHIL # BLD AUTO: 0.2 10E3/UL (ref 0–0.7)
EOSINOPHIL NFR BLD AUTO: 2 %
ERYTHROCYTE [DISTWIDTH] IN BLOOD BY AUTOMATED COUNT: 13.2 % (ref 10–15)
GFR SERPL CREATININE-BSD FRML MDRD: >90 ML/MIN/1.73M2
GLUCOSE SERPL-MCNC: 93 MG/DL (ref 70–99)
HCT VFR BLD AUTO: 40.9 % (ref 35–47)
HGB BLD-MCNC: 13.3 G/DL (ref 11.7–15.7)
HOLD SPECIMEN: NORMAL
IMM GRANULOCYTES # BLD: 0 10E3/UL
IMM GRANULOCYTES NFR BLD: 0 %
LYMPHOCYTES # BLD AUTO: 3.4 10E3/UL (ref 0.8–5.3)
LYMPHOCYTES NFR BLD AUTO: 36 %
MCH RBC QN AUTO: 29 PG (ref 26.5–33)
MCHC RBC AUTO-ENTMCNC: 32.5 G/DL (ref 31.5–36.5)
MCV RBC AUTO: 89 FL (ref 78–100)
MONOCYTES # BLD AUTO: 0.6 10E3/UL (ref 0–1.3)
MONOCYTES NFR BLD AUTO: 7 %
NEUTROPHILS # BLD AUTO: 5.1 10E3/UL (ref 1.6–8.3)
NEUTROPHILS NFR BLD AUTO: 54 %
NRBC # BLD AUTO: 0 10E3/UL
NRBC BLD AUTO-RTO: 0 /100
PLATELET # BLD AUTO: 294 10E3/UL (ref 150–450)
POTASSIUM SERPL-SCNC: 4.4 MMOL/L (ref 3.4–5.3)
RBC # BLD AUTO: 4.58 10E6/UL (ref 3.8–5.2)
SODIUM SERPL-SCNC: 138 MMOL/L (ref 136–145)
WBC # BLD AUTO: 9.3 10E3/UL (ref 4–11)

## 2022-09-06 PROCEDURE — 82310 ASSAY OF CALCIUM: CPT | Performed by: FAMILY MEDICINE

## 2022-09-06 PROCEDURE — 99285 EMERGENCY DEPT VISIT HI MDM: CPT | Performed by: FAMILY MEDICINE

## 2022-09-06 PROCEDURE — 255N000002 HC RX 255 OP 636: Performed by: FAMILY MEDICINE

## 2022-09-06 PROCEDURE — 99285 EMERGENCY DEPT VISIT HI MDM: CPT | Mod: 25 | Performed by: FAMILY MEDICINE

## 2022-09-06 PROCEDURE — 36415 COLL VENOUS BLD VENIPUNCTURE: CPT | Performed by: FAMILY MEDICINE

## 2022-09-06 PROCEDURE — A9585 GADOBUTROL INJECTION: HCPCS | Performed by: FAMILY MEDICINE

## 2022-09-06 PROCEDURE — 86140 C-REACTIVE PROTEIN: CPT | Performed by: FAMILY MEDICINE

## 2022-09-06 PROCEDURE — 96361 HYDRATE IV INFUSION ADD-ON: CPT | Performed by: FAMILY MEDICINE

## 2022-09-06 PROCEDURE — 96375 TX/PRO/DX INJ NEW DRUG ADDON: CPT | Performed by: FAMILY MEDICINE

## 2022-09-06 PROCEDURE — 250N000011 HC RX IP 250 OP 636: Performed by: FAMILY MEDICINE

## 2022-09-06 PROCEDURE — 70544 MR ANGIOGRAPHY HEAD W/O DYE: CPT

## 2022-09-06 PROCEDURE — 96374 THER/PROPH/DIAG INJ IV PUSH: CPT | Mod: 59 | Performed by: FAMILY MEDICINE

## 2022-09-06 PROCEDURE — 85025 COMPLETE CBC W/AUTO DIFF WBC: CPT | Performed by: FAMILY MEDICINE

## 2022-09-06 PROCEDURE — 258N000003 HC RX IP 258 OP 636: Performed by: FAMILY MEDICINE

## 2022-09-06 PROCEDURE — 70549 MR ANGIOGRAPH NECK W/O&W/DYE: CPT

## 2022-09-06 PROCEDURE — 70553 MRI BRAIN STEM W/O & W/DYE: CPT

## 2022-09-06 RX ORDER — GADOBUTROL 604.72 MG/ML
10 INJECTION INTRAVENOUS ONCE
Status: COMPLETED | OUTPATIENT
Start: 2022-09-06 | End: 2022-09-06

## 2022-09-06 RX ORDER — ONDANSETRON 2 MG/ML
4 INJECTION INTRAMUSCULAR; INTRAVENOUS
Status: DISCONTINUED | OUTPATIENT
Start: 2022-09-06 | End: 2022-09-06 | Stop reason: HOSPADM

## 2022-09-06 RX ORDER — KETOROLAC TROMETHAMINE 15 MG/ML
15 INJECTION, SOLUTION INTRAMUSCULAR; INTRAVENOUS ONCE
Status: COMPLETED | OUTPATIENT
Start: 2022-09-06 | End: 2022-09-06

## 2022-09-06 RX ORDER — LISINOPRIL 40 MG/1
40 TABLET ORAL DAILY
Qty: 30 TABLET | Refills: 0 | Status: SHIPPED | OUTPATIENT
Start: 2022-09-06 | End: 2022-09-16

## 2022-09-06 RX ORDER — HYDROMORPHONE HYDROCHLORIDE 1 MG/ML
0.5 INJECTION, SOLUTION INTRAMUSCULAR; INTRAVENOUS; SUBCUTANEOUS
Status: DISCONTINUED | OUTPATIENT
Start: 2022-09-06 | End: 2022-09-06 | Stop reason: HOSPADM

## 2022-09-06 RX ADMIN — KETOROLAC TROMETHAMINE 15 MG: 15 INJECTION, SOLUTION INTRAMUSCULAR; INTRAVENOUS at 10:38

## 2022-09-06 RX ADMIN — SODIUM CHLORIDE 500 ML: 9 INJECTION, SOLUTION INTRAVENOUS at 10:37

## 2022-09-06 RX ADMIN — SODIUM CHLORIDE 50 ML: 9 INJECTION, SOLUTION INTRAVENOUS at 11:12

## 2022-09-06 RX ADMIN — HYDROMORPHONE HYDROCHLORIDE 0.5 MG: 1 INJECTION, SOLUTION INTRAMUSCULAR; INTRAVENOUS; SUBCUTANEOUS at 12:48

## 2022-09-06 RX ADMIN — ONDANSETRON 4 MG: 2 INJECTION INTRAMUSCULAR; INTRAVENOUS at 12:48

## 2022-09-06 RX ADMIN — GADOBUTROL 10 ML: 604.72 INJECTION INTRAVENOUS at 11:12

## 2022-09-06 ASSESSMENT — ACTIVITIES OF DAILY LIVING (ADL)
ADLS_ACUITY_SCORE: 35
ADLS_ACUITY_SCORE: 35

## 2022-09-06 NOTE — DISCHARGE INSTRUCTIONS
RETURN TO THE EMERGENCY ROOM FOR THE FOLLOWING:    Severely worsened headache, new vision changes, new one-sided weakness or incoordination, new difficulty with speech, or at anytime for any concern.    FOLLOW UP:    Consider physical therapy follow-up.  Referral order placed at the time of your discharge.  Expect a phone call within the next couple of days to help with scheduling.    Follow-up with your primary clinic in 1 to 2 weeks for reevaluation.  I played a referral order for this visit as well.  Expect a phone call to help with scheduling.    TREATMENT RECOMMENDATIONS:    Lisinopril 40 mg daily.    NURSE ADVICE LINE:  [phone] or [phone]

## 2022-09-06 NOTE — ED TRIAGE NOTES
BP has been elevated, 201/101 yesterday, taking BP meds, has been dizzy with near syncope, pain in left shoulder that goes up the side of neck into head     Triage Assessment     Row Name 09/06/22 0940       Triage Assessment (Adult)    Airway WDL WDL       Respiratory WDL    Respiratory WDL WDL       Peripheral/Neurovascular WDL    Peripheral Neurovascular WDL WDL       Cognitive/Neuro/Behavioral WDL    Cognitive/Neuro/Behavioral WDL WDL

## 2022-09-06 NOTE — ED PROVIDER NOTES
HPI   The patient is a 41-year-old female presenting with neck pain, headache, and dizziness.  She presents with 10 to 14 days of dizziness.  Her dizziness is described as being off balance.  It is episodic but does not come on without obvious triggers.  Movement of her body or changes in position of her head at the neck did not elicit dizziness.  Her sense of movement can come on when she is at rest and sitting on the couch.  She denies feeling like she is going to faint.  No lightheadedness.  No chest pain.  No palpitations.  Headache and neck pain started together about 3 days ago.  Her headache and neck pain have been constant since onset.  They are moderate to severe.  She denies associated nausea or vomiting.  No visual changes.  No difficulty with speech.  No hearing changes.  No tinnitus.  No one-sided weakness or incoordination.  No recent trauma or injury.  No URI symptoms or fever.  She has not had similar symptoms previously.        Allergies:  Allergies   Allergen Reactions     Bupropion Nausea     Sulfa Drugs      Problem List:    Patient Active Problem List    Diagnosis Date Noted     COPD (chronic obstructive pulmonary disease) (H) 07/27/2021     Priority: Medium     Moderate episode of recurrent major depressive disorder (H) 05/21/2021     Priority: Medium     Symptomatic cholelithiasis 12/15/2020     Priority: Medium     Added automatically from request for surgery [id number]       Morbid obesity (H) 12/11/2020     Priority: Medium     Dyslipidemia 11/10/2020     Priority: Medium     Dysmenorrhea 11/10/2020     Priority: Medium     treated with continuous OCPs       GERD (gastroesophageal reflux disease) 11/10/2020     Priority: Medium     Thoracic back pain 11/10/2020     Priority: Medium     left, approx T10       H/O LEEP      Priority: Medium     1998 LEEP- Unknown results.  2010 NIL pap.  2/2/12 COLP and ECC- DARREN 1.  2013 NIL pap  4/7/15 LSIL pap, + HR HPV   8/13/15 COLP- DARREN 1, ECC-  Negative.  6/10/16 NIL pap, Neg HPV.  5/14/19 NIL pap, Neg HPV.  Above results found in [facility]  10/15/20 NIL pap, Neg HPV. Plan cotest in 3 years.        Cervical spinal stenosis 07/01/2019     Priority: Medium     7/2019 MRI - At C5-6, broad-based central disc extrusion demonstrating caudal migration mildly deforms the cord and severely narrows the spinal canal. Uncovertebral joint spurring contributes to mild to moderate left neural foraminal narrowing with potential left C6 nerve root encroachment.       Herniation of cervical intervertebral disc with radiculopathy 07/01/2019     Priority: Medium     see MRI 7/2019       Attention deficit hyperactivity disorder, combined type, mild 08/29/2018     Priority: Medium     Generalized anxiety disorder 08/29/2018     Priority: Medium     Allergic rhinitis 04/01/2008     Priority: Medium      Past Medical History:    Past Medical History:   Diagnosis Date     Abnormal Pap smear of cervix      Past Surgical History:    Past Surgical History:   Procedure Laterality Date     CYSTOSCOPY N/A 4/25/2022    Procedure: CYSTOSCOPY;  Surgeon: Julius Montejo MD;  Location: [facility]     LAPAROSCOPIC CHOLECYSTECTOMY N/A 12/21/2020    Procedure: Laparoscopic cholecystectomy;  Surgeon: Manuel Durham DO;  Location: [facility]     LAPAROSCOPIC HYSTERECTOMY TOTAL, BILATERAL SALPINGO-OOPHORECTOMY, COMBINED Bilateral 4/25/2022    Procedure: Laparoscopic total hysterectomy, Bilateral salpingectomy, with sparing of ovaries;  Surgeon: Julius Montejo MD;  Location: [facility]     Family History:    Family History   Problem Relation Age of Onset     Hypertension Father      Hypertension Brother      Hypertension Sister      Hypertension Brother      Social History:  Marital Status:   [4]  Social History     Tobacco Use     Smoking status: Current Every Day Smoker     Packs/day: 0.50     Types: Cigarettes     Smokeless tobacco: Never Used   Vaping Use     Vaping Use: Never used    Substance Use Topics     Alcohol use: Yes     Comment: occassionally     Drug use: Never      Medications:    lisinopril (ZESTRIL) 40 MG tablet  acetaminophen (TYLENOL) 325 MG tablet  albuterol (PROAIR HFA/PROVENTIL HFA/VENTOLIN HFA) 108 (90 Base) MCG/ACT inhaler  cyclobenzaprine (FLEXERIL) 10 MG tablet  dexmethylphenidate (FOCALIN) 10 MG tablet  [START ON 9/19/2022] dexmethylphenidate (FOCALIN) 10 MG tablet  dexmethylphenidate (FOCALIN) 10 MG tablet  diphenhydrAMINE (BENADRYL) 25 MG tablet  escitalopram (LEXAPRO) 10 MG tablet  escitalopram (LEXAPRO) 20 MG tablet  hydrOXYzine (ATARAX) 25 MG tablet  ibuprofen (ADVIL/MOTRIN) 200 MG tablet  ipratropium - albuterol 0.5 mg/2.5 mg/3 mL (DUONEB) 0.5-2.5 (3) MG/3ML neb solution  methocarbamol (ROBAXIN) 500 MG tablet  montelukast (SINGULAIR) 10 MG tablet  omeprazole (PRILOSEC) 20 MG DR capsule  oxyCODONE (ROXICODONE) 5 MG tablet  propranolol (INDERAL) 80 MG tablet      Review of Systems   All other systems reviewed and are negative.      PE   BP: (!) 159/90  Pulse: 57  Temp: 98  F (36.7  C)  Resp: 18  Weight: 86.2 kg (190 lb)  SpO2: 97 %  Physical Exam  Vitals reviewed.   Constitutional:       Appearance: She is well-developed.      Comments: The patient is a washcloth on her head.  She looks uncomfortable during our conversation.  Cooperative though and answering questions well.   HENT:      Head: Normocephalic and atraumatic.      Right Ear: Tympanic membrane, ear canal and external ear normal.      Left Ear: Tympanic membrane, ear canal and external ear normal.      Nose: Nose normal.      Mouth/Throat:      Mouth: Mucous membranes are moist.      Pharynx: Oropharynx is clear.   Eyes:      Extraocular Movements: Extraocular movements intact.      Conjunctiva/sclera: Conjunctivae normal.      Pupils: Pupils are equal, round, and reactive to light.      Comments: No evidence for nystagmus with looking straight ahead or with testing range of motion.   Cardiovascular:       Rate and Rhythm: Normal rate and regular rhythm.   Pulmonary:      Effort: Pulmonary effort is normal.   Abdominal:      Palpations: Abdomen is soft.      Tenderness: There is no abdominal tenderness.   Musculoskeletal:         General: Normal range of motion.      Cervical back: Normal range of motion.   Skin:     General: Skin is warm and dry.   Neurological:      General: No focal deficit present.      Mental Status: She is alert and oriented to person, place, and time.      Comments: No dysarthria or dysphasia.  CN II-VIII intact grossly.  Moving U/L extremities B.  Strength 5/5 U/L extremities B.  Sensation intact grossly to touch (equal).  Rapid alternating movements intact.  Negative Rhomberg.  Normal finger-to-nose movments.   Psychiatric:         Behavior: Behavior normal.         ED COURSE and MDM   1033.  The patient presents with 10 to 14 days of episodic dizziness.  She has 3 days of neck pain and headache on the left, specifically.  Headache is moderate to severe.  MRI studies pending.  Lab values pending.  Toradol, fluid bolus.    1243.  MRI results reviewed with the patient.  Low concern for vascular pathology and stroke.  Low concern for infectious or oncologic etiology of symptoms.  Cervical MRI was not obtained.  I suspect she has cervical source of neck pain.  I suspect she has a tension headache.  She does have high blood pressure here in the ED and she describes multiple documented episodes of hypertension outside the hospital.  Increase the lisinopril to 40 mg daily.  Close follow-up with her primary to further evaluate the blood pressure and dizziness are recommended.  Physical therapy referral order placed for her neck pain and tension headache.  Dilaudid given here in the ED for pain control.    LABS  Labs Ordered and Resulted from Time of ED Arrival to Time of ED Departure   CRP INFLAMMATION - Normal       Result Value    CRP Inflammation <3.00     BASIC METABOLIC PANEL - Normal     Creatinine 0.74      Sodium 138      Potassium 4.4      Urea Nitrogen 9.8      Chloride 103      Carbon Dioxide (CO2) 26      Anion Gap 9      Glucose 93      GFR Estimate >90      Calcium 8.9     CBC WITH PLATELETS AND DIFFERENTIAL    WBC Count 9.3      RBC Count 4.58      Hemoglobin 13.3      Hematocrit 40.9      MCV 89      MCH 29.0      MCHC 32.5      RDW 13.2      Platelet Count 294      % Neutrophils 54      % Lymphocytes 36      % Monocytes 7      % Eosinophils 2      % Basophils 1      % Immature Granulocytes 0      NRBCs per 100 WBC 0      Absolute Neutrophils 5.1      Absolute Lymphocytes 3.4      Absolute Monocytes 0.6      Absolute Eosinophils 0.2      Absolute Basophils 0.1      Absolute Immature Granulocytes 0.0      Absolute NRBCs 0.0         IMAGING  Images reviewed by me.  Radiology report also reviewed.  MR Brain w/o & w Contrast   Final Result   IMPRESSION:  Normal MRI of the brain.       ODETTE KU MD            SYSTEM ID:  [id number]      MRA Neck (Carotids) wo & w Contrast   Final Result   IMPRESSION:  Normal MR angiogram of the neck.          ODETTE KU MD            SYSTEM ID:  [id number]      MRA Brain ([employer] [place] [facility]) wo Contrast   Final Result   IMPRESSION: Normal MR angiogram of the head.           ODETTE KU MD            SYSTEM ID:  [id number]          Procedures    Medications   HYDROmorphone (PF) (DILAUDID) injection 0.5 mg (0.5 mg Intravenous Given 9/6/22 1248)   ondansetron (ZOFRAN) injection 4 mg (4 mg Intravenous Given 9/6/22 1248)   ketorolac (TORADOL) injection 15 mg (15 mg Intravenous Given 9/6/22 1038)   0.9% sodium chloride BOLUS (0 mLs Intravenous Stopped 9/6/22 1258)   gadobutrol (GADAVIST) injection 10 mL (10 mLs Intravenous Given 9/6/22 1112)   0.9% sodium chloride BOLUS (50 mLs Intravenous New Bag 9/6/22 1112)         IMPRESSION       ICD-10-CM    1. Cervicalgia  M54.2 Physical Therapy Referral   2. Tension headache  G44.209 Physical Therapy Referral   3.  Dizziness  R42 Primary Care Referral   4. Hypertension, unspecified type  I10 Primary Care Referral            Medication List      Modified    lisinopril 40 MG tablet  Commonly known as: ZESTRIL  40 mg, Oral, DAILY  What changed:     medication strength    how much to take                        Sami Fischer MD  09/06/22 [id number]

## 2022-09-06 NOTE — ED NOTES
Patient reports headache and right eye droop, along with left neck pain. She takes BP at home and SBP has ranged from 160-200. She takes 2 antihypertensives at home. She has been alternating tylenol and ibuprofen at home. Has not taken anything this morning. Denies SOB and CP. No swelling.

## 2022-09-16 ENCOUNTER — OFFICE VISIT (OUTPATIENT)
Dept: FAMILY MEDICINE | Facility: CLINIC | Age: 42
End: 2022-09-16
Payer: COMMERCIAL

## 2022-09-16 VITALS
WEIGHT: 193 LBS | TEMPERATURE: 98.2 F | SYSTOLIC BLOOD PRESSURE: 162 MMHG | HEART RATE: 80 BPM | DIASTOLIC BLOOD PRESSURE: 80 MMHG | BODY MASS INDEX: 32.95 KG/M2 | HEIGHT: 64 IN | RESPIRATION RATE: 18 BRPM

## 2022-09-16 DIAGNOSIS — G43.009 MIGRAINE WITHOUT AURA AND WITHOUT STATUS MIGRAINOSUS, NOT INTRACTABLE: ICD-10-CM

## 2022-09-16 DIAGNOSIS — G89.4 CHRONIC PAIN SYNDROME: ICD-10-CM

## 2022-09-16 DIAGNOSIS — M51.369 DDD (DEGENERATIVE DISC DISEASE), LUMBAR: ICD-10-CM

## 2022-09-16 DIAGNOSIS — M54.50 CHRONIC BILATERAL LOW BACK PAIN WITHOUT SCIATICA: ICD-10-CM

## 2022-09-16 DIAGNOSIS — F33.1 MODERATE EPISODE OF RECURRENT MAJOR DEPRESSIVE DISORDER (H): ICD-10-CM

## 2022-09-16 DIAGNOSIS — Z00.00 ROUTINE GENERAL MEDICAL EXAMINATION AT A HEALTH CARE FACILITY: Primary | ICD-10-CM

## 2022-09-16 DIAGNOSIS — F90.2 ATTENTION DEFICIT HYPERACTIVITY DISORDER (ADHD), COMBINED TYPE: ICD-10-CM

## 2022-09-16 DIAGNOSIS — I10 BENIGN ESSENTIAL HYPERTENSION: ICD-10-CM

## 2022-09-16 DIAGNOSIS — F41.1 GAD (GENERALIZED ANXIETY DISORDER): ICD-10-CM

## 2022-09-16 DIAGNOSIS — G89.29 CHRONIC BILATERAL LOW BACK PAIN WITHOUT SCIATICA: ICD-10-CM

## 2022-09-16 DIAGNOSIS — R00.2 PALPITATIONS: ICD-10-CM

## 2022-09-16 DIAGNOSIS — M54.12 CERVICAL RADICULOPATHY: ICD-10-CM

## 2022-09-16 PROCEDURE — 99396 PREV VISIT EST AGE 40-64: CPT | Performed by: NURSE PRACTITIONER

## 2022-09-16 PROCEDURE — 99214 OFFICE O/P EST MOD 30 MIN: CPT | Mod: 25 | Performed by: NURSE PRACTITIONER

## 2022-09-16 RX ORDER — PROPRANOLOL HYDROCHLORIDE 80 MG/1
80 TABLET ORAL DAILY
Qty: 90 TABLET | Refills: 0 | Status: SHIPPED | OUTPATIENT
Start: 2022-09-16 | End: 2022-11-30

## 2022-09-16 RX ORDER — AMLODIPINE BESYLATE 2.5 MG/1
2.5 TABLET ORAL DAILY
Qty: 90 TABLET | Refills: 3 | Status: SHIPPED | OUTPATIENT
Start: 2022-09-16 | End: 2023-10-02

## 2022-09-16 RX ORDER — ESCITALOPRAM OXALATE 10 MG/1
TABLET ORAL
Qty: 90 TABLET | Refills: 0 | Status: CANCELLED | OUTPATIENT
Start: 2022-09-16

## 2022-09-16 RX ORDER — ESCITALOPRAM OXALATE 20 MG/1
20 TABLET ORAL DAILY
Qty: 90 TABLET | Refills: 3 | Status: SHIPPED | OUTPATIENT
Start: 2022-09-16 | End: 2023-10-10

## 2022-09-16 RX ORDER — OXYCODONE HYDROCHLORIDE 5 MG/1
5 TABLET ORAL EVERY 8 HOURS PRN
Qty: 90 TABLET | Refills: 0 | Status: SHIPPED | OUTPATIENT
Start: 2022-09-16 | End: 2022-10-14

## 2022-09-16 RX ORDER — DEXMETHYLPHENIDATE HYDROCHLORIDE 10 MG/1
10 TABLET ORAL 2 TIMES DAILY
Qty: 60 TABLET | Refills: 0 | Status: SHIPPED | OUTPATIENT
Start: 2022-09-19 | End: 2022-10-14

## 2022-09-16 RX ORDER — LISINOPRIL 40 MG/1
40 TABLET ORAL DAILY
Qty: 90 TABLET | Refills: 3 | Status: SHIPPED | OUTPATIENT
Start: 2022-09-16 | End: 2023-10-10

## 2022-09-16 ASSESSMENT — PATIENT HEALTH QUESTIONNAIRE - PHQ9
SUM OF ALL RESPONSES TO PHQ QUESTIONS 1-9: 6
SUM OF ALL RESPONSES TO PHQ QUESTIONS 1-9: 6
10. IF YOU CHECKED OFF ANY PROBLEMS, HOW DIFFICULT HAVE THESE PROBLEMS MADE IT FOR YOU TO DO YOUR WORK, TAKE CARE OF THINGS AT HOME, OR GET ALONG WITH OTHER PEOPLE: SOMEWHAT DIFFICULT

## 2022-09-16 ASSESSMENT — PAIN SCALES - GENERAL: PAINLEVEL: NO PAIN (0)

## 2022-09-16 ASSESSMENT — ENCOUNTER SYMPTOMS
CHILLS: 0
EYE PAIN: 1
FEVER: 0
PARESTHESIAS: 0
NERVOUS/ANXIOUS: 1
JOINT SWELLING: 1
WEAKNESS: 0
COUGH: 0
DIZZINESS: 1
NAUSEA: 0
SHORTNESS OF BREATH: 0
HEARTBURN: 1
HEMATURIA: 0
DIARRHEA: 0
DYSURIA: 0
MYALGIAS: 1
HEADACHES: 1
ABDOMINAL PAIN: 0
BREAST MASS: 0
CONSTIPATION: 0
ARTHRALGIAS: 1
SORE THROAT: 0
PALPITATIONS: 1
FREQUENCY: 0
HEMATOCHEZIA: 0

## 2022-09-16 NOTE — PATIENT INSTRUCTIONS
[facility] procedure at [phone]     Preventive Health Recommendations  Female Ages 40 to 49    Yearly exam:   See your health care provider every year in order to  Review health changes.   Discuss preventive care.    Review your medicines if your doctor prescribed any.    Get a Pap test every three years (unless you have an abnormal result and your provider advises testing more often).    If you get Pap tests with HPV test, you only need to test every 5 years, unless you have an abnormal result. You do not need a Pap test if your uterus was removed (hysterectomy) and you have not had cancer.    You should be tested each year for STDs (sexually transmitted diseases), if you're at risk.   Ask your doctor if you should have a mammogram.    Have a colonoscopy (test for colon cancer) if someone in your family has had colon cancer or polyps before age 50.     Have a cholesterol test every 5 years.     Have a diabetes test (fasting glucose) after age 45. If you are at risk for diabetes, you should have this test every 3 years.    Shots: Get a flu shot each year. Get a tetanus shot every 10 years.     Nutrition:   Eat at least 5 servings of fruits and vegetables each day.  Eat whole-grain bread, whole-wheat pasta and brown rice instead of white grains and rice.  Get adequate Calcium and Vitamin D.      Lifestyle  Exercise at least 150 minutes a week (an average of 30 minutes a day, 5 days a week). This will help you control your weight and prevent disease.  Limit alcohol to one drink per day.  No smoking.   Wear sunscreen to prevent skin cancer.  See your dentist every six months for an exam and cleaning.

## 2022-09-16 NOTE — PROGRESS NOTES
\   SUBJECTIVE:   CC: Anne is an 41 year old who presents for preventive health visit.       Patient has been advised of split billing requirements and indicates understanding: Yes  Healthy Habits:     Getting at least 3 servings of Calcium per day:  Yes    Bi-annual eye exam:  Yes    Dental care twice a year:  NO    Sleep apnea or symptoms of sleep apnea:  Daytime drowsiness    Diet:  Regular (no restrictions)    Frequency of exercise:  None    Taking medications regularly:  Yes    Medication side effects:  None    PHQ-2 Total Score: 2    Additional concerns today:  Yes          Today's PHQ-2 Score:   PHQ-2 ( 1999 [employer]) 9/16/2022   Q1: Little interest or pleasure in doing things 1   Q2: Feeling down, depressed or hopeless 1   PHQ-2 Score 2   PHQ-2 Total Score (12-17 Years)- Positive if 3 or more points; Administer PHQ-A if positive -   Q1: Little interest or pleasure in doing things Several days   Q2: Feeling down, depressed or hopeless Several days   PHQ-2 Score 2       Abuse: Current or Past (Physical, Sexual or Emotional) - No  Do you feel safe in your environment? Yes    Have you ever done Advance Care Planning? (For example, a Health Directive, POLST, or a discussion with a medical provider or your loved ones about your wishes): No, advance care planning information given to patient to review.  Patient declined advance care planning discussion at this time.    Social History     Tobacco Use     Smoking status: Current Every Day Smoker     Packs/day: 0.50     Types: Cigarettes     Smokeless tobacco: Never Used   Substance Use Topics     Alcohol use: Yes     Comment: occassionally     If you drink alcohol do you typically have >3 drinks per day or >7 drinks per week? No    Alcohol Use 9/16/2022   Prescreen: >3 drinks/day or >7 drinks/week? No       Reviewed orders with patient.  Reviewed health maintenance and updated orders accordingly - Yes  BP Readings from Last 3 Encounters:   09/16/22 (!) 162/80   09/06/22  (!) 145/79   08/11/22 (!) 148/77    Wt Readings from Last 3 Encounters:   09/16/22 87.5 kg (193 lb)   09/06/22 86.2 kg (190 lb)   05/17/22 94.1 kg (207 lb 6.4 oz)                  Patient Active Problem List   Diagnosis     H/O LEEP     Allergic rhinitis     Attention deficit hyperactivity disorder, combined type, mild     Cervical spinal stenosis     Dyslipidemia     Dysmenorrhea     Generalized anxiety disorder     GERD (gastroesophageal reflux disease)     Herniation of cervical intervertebral disc with radiculopathy     Thoracic back pain     Morbid obesity (H)     Symptomatic cholelithiasis     Moderate episode of recurrent major depressive disorder (H)     COPD (chronic obstructive pulmonary disease) (H)     Past Surgical History:   Procedure Laterality Date     CYSTOSCOPY N/A 4/25/2022    Procedure: CYSTOSCOPY;  Surgeon: Julius Montejo MD;  Location: [facility]     LAPAROSCOPIC CHOLECYSTECTOMY N/A 12/21/2020    Procedure: Laparoscopic cholecystectomy;  Surgeon: Manuel Durham DO;  Location: [facility]     LAPAROSCOPIC HYSTERECTOMY TOTAL, BILATERAL SALPINGO-OOPHORECTOMY, COMBINED Bilateral 4/25/2022    Procedure: Laparoscopic total hysterectomy, Bilateral salpingectomy, with sparing of ovaries;  Surgeon: Julius Montejo MD;  Location: [facility]       Social History     Tobacco Use     Smoking status: Current Every Day Smoker     Packs/day: 0.50     Types: Cigarettes     Smokeless tobacco: Never Used   Substance Use Topics     Alcohol use: Yes     Comment: occassionally     Family History   Problem Relation Age of Onset     Hypertension Father      Hypertension Brother      Hypertension Sister      Hypertension Brother          Current Outpatient Medications   Medication Sig Dispense Refill     acetaminophen (TYLENOL) 325 MG tablet Take 3 tablets (975 mg) by mouth every 6 hours as needed for mild pain 50 tablet 0     albuterol (PROAIR HFA/PROVENTIL HFA/VENTOLIN HFA) 108 (90 Base) MCG/ACT inhaler Inhale  2 puffs into the lungs every 6 hours 1 Inhaler 1     cyclobenzaprine (FLEXERIL) 10 MG tablet Take 1 tablet by mouth twice daily as needed 60 tablet 0     dexmethylphenidate (FOCALIN) 10 MG tablet Take 1 tablet (10 mg) by mouth 2 times daily 60 tablet 0     diphenhydrAMINE (BENADRYL) 25 MG tablet Take 1-2 tablets (25-50 mg) by mouth every 6 hours as needed for itching or allergies 30 tablet 1     hydrOXYzine (ATARAX) 25 MG tablet TAKE 1 TO 2 TABLETS BY MOUTH EVERY 6 HOURS AS NEEDED FOR ANXIETY 60 tablet 0     ibuprofen (ADVIL/MOTRIN) 200 MG tablet Take 4 tablets (800 mg) by mouth every 6 hours as needed for other (mild and/or inflammatory pain)       ipratropium - albuterol 0.5 mg/2.5 mg/3 mL (DUONEB) 0.5-2.5 (3) MG/3ML neb solution Take 1 vial by nebulization every 6 hours as needed for shortness of breath / dyspnea or wheezing       lisinopril (ZESTRIL) 40 MG tablet Take 1 tablet (40 mg) by mouth daily 30 tablet 0     methocarbamol (ROBAXIN) 500 MG tablet Take 1 tablet (500 mg) by mouth 4 times daily as needed for muscle spasms 60 tablet 0     montelukast (SINGULAIR) 10 MG tablet TAKE 1 TABLET BY MOUTH ONCE DAILY AT BEDTIME 90 tablet 0     omeprazole (PRILOSEC) 20 MG DR capsule Take 20 mg by mouth as needed PRN       oxyCODONE (ROXICODONE) 5 MG tablet Take 1 tablet (5 mg) by mouth every 8 hours as needed for pain Max 3 tablets per day 90 tablet 0     propranolol (INDERAL) 80 MG tablet Take 1 tablet (80 mg) by mouth daily 90 tablet 0     escitalopram (LEXAPRO) 10 MG tablet TAKE 1 TABLET BY MOUTH ONCE DAILY .  TAKE  WITH  20MG  FOR  TOTAL  OF  30MG  DAILY 90 tablet 0     escitalopram (LEXAPRO) 20 MG tablet TAKE 1 TABLET BY MOUTH EVERY DAY (Patient not taking: Reported on 9/16/2022) 90 tablet 3     Allergies   Allergen Reactions     Bupropion Nausea     Sulfa Drugs      Recent Labs   Lab Test 09/06/22  1009 04/12/22  1530 03/15/22  1016 02/16/22  1039 12/14/21  2145 12/10/20  1500 12/08/20  2028 11/10/20  4500  10/15/20  0825   LDL  --   --   --   --   --   --   --   --  71   HDL  --   --   --   --   --   --   --   --  46*   TRIG  --   --   --   --   --   --   --   --  289*   ALT  --   --  27  --  20 18 19   < > 20   CR 0.74 0.58 0.56   < > 0.56 0.58 0.60   < > 0.64   GFRESTIMATED >90 >90 >90   < > >90 >90 >90   < > >90   GFRESTBLACK  --   --   --   --   --  >90 >90   < > >90   POTASSIUM 4.4 4.2 4.4   < > 4.4 3.8 4.0   < > 3.9   TSH  --   --   --   --   --   --   --   --  4.93*    < > = values in this interval not displayed.        Breast Cancer Screening:    Breast CA Risk Assessment (FHS-7) 2022   Do you have a family history of breast, colon, or ovarian cancer? No / Unknown         Mammogram Screening - Offered annual screening and updated Health Maintenance for [place] plan based on risk factor consideration    Pertinent mammograms are reviewed under the imaging tab.    History of abnormal Pap smear: NO - age 30- 65 PAP every 3 years recommended  PAP / HPV Latest Ref Rng & Units 10/15/2020   PAP (Historical) - NIL   HPV16 NEG:Negative Negative   HPV18 NEG:Negative Negative   HRHPV NEG:Negative Negative     Reviewed and updated as needed this visit by clinical staff                    Reviewed and updated as needed this visit by Provider                   Past Medical History:   Diagnosis Date     Abnormal Pap smear of cervix       Past Surgical History:   Procedure Laterality Date     CYSTOSCOPY N/A 2022    Procedure: CYSTOSCOPY;  Surgeon: Julius Montejo MD;  Location: [facility]     LAPAROSCOPIC CHOLECYSTECTOMY N/A 2020    Procedure: Laparoscopic cholecystectomy;  Surgeon: Manuel Durham DO;  Location: [facility]     LAPAROSCOPIC HYSTERECTOMY TOTAL, BILATERAL SALPINGO-OOPHORECTOMY, COMBINED Bilateral 2022    Procedure: Laparoscopic total hysterectomy, Bilateral salpingectomy, with sparing of ovaries;  Surgeon: Julius Montejo MD;  Location: [facility]     OB History    Para Term   AB Living   4 3 1 2 1 3   SAB IAB Ectopic Multiple Live Births   1 0 0 0 0      # Outcome Date GA Lbr Lars/2nd Weight Sex Delivery Anes PTL Lv   4 SAB            3             2             1 Term                Review of Systems   Constitutional: Negative for chills and fever.   HENT: Positive for ear pain. Negative for congestion, hearing loss and sore throat.    Eyes: Positive for pain. Negative for visual disturbance.   Respiratory: Negative for cough and shortness of breath.    Cardiovascular: Positive for palpitations. Negative for chest pain and peripheral edema.   Gastrointestinal: Positive for heartburn. Negative for abdominal pain, constipation, diarrhea, hematochezia and nausea.   Breasts:  Negative for tenderness, breast mass and discharge.   Genitourinary: Negative for dysuria, frequency, genital sores, hematuria, pelvic pain, urgency, vaginal bleeding and vaginal discharge.   Musculoskeletal: Positive for arthralgias, joint swelling and myalgias.   Skin: Negative for rash.   Neurological: Positive for dizziness and headaches. Negative for weakness and paresthesias.   Psychiatric/Behavioral: Negative for mood changes. The patient is nervous/anxious.      Inspection: normal cervical lordosis  Tender:  normal musculature, left paracervical muscles, right paracervical muscles  Non-tender:  occipital nerves, spinous processes, scapula, left trapezius muscles, right trapezius muscles  Range of Motion:  Full ROM of cervical spine  Strength: Full strength of all neck muscles  Special tests:  Reproduction of radicular pattern      Tender:  lumbar spinous processes, left para lumbar muscles, right para lumbar muscles  Non-tender:  thoracic spinous processes, thoracic facet joints, left parathoracic muscles, right parathoracic muscles  Range of Motion:  left lateral thoracic bending   full, right lateral thoracic bending  full, left thoracic rotation  full, right thoracic rotation  full,  "lumbar flexion  decreased, painful, lumbar extension  decreased, painful, left lateral lumbar bending  decreased, painful, right lateral lumbar bending  decreased, painful, left lateral lumbar rotation  decreased, painful, right lateral lumbar rotation  decreased, painful  Strength:  able to heel walk  Special tests:  negative straight leg raises    Hip Exam: Hip ROM full       OBJECTIVE:   LMP 03/02/2022 (LMP Unknown)  BP (!) 162/80 (BP Location: Right arm, Cuff Size: Adult Large)   Pulse 80   Temp 98.2  F (36.8  C) (Tympanic)   Resp 18   Ht 1.626 m (5' 4\")   Wt 87.5 kg (193 lb)   LMP 03/02/2022 (LMP Unknown)   BMI 33.13 kg/m      Physical Exam  GENERAL APPEARANCE: healthy, alert and no distress  EYES: Eyes grossly normal to inspection, PERRL and conjunctivae and sclerae normal  HENT: ear canals and TM's normal, nose and mouth without ulcers or lesions, oropharynx clear and oral mucous membranes moist  NECK: no adenopathy, no asymmetry, masses, or scars and thyroid normal to palpation  RESP: lungs clear to auscultation - no rales, rhonchi or wheezes  BREAST: normal without masses, tenderness or nipple discharge and no palpable axillary masses or adenopathy  CV: regular rate and rhythm, normal S1 S2, no S3 or S4, no murmur, click or rub, no peripheral edema and peripheral pulses strong  ABDOMEN: soft, nontender, no hepatosplenomegaly, no masses and bowel sounds normal  MS: no musculoskeletal defects are noted and gait is age appropriate without ataxia  SKIN: no suspicious lesions or rashes  NEURO: Normal strength and tone, sensory exam grossly normal, mentation intact and speech normal  PSYCH: mentation appears normal and affect normal/bright      Diagnostic Test Results:  Labs reviewed in Epic  No results found for any visits on 09/16/22.    ASSESSMENT/PLAN:   (Z00.00) Routine general medical examination at a health care facility  (primary encounter diagnosis)  Comment:   Plan:     (F41.1) MILES (generalized " anxiety disorder)  Comment:  Controlled no change in treatment plan  Plan: escitalopram (LEXAPRO) 20 MG tablet,         OFFICE/OUTPT VISIT,EST,LEVL IV      (F33.1) Moderate episode of recurrent major depressive disorder (H)  Comment:  Controlled no change in treatment plan  Plan: OFFICE/OUTPT VISIT,EST,LEVL IV      (F90.2) Attention deficit hyperactivity disorder (ADHD), combined type  Comment:  Controlled no change in treatment plan  Plan: dexmethylphenidate (FOCALIN) 10 MG tablet,         OFFICE/OUTPT VISIT,EST,LEVL IV      (I10) Benign essential hypertension  Comment: uncontrolled will increase lisinopril to 40 mg and start noravac   Plan: lisinopril (ZESTRIL) 40 MG tablet, amLODIPine         (NORVASC) 2.5 MG tablet, propranolol (INDERAL)         80 MG tablet, OFFICE/OUTPT VISIT,EST,LEVL IV      (M54.50,  G89.29) Chronic bilateral low back pain without sciatica  Comment: recommend follow-up wit spinal specialist   Plan: Spine  Referral, OFFICE/OUTPT         VISIT,EST,LEVL IV      (M54.12) Cervical radiculopathy  Comment: recommend follow-up with spinal specialist   Plan: Spine  Referral, OFFICE/OUTPT         VISIT,EST,LEVL IV      (G89.4) Chronic pain syndrome  Comment:  Controlled no change in treatment plan  Plan: oxyCODONE (ROXICODONE) 5 MG tablet,         OFFICE/OUTPT VISIT,EST,LEVL IV            (M51.36) DDD (degenerative disc disease), lumbar  Comment:  Controlled no change in treatment plan  Plan: oxyCODONE (ROXICODONE) 5 MG tablet,         OFFICE/OUTPT VISIT,EST,LEVL IV      (R00.2) Palpitations  Comment: due to patient symptoms recommend ZIO patch   Plan: Adult Leadless EKG Monitor 3 to 7 Days,         OFFICE/OUTPT VISIT,EST,LEVL IV      (G43.009) Migraine without aura and without status migrainosus, not intractable  Comment:  Controlled no change in treatment plan  Plan: propranolol (INDERAL) 80 MG tablet,         OFFICE/OUTPT VISIT,EST,LEVL IV            Patient has been advised of  "split billing requirements and indicates understanding: Yes    COUNSELING:  Reviewed preventive health counseling, as reflected in patient instructions       Regular exercise       Healthy diet/nutrition       Vision screening       Hearing screening       Aspirin prophylaxis       Alcohol Use       Folic Acid       Osteoporosis prevention/bone health       Colorectal Cancer Screening       (Nga)menopause management    Estimated body mass index is 32.61 kg/m  as calculated from the following:    Height as of 5/17/22: 1.626 m (5' 4\").    Weight as of 9/6/22: 86.2 kg (190 lb).    Weight management plan: Discussed healthy diet and exercise guidelines    She reports that she has been smoking cigarettes. She has been smoking about 0.50 packs per day. She has never used smokeless tobacco.  Nicotine/Tobacco Cessation Plan:   Information offered: Patient not interested at this time      Counseling Resources:  ATP IV Guidelines  Pooled Cohorts Equation Calculator  Breast Cancer Risk Calculator  BRCA-Related Cancer Risk Assessment: FHS-7 Tool  FRAX Risk Assessment  ICSI Preventive Guidelines  Dietary Guidelines for Americans, 2010  USDA's MyPlate  ASA Prophylaxis  Lung CA Screening    FRANKLYN Christianson [facility]  "

## 2022-09-26 ENCOUNTER — HOSPITAL ENCOUNTER (OUTPATIENT)
Dept: CARDIOLOGY | Facility: CLINIC | Age: 42
Discharge: HOME | End: 2022-09-26
Attending: NURSE PRACTITIONER | Admitting: NURSE PRACTITIONER
Payer: COMMERCIAL

## 2022-09-26 DIAGNOSIS — R00.2 PALPITATIONS: ICD-10-CM

## 2022-09-26 PROCEDURE — 93244 EXT ECG>48HR<7D REV&INTERPJ: CPT | Performed by: INTERNAL MEDICINE

## 2022-09-26 PROCEDURE — 93242 EXT ECG>48HR<7D RECORDING: CPT

## 2022-10-08 ENCOUNTER — OFFICE VISIT (OUTPATIENT)
Dept: URGENT CARE | Facility: URGENT CARE | Age: 42
End: 2022-10-08
Payer: COMMERCIAL

## 2022-10-08 VITALS
BODY MASS INDEX: 32.96 KG/M2 | OXYGEN SATURATION: 99 % | DIASTOLIC BLOOD PRESSURE: 72 MMHG | HEART RATE: 65 BPM | WEIGHT: 192 LBS | TEMPERATURE: 98.2 F | SYSTOLIC BLOOD PRESSURE: 125 MMHG

## 2022-10-08 DIAGNOSIS — J45.21 EXACERBATION OF INTERMITTENT ASTHMA, UNSPECIFIED ASTHMA SEVERITY: Primary | ICD-10-CM

## 2022-10-08 PROCEDURE — 99213 OFFICE O/P EST LOW 20 MIN: CPT | Performed by: FAMILY MEDICINE

## 2022-10-08 RX ORDER — ALBUTEROL SULFATE 90 UG/1
1-2 AEROSOL, METERED RESPIRATORY (INHALATION) EVERY 4 HOURS PRN
Qty: 18 G | Refills: 1 | Status: SHIPPED | OUTPATIENT
Start: 2022-10-08 | End: 2023-01-08

## 2022-10-08 RX ORDER — PREDNISONE 20 MG/1
TABLET ORAL
Qty: 15 TABLET | Refills: 0 | Status: SHIPPED | OUTPATIENT
Start: 2022-10-08 | End: 2023-01-08

## 2022-10-08 RX ORDER — IPRATROPIUM BROMIDE AND ALBUTEROL SULFATE 2.5; .5 MG/3ML; MG/3ML
1 SOLUTION RESPIRATORY (INHALATION) EVERY 6 HOURS PRN
Qty: 90 ML | Refills: 0 | Status: SHIPPED | OUTPATIENT
Start: 2022-10-08

## 2022-10-08 NOTE — PROGRESS NOTES
SUBJECTIVE:  Patient presents with slight cough and feeling of congestion.  Has asthma and exacerbation about twice per year causing anxiety and feeling of SOB.  No fever.    Past Medical History:   Diagnosis Date     Abnormal Pap smear of cervix      Current Outpatient Medications   Medication Sig Dispense Refill     acetaminophen (TYLENOL) 325 MG tablet Take 3 tablets (975 mg) by mouth every 6 hours as needed for mild pain 50 tablet 0     albuterol (PROAIR HFA/PROVENTIL HFA/VENTOLIN HFA) 108 (90 Base) MCG/ACT inhaler Inhale 1-2 puffs into the lungs every 4 hours as needed for shortness of breath / dyspnea or wheezing 18 g 1     albuterol (PROAIR HFA/PROVENTIL HFA/VENTOLIN HFA) 108 (90 Base) MCG/ACT inhaler Inhale 2 puffs into the lungs every 6 hours 1 Inhaler 1     amLODIPine (NORVASC) 2.5 MG tablet Take 1 tablet (2.5 mg) by mouth daily 90 tablet 3     cyclobenzaprine (FLEXERIL) 10 MG tablet Take 1 tablet by mouth twice daily as needed 60 tablet 0     dexmethylphenidate (FOCALIN) 10 MG tablet Take 1 tablet (10 mg) by mouth 2 times daily 60 tablet 0     dexmethylphenidate (FOCALIN) 10 MG tablet Take 1 tablet (10 mg) by mouth 2 times daily 60 tablet 0     diphenhydrAMINE (BENADRYL) 25 MG tablet Take 1-2 tablets (25-50 mg) by mouth every 6 hours as needed for itching or allergies 30 tablet 1     escitalopram (LEXAPRO) 20 MG tablet Take 1 tablet (20 mg) by mouth daily 90 tablet 3     hydrOXYzine (ATARAX) 25 MG tablet TAKE 1 TO 2 TABLETS BY MOUTH EVERY 6 HOURS AS NEEDED FOR ANXIETY 60 tablet 0     ibuprofen (ADVIL/MOTRIN) 200 MG tablet Take 4 tablets (800 mg) by mouth every 6 hours as needed for other (mild and/or inflammatory pain)       ipratropium - albuterol 0.5 mg/2.5 mg/3 mL (DUONEB) 0.5-2.5 (3) MG/3ML neb solution Take 1 vial (3 mLs) by nebulization every 6 hours as needed for shortness of breath / dyspnea or wheezing 90 mL 0     ipratropium - albuterol 0.5 mg/2.5 mg/3 mL (DUONEB) 0.5-2.5 (3) MG/3ML neb solution  Take 1 vial by nebulization every 6 hours as needed for shortness of breath / dyspnea or wheezing       lisinopril (ZESTRIL) 40 MG tablet Take 1 tablet (40 mg) by mouth daily 90 tablet 3     methocarbamol (ROBAXIN) 500 MG tablet Take 1 tablet (500 mg) by mouth 4 times daily as needed for muscle spasms 60 tablet 0     montelukast (SINGULAIR) 10 MG tablet TAKE 1 TABLET BY MOUTH ONCE DAILY AT BEDTIME 90 tablet 0     omeprazole (PRILOSEC) 20 MG DR capsule Take 20 mg by mouth as needed PRN       oxyCODONE (ROXICODONE) 5 MG tablet Take 1 tablet (5 mg) by mouth every 8 hours as needed for pain Max 3 tablets per day 90 tablet 0     predniSONE (DELTASONE) 20 MG tablet 2 daily for 5 days. 1 daily for 5 days 15 tablet 0     propranolol (INDERAL) 80 MG tablet Take 1 tablet (80 mg) by mouth daily 90 tablet 0     History   Smoking Status     Current Every Day Smoker     Packs/day: 0.50     Types: Cigarettes   Smokeless Tobacco     Never Used         OBJECITVE;  /72   Pulse 65   Temp 98.2  F (36.8  C) (Tympanic)   Wt 87.1 kg (192 lb)   LMP 03/02/2022 (LMP Unknown)   SpO2 99%   BMI 32.96 kg/m    Appears moderately ill but not toxic.  EARS:  normal.  THROAT AND PHARYNX:  normal.  NECK: supple; no adenopathy in the neck.  SINUSES: non tender.  CHEST:  clear.    ASSESSMENT:  Asthma exacerbation    PLAN:  Refill Duoneb, start prednisone taper, albuterol inhaler  Follow up with primary care provider if no improvement.

## 2022-10-09 ENCOUNTER — HEALTH MAINTENANCE LETTER (OUTPATIENT)
Age: 42
End: 2022-10-09

## 2022-10-10 ENCOUNTER — OFFICE VISIT (OUTPATIENT)
Dept: NEUROSURGERY | Facility: CLINIC | Age: 42
End: 2022-10-10
Payer: COMMERCIAL

## 2022-10-10 VITALS
HEIGHT: 64 IN | DIASTOLIC BLOOD PRESSURE: 72 MMHG | BODY MASS INDEX: 32.95 KG/M2 | WEIGHT: 193 LBS | SYSTOLIC BLOOD PRESSURE: 122 MMHG

## 2022-10-10 DIAGNOSIS — M54.2 CERVICALGIA: Primary | ICD-10-CM

## 2022-10-10 PROCEDURE — 99213 OFFICE O/P EST LOW 20 MIN: CPT | Performed by: PHYSICIAN ASSISTANT

## 2022-10-10 ASSESSMENT — PAIN SCALES - GENERAL: PAINLEVEL: MODERATE PAIN (5)

## 2022-10-10 NOTE — PROGRESS NOTES
"Anne Ferreira is a 41 year old female who presents for:  Chief Complaint   Patient presents with     Neurologic Problem     LBP / Cervical pain         Initial Vitals:  /72   Ht 5' 4\" (1.626 m)   Wt 193 lb (87.5 kg)   LMP 03/02/2022 (LMP Unknown)   BMI 33.13 kg/m   Estimated body mass index is 33.13 kg/m  as calculated from the following:    Height as of this encounter: 5' 4\" (1.626 m).    Weight as of this encounter: 193 lb (87.5 kg).. Body surface area is 1.99 meters squared. BP completed using cuff size: regular  Moderate Pain (5)    Nursing Comments:     Starr Ramsay  "

## 2022-10-10 NOTE — LETTER
10/10/2022         RE: Anne Ferreira  [address] [facility] [address]        Dear Colleague,    Thank you for referring your patient, Anne Ferreira, to the [facility]UR[facility] [place]. Please see a copy of my visit note below.    Dr. Ashish Fletcher  [facility]  Neurosurgery Clinic Visit      CC: neck pain, arm pian    Primary care Provider: Bertha Loya    Referring Provider:  Bertha Loya, FRNAKLYN BARTHOLOMEW      Reason For Visit:   I was asked to consult on the patient for neck pain, arm pain.      HPI: Anne Ferreira is a 41 year old female who presents for evaluation of her chief complaint of neck pain.  She has a history of a C5-6 ACDF several years ago at an [facility] facility.  She now presents with worsening neck pain, as well as pain that radiates into the lateral aspect of her arms bilaterally.  She also gets some numbness and tingling down her arms and into the fingers of her left hand.  She has had injections in her back, but not on her neck.  She has tried some physical therapy, but not recently.  No bowel or bladder changes, or problems with balance or coordination.    Past Medical History:   Diagnosis Date     Abnormal Pap smear of cervix        Past Medical History reviewed with patient during visit.    Past Surgical History:   Procedure Laterality Date     CYSTOSCOPY N/A 4/25/2022    Procedure: CYSTOSCOPY;  Surgeon: Julius Montejo MD;  Location: [facility]     LAPAROSCOPIC CHOLECYSTECTOMY N/A 12/21/2020    Procedure: Laparoscopic cholecystectomy;  Surgeon: Manuel Durham DO;  Location: [facility]     LAPAROSCOPIC HYSTERECTOMY TOTAL, BILATERAL SALPINGO-OOPHORECTOMY, COMBINED Bilateral 4/25/2022    Procedure: Laparoscopic total hysterectomy, Bilateral salpingectomy, with sparing of ovaries;  Surgeon: Julius Montejo MD;  Location: [facility] OR     Past Surgical History reviewed with patient during visit.    Current Outpatient Medications   Medication      acetaminophen (TYLENOL) 325 MG tablet     albuterol (PROAIR HFA/PROVENTIL HFA/VENTOLIN HFA) 108 (90 Base) MCG/ACT inhaler     albuterol (PROAIR HFA/PROVENTIL HFA/VENTOLIN HFA) 108 (90 Base) MCG/ACT inhaler     amLODIPine (NORVASC) 2.5 MG tablet     cyclobenzaprine (FLEXERIL) 10 MG tablet     dexmethylphenidate (FOCALIN) 10 MG tablet     dexmethylphenidate (FOCALIN) 10 MG tablet     diphenhydrAMINE (BENADRYL) 25 MG tablet     escitalopram (LEXAPRO) 20 MG tablet     hydrOXYzine (ATARAX) 25 MG tablet     ibuprofen (ADVIL/MOTRIN) 200 MG tablet     ipratropium - albuterol 0.5 mg/2.5 mg/3 mL (DUONEB) 0.5-2.5 (3) MG/3ML neb solution     ipratropium - albuterol 0.5 mg/2.5 mg/3 mL (DUONEB) 0.5-2.5 (3) MG/3ML neb solution     lisinopril (ZESTRIL) 40 MG tablet     methocarbamol (ROBAXIN) 500 MG tablet     montelukast (SINGULAIR) 10 MG tablet     omeprazole (PRILOSEC) 20 MG DR capsule     oxyCODONE (ROXICODONE) 5 MG tablet     predniSONE (DELTASONE) 20 MG tablet     propranolol (INDERAL) 80 MG tablet     No current facility-administered medications for this visit.       Allergies   Allergen Reactions     Bupropion Nausea     Sulfa Drugs        Social History     Socioeconomic History     Marital status:    Tobacco Use     Smoking status: Every Day     Packs/day: 0.50     Types: Cigarettes     Smokeless tobacco: Never   Vaping Use     Vaping Use: Never used   Substance and Sexual Activity     Alcohol use: Yes     Comment: occassionally     Drug use: Never     Sexual activity: Not Currently     Partners: Male       Family History   Problem Relation Age of Onset     Hypertension Father      Hypertension Brother      Hypertension Sister      Hypertension Brother           ROS: 10 point ROS neg other than the symptoms noted above in the HPI.    Vital Signs: LMP 03/02/2022 (LMP Unknown)     Examination:  Constitutional:  Alert, well nourished, NAD.  HEENT: Normocephalic, atraumatic.   Pulmonary:  Without shortness of breath,  normal effort.   Lymph: no lymphadenopathy to low back or LE.   Integumentary: Skin is free of rashes or lesions.   Cardiovascular:  No pitting edema of BLE.    Psych: Normal affect, no apparent distress    Neurological:  Awake  Alert  Oriented x 3  Speech clear  Cranial nerves II - XII grossly intact  Motor exam   Shoulder Abduction:  Right:  5/5   Left:  5/5  Biceps:                      Right:  5/5   Left:  5/5  Triceps:                     Right:  5/5   Left:  5/5  Wrist Extensors:       Right:  5/5   Left:  5/5  Wrist Flexors:           Right:  5/5   Left:  5/5  Intrinsics:                   Right:  5/5   Left:  5/5  Sensation normal to bilateral upper and lower extremities.    Reflexes are 2+ in the patellar and Achilles. There is no clonus. Downgoing Babinski.  Musculoskeletal:  Gait: Able to stand from a seated position. Normal non-antalgic, non-myelopathic gait.    Cervical examination reveals good range of motion.  No tenderness to palpation of the cervical spine or paraspinous muscles bilaterally.       Imaging:   MRI of the cervical spine from January 2022 was reviewed in the office today.  It demonstrates mild, multilevel central canal narrowing.    Assessment/Plan:     Cervicalgia  Prior ACDF C5-6      Anne Ferreira is a 41 year old female.  I did have a discussion with the patient regarding her symptoms.  She understands of findings described on her MRI.  At this point, she has multiple levels of mild central narrowing, without any clear nerve impingement.  I think that she would benefit from another course of dedicated physical therapy, which she did elect to proceed with.  She is not interested in trying any injections in her neck at present.  She will contact us after her physical therapy to let us know how she is progressing.  She voiced agreement and understanding.          Ross Dejesus PA-C  [facility] Neurosurgery  [address]  [address] [place] [address]  "[place] [id number]    Tel [phone]  Pager [phone]      The use of Dragon/Health: [employer] dictation services may have been used to construct the content in this note; any grammatical or spelling errors are non-intentional. Please contact the author of this note directly if you are in need of any clarification.      Anne Ferreira is a 41 year old female who presents for:  Chief Complaint   Patient presents with     Neurologic Problem     LBP / Cervical pain         Initial Vitals:  /72   Ht 5' 4\" (1.626 m)   Wt 193 lb (87.5 kg)   LMP 03/02/2022 (LMP Unknown)   BMI 33.13 kg/m   Estimated body mass index is 33.13 kg/m  as calculated from the following:    Height as of this encounter: 5' 4\" (1.626 m).    Weight as of this encounter: 193 lb (87.5 kg).. Body surface area is 1.99 meters squared. BP completed using cuff size: regular  Moderate Pain (5)    Nursing Comments:     Starr Ramsay      Again, thank you for allowing me to participate in the care of your patient.        Sincerely,        Ross Dejesus PA-C    "

## 2022-10-10 NOTE — PROGRESS NOTES
Dr. Ashish Fletcher  [facility]  Neurosurgery Clinic Visit      CC: neck pain, arm pian    Primary care Provider: Bertha Loya    Referring Provider:  Bertha Loya APRN CNP      Reason For Visit:   I was asked to consult on the patient for neck pain, arm pain.      HPI: Anne Ferreira is a 41 year old female who presents for evaluation of her chief complaint of neck pain.  She has a history of a C5-6 ACDF several years ago at an [facility] facility.  She now presents with worsening neck pain, as well as pain that radiates into the lateral aspect of her arms bilaterally.  She also gets some numbness and tingling down her arms and into the fingers of her left hand.  She has had injections in her back, but not on her neck.  She has tried some physical therapy, but not recently.  No bowel or bladder changes, or problems with balance or coordination.    Past Medical History:   Diagnosis Date     Abnormal Pap smear of cervix        Past Medical History reviewed with patient during visit.    Past Surgical History:   Procedure Laterality Date     CYSTOSCOPY N/A 4/25/2022    Procedure: CYSTOSCOPY;  Surgeon: Julius Montejo MD;  Location: [facility]     LAPAROSCOPIC CHOLECYSTECTOMY N/A 12/21/2020    Procedure: Laparoscopic cholecystectomy;  Surgeon: Manuel Durham DO;  Location: [facility]     LAPAROSCOPIC HYSTERECTOMY TOTAL, BILATERAL SALPINGO-OOPHORECTOMY, COMBINED Bilateral 4/25/2022    Procedure: Laparoscopic total hysterectomy, Bilateral salpingectomy, with sparing of ovaries;  Surgeon: Julius Montejo MD;  Location: [facility]     Past Surgical History reviewed with patient during visit.    Current Outpatient Medications   Medication     acetaminophen (TYLENOL) 325 MG tablet     albuterol (PROAIR HFA/PROVENTIL HFA/VENTOLIN HFA) 108 (90 Base) MCG/ACT inhaler     albuterol (PROAIR HFA/PROVENTIL HFA/VENTOLIN HFA) 108 (90 Base) MCG/ACT inhaler     amLODIPine (NORVASC) 2.5 MG tablet      cyclobenzaprine (FLEXERIL) 10 MG tablet     dexmethylphenidate (FOCALIN) 10 MG tablet     dexmethylphenidate (FOCALIN) 10 MG tablet     diphenhydrAMINE (BENADRYL) 25 MG tablet     escitalopram (LEXAPRO) 20 MG tablet     hydrOXYzine (ATARAX) 25 MG tablet     ibuprofen (ADVIL/MOTRIN) 200 MG tablet     ipratropium - albuterol 0.5 mg/2.5 mg/3 mL (DUONEB) 0.5-2.5 (3) MG/3ML neb solution     ipratropium - albuterol 0.5 mg/2.5 mg/3 mL (DUONEB) 0.5-2.5 (3) MG/3ML neb solution     lisinopril (ZESTRIL) 40 MG tablet     methocarbamol (ROBAXIN) 500 MG tablet     montelukast (SINGULAIR) 10 MG tablet     omeprazole (PRILOSEC) 20 MG DR capsule     oxyCODONE (ROXICODONE) 5 MG tablet     predniSONE (DELTASONE) 20 MG tablet     propranolol (INDERAL) 80 MG tablet     No current facility-administered medications for this visit.       Allergies   Allergen Reactions     Bupropion Nausea     Sulfa Drugs        Social History     Socioeconomic History     Marital status:    Tobacco Use     Smoking status: Every Day     Packs/day: 0.50     Types: Cigarettes     Smokeless tobacco: Never   Vaping Use     Vaping Use: Never used   Substance and Sexual Activity     Alcohol use: Yes     Comment: occassionally     Drug use: Never     Sexual activity: Not Currently     Partners: Male       Family History   Problem Relation Age of Onset     Hypertension Father      Hypertension Brother      Hypertension Sister      Hypertension Brother           ROS: 10 point ROS neg other than the symptoms noted above in the HPI.    Vital Signs: LMP 03/02/2022 (LMP Unknown)     Examination:  Constitutional:  Alert, well nourished, NAD.  HEENT: Normocephalic, atraumatic.   Pulmonary:  Without shortness of breath, normal effort.   Lymph: no lymphadenopathy to low back or LE.   Integumentary: Skin is free of rashes or lesions.   Cardiovascular:  No pitting edema of BLE.    Psych: Normal affect, no apparent distress    Neurological:  Awake  Alert  Oriented x  3  Speech clear  Cranial nerves II - XII grossly intact  Motor exam   Shoulder Abduction:  Right:  5/5   Left:  5/5  Biceps:                      Right:  5/5   Left:  5/5  Triceps:                     Right:  5/5   Left:  5/5  Wrist Extensors:       Right:  5/5   Left:  5/5  Wrist Flexors:           Right:  5/5   Left:  5/5  Intrinsics:                   Right:  5/5   Left:  5/5  Sensation normal to bilateral upper and lower extremities.    Reflexes are 2+ in the patellar and Achilles. There is no clonus. Downgoing Babinski.  Musculoskeletal:  Gait: Able to stand from a seated position. Normal non-antalgic, non-myelopathic gait.    Cervical examination reveals good range of motion.  No tenderness to palpation of the cervical spine or paraspinous muscles bilaterally.       Imaging:   MRI of the cervical spine from January 2022 was reviewed in the office today.  It demonstrates mild, multilevel central canal narrowing.    Assessment/Plan:     Cervicalgia  Prior ACDF C5-6      Anne Ferreira is a 41 year old female.  I did have a discussion with the patient regarding her symptoms.  She understands of findings described on her MRI.  At this point, she has multiple levels of mild central narrowing, without any clear nerve impingement.  I think that she would benefit from another course of dedicated physical therapy, which she did elect to proceed with.  She is not interested in trying any injections in her neck at present.  She will contact us after her physical therapy to let us know how she is progressing.  She voiced agreement and understanding.          Ross Dejesus PA-C  [facility]surgery  [address] [address]    Tel [phone]  Pager [phone]      The use of Dragon/[employer]ation services may have been used to construct the content in this note; any grammatical or spelling errors are non-intentional. Please contact the author of this  note directly if you are in need of any clarification.

## 2022-10-14 ENCOUNTER — MYC REFILL (OUTPATIENT)
Dept: FAMILY MEDICINE | Facility: CLINIC | Age: 42
End: 2022-10-14

## 2022-10-14 ENCOUNTER — MYC MEDICAL ADVICE (OUTPATIENT)
Dept: FAMILY MEDICINE | Facility: CLINIC | Age: 42
End: 2022-10-14

## 2022-10-14 DIAGNOSIS — F41.1 GAD (GENERALIZED ANXIETY DISORDER): ICD-10-CM

## 2022-10-14 DIAGNOSIS — M51.369 DDD (DEGENERATIVE DISC DISEASE), LUMBAR: ICD-10-CM

## 2022-10-14 DIAGNOSIS — F90.2 ATTENTION DEFICIT HYPERACTIVITY DISORDER (ADHD), COMBINED TYPE: ICD-10-CM

## 2022-10-14 DIAGNOSIS — G89.4 CHRONIC PAIN SYNDROME: ICD-10-CM

## 2022-10-14 RX ORDER — HYDROXYZINE HYDROCHLORIDE 25 MG/1
25 TABLET, FILM COATED ORAL EVERY 6 HOURS PRN
Qty: 60 TABLET | Refills: 0 | Status: SHIPPED | OUTPATIENT
Start: 2022-10-14

## 2022-10-14 RX ORDER — DEXMETHYLPHENIDATE HYDROCHLORIDE 10 MG/1
10 TABLET ORAL 2 TIMES DAILY
Qty: 60 TABLET | Refills: 0 | Status: SHIPPED | OUTPATIENT
Start: 2022-10-14 | End: 2023-02-21

## 2022-10-14 RX ORDER — OXYCODONE HYDROCHLORIDE 5 MG/1
5 TABLET ORAL EVERY 8 HOURS PRN
Qty: 90 TABLET | Refills: 0 | Status: SHIPPED | OUTPATIENT
Start: 2022-10-14 | End: 2022-11-11

## 2022-10-14 NOTE — TELEPHONE ENCOUNTER
Requested Prescriptions   Pending Prescriptions Disp Refills     dexmethylphenidate (FOCALIN) 10 MG tablet 60 tablet 0     Sig: Take 1 tablet (10 mg) by mouth 2 times daily       There is no refill protocol information for this order   Last Written Prescription Date: 9/19/22  Last Fill Quantity: 60,  # refills: 0   Last office visit: 9/16/2022 with prescribing provider:     Future Office Visit:               oxyCODONE (ROXICODONE) 5 MG tablet 90 tablet 0     Sig: Take 1 tablet (5 mg) by mouth every 8 hours as needed for pain Max 3 tablets per day       There is no refill protocol information for this order        Last Written Prescription Date:  9/16/22  Last Fill Quantity: 90,  # refills: 0   Last office visit: 9/16/2022 with prescribing provider:     Future Office Visit:

## 2022-11-02 ENCOUNTER — HOSPITAL ENCOUNTER (OUTPATIENT)
Dept: PHYSICAL THERAPY | Facility: CLINIC | Age: 42
Setting detail: THERAPIES SERIES
Discharge: HOME | End: 2022-11-02
Attending: PHYSICIAN ASSISTANT
Payer: COMMERCIAL

## 2022-11-02 DIAGNOSIS — M54.2 CERVICALGIA: ICD-10-CM

## 2022-11-02 PROCEDURE — 97161 PT EVAL LOW COMPLEX 20 MIN: CPT | Mod: GP | Performed by: PHYSICAL THERAPIST

## 2022-11-02 PROCEDURE — 97110 THERAPEUTIC EXERCISES: CPT | Mod: GP | Performed by: PHYSICAL THERAPIST

## 2022-11-04 DIAGNOSIS — M54.2 CERVICALGIA: Primary | ICD-10-CM

## 2022-11-04 DIAGNOSIS — M54.42 ACUTE BILATERAL LOW BACK PAIN WITH LEFT-SIDED SCIATICA: ICD-10-CM

## 2022-11-06 DIAGNOSIS — J45.20 MILD INTERMITTENT ASTHMA WITHOUT COMPLICATION: ICD-10-CM

## 2022-11-07 NOTE — PROGRESS NOTES
11/02/22 0900   General Information   Type of Visit Initial OP Ortho PT Evaluation   Start of Care Date 11/02/22   Referring Physician Ross Dejesus, MAUREEN   Patient/Family Goals Statement avoid future   Orders Evaluate and Treat   Date of Order 11/04/22   Certification Required? No   Medical Diagnosis Cervicalgia (M54.2)  - Primary Acute bilateral low back pain with left-sided sciatica (M54.42   Surgical/Medical history reviewed Yes   Precautions/Limitations no known precautions/limitations   General Information Comments PMH: asthma, depression, high BP, OA, smoking, gallbladder recmoed, hysteerctomy,   Body Part(s)   Body Part(s) Cervical Spine;Lumbar Spine/SI   Presentation and Etiology   Pertinent history of current problem (include personal factors and/or comorbidities that impact the POC) Pt relates chronic pain in low back and B SI. Pt will go down her L Leg however injections helped. 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 Pt also relates hx of neck pain w fusion.   Impairments A. Pain;E. Decreased flexibility;J. Burning;L. Tingling;N. Headaches;Q. Dizziness   Functional Limitations perform activities of daily living   Symptom Location LBP/neck pain   How/Where did it occur From insidious onset   Chronicity Chronic   Pain rating (0-10 point scale) Best (/10);Worst (/10)   Best (/10) 2   Worst (/10) 9   Pain quality C. Aching;D. Burning;E. Shooting   Frequency of pain/symptoms A. Constant   Pain/symptoms exacerbated by A. Sitting;B. Walking;C. Lifting;D. Carrying;G. Certain positions;H. Overhead reach;I. Bending;L. Work tasks   Pain/symptoms eased by C. Rest;K. Other   Pain eased by comment oxycodone   Current Level of Function   Current Community Support Family/friend caregiver   Patient role/employment history A. Employed   Employment Comments CNA -   Living environment [place]   Fall Risk Screen   Fall screen completed by PT   Have you fallen 2 or more  times in the past year? Yes   Have you fallen and had an injury in the past year? Yes   Fall screen comments Fell due to poor steps   Abuse Screen (yes response referral indicated)   Feels Unsafe at Home or Work/School no   Feels Threatened by Someone no   Does Anyone Try to Keep You From Having Contact with Others or Doing Things Outside Your Home? no   Physical Signs of Abuse Present no   System Outcome Measures   Outcome Measures Low Back Pain (see Oswestry and Nathanael)   Functional Scales   Functional Scales Other   Other Scales  NDI: 32   Lumbar Spine/SI Objective Findings   Gait/Locomotion WNL   Flexion ROM 10 in from floor   Extension ROM 10 deg w pain at SI   Right Side Bending ROM 4 in above knee   Left Side Bending ROM 4 in above knee   Pelvic Screen nuetral pelvis   Transversus Abdominus Strength (Gilles Leg Lowering-deg) 75 deg from table   Hip Flexion (L2) Strength 5/5   Hip Abduction Strength 4/5   Knee Flexion Strength 5/5   Knee Extension (L3) Strength 5/5   Ankle Dorsiflexion (L4) Strength able to walk on heels   Ankle Plantar Flexion (S1) Strength able to walk on toes   SLR 45 deg w pain in B SI   Slump Test negative   Posture increasedlumbar lordosis   Planned Therapy Interventions   Planned Therapy Interventions balance training;gait training;joint mobilization;manual therapy;neuromuscular re-education;ROM;strengthening;stretching   Planned Modality Interventions   Planned Modality Interventions Cryotherapy;TENS;Traction;Ultrasound;Electrical stimulation   Clinical Impression   Criteria for Skilled Therapeutic Interventions Met yes, treatment indicated   PT Diagnosis LBP/neck pain   Influenced by the following impairments limited ROM, weakness, pain   Functional limitations due to impairments sitting, walking, lifting, carrying   Clinical Presentation Stable/Uncomplicated   Clinical Presentation Rationale Pt is 42 year old female with chronic neck/back pain. Pt is motivated to get better but has  demanding job  and has been dealing with pain chronically   Clinical Decision Making (Complexity) Low complexity   Therapy Frequency 1 time/week   Predicted Duration of Therapy Intervention (days/wks) 6 weeks   Risk & Benefits of therapy have been explained Yes   Patient, Family & other staff in agreement with plan of care Yes   Education Assessment   Preferred Learning Style Listening;Reading;Demonstration;Pictures/video   Barriers to Learning No barriers   ORTHO GOALS   PT Ortho Eval Goals 1;2;4;3   Ortho Goal 1   Goal Identifier feeding - sitting   Goal Description Pt will be able to sit for 30+ min 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   Target Date 12/16/22   Total Evaluation Time   PT Eval, Low Complexity Minutes (30862) 20

## 2022-11-07 NOTE — TELEPHONE ENCOUNTER
Meloxicam not on current med list. LM to call care team- is she taking?   10-08-22  visit- asthma exac.   Also LM ACT sent via [employer].   CHANELLE Ramsay RN

## 2022-11-09 RX ORDER — MELOXICAM 7.5 MG/1
TABLET ORAL
Qty: 30 TABLET | Refills: 1 | OUTPATIENT
Start: 2022-11-09

## 2022-11-09 RX ORDER — MONTELUKAST SODIUM 10 MG/1
TABLET ORAL
Qty: 60 TABLET | Refills: 0 | Status: SHIPPED | OUTPATIENT
Start: 2022-11-09 | End: 2023-01-02

## 2022-11-11 DIAGNOSIS — M51.369 DDD (DEGENERATIVE DISC DISEASE), LUMBAR: ICD-10-CM

## 2022-11-11 DIAGNOSIS — G89.4 CHRONIC PAIN SYNDROME: ICD-10-CM

## 2022-11-11 RX ORDER — OXYCODONE HYDROCHLORIDE 5 MG/1
5 TABLET ORAL EVERY 8 HOURS PRN
Qty: 90 TABLET | Refills: 0 | Status: SHIPPED | OUTPATIENT
Start: 2022-11-11 | End: 2022-12-09

## 2022-11-16 DIAGNOSIS — F90.2 ATTENTION DEFICIT HYPERACTIVITY DISORDER (ADHD), COMBINED TYPE: Primary | ICD-10-CM

## 2022-11-16 RX ORDER — DEXMETHYLPHENIDATE HYDROCHLORIDE 10 MG/1
TABLET ORAL
Qty: 60 TABLET | Refills: 0 | Status: SHIPPED | OUTPATIENT
Start: 2022-11-17 | End: 2022-12-14

## 2022-11-30 DIAGNOSIS — G43.009 MIGRAINE WITHOUT AURA AND WITHOUT STATUS MIGRAINOSUS, NOT INTRACTABLE: ICD-10-CM

## 2022-11-30 DIAGNOSIS — I10 BENIGN ESSENTIAL HYPERTENSION: ICD-10-CM

## 2022-11-30 RX ORDER — PROPRANOLOL HYDROCHLORIDE 80 MG/1
80 TABLET ORAL DAILY
Qty: 90 TABLET | Refills: 3 | Status: SHIPPED | OUTPATIENT
Start: 2022-11-30 | End: 2024-01-11

## 2022-12-09 DIAGNOSIS — M51.369 DDD (DEGENERATIVE DISC DISEASE), LUMBAR: ICD-10-CM

## 2022-12-09 DIAGNOSIS — G89.4 CHRONIC PAIN SYNDROME: ICD-10-CM

## 2022-12-09 RX ORDER — OXYCODONE HYDROCHLORIDE 5 MG/1
5 TABLET ORAL EVERY 8 HOURS PRN
Qty: 90 TABLET | Refills: 0 | Status: SHIPPED | OUTPATIENT
Start: 2022-12-09 | End: 2023-02-13

## 2022-12-14 DIAGNOSIS — F90.2 ATTENTION DEFICIT HYPERACTIVITY DISORDER (ADHD), COMBINED TYPE: ICD-10-CM

## 2022-12-14 RX ORDER — DEXMETHYLPHENIDATE HYDROCHLORIDE 10 MG/1
TABLET ORAL
Qty: 60 TABLET | Refills: 0 | OUTPATIENT
Start: 2022-12-14

## 2022-12-14 NOTE — TELEPHONE ENCOUNTER
Called and spoke with pt. Scheduled appt for 1/3/23 with Bertha. This was the soonest appt available. Pt wondering if this medication can be refilled up until her appt date.    Ewa Carlos Patient

## 2022-12-15 RX ORDER — DEXMETHYLPHENIDATE HYDROCHLORIDE 10 MG/1
TABLET ORAL
Qty: 60 TABLET | Refills: 0 | Status: SHIPPED | OUTPATIENT
Start: 2022-12-15 | End: 2023-01-06

## 2022-12-30 DIAGNOSIS — J45.20 MILD INTERMITTENT ASTHMA WITHOUT COMPLICATION: ICD-10-CM

## 2023-01-02 RX ORDER — MONTELUKAST SODIUM 10 MG/1
TABLET ORAL
Qty: 60 TABLET | Refills: 0 | Status: SHIPPED | OUTPATIENT
Start: 2023-01-02 | End: 2023-03-02

## 2023-01-06 ENCOUNTER — VIRTUAL VISIT (OUTPATIENT)
Dept: FAMILY MEDICINE | Facility: CLINIC | Age: 43
End: 2023-01-06
Payer: MEDICAID

## 2023-01-06 DIAGNOSIS — J44.1 CHRONIC OBSTRUCTIVE PULMONARY DISEASE WITH ACUTE EXACERBATION (H): ICD-10-CM

## 2023-01-06 DIAGNOSIS — E66.01 MORBID OBESITY (H): ICD-10-CM

## 2023-01-06 DIAGNOSIS — F33.1 MODERATE EPISODE OF RECURRENT MAJOR DEPRESSIVE DISORDER (H): ICD-10-CM

## 2023-01-06 DIAGNOSIS — G89.4 CHRONIC PAIN SYNDROME: Primary | ICD-10-CM

## 2023-01-06 DIAGNOSIS — F90.2 ATTENTION DEFICIT HYPERACTIVITY DISORDER (ADHD), COMBINED TYPE: ICD-10-CM

## 2023-01-06 PROCEDURE — 99214 OFFICE O/P EST MOD 30 MIN: CPT | Mod: 95 | Performed by: NURSE PRACTITIONER

## 2023-01-06 RX ORDER — OXYCODONE HYDROCHLORIDE 5 MG/1
5 TABLET ORAL EVERY 8 HOURS PRN
Qty: 90 TABLET | Refills: 0 | Status: SHIPPED | OUTPATIENT
Start: 2023-01-09 | End: 2023-02-08

## 2023-01-06 RX ORDER — DEXMETHYLPHENIDATE HYDROCHLORIDE 10 MG/1
10 TABLET ORAL 2 TIMES DAILY
Qty: 60 TABLET | Refills: 0 | Status: CANCELLED | OUTPATIENT
Start: 2023-02-15

## 2023-01-06 RX ORDER — OXYCODONE HYDROCHLORIDE 5 MG/1
5 TABLET ORAL EVERY 8 HOURS PRN
Qty: 90 TABLET | Refills: 0 | Status: CANCELLED | OUTPATIENT
Start: 2023-02-09 | End: 2023-03-11

## 2023-01-06 RX ORDER — DEXMETHYLPHENIDATE HYDROCHLORIDE 10 MG/1
TABLET ORAL
Qty: 60 TABLET | Refills: 0 | Status: SHIPPED | OUTPATIENT
Start: 2023-01-15 | End: 2023-02-21

## 2023-01-06 NOTE — PROGRESS NOTES
Anne is a 42 year old who is being evaluated via a billable video visit.      How would you like to obtain your AVS? MyChart  If the video visit is dropped, the invitation should be resent by: Text to cell phone: [phone]  Will anyone else be joining your video visit? No          Assessment & Plan     (G89.4) Chronic pain syndrome  (primary encounter diagnosis)  Comment: . Controlled no change in treatment plan filled for 2 months will need an in office appointment for further refills will be due to pain contract signing   Plan: oxyCODONE (ROXICODONE) 5 MG tablet      (F90.2) Attention deficit hyperactivity disorder (ADHD), combined type  Comment: Bertha Loya CNP   Plan: dexmethylphenidate (FOCALIN) 10 MG tablet            (J44.1) Chronic obstructive pulmonary disease with acute exacerbation (H)  Comment:  Controlled no change in treatment plan  Plan:     (F33.1) Moderate episode of recurrent major depressive disorder (H)  Comment:  Controlled no change in treatment plan  Plan:     (E66.01) Morbid obesity (H)  Comment:  Controlled no change in treatment plan  Plan:   No follow-ups on file.    FRANKLYN Christianson CNP  [facility]    Subjective   Anne is a 42 year old, presenting for the following health issues:  No chief complaint on file.      HPI     Medication Followup of focalin    Taking Medication as prescribed: yes    Side Effects:  None    Medication Helping Symptoms:  yes    Medication Followup of oxycodone    Taking Medication as prescribed: yes    Side Effects:  None    Medication Helping Symptoms:  yes          Review of Systems   Constitutional, HEENT, cardiovascular, pulmonary, gi and gu systems are negative, except as otherwise noted.      Objective           Vitals:  No vitals were obtained today due to virtual visit.    Physical Exam   GENERAL: Healthy, alert and no distress  EYES: Eyes grossly normal to inspection.  No discharge or erythema, or obvious  scleral/conjunctival abnormalities.  RESP: No audible wheeze, cough, or visible cyanosis.  No visible retractions or increased work of breathing.    SKIN: Visible skin clear. No significant rash, abnormal pigmentation or lesions.  NEURO: Cranial nerves grossly intact.  Mentation and speech appropriate for age.  PSYCH: Mentation appears normal, affect normal/bright, judgement and insight intact, normal speech and appearance well-groomed.    No results found for any visits on 01/06/23.            Video-Visit Details    Type of service:  Video Visit   Video Start Time: 12:01  Video End Time:12:18    Originating Location (pt. Location): Home    Distant Location (provider location):  On-site  Platform used for Video Visit: Trey

## 2023-02-02 ENCOUNTER — OFFICE VISIT (OUTPATIENT)
Dept: URGENT CARE | Facility: URGENT CARE | Age: 43
End: 2023-02-02
Payer: MEDICAID

## 2023-02-02 VITALS
WEIGHT: 207 LBS | OXYGEN SATURATION: 98 % | HEART RATE: 79 BPM | SYSTOLIC BLOOD PRESSURE: 131 MMHG | DIASTOLIC BLOOD PRESSURE: 68 MMHG | TEMPERATURE: 98.2 F | BODY MASS INDEX: 35.53 KG/M2

## 2023-02-02 DIAGNOSIS — R52 BODY ACHES: ICD-10-CM

## 2023-02-02 DIAGNOSIS — R07.0 THROAT PAIN: Primary | ICD-10-CM

## 2023-02-02 DIAGNOSIS — R50.9 LOW GRADE FEVER: ICD-10-CM

## 2023-02-02 LAB
DEPRECATED S PYO AG THROAT QL EIA: NEGATIVE
FLUAV AG SPEC QL IA: NEGATIVE
FLUBV AG SPEC QL IA: NEGATIVE
GROUP A STREP BY PCR: NOT DETECTED

## 2023-02-02 PROCEDURE — U0003 INFECTIOUS AGENT DETECTION BY NUCLEIC ACID (DNA OR RNA); SEVERE ACUTE RESPIRATORY SYNDROME CORONAVIRUS 2 (SARS-COV-2) (CORONAVIRUS DISEASE [COVID-19]), AMPLIFIED PROBE TECHNIQUE, MAKING USE OF HIGH THROUGHPUT TECHNOLOGIES AS DESCRIBED BY CMS-2020-01-R: HCPCS

## 2023-02-02 PROCEDURE — 87804 INFLUENZA ASSAY W/OPTIC: CPT

## 2023-02-02 PROCEDURE — 87651 STREP A DNA AMP PROBE: CPT

## 2023-02-02 PROCEDURE — 99213 OFFICE O/P EST LOW 20 MIN: CPT | Mod: CS

## 2023-02-02 PROCEDURE — U0005 INFEC AGEN DETEC AMPLI PROBE: HCPCS

## 2023-02-02 NOTE — PROGRESS NOTES
ASSESSMENT:  (R07.0) Throat pain  (primary encounter diagnosis)  Plan: Streptococcus A Rapid Screen w/Reflex to PCR -         Clinic Collect, Influenza A & B Antigen -         Clinic Collect, Symptomatic COVID-19 Virus         (Coronavirus) by PCR Nose, Group A         Streptococcus PCR Throat Swab    (R52) Body aches  Plan: Streptococcus A Rapid Screen w/Reflex to PCR -         Clinic Collect, Influenza A & B Antigen -         Clinic Collect, Symptomatic COVID-19 Virus         (Coronavirus) by PCR Nose, Group A         Streptococcus PCR Throat Swab    (R50.9) Low grade fever  Plan: Streptococcus A Rapid Screen w/Reflex to PCR -         Clinic Collect, Influenza A & B Antigen -         Clinic Collect, Symptomatic COVID-19 Virus         (Coronavirus) by PCR Nose, Group A         Streptococcus PCR Throat Swab    PLAN:  Informed the patient that the strep and influenza tests are negative and the COVID test is pending.  We discussed that we will contact her within 1-2 business days if it is positive.  Informed her to get plenty of rest, drink fluids and use Tylenol as needed for pain and fever with the maximum dose of Tylenol being 4000 mg in a 24-hour period of time.  Work note provided.  We also discussed trying warm salt water gargles, hot/warm water or tea with honey and/or lemon and or Cepacol lozenges or spray for her sore throat.  Discussed the need to return to clinic with any new or worsening symptoms.  Patient acknowledged her understanding of the above plan.    FRANKLYN Snell CNP      SUBJECTIVE:   Anne Ferreira is a 42 year old female presenting with a chief complaint of chills, cough - non-productive, ear pain, sore throat, headache, neck pain and body aches.  Onset of symptoms was earlier today  Course of illness is same.    Patient denies: nausea, vomiting and diarrhea  Treatment measures tried include None tried.  Predisposing factors include None.    ROS:  Negative except noted above.      OBJECTIVE:  GENERAL APPEARANCE: healthy, alert and no distress  EYES: EOMI,  PERRL, conjunctiva clear  HENT: TM's normal bilaterally, nose and mouth without erythema, ulcers or lesions, tonsillar hypertrophy, tonsillar erythema and tonsillar exudate  NECK: supple, nontender, no lymphadenopathy  RESP: lungs clear to auscultation - no rales, rhonchi or wheezes  CV: regular rates and rhythm, normal S1 S2, no murmur noted  SKIN: no suspicious lesions or rashes    Rapid Strep test: Negative

## 2023-02-02 NOTE — LETTER
February 2, 2023      Anne Ferreira  [address] [address]        To Whom It May Concern:    Anne Ferreira  was seen on February 2, 2023.  Please excuse her from work until February 6th due to illness.        Sincerely,        FRANKLYN Snell CNP

## 2023-02-02 NOTE — PATIENT INSTRUCTIONS
Strep and influenza tests are negative.  COVID test is pending.  We will contact you within 1-2 business days if it is positive.  Get plenty of rest and drink fluids.  Can use Tylenol as needed for pain and fever.  Maximum dose of Tylenol is 4000mg in a 24 hour period of time.  You can also try warm salt water gargles, hot/warm water or tea with honey and/or lemon and/or Cepacol lozenges or spray for your sore throat.

## 2023-02-03 LAB — SARS-COV-2 RNA RESP QL NAA+PROBE: NEGATIVE

## 2023-02-06 ENCOUNTER — TELEPHONE (OUTPATIENT)
Dept: FAMILY MEDICINE | Facility: CLINIC | Age: 43
End: 2023-02-06
Payer: MEDICAID

## 2023-02-06 DIAGNOSIS — G89.4 CHRONIC PAIN SYNDROME: Primary | ICD-10-CM

## 2023-02-06 RX ORDER — OXYCODONE HYDROCHLORIDE 5 MG/1
TABLET ORAL
Qty: 90 TABLET | Refills: 0 | Status: SHIPPED | OUTPATIENT
Start: 2023-02-06 | End: 2023-02-21

## 2023-02-06 NOTE — TELEPHONE ENCOUNTER
Just wanted you guys to know that patient has [place] Medicaid, and her insurance will only allow us to fill a 7 day supply for her Oxycodone 5mg tabs. Because of her ins, she can not cash out or use a discount card.     Thank You,   Miracle Kee, [facility] [place]

## 2023-02-12 ENCOUNTER — TELEPHONE (OUTPATIENT)
Dept: FAMILY MEDICINE | Facility: CLINIC | Age: 43
End: 2023-02-12
Payer: MEDICAID

## 2023-02-12 DIAGNOSIS — G89.4 CHRONIC PAIN SYNDROME: ICD-10-CM

## 2023-02-12 NOTE — TELEPHONE ENCOUNTER
Anne needs a new Rx for her Oxycodone, she has new insurance and they only allowed a 7 days supply so the rest of the tablets had to be forfeited.      Thank You,  Ninfa Dowd, [facility]

## 2023-02-12 NOTE — TELEPHONE ENCOUNTER
Prior Authorization Retail Medication Request    Medication/Dose: oxycodone  ICD code (if different than what is on RX):    Previously Tried and Failed:    Rationale:      Insurance Name:  [place] MEDICAID  Insurance ID:  [id number]  [phone]      New ins only allowing 7 days, try for limitation override        Thank You,  Ninfa Dowd, [facility]

## 2023-02-13 ENCOUNTER — MYC MEDICAL ADVICE (OUTPATIENT)
Dept: FAMILY MEDICINE | Facility: CLINIC | Age: 43
End: 2023-02-13
Payer: MEDICAID

## 2023-02-13 DIAGNOSIS — M51.369 DDD (DEGENERATIVE DISC DISEASE), LUMBAR: ICD-10-CM

## 2023-02-13 DIAGNOSIS — G89.4 CHRONIC PAIN SYNDROME: ICD-10-CM

## 2023-02-13 RX ORDER — OXYCODONE HYDROCHLORIDE 5 MG/1
TABLET ORAL
Qty: 90 TABLET | Refills: 0 | OUTPATIENT
Start: 2023-02-13

## 2023-02-13 RX ORDER — OXYCODONE HYDROCHLORIDE 5 MG/1
5 TABLET ORAL EVERY 8 HOURS PRN
Qty: 69 TABLET | Refills: 0 | Status: SHIPPED | OUTPATIENT
Start: 2023-02-13 | End: 2023-03-12

## 2023-02-13 NOTE — TELEPHONE ENCOUNTER
Central Prior Authorization Team  Phone: [phone]    [place] Initiation    Medication: oxycodone  Insurance Company: [place] Medicaid ([facility]) - Phone [phone] Fax [phone]  Pharmacy Filling the Rx: [place], [place] - [address] [place] [address]  Filling Pharmacy Phone: [phone]  Filling Pharmacy Fax:    Start Date: 2/12/2023

## 2023-02-13 NOTE — TELEPHONE ENCOUNTER
Oxycodone 5 mg  Last Written Prescription Date: 02-06-23  Last Fill Quantity: 90,  # refills: 0  Last office visit: 1/3/2023 with prescribing provider:  Bertha Rodriguez Office Visit:  02-21-23    Bertha please see pharm message below.  Routing refill request to provider for review/approval because:  Drug not on the [facility] refill protocol   CHANELLE Ramsay RN

## 2023-02-15 NOTE — TELEPHONE ENCOUNTER
Central Prior Authorization Team  Phone: [phone]    Prior Authorization Not Needed per Insurance    Medication: oxycodone  Insurance Company: [place] Medicaid ([facility]) - Phone [phone] Fax [phone]  Expected CoPay:      Pharmacy Filling the Rx: [facility] [place], [place] - [address] [place] [address]  Pharmacy Notified:    Patient Notified:

## 2023-02-17 ENCOUNTER — MYC MEDICAL ADVICE (OUTPATIENT)
Dept: FAMILY MEDICINE | Facility: CLINIC | Age: 43
End: 2023-02-17
Payer: MEDICAID

## 2023-02-17 DIAGNOSIS — F90.2 ATTENTION DEFICIT HYPERACTIVITY DISORDER (ADHD), COMBINED TYPE: ICD-10-CM

## 2023-02-17 DIAGNOSIS — F90.2 ATTENTION DEFICIT HYPERACTIVITY DISORDER (ADHD), COMBINED TYPE: Primary | ICD-10-CM

## 2023-02-17 RX ORDER — DEXMETHYLPHENIDATE HYDROCHLORIDE 10 MG/1
TABLET ORAL
Qty: 60 TABLET | Refills: 0 | Status: SHIPPED | OUTPATIENT
Start: 2023-02-17 | End: 2023-02-21

## 2023-02-20 RX ORDER — DEXMETHYLPHENIDATE HYDROCHLORIDE 10 MG/1
TABLET ORAL
Qty: 60 TABLET | Refills: 0 | OUTPATIENT
Start: 2023-02-20

## 2023-02-21 ENCOUNTER — OFFICE VISIT (OUTPATIENT)
Dept: FAMILY MEDICINE | Facility: CLINIC | Age: 43
End: 2023-02-21
Payer: MEDICAID

## 2023-02-21 VITALS
BODY MASS INDEX: 36.19 KG/M2 | WEIGHT: 212 LBS | TEMPERATURE: 98.7 F | HEIGHT: 64 IN | HEART RATE: 70 BPM | OXYGEN SATURATION: 98 % | SYSTOLIC BLOOD PRESSURE: 116 MMHG | DIASTOLIC BLOOD PRESSURE: 70 MMHG | RESPIRATION RATE: 18 BRPM

## 2023-02-21 DIAGNOSIS — R22.1 LUMP IN NECK: ICD-10-CM

## 2023-02-21 DIAGNOSIS — F90.2 ATTENTION DEFICIT HYPERACTIVITY DISORDER (ADHD), COMBINED TYPE: ICD-10-CM

## 2023-02-21 DIAGNOSIS — E53.8 VITAMIN B12 DEFICIENCY (NON ANEMIC): ICD-10-CM

## 2023-02-21 DIAGNOSIS — Z13.29 SCREENING FOR HYPOTHYROIDISM: ICD-10-CM

## 2023-02-21 DIAGNOSIS — M47.26 OSTEOARTHRITIS OF SPINE WITH RADICULOPATHY, LUMBAR REGION: Primary | ICD-10-CM

## 2023-02-21 DIAGNOSIS — E66.01 MORBID OBESITY (H): ICD-10-CM

## 2023-02-21 DIAGNOSIS — G89.4 CHRONIC PAIN SYNDROME: ICD-10-CM

## 2023-02-21 DIAGNOSIS — Z13.220 SCREENING FOR HYPERLIPIDEMIA: ICD-10-CM

## 2023-02-21 DIAGNOSIS — E55.9 VITAMIN D DEFICIENCY: ICD-10-CM

## 2023-02-21 DIAGNOSIS — J44.1 CHRONIC OBSTRUCTIVE PULMONARY DISEASE WITH ACUTE EXACERBATION (H): ICD-10-CM

## 2023-02-21 LAB
CHOLEST SERPL-MCNC: 186 MG/DL
HDLC SERPL-MCNC: 30 MG/DL
LDLC SERPL CALC-MCNC: 88 MG/DL
NONHDLC SERPL-MCNC: 156 MG/DL
TRIGL SERPL-MCNC: 340 MG/DL
TSH SERPL DL<=0.005 MIU/L-ACNC: 1.13 UIU/ML (ref 0.3–4.2)
VIT B12 SERPL-MCNC: 479 PG/ML (ref 232–1245)

## 2023-02-21 PROCEDURE — 36415 COLL VENOUS BLD VENIPUNCTURE: CPT | Performed by: NURSE PRACTITIONER

## 2023-02-21 PROCEDURE — 82607 VITAMIN B-12: CPT | Performed by: NURSE PRACTITIONER

## 2023-02-21 PROCEDURE — 82306 VITAMIN D 25 HYDROXY: CPT | Performed by: NURSE PRACTITIONER

## 2023-02-21 PROCEDURE — 80061 LIPID PANEL: CPT | Performed by: NURSE PRACTITIONER

## 2023-02-21 PROCEDURE — 84443 ASSAY THYROID STIM HORMONE: CPT | Performed by: NURSE PRACTITIONER

## 2023-02-21 PROCEDURE — 99215 OFFICE O/P EST HI 40 MIN: CPT | Performed by: NURSE PRACTITIONER

## 2023-02-21 RX ORDER — DEXMETHYLPHENIDATE HYDROCHLORIDE 10 MG/1
10 TABLET ORAL DAILY
Qty: 30 TABLET | Refills: 0 | Status: SHIPPED | OUTPATIENT
Start: 2023-05-17 | End: 2023-04-17

## 2023-02-21 RX ORDER — DEXMETHYLPHENIDATE HYDROCHLORIDE 10 MG/1
10 TABLET ORAL DAILY
Qty: 30 TABLET | Refills: 0 | Status: SHIPPED | OUTPATIENT
Start: 2023-03-17 | End: 2023-03-27

## 2023-02-21 RX ORDER — OXYCODONE HYDROCHLORIDE 5 MG/1
TABLET ORAL
Qty: 90 TABLET | Refills: 0 | Status: SHIPPED | OUTPATIENT
Start: 2023-03-07 | End: 2023-05-05

## 2023-02-21 RX ORDER — OXYCODONE HYDROCHLORIDE 5 MG/1
5 TABLET ORAL EVERY 6 HOURS PRN
Qty: 90 TABLET | Refills: 0 | Status: SHIPPED | OUTPATIENT
Start: 2023-04-07 | End: 2023-04-12

## 2023-02-21 RX ORDER — DEXMETHYLPHENIDATE HYDROCHLORIDE 10 MG/1
10 TABLET ORAL DAILY
Qty: 30 TABLET | Refills: 0 | Status: SHIPPED | OUTPATIENT
Start: 2023-04-17 | End: 2023-03-27

## 2023-02-21 RX ORDER — DEXMETHYLPHENIDATE HYDROCHLORIDE 10 MG/1
10 TABLET ORAL DAILY
Qty: 30 TABLET | Refills: 0 | Status: SHIPPED | OUTPATIENT
Start: 2023-05-17 | End: 2023-03-27

## 2023-02-21 ASSESSMENT — ANXIETY QUESTIONNAIRES
7. FEELING AFRAID AS IF SOMETHING AWFUL MIGHT HAPPEN: NOT AT ALL
5. BEING SO RESTLESS THAT IT IS HARD TO SIT STILL: SEVERAL DAYS
GAD7 TOTAL SCORE: 6
3. WORRYING TOO MUCH ABOUT DIFFERENT THINGS: SEVERAL DAYS
4. TROUBLE RELAXING: SEVERAL DAYS
6. BECOMING EASILY ANNOYED OR IRRITABLE: SEVERAL DAYS
IF YOU CHECKED OFF ANY PROBLEMS ON THIS QUESTIONNAIRE, HOW DIFFICULT HAVE THESE PROBLEMS MADE IT FOR YOU TO DO YOUR WORK, TAKE CARE OF THINGS AT HOME, OR GET ALONG WITH OTHER PEOPLE: SOMEWHAT DIFFICULT
8. IF YOU CHECKED OFF ANY PROBLEMS, HOW DIFFICULT HAVE THESE MADE IT FOR YOU TO DO YOUR WORK, TAKE CARE OF THINGS AT HOME, OR GET ALONG WITH OTHER PEOPLE?: SOMEWHAT DIFFICULT
1. FEELING NERVOUS, ANXIOUS, OR ON EDGE: SEVERAL DAYS
GAD7 TOTAL SCORE: 6
GAD7 TOTAL SCORE: 6
7. FEELING AFRAID AS IF SOMETHING AWFUL MIGHT HAPPEN: NOT AT ALL
2. NOT BEING ABLE TO STOP OR CONTROL WORRYING: SEVERAL DAYS

## 2023-02-21 ASSESSMENT — PATIENT HEALTH QUESTIONNAIRE - PHQ9
SUM OF ALL RESPONSES TO PHQ QUESTIONS 1-9: 8
SUM OF ALL RESPONSES TO PHQ QUESTIONS 1-9: 8
10. IF YOU CHECKED OFF ANY PROBLEMS, HOW DIFFICULT HAVE THESE PROBLEMS MADE IT FOR YOU TO DO YOUR WORK, TAKE CARE OF THINGS AT HOME, OR GET ALONG WITH OTHER PEOPLE: SOMEWHAT DIFFICULT

## 2023-02-21 ASSESSMENT — ENCOUNTER SYMPTOMS
BACK PAIN: 1
NERVOUS/ANXIOUS: 1

## 2023-02-21 ASSESSMENT — PAIN SCALES - GENERAL: PAINLEVEL: MODERATE PAIN (4)

## 2023-02-21 NOTE — PROGRESS NOTES
Assessment & Plan     (M47.26) Osteoarthritis of spine with radiculopathy, lumbar region  (primary encounter diagnosis)  Comment: recommend follow-up with spine specialist   Plan: Spine  Referral           (F90.2) Attention deficit hyperactivity disorder (ADHD), combined type  Comment:  Controlled no change in treatment plan  Plan: REVIEW OF HEALTH MAINTENANCE PROTOCOL ORDERS,         dexmethylphenidate (FOCALIN) 10 MG tablet,         dexmethylphenidate (FOCALIN) 10 MG tablet,         dexmethylphenidate (FOCALIN) 10 MG tablet,         dexmethylphenidate (FOCALIN) 10 MG tablet,         dexmethylphenidate (FOCALIN) 10 MG tablet,         dexmethylphenidate (FOCALIN) 10 MG tablet      (Z13.220) Screening for hyperlipidemia  Comment:   Plan: Lipid panel reflex to direct LDL Fasting            (E66.01) Morbid obesity (H)  Comment: recommend weight loss clinic   Plan: Comprehensive Weight Management      (Z13.29) Screening for hypothyroidism  Comment:   Plan: TSH with free T4 reflex            (E53.8) Vitamin B12 deficiency (non anemic)  Comment:  Controlled no change in treatment plan  Plan: Vitamin D Deficiency, Vitamin B12    (G89.4) Chronic pain syndrome  Comment:  Controlled no change in treatment plan  Plan: Drug Confirmation Panel Urine with Creat - lab         collect, Drug Confirmation Panel Urine with         Creat, oxyCODONE (ROXICODONE) 5 MG tablet,         oxyCODONE (ROXICODONE) 5 MG tablet            (R22.1) Lump in neck  Comment: recommend ultra sound   Plan: US Head Neck Soft Tissue    (J44.1) Chronic obstructive pulmonary disease with acute exacerbation (H)  Comment:  Controlled no change in treatment plan  Plan:     (E55.9) Vitamin D deficiency  Comment:  Controlled no change in treatment plan  Plan: vitamin D2 (ERGOCALCIFEROL) 36349 units (1250         mcg) capsule, Vitamin D Deficiency           50 minutes spent on the date of the encounter doing chart review, history and exam, documentation  and further activities per the note       See Patient Instructions    No follow-ups on file.    FRANKLYN Christianson CNP  [facility]    Justin Rodríguez is a 42 year old, presenting for the following health issues:  Back Pain, Depression, and Anxiety      Back Pain     Anxiety    History of Present Illness       Back Pain:  She presents for follow up of back pain. Patient's back pain is a chronic problem.  Location of back pain:  Right lower back, left lower back, right upper back, left upper back, right side of neck, left shoulder, right side of waist and left side of waist  Description of back pain: burning, dull ache, shooting and stabbing  Back pain spreads: nowhere    Since patient first noticed back pain, pain is: gradually worsening  Does back pain interfere with her job:  Yes      Mental Health Follow-up:  Patient presents to follow-up on Depression & Anxiety.Patient's depression since last visit has been:  Medium  The patient is having other symptoms associated with depression.  Patient's anxiety since last visit has been:  Medium  The patient is having other symptoms associated with anxiety.  Any significant life events: financial concerns  Patient is feeling anxious or having panic attacks.  Patient has no concerns about alcohol or drug use.    She eats 0-1 servings of fruits and vegetables daily.She consumes 2 sweetened beverage(s) daily.She exercises with enough effort to increase her heart rate 9 or less minutes per day.  She exercises with enough effort to increase her heart rate 3 or less days per week.   She is taking medications regularly.    Today's PHQ-9         PHQ-9 Total Score: 8    PHQ-9 Q9 Thoughts of better off dead/self-harm past 2 weeks :   Not at all    How difficult have these problems made it for you to do your work, take care of things at home, or get along with other people: Somewhat difficult  Today's MILES-7 Score: 6     Medication Followup of  focalin    Taking Medication as prescribed: yes    Side Effects:  None    Medication Helping Symptoms:  Yes- she thinks it might not be working as well as it used to      She has a lump on the right side of her neck that she would like checked.          Review of Systems   Musculoskeletal: Positive for back pain.   Psychiatric/Behavioral: The patient is nervous/anxious.             Objective    LMP 03/02/2022 (LMP Unknown)   There is no height or weight on file to calculate BMI.  Physical Exam   GENERAL: healthy, alert and no distress  EYES: Eyes grossly normal to inspection, PERRL and conjunctivae and sclerae normal  HENT: ear canals and TM's normal, nose and mouth without ulcers or lesions  NECK: no adenopathy, no asymmetry, masses, or scars and thyroid normal to palpation  RESP: lungs clear to auscultation - no rales, rhonchi or wheezes  CV: regular rate and rhythm, normal S1 S2, no S3 or S4, no murmur, click or rub, no peripheral edema and peripheral pulses strong  ABDOMEN: soft, nontender, no hepatosplenomegaly, no masses and bowel sounds normal  MS: no gross musculoskeletal defects noted, no edema  SKIN: no suspicious lesions or rashes  NEURO: Normal strength and tone, mentation intact and speech normal  PSYCH: mentation appears normal, affect normal/bright  LYMPH: supraclavicular: enlarged tender nodes      Tender:  lumbar facet joints, left para lumbar muscles, right para lumbar muscles  Non-tender:  thoracic spinous processes, thoracic facet joints, left parathoracic muscles  Range of Motion:  left lateral thoracic bending   full, right lateral thoracic bending  full, left thoracic rotation  full, right thoracic rotation  full, lumbar flexion  decreased, painful, lumbar extension  decreased, painful, left lateral lumbar bending  decreased, painful, right lateral lumbar bending  decreased, painful, left lateral lumbar rotation  decreased, painful, right lateral lumbar rotation  decreased, painful  Strength:   able to heel walk  Special tests:  negative straight leg raises    Hip Exam: Hip ROM full    Results for orders placed or performed in visit on 02/21/23   Lipid panel reflex to direct LDL Fasting     Status: Abnormal   Result Value Ref Range    Cholesterol 186 <200 mg/dL    Triglycerides 340 (H) <150 mg/dL    Direct Measure HDL 30 (L) >=50 mg/dL    LDL Cholesterol Calculated 88 <=100 mg/dL    Non HDL Cholesterol 156 (H) <130 mg/dL    Narrative    Cholesterol  Desirable:  <200 mg/dL    Triglycerides  Normal:  Less than 150 mg/dL  Borderline High:  150-199 mg/dL  High:  200-499 mg/dL  Very High:  Greater than or equal to 500 mg/dL    Direct Measure HDL  Female:  Greater than or equal to 50 mg/dL   Male:  Greater than or equal to 40 mg/dL    LDL Cholesterol  Desirable:  <100mg/dL  Above Desirable:  100-129 mg/dL   Borderline High:  130-159 mg/dL   High:  160-189 mg/dL   Very High:  >= 190 mg/dL    Non HDL Cholesterol  Desirable:  130 mg/dL  Above Desirable:  130-159 mg/dL  Borderline High:  160-189 mg/dL  High:  190-219 mg/dL  Very High:  Greater than or equal to 220 mg/dL   Vitamin B12     Status: Normal   Result Value Ref Range    Vitamin B12 479 232 - 1,245 pg/mL   Vitamin D Deficiency     Status: Abnormal   Result Value Ref Range    Vitamin D, Total (25-Hydroxy) 15 (L) 20 - 75 ug/L    Narrative    Season, race, dietary intake, and treatment affect the concentration of 25-hydroxy-Vitamin D. Values may decrease during winter months and increase during summer months. Values 20-29 ug/L may indicate Vitamin D insufficiency and values <20 ug/L may indicate Vitamin D deficiency.    Vitamin D determination is routinely performed by an immunoassay specific for 25 hydroxyvitamin D3.  If an individual is on vitamin D2(ergocalciferol) supplementation, please specify 25 OH vitamin D2 and D3 level determination by LCMSMS test VITD23.     TSH with free T4 reflex     Status: Normal   Result Value Ref Range    TSH 1.13 0.30 - 4.20  uIU/mL   Drug Confirmation Panel Urine with Creat - lab collect *Canceled*     Status: None ()    Narrative    The following orders were created for panel order Drug Confirmation Panel Urine with Creat - lab collect.  Procedure                               Abnormality         Status                     ---------                               -----------         ------                     Urine Drug Confirmation ...[[id number]]                                                 Urine Creatinine for Abhinav...[[id number]]                                                 Cannabinoids qualitative...[[id number]]                                                   Please view results for these tests on the individual orders.

## 2023-02-21 NOTE — LETTER
Opioid / Opioid Plus Controlled Substance Agreement    This is an agreement between you and your provider about the safe and appropriate use of controlled substance/opioids prescribed by your care team. Controlled substances are medicines that can cause physical and mental dependence (abuse).    There are strict laws about having and using these medicines. We here at [facility] are committing to working with you in your efforts to get better. To support you in this work, we ll help you schedule regular office appointments for medicine refills. If we must cancel or change your appointment for any reason, we ll make sure you have enough medicine to last until your next appointment.     As a Provider, I will:    Listen carefully to your concerns and treat you with respect.     Recommend a treatment plan that I believe is in your best interest. This plan may involve therapies other than opioid pain medication.     Talk with you often about the possible benefits, and the risk of harm of any medicine that we prescribe for you.     Provide a plan on how to taper (discontinue or go off) using this medicine if the decision is made to stop its use.    As a Patient, I understand that opioid(s):     Are a controlled substance prescribed by my care team to help me function or work and manage my condition(s).     Are strong medicines and can cause serious side effects such as:    Drowsiness, which can seriously affect my driving ability    A lower breathing rate, enough to cause death    Harm to my thinking ability     Depression     Abuse of and addiction to this medicine    Need to be taken exactly as prescribed. Combining opioids with certain medicines or chemicals (such as illegal drugs, sedatives, sleeping pills, and benzodiazepines) can be dangerous or even fatal. If I stop opioids suddenly, I may have severe withdrawal symptoms.    Do not work for all types of pain nor for all patients. If they re not helpful, I may  be asked to stop them.        The risks, benefits and side effects of these medicine(s) were explained to me. I agree that:  1. I will take part in other treatments as advised by my care team. This may be psychiatry or counseling, physical therapy, behavioral therapy, group treatment or a referral to a specialist.     2. I will keep all my appointments. I understand that this is part of the monitoring of opioids. My care team may require an office visit for EVERY opioid/controlled substance refill. If I miss appointments or don t follow instructions, my care team may stop my medicine.    3. I will take my medicines as prescribed. I will not change the dose or schedule unless my care team tells me to. There will be no refills if I run out early.     4. I may be asked to come to the clinic and complete a urine drug test or complete a pill count at any time. If I don t give a urine sample or participate in a pill count, the care team may stop my medicine.    5. I will only receive prescriptions from this clinic for chronic pain. If I am treated by another provider for acute pain issues, I will tell them that I am taking opioid pain medication for chronic pain and that I have a treatment agreement with this provider. I will inform my [facility] care team within one business day if I am given a prescription for any pain medication by another healthcare provider. My [facility] care team can contact other providers and pharmacists about my use of any medicines.    6. It is up to me to make sure that I don t run out of my medicines on weekends or holidays. If my care team is willing to refill my opioid prescription without a visit, I must request refills only during office hours. Refills may take up to 3 business days to process. I will use one pharmacy to fill all my opioid and other controlled substance prescriptions. I will notify the clinic about any changes to my insurance or medication  availability.    7. I am responsible for my prescriptions. If the medicine/prescription is lost, stolen or destroyed, it will not be replaced. I also agree not to share controlled substance medicines with anyone.    8. I am aware I should not use any illegal or recreational drugs. I agree not to drink alcohol unless my care team says I can.       9. If I enroll in the [place] [facility] program, I will tell my care team prior to my next refill.     10. I will tell my care team right away if I become pregnant, have a new medical problem treated outside of my regular clinic, or have a change in my medications.    11. I understand that this medicine can affect my thinking, judgment and reaction time. Alcohol and drugs affect the brain and body, which can affect the safety of my driving. Being under the influence of alcohol or drugs can affect my decision-making, behaviors, personal safety, and the safety of others. Driving while impaired (DWI) can occur if a person is driving, operating, or in physical control of a car, motorcycle, boat, snowmobile, ATV, motorbike, off-road vehicle, or any other motor vehicle ([place] Statute 169A.20). I understand the risk if I choose to drive or operate any vehicle or machinery.    I understand that if I do not follow any of the conditions above, my prescriptions or treatment may be stopped or changed.          Opioids  What You Need to Know    What are opioids?   Opioids are pain medicines that must be prescribed by a doctor. They are also known as narcotics.     Examples are:   1. morphine (MS Contin, Yarelis)  2. oxycodone (Oxycontin)  3. oxycodone and acetaminophen (Percocet)  4. hydrocodone and acetaminophen (Vicodin, Norco)   5. fentanyl patch (Duragesic)   6. hydromorphone (Dilaudid)   7. methadone  8. codeine (Tylenol #3)     What do opioids do well?   Opioids are best for severe short-term pain such as after a surgery or injury. They may work well for cancer pain. They may  help some people with long-lasting (chronic) pain.     What do opioids NOT do well?   Opioids never get rid of pain entirely, and they don t work well for most patients with chronic pain. Opioids don t reduce swelling, one of the causes of pain.                                    Other ways to manage chronic pain and improve function include:       Treat the health problem that may be causing pain    Anti-inflammation medicines, which reduce swelling and tenderness, such as ibuprofen (Advil, Motrin) or naproxen (Aleve)    Acetaminophen (Tylenol)    Antidepressants and anti-seizure medicines, especially for nerve pain    Topical treatments such as patches or creams    Injections or nerve blocks    Chiropractic or osteopathic treatment    Acupuncture, massage, deep breathing, meditation, visual imagery, aromatherapy    Use heat or ice at the pain site    Physical therapy     Exercise    Stop smoking    Take part in therapy       Risks and side effects     Talk to your doctor before you start or decide to keep taking opioids. Possible side effects include:      Lowering your breathing rate enough to cause death    Overdose, including death, especially if taking higher than prescribed doses    Worse depression symptoms; less pleasure in things you usually enjoy    Feeling tired or sluggish    Slower thoughts or cloudy thinking    Being more sensitive to pain over time; pain is harder to control    Trouble sleeping or restless sleep    Changes in hormone levels (for example, less testosterone)    Changes in sex drive or ability to have sex    Constipation    Unsafe driving    Itching and sweating    Dizziness    Nausea, throwing up and dry mouth    What else should I know about opioids?    Opioids may lead to dependence, tolerance, or addiction.      Dependence means that if you stop or reduce the medicine too quickly, you will have withdrawal symptoms. These include loose poop (diarrhea), jitters, flu-like symptoms,  nervousness and tremors. Dependence is not the same as addiction.                       Tolerance means needing higher doses over time to get the same effect. This may increase the chance of serious side effects.      Addiction is when people improperly use a substance that harms their body, their mind or their relations with others. Use of opiates can cause a relapse of addiction if you have a history of drug or alcohol abuse.      People who have used opioids for a long time may have a lower quality of life, worse depression, higher levels of pain and more visits to doctors.    You can overdose on opioids. Take these steps to lower your risk of overdose:    1. Recognize the signs:  Signs of overdose include decrease or loss of consciousness (blackout), slowed breathing, trouble waking up and blue lips. If someone is worried about overdose, they should call 911.    2. Talk to your doctor about Narcan (naloxone).   If you are at risk for overdose, you may be given a prescription for Narcan. This medicine very quickly reverses the effects of opioids.   If you overdose, a friend or family member can give you Narcan while waiting for the ambulance. They need to know the signs of overdose and how to give Narcan.     3. Don't use alcohol or street drugs.   Taking them with opioids can cause death.    4. Do not take any of these medicines unless your doctor says it s OK. Taking these with opioids can cause death:    Benzodiazepines, such as lorazepam (Ativan), alprazolam (Xanax) or diazepam (Valium)    Muscle relaxers, such as cyclobenzaprine (Flexeril)    Sleeping pills like zolpidem (Ambien)     Other opioids      How to keep you and other people safe while taking opioids:    1. Never share your opioids with others.  Opioid medicines are regulated by the Drug Enforcement Agency (TROY). Selling or sharing medications is a criminal act.    2. Be sure to store opioids in a secure place, locked up if possible. Young children  can easily swallow them and overdose.    3. When you are traveling with your medicines, keep them in the original bottles. If you use a pill box, be sure you also carry a copy of your medicine list from your clinic or pharmacy.    4. Safe disposal of opioids    Most pharmacies have places to get rid of medicine, called disposal kiosks. Medicine disposal options are also available in every [facility]. Search your county and  medication disposal  to find more options. You can find more details at:  [email or web address]     I agree that my provider, clinic care team, and pharmacy may work with any city, state or federal law enforcement agency that investigates the misuse, sale, or other diversion of my controlled medicine. I will allow my provider to discuss my care with, or share a copy of, this agreement with any other treating provider, pharmacy or emergency room where I receive care.    I have read this agreement and have asked questions about anything I did not understand.    _______________________________________________________  Patient Signature - Anne Ferreira _____________________                   Date     _______________________________________________________  Provider Signature - FRANKLYN Christianson CNP   _____________________                   Date     _______________________________________________________  Witness Signature (required if provider not present while patient signing)   _____________________                   Date

## 2023-02-22 LAB — DEPRECATED CALCIDIOL+CALCIFEROL SERPL-MC: 15 UG/L (ref 20–75)

## 2023-02-27 RX ORDER — ERGOCALCIFEROL 1.25 MG/1
50000 CAPSULE, LIQUID FILLED ORAL WEEKLY
Qty: 5 CAPSULE | Refills: 0 | Status: SHIPPED | OUTPATIENT
Start: 2023-02-27 | End: 2023-06-19

## 2023-03-01 DIAGNOSIS — J45.20 MILD INTERMITTENT ASTHMA WITHOUT COMPLICATION: ICD-10-CM

## 2023-03-02 RX ORDER — MONTELUKAST SODIUM 10 MG/1
TABLET ORAL
Qty: 60 TABLET | Refills: 0 | Status: SHIPPED | OUTPATIENT
Start: 2023-03-02 | End: 2023-04-06

## 2023-03-02 NOTE — TELEPHONE ENCOUNTER
Routing refill request to provider for review/approval because:  ACT Total Scores 10/15/2020 4/21/2021 11/9/2022   ACT TOTAL SCORE (Goal Greater than or Equal to 20) 16 25 19   In the past 12 months, how many times did you visit the emergency room for your asthma without being admitted to the hospital? 2 0 3   In the past 12 months, how many times were you hospitalized overnight because of your asthma? 0 0 0    Julie Behrendt RN

## 2023-03-16 ENCOUNTER — OFFICE VISIT (OUTPATIENT)
Dept: ANESTHESIOLOGY | Facility: CLINIC | Age: 43
End: 2023-03-16
Attending: NURSE PRACTITIONER
Payer: MEDICAID

## 2023-03-16 VITALS — HEART RATE: 70 BPM | OXYGEN SATURATION: 98 % | SYSTOLIC BLOOD PRESSURE: 124 MMHG | DIASTOLIC BLOOD PRESSURE: 68 MMHG

## 2023-03-16 DIAGNOSIS — F33.1 MODERATE EPISODE OF RECURRENT MAJOR DEPRESSIVE DISORDER (H): ICD-10-CM

## 2023-03-16 DIAGNOSIS — M47.26 OSTEOARTHRITIS OF SPINE WITH RADICULOPATHY, LUMBAR REGION: ICD-10-CM

## 2023-03-16 DIAGNOSIS — F41.1 GENERALIZED ANXIETY DISORDER: ICD-10-CM

## 2023-03-16 DIAGNOSIS — M48.02 CERVICAL SPINAL STENOSIS: ICD-10-CM

## 2023-03-16 DIAGNOSIS — E66.01 MORBID OBESITY (H): ICD-10-CM

## 2023-03-16 DIAGNOSIS — M54.12 CERVICAL RADICULOPATHY: ICD-10-CM

## 2023-03-16 DIAGNOSIS — M54.16 LUMBAR RADICULOPATHY, CHRONIC: ICD-10-CM

## 2023-03-16 DIAGNOSIS — Z98.1 S/P CERVICAL SPINAL FUSION: Primary | ICD-10-CM

## 2023-03-16 PROCEDURE — 99204 OFFICE O/P NEW MOD 45 MIN: CPT | Performed by: ANESTHESIOLOGY

## 2023-03-16 RX ORDER — PREGABALIN 50 MG/1
50 CAPSULE ORAL 2 TIMES DAILY
Qty: 60 CAPSULE | Refills: 0 | Status: SHIPPED | OUTPATIENT
Start: 2023-03-16 | End: 2023-05-18

## 2023-03-16 RX ORDER — DIAZEPAM 5 MG
10 TABLET ORAL ONCE
Status: CANCELLED | OUTPATIENT
Start: 2023-03-16

## 2023-03-16 ASSESSMENT — PAIN SCALES - GENERAL: PAINLEVEL: SEVERE PAIN (6)

## 2023-03-16 NOTE — NURSING NOTE
RN read through the instructions with the patient for the recommended procedure: Cervical Epidural Steroid Injection  Patient verbalized understanding to holding appropriate medication per protocol and was agreeable to NPO policy and needing a .    Anticoagulant: None reported    Recommended Follow Up: as needed    Jodi Gonzalez RN

## 2023-03-16 NOTE — PATIENT INSTRUCTIONS
Medications:    Pregabalin (Lyrica) 50 mg capsule. Take one capsule by mouth two times daily.      The prescription will not give the full details as outlined below.    AM    PM                     0                         50mg  If tolerating, after 3-4 days, increase as tolerated to next line  50mg (2 tabs)     50mg Call us when you are at this dose, or with any concerns.     Don't drive on this medication until you know how it effects you.  Common side effects are drowsiness and dizziness  You can go slower if any side effects  Once on higher doses, you cannot stop this medication abruptly.  Tapering instructions would need to be provided by a medical professional.       *Please provide the clinic with a minium of 1 week notice, on all prescription refills.       Referrals:    Physical Therapy Referral placed-   If you have not heard from the scheduling office within 2 business days, please call [phone] for all locations       Procedures:    Call to schedule your procedure: [phone] option #2    Cervical Epidural Steroid Injection    Your pre-procedure instructions are below, please call our clinic if you have any questions.      Recommended Follow up:      Follow up as needed after procedure.        Please call [phone] to schedule your clinic appointment if you don't already have an appointment scheduled.      Procedure Information related to COVID-19     Please call [phone] option #2 to schedule, reschedule, or cancel your procedure appointment.   Phones are answered Monday - Friday from 08:00 - 4:30pm.  Leave a voicemail with your name, birth date, and phone number if no one is available to take your call.        You no longer need to test for COVID- 19 prior to your procedure/surgery, unless your physician specifically requests that you test. If you experience COVID symptoms or have tested positive for COVID-19 within 14 days of your scheduled surgery or procedure, please update our office  right away and your procedure may have to be postponed.       The procedure center staff will call you several days before the procedure to review important information that you will need to know for the day of the procedure.     Please contact the clinic if you have further questions about this information [phone].        Information related to Scheduling and Pre-Procedure Instructions:    If you must reschedule your procedure more than two times, you must follow up in clinic before rescheduling again.    Preparing for your procedure    CAUTION - FAILURE TO FOLLOW THESE PRE-PROCEDURE INSTRUCTIONS WILL RESULT IN YOUR PROCEDURE BEING RESCHEDULED.    Your Procedure: Cervical Epidural Steroid Injection      Pregnancy  If you are pregnant, or think you may be pregnant, please notify our staff. This may or may not affect the ability to perform the procedure.       You must have a  take you home after your procedure. Transportation by taxi or para-transit is okay as long as you have a responsible adult accompany you. You must provide your 's full name and contact number at time of check in.     Fasting Protocol Please have nothing to eat or drink 1 hour prior to arrival.   Medications If you take any medications, DO NOT STOP. Take your medications as usual the day of your procedure with a sip of water AT LEAST 2 HOURS PRIOR TO ARRIVAL.    Antibiotics If you are currently taking antibiotics, you must complete the entire dose 7 days prior to your scheduled procedure. You must be clear of any signs or symptoms of infection. If you begin antibiotics, please contact our clinic for instructions.     Fever, Chills, or Rash If you experience a fever of higher than 100 degrees, chills, rash, or open wounds during the one week before your procedure, please call the clinic to see if you may proceed with your procedure.      Medication Hold List  **Patients under Cardiology/Neurology care should consult their  provider prior to the pain procedure to verify pre-procedure medication instructions. The information below contains general guidelines.**    Blood Thinners If you are taking daily ASPIRIN, PLAVIX, OR OTHER BLOOD THINNERS SUCH AS COUMADIN/WARFARIN, we will need your prescribing doctor to sign a release permitting you to stop these medications. Once approved by your prescribing doctor - STOP ALL BLOOD THINNERS BASED ON THE TIME TABLE BELOW PRIOR TO YOUR PROCEDURE. If you have been instructed to stop WARFARIN(COUMADIN), you must have an INR lab drawn the day before your procedure. . Your INR must be within normal limits before we can perform your injection. MEDICATIONS CAN BE RESTARTED AFTER YOUR PROCEDURE.    14 DAY HOLD  Ticlid (ticlopidine)    10 DAY HOLD  Effient (Prasugel)    3 DAY HOLD  Xarelto (rivaroxaban) 7 DAY HOLD  Anacin, Bufferin, Ecotrin, Excedrin, Aggrenox (Aspirin)  Brilinta (ticagrelor)  Coumadin (Warfarin)  Pradexa (Dabigatran)  Elmiron (Pentosan)  Plavix (Clopidogrel Bisulfate)  Pletal (Cilostazol)    24 HOUR HOLD  Lovenox (enoxaparin)  Agrylin (Anagrelide)        Non-steroidal Anti-inflammatories (NSAIDs) DO NOT TAKE any non-steroidal anti-inflammatory medications (NSAIDs) listed on the table below. MEDICATIONS CAN BE RESTARTED AFTER YOUR PROCEDURE. Celebrex is OK to take and does not need to be discontinued.     Medications to stop:  3 DAY HOLD  Advil, Motrin (Ibuprofen)  Arthrotec (diciofenac sodium/misoprostol)  Clinoril (Sulindac)  Indocin (Indomethacin)  Lodine (Etodolac)  Toradol (Ketorolac)  Vicoprofen (Hydrocodone and Ibuprofen)  Voltaren (Diclofenac)  Apixaban (Eliquis)    14 DAY HOLD  Daypro (Oxaprozin)  Feldene (Piroxicam)   7 DAY HOLD  Aleve (Naproxen sodium)  Darvon compound (contains aspirin)  Naprosyn (Naproxen)  Norgesic Forte (contains aspirin)  Mobic (Meloxicam)  Oruvall (Ketoprofen)  Percodan (contains aspirin)  Relafen (Nabumetone)  Salsalate  Trilisate  Vitamin E (more than 400  mg per day)  Any medication containing aspirin                To speak with a nurse, schedule/reschedule/cancel a clinic appointment, or request a medication refill call: [phone]    You can also reach us by Mayi [employer]: [email or web address]

## 2023-03-16 NOTE — NURSING NOTE
Patient presents with:  Consult: Back pain      Severe Pain (6)     What medications are you using for pain? Oxycodone, Tylenol, ibuprofen    (New patients only) Have you been seen by another pain clinic/ provider? Pamela Mccall, EMT

## 2023-03-16 NOTE — LETTER
3/16/2023       RE: Anne Ferreira  [address] [address]     Dear Colleague,    Thank you for referring your patient, Anne Ferreira, to the [facility] [place] at [facility]. Please see a copy of my visit note below.    [facility] for Comprehensive Chronic Pain Management : Consultation Note    Patient: Anne Ferreira Age: 42 year old   MRN: [id number] Referred by:  Vincenzo     Date of Visit: March 16, 2023    Reason for consultation:    Anne Ferreira is a 42 year old female who is seen in consultation today at the request of her provider,Dr. Loya for a comprehensive evaluation and management of pain.  Primary Care Provider is Bertha Loya.      Chief complaints:  Neck and back pain    History of Present illness:     Anne Ferreira is a 42 year old female with pain history as described below:     Location: neck and back  Laterality: both side   Quality: dull aching   Radiation: radiate to both arms; occassionally radiate to the left;   Duration: 5 years; lower back one year   Severity: 6/10, 10/10  Aggravating factors include: sitting long time or walking   Relieving factors include: rest   Any bowel or bladder incontinence: none   Other associated symptoms:tinging in toes and fingers      The patient has a medical history significant for obesity, ADHD, anxiety, depression, cervical spinal stenosis (status post cervical spinal fusion) now presents with a chronic neck pain. She has a history of a C5-6 ACDF several years ago at an [facility] facility.  She has axial neck pain with radicular symptoms to the lateral aspect of her arms bilaterally.  She also gets some numbness and tingling down her arms and into the fingers of her left hand. She has had injections in her back, but not on her neck.  She has tried some physical therapy.  Her MRI of the cervical spine performed in 2021 shows  degenerative changes of the cervical spine including disc osteophyte complex at C3-C4 and C6-C7 and multilevel cervical facet arthropathy.  There is mild neuroforaminal narrowing at C3-C4 on the left.        Trials of therapies  including     PT: Yes:   TENs Unit: No  Manual Medicine/Chiropractic: No  Surgeries:cervical spine surgery  Acupuncture: No  Other Integrative therapies: No  Behavioral interventions: No  Previous medication treatments included: gabapentin  Previous pain interventions: back injection     [place] Prescription Monitoring Program:   Reviewed. No concerns    Review of Systems:  Review of Systems   HENT: Negative.    Eyes: Negative.    Respiratory: Negative.    Cardiovascular: Negative.    Gastrointestinal: Negative.    Genitourinary: Negative.    Musculoskeletal: Positive for back pain, joint pain, myalgias and neck pain.   Skin: Negative.    Neurological: Positive for sensory change.   Psychiatric/Behavioral: Positive for depression. The patient is nervous/anxious.        Patient Supplied Answers To the [facility] Pain Questionnaire  No flowsheet data found.         MILES-7 SCORE 8/19/2021 5/17/2022 2/21/2023   Total Score 7 (mild anxiety) - 6 (mild anxiety)   Total Score 7 10 6        PHQ-2 ( 1999 [employer]) 2/21/2023 9/16/2022   Q1: Little interest or pleasure in doing things - 1   Q2: Feeling down, depressed or hopeless - 1   PHQ-2 Score - 2   PHQ-2 Total Score (12-17 Years)- Positive if 3 or more points; Administer PHQ-A if positive - -   Q1: Little interest or pleasure in doing things - Several days   Q2: Feeling down, depressed or hopeless - Several days   PHQ-2 Score Incomplete 2        [facility] [place] Follow-up to PHQ 5/17/2022 9/16/2022 2/21/2023   PHQ-9 9. Suicide Ideation past 2 weeks Not at all Not at all Not at all          Past Medical History:  Past Medical History:   Diagnosis Date     Abnormal Pap smear of cervix        Past Surgical History:  Past Surgical History:   Procedure Laterality Date      CYSTOSCOPY N/A 4/25/2022    Procedure: CYSTOSCOPY;  Surgeon: Julius Montejo MD;  Location: [facility]     LAPAROSCOPIC CHOLECYSTECTOMY N/A 12/21/2020    Procedure: Laparoscopic cholecystectomy;  Surgeon: Manuel Durham DO;  Location: [facility]     LAPAROSCOPIC HYSTERECTOMY TOTAL, BILATERAL SALPINGO-OOPHORECTOMY, COMBINED Bilateral 4/25/2022    Procedure: Laparoscopic total hysterectomy, Bilateral salpingectomy, with sparing of ovaries;  Surgeon: Julius Montejo MD;  Location: [facility]       Medications:  Current Outpatient Medications   Medication Sig Dispense Refill     acetaminophen (TYLENOL) 325 MG tablet Take 3 tablets (975 mg) by mouth every 6 hours as needed for mild pain 50 tablet 0     albuterol (PROAIR HFA/PROVENTIL HFA/VENTOLIN HFA) 108 (90 Base) MCG/ACT inhaler Inhale 2 puffs into the lungs every 6 hours 1 Inhaler 1     amLODIPine (NORVASC) 2.5 MG tablet Take 1 tablet (2.5 mg) by mouth daily 90 tablet 3     cyclobenzaprine (FLEXERIL) 10 MG tablet Take 1 tablet by mouth twice daily as needed 60 tablet 0     [START ON 3/17/2023] dexmethylphenidate (FOCALIN) 10 MG tablet Take 1 tablet (10 mg) by mouth daily for 30 days 30 tablet 0     [START ON 4/17/2023] dexmethylphenidate (FOCALIN) 10 MG tablet Take 1 tablet (10 mg) by mouth daily for 30 days 30 tablet 0     [START ON 5/17/2023] dexmethylphenidate (FOCALIN) 10 MG tablet Take 1 tablet (10 mg) by mouth daily for 30 days 30 tablet 0     [START ON 3/17/2023] dexmethylphenidate (FOCALIN) 10 MG tablet Take 1 tablet (10 mg) by mouth daily for 30 days 30 tablet 0     [START ON 4/17/2023] dexmethylphenidate (FOCALIN) 10 MG tablet Take 1 tablet (10 mg) by mouth daily for 30 days 30 tablet 0     [START ON 5/17/2023] dexmethylphenidate (FOCALIN) 10 MG tablet Take 1 tablet (10 mg) by mouth daily for 30 days 30 tablet 0     diphenhydrAMINE (BENADRYL) 25 MG tablet Take 1-2 tablets (25-50 mg) by mouth every 6 hours as needed for itching or allergies 30 tablet 1      escitalopram (LEXAPRO) 20 MG tablet Take 1 tablet (20 mg) by mouth daily 90 tablet 3     hydrOXYzine (ATARAX) 25 MG tablet Take 1 tablet (25 mg) by mouth every 6 hours as needed for itching TAKE 1 TO 2 TABLETS BY MOUTH EVERY 6 HOURS AS NEEDED FOR ANXIETY Strength: 25 mg 60 tablet 0     ibuprofen (ADVIL/MOTRIN) 200 MG tablet Take 4 tablets (800 mg) by mouth every 6 hours as needed for other (mild and/or inflammatory pain)       ipratropium - albuterol 0.5 mg/2.5 mg/3 mL (DUONEB) 0.5-2.5 (3) MG/3ML neb solution Take 1 vial (3 mLs) by nebulization every 6 hours as needed for shortness of breath / dyspnea or wheezing 90 mL 0     lisinopril (ZESTRIL) 40 MG tablet Take 1 tablet (40 mg) by mouth daily 90 tablet 3     montelukast (SINGULAIR) 10 MG tablet TAKE ONE TABLET BY MOUTH EVERY NIGHT AT BEDTIME 60 tablet 0     Multiple Vitamins-Minerals (WOMENS MULTIVITAMIN PO)        omeprazole (PRILOSEC) 20 MG DR capsule Take 20 mg by mouth as needed PRN       oxyCODONE (ROXICODONE) 5 MG tablet TAKE 1 TABLET (5 MG) BY MOUTH EVERY 8 HOURS AS NEEDED FOR PAIN MAX 3 TABLETS PER DAY Strength: 5 mg 90 tablet 0     [START ON 4/7/2023] oxyCODONE (ROXICODONE) 5 MG tablet Take 1 tablet (5 mg) by mouth every 6 hours as needed for pain Max 3 tablet per day 90 tablet 0     propranolol (INDERAL) 80 MG tablet TAKE 1 TABLET (80 MG) BY MOUTH DAILY 90 tablet 3     vitamin D2 (ERGOCALCIFEROL) 39040 units (1250 mcg) capsule Take 1 capsule (50,000 Units) by mouth once a week 5 capsule 0         Medications related to Pain Management:   Medications related to Pain Management (From now, onward)    None          Allergies:       Allergies   Allergen Reactions     Bupropion Nausea     Sulfa Drugs        Social History:    Social History     Socioeconomic History     Marital status:      Spouse name: Not on file     Number of children: Not on file     Years of education: Not on file     Highest education level: Not on file   Occupational History      Not on file   Tobacco Use     Smoking status: Every Day     Packs/day: 0.50     Types: Cigarettes     Smokeless tobacco: Never   Vaping Use     Vaping Use: Never used   Substance and Sexual Activity     Alcohol use: Yes     Comment: occassionally     Drug use: Never     Sexual activity: Not Currently     Partners: Male   Other Topics Concern     Not on file   Social History Narrative     Not on file     Social Determinants of Health     Financial Resource Strain: Not on file   Food Insecurity: Not on file   Transportation Needs: Not on file   Physical Activity: Not on file   Stress: Not on file   Social Connections: Not on file   Intimate Partner Violence: Not on file   Housing Stability: Not on file     Social History     Social History Narrative     Not on file         Family history:  Family History   Problem Relation Age of Onset     Hypertension Father      Hypertension Brother      Hypertension Sister      Hypertension Brother          Physical Exam:  Vitals:    03/16/23 0858   BP: 124/68   BP Location: Right arm   Patient Position: Chair   Cuff Size: Adult Large   Pulse: 70   SpO2: 98%       General: Awake in no apparent distress.   Eyes: Sclerae are anicteric. PERRLA, EOMI   Neck: supple, no masses.   Lungs: unlabored.   Heart: regular rate and rhythm   Abdomen: soft non tender.  Extremities: Pulses are well palpable, no peripheral edema.   Musculoskeletal: Patient changes position without pain behavior.  Tenderness to palpation noted on the entire lower back and cervical paraspinal muscles.  Spurling negative.  Forced side-to-side movement of the neck negative.  Sacroiliac joints are tender.  Ravi positive.  Muscle strength 5 out of 5 in all extremities  Neurologic exam: Sensation to light touch intact throughout all dermatomes bilateral upper extremities and lower extremities.   Psychiatric; Normal affect.   Skin: Warm and Dry.       LABORATORY VALUES:   Recent Labs   Lab Test 09/06/22  1009  04/12/22  1530    136   POTASSIUM 4.4 4.2   CHLORIDE 103 104   CO2 26 23   ANIONGAP 9 9   GLC 93 95   BUN 9.8 12   CR 0.74 0.58   JAIME 8.9 8.6       CBC RESULTS:   Recent Labs   Lab Test 09/06/22  1009   WBC 9.3   RBC 4.58   HGB 13.3   HCT 40.9   MCV 89   MCH 29.0   MCHC 32.5   RDW 13.2          Most Recent 3 INR's:No lab results found.        ASSESSMENT/PLAN:                             ASSESSMENT:    Diagnoses       Codes Comments    S/P cervical spinal fusion    -  Primary Z98.1     Osteoarthritis of spine with radiculopathy, lumbar region     M47.26     Lumbar radiculopathy, chronic     M54.16     Cervical radiculopathy     M54.12     Moderate episode of recurrent major depressive disorder (H)     F33.1     Morbid obesity (H)     E66.01     Generalized anxiety disorder     F41.1     Cervical spinal stenosis     M48.02            PLAN:    - Medications.     Ordered pregabalin 50 mg p.o. twice daily    - Interventional procedures:  Ordered cervical epidural steroid injection.  Risk of the procedure including bleeding, infection, failed procedure, nerve injury, paralysis, post dural puncture headache, and epidural hematoma discussed with the patient.  All of the questions answered.    - Labs and imaging: None needed for pain management.     - Rehab: PT referral for core-strengthening exercises to relieve back pain include the pelvic tilt, cat-cow pose, bird dog, high and low planks, crunches, and exercises performed using a Swiss ball (or exercise ball).       - Psychology: No current needs.      - Disposition: We will see the patient for the above-mentioned procedure.  Follow-up as needed.        Assessment will be ongoing with changes in treatment as indicated.  Benefits/risks/alternatives to treatment have been reviewed and the patient has been instructed to contact this office if they have any questions or concerns.  This plan of care has been discussed with the patient and the patient is in  agreement.     Naresh Zuniga MD, PHD

## 2023-03-16 NOTE — PROGRESS NOTES
[facility] for Comprehensive Chronic Pain Management : Consultation Note    Patient: Anne Ferreira Age: 42 year old   MRN: [id number] Referred by:  Vincenzo     Date of Visit: March 16, 2023    Reason for consultation:    Anne Ferreira is a 42 year old female who is seen in consultation today at the request of her provider,Dr. Loya for a comprehensive evaluation and management of pain.  Primary Care Provider is Bertha Loya.      Chief complaints:  Neck and back pain    History of Present illness:     Anne Ferreira is a 42 year old female with pain history as described below:     Location: neck and back  Laterality: both side   Quality: dull aching   Radiation: radiate to both arms; occassionally radiate to the left;   Duration: 5 years; lower back one year   Severity: 6/10, 10/10  Aggravating factors include: sitting long time or walking   Relieving factors include: rest   Any bowel or bladder incontinence: none   Other associated symptoms:tinging in toes and fingers      The patient has a medical history significant for obesity, ADHD, anxiety, depression, cervical spinal stenosis (status post cervical spinal fusion) now presents with a chronic neck pain. She has a history of a C5-6 ACDF several years ago at an [facility] facility.  She has axial neck pain with radicular symptoms to the lateral aspect of her arms bilaterally.  She also gets some numbness and tingling down her arms and into the fingers of her left hand. She has had injections in her back, but not on her neck.  She has tried some physical therapy.  Her MRI of the cervical spine performed in 2021 shows degenerative changes of the cervical spine including disc osteophyte complex at C3-C4 and C6-C7 and multilevel cervical facet arthropathy.  There is mild neuroforaminal narrowing at C3-C4 on the left.        Trials of therapies  including     PT: Yes:   TENs Unit: No  Manual Medicine/Chiropractic: No  Surgeries:cervical spine  surgery  Acupuncture: No  Other Integrative therapies: No  Behavioral interventions: No  Previous medication treatments included: gabapentin  Previous pain interventions: back injection     [place] Prescription Monitoring Program:   Reviewed. No concerns    Review of Systems:  Review of Systems   HENT: Negative.    Eyes: Negative.    Respiratory: Negative.    Cardiovascular: Negative.    Gastrointestinal: Negative.    Genitourinary: Negative.    Musculoskeletal: Positive for back pain, joint pain, myalgias and neck pain.   Skin: Negative.    Neurological: Positive for sensory change.   Psychiatric/Behavioral: Positive for depression. The patient is nervous/anxious.        Patient Supplied Answers To the [facility] Pain Questionnaire  No flowsheet data found.         MILES-7 SCORE 8/19/2021 5/17/2022 2/21/2023   Total Score 7 (mild anxiety) - 6 (mild anxiety)   Total Score 7 10 6        PHQ-2 ( 1999 [employer]) 2/21/2023 9/16/2022   Q1: Little interest or pleasure in doing things - 1   Q2: Feeling down, depressed or hopeless - 1   PHQ-2 Score - 2   PHQ-2 Total Score (12-17 Years)- Positive if 3 or more points; Administer PHQ-A if positive - -   Q1: Little interest or pleasure in doing things - Several days   Q2: Feeling down, depressed or hopeless - Several days   PHQ-2 Score Incomplete 2        [facility] Follow-up to PHQ 5/17/2022 9/16/2022 2/21/2023   PHQ-9 9. Suicide Ideation past 2 weeks Not at all Not at all Not at all          Past Medical History:  Past Medical History:   Diagnosis Date     Abnormal Pap smear of cervix        Past Surgical History:  Past Surgical History:   Procedure Laterality Date     CYSTOSCOPY N/A 4/25/2022    Procedure: CYSTOSCOPY;  Surgeon: Julius Montejo MD;  Location: [facility]     LAPAROSCOPIC CHOLECYSTECTOMY N/A 12/21/2020    Procedure: Laparoscopic cholecystectomy;  Surgeon: Manuel Durham DO;  Location: [facility]     LAPAROSCOPIC HYSTERECTOMY TOTAL, BILATERAL SALPINGO-OOPHORECTOMY, COMBINED  Bilateral 4/25/2022    Procedure: Laparoscopic total hysterectomy, Bilateral salpingectomy, with sparing of ovaries;  Surgeon: Julius Montejo MD;  Location: [facility]       Medications:  Current Outpatient Medications   Medication Sig Dispense Refill     acetaminophen (TYLENOL) 325 MG tablet Take 3 tablets (975 mg) by mouth every 6 hours as needed for mild pain 50 tablet 0     albuterol (PROAIR HFA/PROVENTIL HFA/VENTOLIN HFA) 108 (90 Base) MCG/ACT inhaler Inhale 2 puffs into the lungs every 6 hours 1 Inhaler 1     amLODIPine (NORVASC) 2.5 MG tablet Take 1 tablet (2.5 mg) by mouth daily 90 tablet 3     cyclobenzaprine (FLEXERIL) 10 MG tablet Take 1 tablet by mouth twice daily as needed 60 tablet 0     [START ON 3/17/2023] dexmethylphenidate (FOCALIN) 10 MG tablet Take 1 tablet (10 mg) by mouth daily for 30 days 30 tablet 0     [START ON 4/17/2023] dexmethylphenidate (FOCALIN) 10 MG tablet Take 1 tablet (10 mg) by mouth daily for 30 days 30 tablet 0     [START ON 5/17/2023] dexmethylphenidate (FOCALIN) 10 MG tablet Take 1 tablet (10 mg) by mouth daily for 30 days 30 tablet 0     [START ON 3/17/2023] dexmethylphenidate (FOCALIN) 10 MG tablet Take 1 tablet (10 mg) by mouth daily for 30 days 30 tablet 0     [START ON 4/17/2023] dexmethylphenidate (FOCALIN) 10 MG tablet Take 1 tablet (10 mg) by mouth daily for 30 days 30 tablet 0     [START ON 5/17/2023] dexmethylphenidate (FOCALIN) 10 MG tablet Take 1 tablet (10 mg) by mouth daily for 30 days 30 tablet 0     diphenhydrAMINE (BENADRYL) 25 MG tablet Take 1-2 tablets (25-50 mg) by mouth every 6 hours as needed for itching or allergies 30 tablet 1     escitalopram (LEXAPRO) 20 MG tablet Take 1 tablet (20 mg) by mouth daily 90 tablet 3     hydrOXYzine (ATARAX) 25 MG tablet Take 1 tablet (25 mg) by mouth every 6 hours as needed for itching TAKE 1 TO 2 TABLETS BY MOUTH EVERY 6 HOURS AS NEEDED FOR ANXIETY Strength: 25 mg 60 tablet 0     ibuprofen (ADVIL/MOTRIN) 200 MG tablet  Take 4 tablets (800 mg) by mouth every 6 hours as needed for other (mild and/or inflammatory pain)       ipratropium - albuterol 0.5 mg/2.5 mg/3 mL (DUONEB) 0.5-2.5 (3) MG/3ML neb solution Take 1 vial (3 mLs) by nebulization every 6 hours as needed for shortness of breath / dyspnea or wheezing 90 mL 0     lisinopril (ZESTRIL) 40 MG tablet Take 1 tablet (40 mg) by mouth daily 90 tablet 3     montelukast (SINGULAIR) 10 MG tablet TAKE ONE TABLET BY MOUTH EVERY NIGHT AT BEDTIME 60 tablet 0     Multiple Vitamins-Minerals (WOMENS MULTIVITAMIN PO)        omeprazole (PRILOSEC) 20 MG DR capsule Take 20 mg by mouth as needed PRN       oxyCODONE (ROXICODONE) 5 MG tablet TAKE 1 TABLET (5 MG) BY MOUTH EVERY 8 HOURS AS NEEDED FOR PAIN MAX 3 TABLETS PER DAY Strength: 5 mg 90 tablet 0     [START ON 4/7/2023] oxyCODONE (ROXICODONE) 5 MG tablet Take 1 tablet (5 mg) by mouth every 6 hours as needed for pain Max 3 tablet per day 90 tablet 0     propranolol (INDERAL) 80 MG tablet TAKE 1 TABLET (80 MG) BY MOUTH DAILY 90 tablet 3     vitamin D2 (ERGOCALCIFEROL) 11893 units (1250 mcg) capsule Take 1 capsule (50,000 Units) by mouth once a week 5 capsule 0         Medications related to Pain Management:   Medications related to Pain Management (From now, onward)    None          Allergies:       Allergies   Allergen Reactions     Bupropion Nausea     Sulfa Drugs        Social History:    Social History     Socioeconomic History     Marital status:      Spouse name: Not on file     Number of children: Not on file     Years of education: Not on file     Highest education level: Not on file   Occupational History     Not on file   Tobacco Use     Smoking status: Every Day     Packs/day: 0.50     Types: Cigarettes     Smokeless tobacco: Never   Vaping Use     Vaping Use: Never used   Substance and Sexual Activity     Alcohol use: Yes     Comment: occassionally     Drug use: Never     Sexual activity: Not Currently     Partners: Male    Other Topics Concern     Not on file   Social History Narrative     Not on file     Social Determinants of Health     Financial Resource Strain: Not on file   Food Insecurity: Not on file   Transportation Needs: Not on file   Physical Activity: Not on file   Stress: Not on file   Social Connections: Not on file   Intimate Partner Violence: Not on file   Housing Stability: Not on file     Social History     Social History Narrative     Not on file         Family history:  Family History   Problem Relation Age of Onset     Hypertension Father      Hypertension Brother      Hypertension Sister      Hypertension Brother          Physical Exam:  Vitals:    03/16/23 0858   BP: 124/68   BP Location: Right arm   Patient Position: Chair   Cuff Size: Adult Large   Pulse: 70   SpO2: 98%       General: Awake in no apparent distress.   Eyes: Sclerae are anicteric. PERRLA, EOMI   Neck: supple, no masses.   Lungs: unlabored.   Heart: regular rate and rhythm   Abdomen: soft non tender.  Extremities: Pulses are well palpable, no peripheral edema.   Musculoskeletal: Patient changes position without pain behavior.  Tenderness to palpation noted on the entire lower back and cervical paraspinal muscles.  Spurling negative.  Forced side-to-side movement of the neck negative.  Sacroiliac joints are tender.  Ravi positive.  Muscle strength 5 out of 5 in all extremities  Neurologic exam: Sensation to light touch intact throughout all dermatomes bilateral upper extremities and lower extremities.   Psychiatric; Normal affect.   Skin: Warm and Dry.       LABORATORY VALUES:   Recent Labs   Lab Test 09/06/22  1009 04/12/22  1530    136   POTASSIUM 4.4 4.2   CHLORIDE 103 104   CO2 26 23   ANIONGAP 9 9   GLC 93 95   BUN 9.8 12   CR 0.74 0.58   JAIME 8.9 8.6       CBC RESULTS:   Recent Labs   Lab Test 09/06/22  1009   WBC 9.3   RBC 4.58   HGB 13.3   HCT 40.9   MCV 89   MCH 29.0   MCHC 32.5   RDW 13.2          Most Recent 3 INR's:No  lab results found.        ASSESSMENT/PLAN:                             ASSESSMENT:    Diagnoses       Codes Comments    S/P cervical spinal fusion    -  Primary Z98.1     Osteoarthritis of spine with radiculopathy, lumbar region     M47.26     Lumbar radiculopathy, chronic     M54.16     Cervical radiculopathy     M54.12     Moderate episode of recurrent major depressive disorder (H)     F33.1     Morbid obesity (H)     E66.01     Generalized anxiety disorder     F41.1     Cervical spinal stenosis     M48.02            PLAN:    - Medications.     Ordered pregabalin 50 mg p.o. twice daily    - Interventional procedures:  Ordered cervical epidural steroid injection.  Risk of the procedure including bleeding, infection, failed procedure, nerve injury, paralysis, post dural puncture headache, and epidural hematoma discussed with the patient.  All of the questions answered.    - Labs and imaging: None needed for pain management.     - Rehab: PT referral for core-strengthening exercises to relieve back pain include the pelvic tilt, cat-cow pose, bird dog, high and low planks, crunches, and exercises performed using a Swiss ball (or exercise ball).       - Psychology: No current needs.      - Disposition: We will see the patient for the above-mentioned procedure.  Follow-up as needed.        Assessment will be ongoing with changes in treatment as indicated.  Benefits/risks/alternatives to treatment have been reviewed and the patient has been instructed to contact this office if they have any questions or concerns.  This plan of care has been discussed with the patient and the patient is in agreement.     Naresh Zuniga MD, PHD

## 2023-03-20 ENCOUNTER — MYC MEDICAL ADVICE (OUTPATIENT)
Dept: FAMILY MEDICINE | Facility: CLINIC | Age: 43
End: 2023-03-20
Payer: MEDICAID

## 2023-03-20 ENCOUNTER — TELEPHONE (OUTPATIENT)
Dept: ANESTHESIOLOGY | Facility: CLINIC | Age: 43
End: 2023-03-20
Payer: MEDICAID

## 2023-03-20 DIAGNOSIS — F90.2 ATTENTION DEFICIT HYPERACTIVITY DISORDER (ADHD), COMBINED TYPE: Primary | ICD-10-CM

## 2023-03-20 PROBLEM — Z98.1 S/P CERVICAL SPINAL FUSION: Status: ACTIVE | Noted: 2023-03-20

## 2023-03-20 PROBLEM — M50.10 HERNIATION OF CERVICAL INTERVERTEBRAL DISC WITH RADICULOPATHY: Status: ACTIVE | Noted: 2019-07-01

## 2023-03-20 NOTE — TELEPHONE ENCOUNTER
Patient is scheduled for procedure with Dr. Zuniga     Spoke with: Patient     Date of Procedure: 3/30     Location: [facility]     Informed patient they will need an adult  yes     Additional comments: Spoke with patient, they are aware of above date and time, will reach out with any questions     Patient is aware pre-op RN will call 2-3 days prior to procedure with arrival time and instructions    Anna C. Schoenecker on 3/20/2023 at 8:42 AM

## 2023-03-21 ENCOUNTER — HOSPITAL ENCOUNTER (OUTPATIENT)
Dept: ULTRASOUND IMAGING | Facility: CLINIC | Age: 43
Discharge: HOME | End: 2023-03-21
Attending: NURSE PRACTITIONER | Admitting: NURSE PRACTITIONER
Payer: MEDICAID

## 2023-03-21 DIAGNOSIS — R22.1 LUMP IN NECK: ICD-10-CM

## 2023-03-21 PROCEDURE — 76536 US EXAM OF HEAD AND NECK: CPT

## 2023-03-22 ASSESSMENT — ENCOUNTER SYMPTOMS
GASTROINTESTINAL NEGATIVE: 1
RESPIRATORY NEGATIVE: 1
MYALGIAS: 1
EYES NEGATIVE: 1
NECK PAIN: 1
NERVOUS/ANXIOUS: 1
SENSORY CHANGE: 1
BACK PAIN: 1
CARDIOVASCULAR NEGATIVE: 1
DEPRESSION: 1

## 2023-03-27 ENCOUNTER — TELEPHONE (OUTPATIENT)
Dept: FAMILY MEDICINE | Facility: CLINIC | Age: 43
End: 2023-03-27

## 2023-03-27 ENCOUNTER — TELEPHONE (OUTPATIENT)
Dept: FAMILY MEDICINE | Facility: CLINIC | Age: 43
End: 2023-03-27
Payer: MEDICAID

## 2023-03-27 RX ORDER — DEXMETHYLPHENIDATE HYDROCHLORIDE 10 MG/1
20 CAPSULE, EXTENDED RELEASE ORAL DAILY
Qty: 30 CAPSULE | Refills: 0 | Status: SHIPPED | OUTPATIENT
Start: 2023-03-27 | End: 2023-04-17

## 2023-03-27 NOTE — TELEPHONE ENCOUNTER
Please disregard previous telephone encounter.     Pharmacy received prescription order for Focalin XR 10mg caps, #30 - for Pt to take 2 caps daily x 30 days.     Please clarify whether we would like #30 x 15 days or intended for #60 x 30 days.  Please send updated Rx to pharmacy if intended for 30 days.     Thank you,    Edda Beckham, PharmD  [facility]- [address] [address]  Phone: [phone]  [email or web address]

## 2023-03-30 ENCOUNTER — ANCILLARY PROCEDURE (OUTPATIENT)
Dept: RADIOLOGY | Facility: AMBULATORY SURGERY CENTER | Age: 43
End: 2023-03-30
Attending: ANESTHESIOLOGY
Payer: MEDICAID

## 2023-03-30 ENCOUNTER — HOSPITAL ENCOUNTER (OUTPATIENT)
Facility: AMBULATORY SURGERY CENTER | Age: 43
Discharge: HOME | End: 2023-03-30
Attending: ANESTHESIOLOGY | Admitting: ANESTHESIOLOGY
Payer: MEDICAID

## 2023-03-30 VITALS
DIASTOLIC BLOOD PRESSURE: 52 MMHG | BODY MASS INDEX: 37.56 KG/M2 | TEMPERATURE: 98.6 F | SYSTOLIC BLOOD PRESSURE: 97 MMHG | RESPIRATION RATE: 16 BRPM | HEART RATE: 61 BPM | WEIGHT: 212 LBS | OXYGEN SATURATION: 97 % | HEIGHT: 63 IN

## 2023-03-30 DIAGNOSIS — Z98.1 S/P CERVICAL SPINAL FUSION: ICD-10-CM

## 2023-03-30 DIAGNOSIS — M54.12 CERVICAL RADICULOPATHY: ICD-10-CM

## 2023-03-30 DIAGNOSIS — R52 PAIN: ICD-10-CM

## 2023-03-30 PROCEDURE — 62321 NJX INTERLAMINAR CRV/THRC: CPT

## 2023-03-30 PROCEDURE — 62321 NJX INTERLAMINAR CRV/THRC: CPT | Performed by: ANESTHESIOLOGY

## 2023-03-30 RX ORDER — BUPIVACAINE HYDROCHLORIDE 2.5 MG/ML
INJECTION, SOLUTION EPIDURAL; INFILTRATION; INTRACAUDAL PRN
Status: DISCONTINUED | OUTPATIENT
Start: 2023-03-30 | End: 2023-03-30 | Stop reason: HOSPADM

## 2023-03-30 RX ORDER — DEXAMETHASONE SODIUM PHOSPHATE 10 MG/ML
INJECTION INTRAMUSCULAR; INTRAVENOUS PRN
Status: DISCONTINUED | OUTPATIENT
Start: 2023-03-30 | End: 2023-03-30 | Stop reason: HOSPADM

## 2023-03-30 RX ORDER — DIAZEPAM 5 MG
5 TABLET ORAL ONCE
Status: COMPLETED | OUTPATIENT
Start: 2023-03-30 | End: 2023-03-30

## 2023-03-30 RX ORDER — DIAZEPAM 5 MG
10 TABLET ORAL ONCE
Status: COMPLETED | OUTPATIENT
Start: 2023-03-30 | End: 2023-03-30

## 2023-03-30 RX ORDER — LIDOCAINE HYDROCHLORIDE 10 MG/ML
INJECTION, SOLUTION EPIDURAL; INFILTRATION; INTRACAUDAL; PERINEURAL PRN
Status: DISCONTINUED | OUTPATIENT
Start: 2023-03-30 | End: 2023-03-30 | Stop reason: HOSPADM

## 2023-03-30 RX ORDER — IOPAMIDOL 408 MG/ML
INJECTION, SOLUTION INTRATHECAL PRN
Status: DISCONTINUED | OUTPATIENT
Start: 2023-03-30 | End: 2023-03-30 | Stop reason: HOSPADM

## 2023-03-30 RX ADMIN — DIAZEPAM 5 MG: 5 TABLET ORAL at 11:22

## 2023-03-30 RX ADMIN — DIAZEPAM 10 MG: 5 TABLET ORAL at 10:51

## 2023-03-30 NOTE — OP NOTE
Patient: Anne Ferreira Age: 42 year old   MRN: [id number] Attending: Dr. Zuniga     Date of Visit: March 30, 2023      PAIN MEDICINE CLINIC PROCEDURE NOTE    ATTENDING CLINICIAN:    Naresh Zuniga MD    PREPROCEDURE DIAGNOSES:  1.  Cervical radiculopathy  2.  Chronic neck pain radiating to the upper extremity    PROCEDURE(S) PERFORMED:  1.  Interlaminar cervical epidural steroid injection  2.  Fluoroscopic guidance for the above-named procedure(s)      ANESTHESIA:  Local.    BLOOD LOSS:  Minimal.    DRAINS AND SPECIMENS:  None.    COMPLICATIONS:  None.    INDICATIONS:  Anne Ferreira is a 42 year old female with a history of  chronic neck pain radiating to the upper extremity bilaterally.  Patient is status post ACDF and recent MRI shows cervical neuroforaminal stenosis.    The patient stated that the patient was in their usual state of health and denied recent anticoagulant use or recent infections.  Therefore, the plan is for the above mentioned procedure.     Procedure Details:  The patient was met in the procedure room, where the patient was identified by name, medical record number and date of birth.  All of the patient s last minute questions were answered. Written informed consent was obtained and saved in the electronic medical record, after the risks, benefits, and alternatives were discussed with the patient.      A formal time-out procedure was performed, as per protocol, including patient name, title of procedure, and site of procedure, and all in the room concurred.  Routine monitors were applied.      The patient was placed in the prone position on the procedure room table.  All pressure points were checked and comfortably padded.  Routine monitors were placed.  Vital signs were stable.    A chlorhexidine prep was completed followed by sterile draping per standard procedure.     The AP fluoroscopic view was optimized for approach at left C7-T1 interspace.  The skin over the interspace was infiltrated with 4-5  mL of 1% Lidocaine using a 25 gauge, 1.5 inch needle.  A 22-gauge 3-1/2 inch Tuohy needle was advanced midline between C7-T1 interlaminar space using the loss of resistance technique with preservative free normal saline with fluoroscopic guidance. Mutiple AP, contralateral oblique, and lateral fluoroscopic images are taken as Tuohy needle was advanced to the epidural space. The epidural space was identified, without evidence of blood, cerebrospinal fluid, or parasthesia throughout. Needle tip placement within the epidural space was further confirmed with 1-2 mL of nonionic contrast agent, with the epidural space visualized in the AP, contralateral oblique, and lateral fluoroscopic view(s) with appropriate spread of the agent with fat vacuolization and no intravascular or intrathecal spread noted.  Next, 6 mL of a treatment solution containing 1 mL of preservative dexamethasone 10mg/ml, 1 mL 0.25% and 4 mL preservative free normal saline was administered. The needle was withdrawn.    Light pressure was held at the puncture site(s) to prevent ecchymosis and oozing.  The patient's skin was cleansed, and hemostasis was confirmed.  Band-aids were applied to the needle injection site(s).      Condition:    The patient remained awake and alert throughout the procedure.  The patient tolerated the procedure well and was monitored for approximately 15 minutes afterward in the post procedure area.  There were no immediate post procedure complications noted.  The patient was then discharged to home as per protocol.      Pre-procedure pain score: 3/10  Post-procedure pain score: 0/10

## 2023-03-30 NOTE — DISCHARGE INSTRUCTIONS
Home Care Instructions after an Epidural Steroid Pain Injection    A lumbar epidural steroid injection delivers steroid medication directly into the area that may be causing your lower back pain and/or leg pain. A cervical or thoracic epidural steroid injection delivers steroids into the epidural space surrounding spinal nerve roots to help relieve pain in the upper spine/neck.    Activity  -Rest today  -Do not work today  -Resume normal activity tomorrow  -DO NOT shower for 24 hours  -DO NOT remove bandaid for 24 hours    Pain  -You may experience soreness at the injection site for one or two days  -You may use an ice pack for 20 minutes every 2 hours for the first 24 hours  -You may use a heating pad after the first 24 hours  -You may use Tylenol (acetaminophen) every 4 hours or other pain medicines as     directed by your physician    You may experience numbness radiating into your legs or arms (depending on the procedure location). This numbness may last several hours. Until sensation returns to normal; please use caution in walking, climbing stairs, and stepping out of your vehicle, etc.    Common side effects of steroids:  Not everyone will experience corticosteroid side effects. If side effects are experienced, they will gradually subside in the 7-10 day period following an injection. Most common side effects include:  -Flushed face and/or chest  -Feeling of warmth, particularly in the face but could be an overall feeling of warmth  -Increased blood sugar in diabetic patients  -Menstrual irregularities my occur. If taking hormone-based birth control an alternate method of birth control is recommended  -Sleep disturbances and/or mood swings are possible  -Leg cramps    Please contact us if you have:  -Severe pain  -Fever more than 101.5 degrees Fahrenheit  -Signs of infection at the injection site (redness, swelling, or drainage)    FOR PAIN CENTER PATIENTS:  If you have questions, please contact the Pain  Clinic at [phone] Option #1 between the hours of 7:00 am and 3:00 pm Monday through Friday. After office hours you can contact the on call provider by dialing [phone]. If you need immediate attention, we recommend that you go to a hospital emergency room or dial [phone].

## 2023-04-06 DIAGNOSIS — J45.20 MILD INTERMITTENT ASTHMA WITHOUT COMPLICATION: ICD-10-CM

## 2023-04-06 RX ORDER — MONTELUKAST SODIUM 10 MG/1
TABLET ORAL
Qty: 60 TABLET | Refills: 0 | Status: SHIPPED | OUTPATIENT
Start: 2023-04-06 | End: 2023-05-18

## 2023-04-06 NOTE — TELEPHONE ENCOUNTER
Routing refill request to provider for review/approval because:  Labs out of range:        10/15/2020     8:34 AM 4/21/2021     6:59 AM 11/9/2022     7:05 AM   ACT Total Scores   ACT TOTAL SCORE (Goal Greater than or Equal to 20) 16 25 19   In the past 12 months, how many times did you visit the emergency room for your asthma without being admitted to the hospital? 2 0 3   In the past 12 months, how many times were you hospitalized overnight because of your asthma? 0 0 0      Julie Behrendt RN

## 2023-04-07 ENCOUNTER — TELEPHONE (OUTPATIENT)
Dept: FAMILY MEDICINE | Facility: CLINIC | Age: 43
End: 2023-04-07
Payer: COMMERCIAL

## 2023-04-07 NOTE — TELEPHONE ENCOUNTER
Hey team, I just wanted to let you know when filling patient's Oxycodone 5mg tablets today, we received a message back from her insurance that had said: LIMIT 1ST 4 RXS TO 7 DAY SUPPLY ONLY- Patient only got 7 days worth, and her insurance is [employer] so we can't cash, or use a discount card for the full amount. I thought I would let you guys know.     Thank you!    Thank You,   Miracle Kee, [facility]

## 2023-04-07 NOTE — TELEPHONE ENCOUNTER
oxyCODONE (ROXICODONE) 5 MG tablet 90 tablet 0 4/7/2023 5/7/2023 No   Sig - Route: Take 1 tablet (5 mg) by mouth every 6 hours as needed for pain Max 3 tablet per day - Oral   Sent to pharmacy as: oxyCODONE HCl 5 MG Oral Tablet (ROXICODONE)     Forwarded to Bertha. NEETA Ramsay, RN

## 2023-04-10 ENCOUNTER — MYC MEDICAL ADVICE (OUTPATIENT)
Dept: FAMILY MEDICINE | Facility: CLINIC | Age: 43
End: 2023-04-10
Payer: COMMERCIAL

## 2023-04-10 DIAGNOSIS — G89.4 CHRONIC PAIN SYNDROME: ICD-10-CM

## 2023-04-12 RX ORDER — OXYCODONE HYDROCHLORIDE 5 MG/1
5 TABLET ORAL EVERY 6 HOURS PRN
Qty: 69 TABLET | Refills: 0 | Status: SHIPPED | OUTPATIENT
Start: 2023-04-14 | End: 2023-04-21

## 2023-04-17 ENCOUNTER — MYC MEDICAL ADVICE (OUTPATIENT)
Dept: FAMILY MEDICINE | Facility: CLINIC | Age: 43
End: 2023-04-17
Payer: COMMERCIAL

## 2023-04-17 DIAGNOSIS — F90.2 ATTENTION DEFICIT HYPERACTIVITY DISORDER (ADHD), COMBINED TYPE: ICD-10-CM

## 2023-04-17 RX ORDER — DEXMETHYLPHENIDATE HYDROCHLORIDE 10 MG/1
10 TABLET ORAL 2 TIMES DAILY
Qty: 60 TABLET | Refills: 0 | Status: SHIPPED | OUTPATIENT
Start: 2023-04-24 | End: 2023-05-18

## 2023-04-17 NOTE — TELEPHONE ENCOUNTER
Patient was given extended release Focalin for 2 weeks due to shortage,    Patient's refill is back to her normal dose of immediate release 10 mg twice a day sent then patient can pick it up on 4/24/2023    Bertha Loya CNP

## 2023-04-20 DIAGNOSIS — G89.4 CHRONIC PAIN SYNDROME: ICD-10-CM

## 2023-04-21 ENCOUNTER — TELEPHONE (OUTPATIENT)
Dept: FAMILY MEDICINE | Facility: CLINIC | Age: 43
End: 2023-04-21

## 2023-04-21 RX ORDER — OXYCODONE HYDROCHLORIDE 5 MG/1
5 TABLET ORAL EVERY 6 HOURS PRN
Qty: 32 TABLET | Refills: 0 | Status: SHIPPED | OUTPATIENT
Start: 2023-04-21 | End: 2023-04-29

## 2023-04-21 NOTE — TELEPHONE ENCOUNTER
Chin pierce requires her 1st 4 Rx's for Oxycodone to only be 7 days each. We just filled her 3rd, next week she'll need another 7 day Rx, hopefully her last.        Thank You,  Ninfa Dowd, [facility]

## 2023-04-21 NOTE — TELEPHONE ENCOUNTER
Patient was prescribed 69 tablets of oxycodone on 4/14. Patient was only given 21 tablets from the pharmacy. Patient states she only has 2 tablets left. Asking for the remainder of the prescription be resent to pharmacy.     Patient was not told why she was only given 21 tablets     Preferred Pharmacy:    [facility] [id number] [place] [id number]  Phone: [phone] Fax: [phone]      Could we send this information to you in [facility] or would you prefer to receive a phone call?:   Patient would prefer a phone call   Okay to leave a detailed message?: Yes at Home number on file [phone] (home)

## 2023-04-21 NOTE — TELEPHONE ENCOUNTER
Requested Prescriptions   Pending Prescriptions Disp Refills    oxyCODONE (ROXICODONE) 5 MG tablet [Pharmacy Med Name: OXYCODONE HCL 5MG TABS] 21 tablet 0     Sig: Take 1 tablet (5 mg) by mouth every 6 hours as needed for pain Max 3 tablet per day       There is no refill protocol information for this order

## 2023-04-28 DIAGNOSIS — G89.4 CHRONIC PAIN SYNDROME: ICD-10-CM

## 2023-04-29 RX ORDER — OXYCODONE HYDROCHLORIDE 5 MG/1
5 TABLET ORAL EVERY 6 HOURS PRN
Qty: 21 TABLET | Refills: 0 | Status: SHIPPED | OUTPATIENT
Start: 2023-04-29 | End: 2023-06-19

## 2023-05-04 ENCOUNTER — MYC MEDICAL ADVICE (OUTPATIENT)
Dept: FAMILY MEDICINE | Facility: CLINIC | Age: 43
End: 2023-05-04
Payer: COMMERCIAL

## 2023-05-04 DIAGNOSIS — J44.1 CHRONIC OBSTRUCTIVE PULMONARY DISEASE WITH ACUTE EXACERBATION (H): ICD-10-CM

## 2023-05-04 DIAGNOSIS — G89.4 CHRONIC PAIN SYNDROME: ICD-10-CM

## 2023-05-05 RX ORDER — OXYCODONE HYDROCHLORIDE 5 MG/1
TABLET ORAL
Qty: 90 TABLET | Refills: 0 | Status: SHIPPED | OUTPATIENT
Start: 2023-05-05 | End: 2023-05-18

## 2023-05-18 ENCOUNTER — OFFICE VISIT (OUTPATIENT)
Dept: FAMILY MEDICINE | Facility: CLINIC | Age: 43
End: 2023-05-18
Payer: COMMERCIAL

## 2023-05-18 VITALS
RESPIRATION RATE: 16 BRPM | WEIGHT: 208 LBS | HEART RATE: 79 BPM | BODY MASS INDEX: 35.51 KG/M2 | SYSTOLIC BLOOD PRESSURE: 122 MMHG | HEIGHT: 64 IN | OXYGEN SATURATION: 98 % | TEMPERATURE: 98.1 F | DIASTOLIC BLOOD PRESSURE: 70 MMHG

## 2023-05-18 DIAGNOSIS — M54.12 CERVICAL RADICULOPATHY: ICD-10-CM

## 2023-05-18 DIAGNOSIS — Z98.1 S/P CERVICAL SPINAL FUSION: ICD-10-CM

## 2023-05-18 DIAGNOSIS — J44.1 CHRONIC OBSTRUCTIVE PULMONARY DISEASE WITH ACUTE EXACERBATION (H): ICD-10-CM

## 2023-05-18 DIAGNOSIS — F11.20 CONTINUOUS OPIOID DEPENDENCE (H): ICD-10-CM

## 2023-05-18 DIAGNOSIS — M25.50 MULTIPLE JOINT PAIN: Primary | ICD-10-CM

## 2023-05-18 DIAGNOSIS — R35.0 URINE FREQUENCY: ICD-10-CM

## 2023-05-18 DIAGNOSIS — Z13.1 SCREENING FOR DIABETES MELLITUS: ICD-10-CM

## 2023-05-18 DIAGNOSIS — G89.4 CHRONIC PAIN SYNDROME: ICD-10-CM

## 2023-05-18 DIAGNOSIS — F90.2 ATTENTION DEFICIT HYPERACTIVITY DISORDER (ADHD), COMBINED TYPE: ICD-10-CM

## 2023-05-18 DIAGNOSIS — M54.16 LUMBAR RADICULOPATHY, CHRONIC: ICD-10-CM

## 2023-05-18 DIAGNOSIS — F41.1 GAD (GENERALIZED ANXIETY DISORDER): ICD-10-CM

## 2023-05-18 DIAGNOSIS — J45.20 MILD INTERMITTENT ASTHMA WITHOUT COMPLICATION: ICD-10-CM

## 2023-05-18 DIAGNOSIS — F32.2 CURRENT SEVERE EPISODE OF MAJOR DEPRESSIVE DISORDER WITHOUT PSYCHOTIC FEATURES WITHOUT PRIOR EPISODE (H): ICD-10-CM

## 2023-05-18 DIAGNOSIS — M77.8 TENDONITIS OF WRIST, RIGHT: ICD-10-CM

## 2023-05-18 LAB
ALBUMIN SERPL BCG-MCNC: 4.3 G/DL (ref 3.5–5.2)
ALBUMIN UR-MCNC: ABNORMAL MG/DL
ALP SERPL-CCNC: 93 U/L (ref 35–104)
ALT SERPL W P-5'-P-CCNC: 24 U/L (ref 10–35)
ANION GAP SERPL CALCULATED.3IONS-SCNC: 11 MMOL/L (ref 7–15)
APPEARANCE UR: CLEAR
AST SERPL W P-5'-P-CCNC: 20 U/L (ref 10–35)
BILIRUB SERPL-MCNC: 0.2 MG/DL
BILIRUB UR QL STRIP: NEGATIVE
BUN SERPL-MCNC: 8.2 MG/DL (ref 6–20)
CALCIUM SERPL-MCNC: 9.5 MG/DL (ref 8.6–10)
CANNABINOIDS UR QL SCN: ABNORMAL
CHLORIDE SERPL-SCNC: 105 MMOL/L (ref 98–107)
COLOR UR AUTO: YELLOW
CREAT SERPL-MCNC: 0.58 MG/DL (ref 0.51–0.95)
CREAT UR-MCNC: 304 MG/DL
CRP SERPL-MCNC: 6.36 MG/L
DEPRECATED HCO3 PLAS-SCNC: 22 MMOL/L (ref 22–29)
ERYTHROCYTE [SEDIMENTATION RATE] IN BLOOD BY WESTERGREN METHOD: 18 MM/HR (ref 0–20)
GFR SERPL CREATININE-BSD FRML MDRD: >90 ML/MIN/1.73M2
GLUCOSE SERPL-MCNC: 118 MG/DL (ref 70–99)
GLUCOSE UR STRIP-MCNC: NEGATIVE MG/DL
HGB UR QL STRIP: ABNORMAL
KETONES UR STRIP-MCNC: NEGATIVE MG/DL
LEUKOCYTE ESTERASE UR QL STRIP: NEGATIVE
MUCOUS THREADS #/AREA URNS LPF: PRESENT /LPF
NITRATE UR QL: NEGATIVE
PH UR STRIP: 5.5 [PH] (ref 5–7)
POTASSIUM SERPL-SCNC: 4.4 MMOL/L (ref 3.4–5.3)
PROT SERPL-MCNC: 7.5 G/DL (ref 6.4–8.3)
RBC #/AREA URNS AUTO: ABNORMAL /HPF
SODIUM SERPL-SCNC: 138 MMOL/L (ref 136–145)
SP GR UR STRIP: >=1.03 (ref 1–1.03)
SQUAMOUS #/AREA URNS AUTO: ABNORMAL /LPF
UROBILINOGEN UR STRIP-ACNC: 0.2 E.U./DL
WBC #/AREA URNS AUTO: ABNORMAL /HPF

## 2023-05-18 PROCEDURE — 80053 COMPREHEN METABOLIC PANEL: CPT | Performed by: NURSE PRACTITIONER

## 2023-05-18 PROCEDURE — 86038 ANTINUCLEAR ANTIBODIES: CPT | Performed by: NURSE PRACTITIONER

## 2023-05-18 PROCEDURE — 86618 LYME DISEASE ANTIBODY: CPT | Performed by: NURSE PRACTITIONER

## 2023-05-18 PROCEDURE — 81001 URINALYSIS AUTO W/SCOPE: CPT | Mod: 59 | Performed by: NURSE PRACTITIONER

## 2023-05-18 PROCEDURE — 86140 C-REACTIVE PROTEIN: CPT | Performed by: NURSE PRACTITIONER

## 2023-05-18 PROCEDURE — 80307 DRUG TEST PRSMV CHEM ANLYZR: CPT | Performed by: NURSE PRACTITIONER

## 2023-05-18 PROCEDURE — 99215 OFFICE O/P EST HI 40 MIN: CPT | Performed by: NURSE PRACTITIONER

## 2023-05-18 PROCEDURE — 85652 RBC SED RATE AUTOMATED: CPT | Performed by: NURSE PRACTITIONER

## 2023-05-18 PROCEDURE — 36415 COLL VENOUS BLD VENIPUNCTURE: CPT | Performed by: NURSE PRACTITIONER

## 2023-05-18 PROCEDURE — 86431 RHEUMATOID FACTOR QUANT: CPT | Performed by: NURSE PRACTITIONER

## 2023-05-18 PROCEDURE — 86160 COMPLEMENT ANTIGEN: CPT | Performed by: NURSE PRACTITIONER

## 2023-05-18 RX ORDER — PREGABALIN 50 MG/1
50 CAPSULE ORAL 2 TIMES DAILY
Qty: 60 CAPSULE | Refills: 0 | Status: CANCELLED | OUTPATIENT
Start: 2023-05-18

## 2023-05-18 RX ORDER — OXYCODONE HYDROCHLORIDE 5 MG/1
TABLET ORAL
Qty: 90 TABLET | Refills: 0 | Status: SHIPPED | OUTPATIENT
Start: 2023-06-05 | End: 2023-06-29

## 2023-05-18 RX ORDER — DEXMETHYLPHENIDATE HYDROCHLORIDE 10 MG/1
10 TABLET ORAL 2 TIMES DAILY
Qty: 60 TABLET | Refills: 0 | Status: SHIPPED | OUTPATIENT
Start: 2023-05-25 | End: 2023-06-26

## 2023-05-18 RX ORDER — BUSPIRONE HYDROCHLORIDE 5 MG/1
TABLET ORAL
Qty: 106 TABLET | Refills: 0 | Status: SHIPPED | OUTPATIENT
Start: 2023-05-18 | End: 2023-06-19

## 2023-05-18 RX ORDER — MONTELUKAST SODIUM 10 MG/1
TABLET ORAL
Qty: 90 TABLET | Refills: 3 | Status: SHIPPED | OUTPATIENT
Start: 2023-05-18 | End: 2024-06-06

## 2023-05-18 ASSESSMENT — ANXIETY QUESTIONNAIRES
7. FEELING AFRAID AS IF SOMETHING AWFUL MIGHT HAPPEN: SEVERAL DAYS
8. IF YOU CHECKED OFF ANY PROBLEMS, HOW DIFFICULT HAVE THESE MADE IT FOR YOU TO DO YOUR WORK, TAKE CARE OF THINGS AT HOME, OR GET ALONG WITH OTHER PEOPLE?: SOMEWHAT DIFFICULT
5. BEING SO RESTLESS THAT IT IS HARD TO SIT STILL: SEVERAL DAYS
2. NOT BEING ABLE TO STOP OR CONTROL WORRYING: MORE THAN HALF THE DAYS
1. FEELING NERVOUS, ANXIOUS, OR ON EDGE: MORE THAN HALF THE DAYS
4. TROUBLE RELAXING: MORE THAN HALF THE DAYS
7. FEELING AFRAID AS IF SOMETHING AWFUL MIGHT HAPPEN: SEVERAL DAYS
GAD7 TOTAL SCORE: 12
6. BECOMING EASILY ANNOYED OR IRRITABLE: MORE THAN HALF THE DAYS
3. WORRYING TOO MUCH ABOUT DIFFERENT THINGS: MORE THAN HALF THE DAYS
IF YOU CHECKED OFF ANY PROBLEMS ON THIS QUESTIONNAIRE, HOW DIFFICULT HAVE THESE PROBLEMS MADE IT FOR YOU TO DO YOUR WORK, TAKE CARE OF THINGS AT HOME, OR GET ALONG WITH OTHER PEOPLE: SOMEWHAT DIFFICULT
GAD7 TOTAL SCORE: 12
GAD7 TOTAL SCORE: 12

## 2023-05-18 ASSESSMENT — PAIN SCALES - GENERAL: PAINLEVEL: SEVERE PAIN (6)

## 2023-05-18 ASSESSMENT — PATIENT HEALTH QUESTIONNAIRE - PHQ9
SUM OF ALL RESPONSES TO PHQ QUESTIONS 1-9: 17
SUM OF ALL RESPONSES TO PHQ QUESTIONS 1-9: 17
10. IF YOU CHECKED OFF ANY PROBLEMS, HOW DIFFICULT HAVE THESE PROBLEMS MADE IT FOR YOU TO DO YOUR WORK, TAKE CARE OF THINGS AT HOME, OR GET ALONG WITH OTHER PEOPLE: VERY DIFFICULT

## 2023-05-18 ASSESSMENT — ENCOUNTER SYMPTOMS: BACK PAIN: 1

## 2023-05-18 NOTE — PROGRESS NOTES
Assessment & Plan     (M25.50) Multiple joint pain  (primary encounter diagnosis)  Comment: We will obtain lab work  Plan: CRP, inflammation, ESR: Erythrocyte         sedimentation rate, Comprehensive metabolic         panel (BMP + Alb, Alk Phos, ALT, AST, Total.         Bili, TP), Rheumatoid factor, Anti Nuclear Marilee         IgG by IFA with Reflex, Lyme Disease Total Abs         Bld with Reflex to Confirm CLIA, Complement C3,        Complement C4            (J45.20) Mild intermittent asthma without complication  Comment:  Controlled no change in treatment plan  Plan: montelukast (SINGULAIR) 10 MG tablet          (F90.2) Attention deficit hyperactivity disorder (ADHD), combined type  Comment:   Plan: dexmethylphenidate (FOCALIN) 10 MG tablet        (M54.16) Lumbar radiculopathy, chronic  Comment: Improving  Plan:     (M54.12) Cervical radiculopathy  Comment: improving improving  Plan:     (Z98.1) S/P cervical spinal fusion  Comment: stable   Plan:     (M77.8) Tendonitis of wrist, right  Comment: Recommend physical therapy  Plan: Occupational Therapy Referral            (F41.1) MILES (generalized anxiety disorder)  Comment: Uncontrolled will start on BuSpar and mental health referral   plan: Adult Mental Health  Referral,         busPIRone (BUSPAR) 5 MG tablet            (F32.2) Current severe episode of major depressive disorder without psychotic features without prior episode (H)  Comment: Recommend follow-up with mental health for further evaluation  Plan: Adult Mental Health  Referral      \  (G89.4) Chronic pain syndrome  Comment: Oxycodone prescribed patient will need to be up-to-date with her pain contract with a drug screen and pain contract signing  Plan: oxyCODONE (ROXICODONE) 5 MG tablet        (J44.1) Chronic obstructive pulmonary disease with acute exacerbation (H)  Comment:   Plan:     (R35.0) Urine frequency  Comment: UA negative plan: UA Macroscopic with reflex to Microscopic and       "   Culture, UA Microscopic with Reflex to Culture          (Z13.1) Screening for diabetes mellitus  Comment: Screen for diabetes  Plan: Hemoglobin A1c            (F11.20) F11.2 - Continuous opioid dependence (H)  Comment: Drug screen pending  Plan:         50 minutes spent by me on the date of the encounter doing chart review, history and exam, documentation and further activities per the note        BMI:   Estimated body mass index is 35.7 kg/m  as calculated from the following:    Height as of this encounter: 1.626 m (5' 4\").    Weight as of this encounter: 94.3 kg (208 lb).   Weight management plan: Discussed healthy diet and exercise guidelines    See Patient Instructions    FRANKLYN Christianson CNP  [facility]    Justin Rodríguez is a 42 year old, presenting for the following health issues:  Back Pain and Depression        5/18/2023     7:22 AM   Additional Questions   Roomed by [facility]     Back Pain     History of Present Illness       Back Pain:  She presents for follow up of back pain. Patient's back pain is a chronic problem.  Location of back pain:  Right lower back, left lower back, right middle of back, left middle of back, right upper back, left upper back, right side of neck, left side of neck, right shoulder, left shoulder, right hip and left hip  Description of back pain: dull ache, stabbing and other  Back pain spreads: right shoulder, left shoulder, right side of neck and left side of neck    Since patient first noticed back pain, pain is: always present, but gets better and worse  Does back pain interfere with her job:  Yes      Mental Health Follow-up:  Patient presents to follow-up on Depression & Anxiety.Patient's depression since last visit has been:  Worse  The patient is having other symptoms associated with depression.  Patient's anxiety since last visit has been:  Medium  The patient is having other symptoms associated with anxiety.  Any significant life " events: job concerns and financial concerns  Patient is feeling anxious or having panic attacks.  Patient has no concerns about alcohol or drug use.    Reason for visit:  Med recheck    She eats 0-1 servings of fruits and vegetables daily.She consumes 2 sweetened beverage(s) daily.She exercises with enough effort to increase her heart rate 30 to 60 minutes per day.  She exercises with enough effort to increase her heart rate 4 days per week.   She is taking medications regularly.    Today's PHQ-9         PHQ-9 Total Score: 17    PHQ-9 Q9 Thoughts of better off dead/self-harm past 2 weeks :   Not at all    How difficult have these problems made it for you to do your work, take care of things at home, or get along with other people: Very difficult  Today's MILES-7 Score: 12           Review of Systems   Musculoskeletal: Positive for back pain.      Constitutional, HEENT, cardiovascular, pulmonary, gi and gu systems are negative, except as otherwise noted.      Objective    LMP 03/02/2022 (LMP Unknown)   There is no height or weight on file to calculate BMI.  Physical Exam   GENERAL: healthy, alert and no distress  EYES: Eyes grossly normal to inspection, PERRL and conjunctivae and sclerae normal  HENT: ear canals and TM's normal, nose and mouth without ulcers or lesions  NECK: no adenopathy, no asymmetry, masses, or scars and thyroid normal to palpation  RESP: lungs clear to auscultation - no rales, rhonchi or wheezes  BREAST: normal without masses, tenderness or nipple discharge and no palpable axillary masses or adenopathy  CV: regular rate and rhythm, normal S1 S2, no S3 or S4, no murmur, click or rub, no peripheral edema and peripheral pulses strong  ABDOMEN: soft, nontender, no hepatosplenomegaly, no masses and bowel sounds normal  MS: no gross musculoskeletal defects noted, no edema  SKIN: no suspicious lesions or rashes  NEURO: Normal strength and tone, mentation intact and speech normal  PSYCH: mentation appears  normal, affect normal/bright    Results for orders placed or performed in visit on 05/18/23   UA Macroscopic with reflex to Microscopic and Culture     Status: Abnormal    Specimen: Urine, NOS   Result Value Ref Range    Color Urine Yellow Colorless, Straw, Light Yellow, Yellow    Appearance Urine Clear Clear    Glucose Urine Negative Negative mg/dL    Bilirubin Urine Negative Negative    Ketones Urine Negative Negative mg/dL    Specific Gravity Urine >=1.030 1.003 - 1.035    Blood Urine Trace (A) Negative    pH Urine 5.5 5.0 - 7.0    Protein Albumin Urine Trace (A) Negative mg/dL    Urobilinogen Urine 0.2 0.2, 1.0 E.U./dL    Nitrite Urine Negative Negative    Leukocyte Esterase Urine Negative Negative   Complement C4     Status: Normal   Result Value Ref Range    C4 Complement 28 13 - 39 mg/dL   Complement C3     Status: Normal   Result Value Ref Range    C3 Complement 151 81 - 157 mg/dL   Lyme Disease Total Abs Bld with Reflex to Confirm CLIA     Status: Normal   Result Value Ref Range    Lyme Disease Antibodies Total 0.44 <0.90   Anti Nuclear Marilee IgG by IFA with Reflex     Status: Normal   Result Value Ref Range    CHERELLE interpretation Negative Negative   Rheumatoid factor     Status: Normal   Result Value Ref Range    Rheumatoid Factor <7 <12 IU/mL   Comprehensive metabolic panel (BMP + Alb, Alk Phos, ALT, AST, Total. Bili, TP)     Status: Abnormal   Result Value Ref Range    Sodium 138 136 - 145 mmol/L    Potassium 4.4 3.4 - 5.3 mmol/L    Chloride 105 98 - 107 mmol/L    Carbon Dioxide (CO2) 22 22 - 29 mmol/L    Anion Gap 11 7 - 15 mmol/L    Urea Nitrogen 8.2 6.0 - 20.0 mg/dL    Creatinine 0.58 0.51 - 0.95 mg/dL    Calcium 9.5 8.6 - 10.0 mg/dL    Glucose 118 (H) 70 - 99 mg/dL    Alkaline Phosphatase 93 35 - 104 U/L    AST 20 10 - 35 U/L    ALT 24 10 - 35 U/L    Protein Total 7.5 6.4 - 8.3 g/dL    Albumin 4.3 3.5 - 5.2 g/dL    Bilirubin Total 0.2 <=1.2 mg/dL    GFR Estimate >90 >60 mL/min/1.73m2   ESR: Erythrocyte  sedimentation rate     Status: Normal   Result Value Ref Range    Erythrocyte Sedimentation Rate 18 0 - 20 mm/hr   CRP, inflammation     Status: Abnormal   Result Value Ref Range    CRP Inflammation 6.36 (H) <5.00 mg/L   Urine Drug Confirmation Panel     Status: None    Narrative    Interpretation:  Absent or none detected  This test was developed and its performance characteristics determined by the [facility],  Special Chemistry Laboratory. It has not been cleared or approved by the FDA. The laboratory is regulated under CLIA as qualified to perform high-complexity testing. This test is used for clinical purposes. It should not be regarded as investigational or for research.    Drugs with concentrations less than the cutoff will not be reported.  The drugs with applicable detection cutoff limits that are included within the Drug Confirmation Panel are:    The following drugs are detected with a cutoff of 3 ng/ml: FENTANYL    The following drugs are detected with a cutoff of 5 ng/mL: 6-ACETYLMORPHINE, BUPRENORPHINE, NALOXONE, NORBUPRENORPHINE, NORFENTANYL    The following drugs are detected with a cutoff of 10 ng/mL: SUFENTANIL    The following drugs are detected with a cutoff of 20 ng/mL: PCP (PHENCYCLIDINE)    The following drugs are detected with a cutoff of 50 ng/mL: 7-AMINOCLONAZEPAM, 7-AMINOFLUNITRAZEPAM, ALPRAZOLAM, AMPHETAMINE, A-OH-ALPRAZOLAM, A-OH-MIDAZOLAM, A-OH-TRIAZOLAM, BENZOYLECGONINE (Cocaine Metabolite), CLONAZEPAM, COCAETHYLENE (Cocaine Metabolite), CODEINE, DIAZEPAM, DIHYDROCODEINE, EDDP (Methadone Metabolite), HYDROCODONE, HYDROMORPHONE, LORAZEPAM, MDA (3,4-Methylenedioxyamphetamine), MDEA (3,9-Nbmkegnkznsovm-K-ethylcathinone), MDMA (Methylenedioxyamphetamine,Ecstasy), MEPERIDINE, METHADONE, METHAMPHETAMINE, METHYLPHENIDATE (Ritalin), MORPHINE, NALTREXONE, N-DESMETH-TAPENTADOL, NORCODEINE, NORDIAZEPAM, NORMEPERIDINE, O-JATIN-TRAMADOL, OXAZEPAM, OXYCODONE, OXYMORPHONE,  PROPOXYPHENE, RITALINIC ACID, TAPENTADOL, TEMAZEPAM, THEBAINE, TRAMADOL    The following drugs are detected with a cutoff of 100 ng/mL: GABAPENTIN, KETAMINE    The following drugs are detected with a cutoff of 200 ng/mL: PREGABALIN     Urine Creatinine for Drug Screen Panel     Status: None   Result Value Ref Range    Creatinine Urine for Drug Screen 304 mg/dL   Cannabinoids qualitative urine     Status: Abnormal   Result Value Ref Range    Cannabinoids Urine Screen Positive (A) Screen Negative   UA Microscopic with Reflex to Culture     Status: Abnormal   Result Value Ref Range    RBC Urine 2-5 (A) 0-2 /HPF /HPF    WBC Urine 0-5 0-5 /HPF /HPF    Squamous Epithelials Urine Few (A) None Seen /LPF    Mucus Urine Present (A) None Seen /LPF    Narrative    Urine Culture not indicated   Drug Confirmation Panel Urine with Creat     Status: Abnormal    Narrative    The following orders were created for panel order Drug Confirmation Panel Urine with Creat.  Procedure                               Abnormality         Status                     ---------                               -----------         ------                     Urine Drug Confirmation ...[[id number]]                      Final result               Urine Creatinine for Abhinav...[[id number]]                      Final result               Cannabinoids qualitative...[[id number]]  Abnormal            Final result                 Please view results for these tests on the individual orders.

## 2023-05-19 LAB
ANA SER QL IF: NEGATIVE
B BURGDOR IGG+IGM SER QL: 0.44
C3 SERPL-MCNC: 151 MG/DL (ref 81–157)
C4 SERPL-MCNC: 28 MG/DL (ref 13–39)
RHEUMATOID FACT SER NEPH-ACNC: <7 IU/ML

## 2023-06-19 ENCOUNTER — OFFICE VISIT (OUTPATIENT)
Dept: FAMILY MEDICINE | Facility: CLINIC | Age: 43
End: 2023-06-19
Payer: COMMERCIAL

## 2023-06-19 ENCOUNTER — ANCILLARY PROCEDURE (OUTPATIENT)
Dept: GENERAL RADIOLOGY | Facility: CLINIC | Age: 43
End: 2023-06-19
Attending: STUDENT IN AN ORGANIZED HEALTH CARE EDUCATION/TRAINING PROGRAM
Payer: COMMERCIAL

## 2023-06-19 VITALS
HEIGHT: 64 IN | HEART RATE: 64 BPM | RESPIRATION RATE: 18 BRPM | WEIGHT: 211 LBS | SYSTOLIC BLOOD PRESSURE: 148 MMHG | DIASTOLIC BLOOD PRESSURE: 82 MMHG | BODY MASS INDEX: 36.02 KG/M2 | OXYGEN SATURATION: 100 % | TEMPERATURE: 98.7 F

## 2023-06-19 DIAGNOSIS — M13.0 POLYARTHRITIS: ICD-10-CM

## 2023-06-19 DIAGNOSIS — M25.612 DECREASED ROM OF LEFT SHOULDER: Primary | ICD-10-CM

## 2023-06-19 DIAGNOSIS — M54.12 CERVICAL RADICULOPATHY: ICD-10-CM

## 2023-06-19 DIAGNOSIS — F90.2 ATTENTION DEFICIT HYPERACTIVITY DISORDER (ADHD), COMBINED TYPE: ICD-10-CM

## 2023-06-19 DIAGNOSIS — R32 URINARY INCONTINENCE, UNSPECIFIED TYPE: ICD-10-CM

## 2023-06-19 DIAGNOSIS — Z98.1 S/P CERVICAL SPINAL FUSION: ICD-10-CM

## 2023-06-19 DIAGNOSIS — M54.16 LUMBAR RADICULOPATHY, CHRONIC: ICD-10-CM

## 2023-06-19 DIAGNOSIS — Z13.1 SCREENING FOR DIABETES MELLITUS: ICD-10-CM

## 2023-06-19 DIAGNOSIS — M25.612 DECREASED ROM OF LEFT SHOULDER: ICD-10-CM

## 2023-06-19 LAB — HBA1C MFR BLD: 5.6 % (ref 0–5.6)

## 2023-06-19 PROCEDURE — 83036 HEMOGLOBIN GLYCOSYLATED A1C: CPT | Performed by: STUDENT IN AN ORGANIZED HEALTH CARE EDUCATION/TRAINING PROGRAM

## 2023-06-19 PROCEDURE — 99215 OFFICE O/P EST HI 40 MIN: CPT | Performed by: STUDENT IN AN ORGANIZED HEALTH CARE EDUCATION/TRAINING PROGRAM

## 2023-06-19 PROCEDURE — 81374 HLA I TYPING 1 ANTIGEN LR: CPT | Performed by: STUDENT IN AN ORGANIZED HEALTH CARE EDUCATION/TRAINING PROGRAM

## 2023-06-19 PROCEDURE — 36415 COLL VENOUS BLD VENIPUNCTURE: CPT | Performed by: STUDENT IN AN ORGANIZED HEALTH CARE EDUCATION/TRAINING PROGRAM

## 2023-06-19 PROCEDURE — 82784 ASSAY IGA/IGD/IGG/IGM EACH: CPT | Performed by: STUDENT IN AN ORGANIZED HEALTH CARE EDUCATION/TRAINING PROGRAM

## 2023-06-19 PROCEDURE — 73030 X-RAY EXAM OF SHOULDER: CPT | Mod: TC | Performed by: RADIOLOGY

## 2023-06-19 RX ORDER — PREDNISONE 20 MG/1
TABLET ORAL
Qty: 20 TABLET | Refills: 0 | Status: SHIPPED | OUTPATIENT
Start: 2023-06-19 | End: 2023-07-11

## 2023-06-19 ASSESSMENT — ANXIETY QUESTIONNAIRES
5. BEING SO RESTLESS THAT IT IS HARD TO SIT STILL: SEVERAL DAYS
7. FEELING AFRAID AS IF SOMETHING AWFUL MIGHT HAPPEN: SEVERAL DAYS
4. TROUBLE RELAXING: NEARLY EVERY DAY
2. NOT BEING ABLE TO STOP OR CONTROL WORRYING: MORE THAN HALF THE DAYS
6. BECOMING EASILY ANNOYED OR IRRITABLE: MORE THAN HALF THE DAYS
1. FEELING NERVOUS, ANXIOUS, OR ON EDGE: MORE THAN HALF THE DAYS
GAD7 TOTAL SCORE: 13
7. FEELING AFRAID AS IF SOMETHING AWFUL MIGHT HAPPEN: SEVERAL DAYS
8. IF YOU CHECKED OFF ANY PROBLEMS, HOW DIFFICULT HAVE THESE MADE IT FOR YOU TO DO YOUR WORK, TAKE CARE OF THINGS AT HOME, OR GET ALONG WITH OTHER PEOPLE?: VERY DIFFICULT
3. WORRYING TOO MUCH ABOUT DIFFERENT THINGS: MORE THAN HALF THE DAYS
GAD7 TOTAL SCORE: 13
IF YOU CHECKED OFF ANY PROBLEMS ON THIS QUESTIONNAIRE, HOW DIFFICULT HAVE THESE PROBLEMS MADE IT FOR YOU TO DO YOUR WORK, TAKE CARE OF THINGS AT HOME, OR GET ALONG WITH OTHER PEOPLE: VERY DIFFICULT
GAD7 TOTAL SCORE: 13

## 2023-06-19 ASSESSMENT — PAIN SCALES - GENERAL: PAINLEVEL: EXTREME PAIN (8)

## 2023-06-19 ASSESSMENT — ENCOUNTER SYMPTOMS: BACK PAIN: 1

## 2023-06-19 ASSESSMENT — PATIENT HEALTH QUESTIONNAIRE - PHQ9
SUM OF ALL RESPONSES TO PHQ QUESTIONS 1-9: 15
SUM OF ALL RESPONSES TO PHQ QUESTIONS 1-9: 15
10. IF YOU CHECKED OFF ANY PROBLEMS, HOW DIFFICULT HAVE THESE PROBLEMS MADE IT FOR YOU TO DO YOUR WORK, TAKE CARE OF THINGS AT HOME, OR GET ALONG WITH OTHER PEOPLE: VERY DIFFICULT

## 2023-06-19 NOTE — PATIENT INSTRUCTIONS
"[facility] [phone]    Dr. Moss at the [facility] \"spine doctor at the [facility]\"     Patient Education   Here is the plan from today's visit    1. Decreased ROM of left shoulder  - XR Shoulder Left G/E 3 Views; Future  - Spine  Referral; Future    2. Polyarthritis  - IgA; Future  - HLA-B27 Typing; Future  - Spine  Referral; Future  - predniSONE (DELTASONE) 20 MG tablet; Take 3 tabs by mouth daily x 3 days, then 2 tabs daily x 3 days, then 1 tab daily x 3 days, then 1/2 tab daily x 3 days.  Dispense: 20 tablet; Refill: 0    3. Cervical radiculopathy  - Spine  Referral; Future  - predniSONE (DELTASONE) 20 MG tablet; Take 3 tabs by mouth daily x 3 days, then 2 tabs daily x 3 days, then 1 tab daily x 3 days, then 1/2 tab daily x 3 days.  Dispense: 20 tablet; Refill: 0    4. S/P cervical spinal fusion  - Spine  Referral; Future    5. Lumbar radiculopathy, chronic  - Spine  Referral; Future  - predniSONE (DELTASONE) 20 MG tablet; Take 3 tabs by mouth daily x 3 days, then 2 tabs daily x 3 days, then 1 tab daily x 3 days, then 1/2 tab daily x 3 days.  Dispense: 20 tablet; Refill: 0    6. Urinary incontinence, unspecified type  - Spine  Referral; Future    Please call or return to clinic if your symptoms don't go away.    Thank you for coming to the [facility] today.  Lab Testing:  **If you had lab testing today and your results are reassuring or normal they will be mailed to you or sent through Red[employer] within 7 days.   **If the lab tests need quick action we will call you with the results.  **If you are having labs done on a different day, please call [phone] to schedule at the [facility] or [phone] for other [facility] locations. Labs do not offer walk-in appointments.  The phone number we will call with results is # [phone] (home) . If this is not the best number please " call our clinic and change the number.  Medication Refills:  If you need any refills please call your pharmacy and they will contact us.   If you need to  your refill at a new pharmacy, please contact the new pharmacy directly. The new pharmacy will help you get your medications transferred faster.   Scheduling:  If you have any concerns about today's visit or wish to schedule another appointment please call our office during normal business hours [phone]   If a referral was made to an [facility] specialty provider and you do not get a call from central scheduling, please refer to directions on your visit summary or call our office during normal business hours for assistance.   If a Mammogram was ordered for you at the Breast Center call [phone] to schedule or change your appointment.  If you had an XRay/CT/Ultrasound/MRI ordered the number is [phone] to schedule or change your radiology appointment.   If you had an Echo/Heart Monitor/Cardiac Cath or other cardiac testing ordered the number is [phone] to schedule or change your appointment.   Medical Concerns:  If you have urgent medical concerns please call [phone] at any time of the day.    Margie Rivera MD

## 2023-06-19 NOTE — PROGRESS NOTES
Assessment & Plan     Patient is a 42-year-old with past medical history of cervical disc herniation status post cervical spinal fusion, lumbar and thoracic back pain and current chronic continuous opioid dependence who presents today for acute on chronic worsening of back pain.    Today, patient notes that she has been having acute worsening of cervical back pain into bilateral shoulders, now with radiating numbness and tingling down the left arm.  Has some intermittent numbness down the right arm as well.  She is also noticing that her left shoulder has decreased range of motion, with a grinding sensation internally that is uncomfortable.  She has a history of spinal fusion    In addition to the upper extremity symptoms, patient is noticing that she is having some reported urinary incontinence intermittently that started a couple months ago.  Sometimes it is so significant that she cannot make it to the bathroom in time.  About a month ago, patient was seen and had extensive work-up completed including Lyme, CHERELLE, RF, complement 3, complement 4, ESR, CRP which was all largely negative except a very mildly elevated CRP a little over 6.  Last spinal imaging was over 1 year ago.    Of note, she has already seen pain management, had SI joint steroid injection earlier this year.    Decreased ROM of left shoulder  For decreased range of motion, we did obtain x-rays today that did not show any signs of arthritis.  On exam, limited due to pain.  Passive range of motion difficult as well, and range of motion greater than 90 degrees with abduction was not possible.  I am concerned of possibility of frozen shoulder.  An A1c was completed as below.  Symptoms may in part be related to cervical radiculopathy.  However, again, highest concern is frozen shoulder.  She is offered physical therapy as this would be the gold standard next step for frozen shoulder.  She was also offered a second opinion through sports medicine.   Patient's decision is pending.  - XR Shoulder Left G/E 3 Views  - Spine  Referral    Polyarthritis  Patient has multiple areas of noted arthritis on prior imaging particularly throughout the spine.  We discussed, despite the ESR being normal and the CRP being only mildly elevated, she may still have ankylosing spondylitis.  Given her age, it is concerning about the significance of her noted arthritis.  We opted for IgA as well as HLA-B27 testing today.  A prescription for prednisone was sent as well, and a referral to spine surgery is placed.  - IgA  - HLA-B27 Typing  - Spine  Referral  - predniSONE (DELTASONE) 20 MG tablet  Dispense: 20 tablet; Refill: 0  - HLA-B27 Typing  - IgA    Cervical radiculopathy  S/P cervical spinal fusion  History of spinal fusion of the cervical spine.  She is having worsening numbness and tingling down bilateral upper extremities, now worse on the left and more consistent, still intermittent on the right.  Given her history of surgery in the cervical region, worsening symptoms, we did opt for a more urgent MRI of the C-spine.  We will also trial prednisone for multiple radicular symptoms both in the cervical and the lumbar region.  She is encouraged to utilize the oxycodone she has been prescribed, she states she has not actually been taking it.  She was offered a slightly higher dose with an additional tablet daily until she is able to see her primary pain provider within the next week, she declined.  At this point, it seems patient's radicular symptoms are worsening to the point that a consultation with an actual neurosurgeon would be beneficial.  A spine  referral was placed today.  - Spine  Referral  - predniSONE (DELTASONE) 20 MG tablet  Dispense: 20 tablet; Refill: 0  - MR CERVICAL SPINE W/O & W CONTRAST    Lumbar radiculopathy, chronic  Patient has significant lumbar spine pain.  She refuses palpation over the spinous processes in the lumbar  spine as he needs to painful.  Does have worsening pain with knee extension and flexion, though strength remains quite good at 5-/5 bilaterally.  For various reasons, as otherwise noted, prednisone trial will be completed, especially in the context of reported urinary incontinence that is relatively new in the past couple months.  A more urgent spine referral is placed today.  - Spine  Referral  - predniSONE (DELTASONE) 20 MG tablet  Dispense: 20 tablet; Refill: 0  - MR LUMBAR SPINE W/O & W CONTRAST  - Urine Drugs of Abuse Screen Panel 13    Urinary incontinence, unspecified type  As above, patient notes new urinary incontinence over the past couple months.  Given her prior spinal surgery in the cervical region, and prior noted disc bulging, a spine referral is placed today.  - Spine  Referral    Attention deficit hyperactivity disorder (ADHD), combined type  Patient has previously had negative urine drug screens, despite being prescribed oxycodone and Focalin regularly.  Due for UDS again today.  It appears patient did not leave a sample.  Follow-up with Bertha Loya for chronic pain management and ADHD management.  - Drug Abuse Screen Panel 13, Urine (Pain Care Map) - lab collect    Screening for diabetes mellitus  This was ordered by a previous provider.  I definitely agree with this evaluation both for the mild elevation in glucose level, as well as the concern for possible frozen shoulder of the left shoulder.  - Hemoglobin A1c      I spent a total of 65 minutes on the day of the visit.   Time spent by me doing chart review, history and exam, documentation and further activities per the note     Margie Rivera MD  [facility]    Justin Rodríguez is a 42 year old, presenting for the following health issues:  Back Pain        6/19/2023     2:26 PM   Additional Questions   Roomed by Madeline     Back Pain     History of Present Illness       Back Pain:  She presents for  follow up of back pain. Patient's back pain is a chronic problem.  Location of back pain:  Right lower back, left lower back, right middle of back, left middle of back, right upper back, left upper back, left side of neck, left shoulder, right hip and left hip  Description of back pain: burning, dull ache, fullness, gnawing and shooting  Back pain spreads: left shoulder and left side of neck    Since patient first noticed back pain, pain is: gradually worsening  Does back pain interfere with her job:  Yes      She eats 0-1 servings of fruits and vegetables daily.She consumes 2 sweetened beverage(s) daily.She exercises with enough effort to increase her heart rate 9 or less minutes per day.  She exercises with enough effort to increase her heart rate 3 or less days per week.   She is taking medications regularly.    Today's PHQ-9         PHQ-9 Total Score: 15    PHQ-9 Q9 Thoughts of better off dead/self-harm past 2 weeks :   Not at all    How difficult have these problems made it for you to do your work, take care of things at home, or get along with other people: Very difficult  Today's MILES-7 Score: 13   Worse x 5 days. Pain from left shoulder that radiates down arms. Will go to mid and low back left and right side. Reports joint swelling and pain as well.     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     Usually more pain up on the top.     Shoulder - feels like it is scraping in there. Goes down the arm, fingers go numb. Has had it for a long time, not like it is now. Feels like it is getting worse.     Incontinence - kegel exercises. Declined physical therapy.   Wears a liner daily. Slowly leaks. Couple nights didn't make it to the bathroom fast enough.   Dealing with this only for the past 2 months or so.    lleft shoulder, neck region, goes down arm with tingling/numbness. Right side no pain in shoudler, but sometimes with tingling. Then stats in the thoracic and down to hips.  "  Has groin pains as well. Like a solid clump of \"hurt\"   On the left side down by thee top of the pants. Right now feels lik eit hurts worse in that one particular spot.     Legs fall asleep if sit too long.     Medications:   5 mg oxycodone 3 times per day. Took on yesterday. Doesn't seem to be touching it, hasn't been taking it sinc enot helping.   Tylenol arthritis and ibuprofen.   Flexeril for pain sometimes - not helping.       Review of Systems   Musculoskeletal: Positive for back pain.      Constitutional, HEENT, cardiovascular, pulmonary, GI, , musculoskeletal, neuro, skin, endocrine and psych systems are negative, except as otherwise noted.      Objective    BP (!) 148/82 (Cuff Size: Adult Large)   Pulse 64   Temp 98.7  F (37.1  C) (Tympanic)   Resp 18   Ht 1.626 m (5' 4\")   Wt 95.7 kg (211 lb)   LMP 03/02/2022 (LMP Unknown)   SpO2 100%   BMI 36.22 kg/m    Body mass index is 36.22 kg/m .  Physical Exam  Constitutional:       Appearance: Normal appearance.   HENT:      Head: Normocephalic.   Eyes:      General: No scleral icterus.     Extraocular Movements: Extraocular movements intact.      Conjunctiva/sclera: Conjunctivae normal.   Cardiovascular:      Rate and Rhythm: Normal rate and regular rhythm.   Pulmonary:      Effort: Pulmonary effort is normal.      Breath sounds: Normal breath sounds.   Musculoskeletal:      Right shoulder: Normal range of motion.      Left shoulder: Decreased range of motion (in all directions with active and passive ROM).      Cervical back: Tenderness (to neck and upper back musculature) present. No bony tenderness.      Thoracic back: No deformity or bony tenderness.      Lumbar back: Tenderness (to low back musculature bilaterally) and bony tenderness (mid to lower lumbar, bilateral SI joint ) present.      Comments: 5-/5 strength with knee extension and flexion bilaterally.    Neurological:      General: No focal deficit present.      Mental Status: She is alert " and oriented to person, place, and time.         Results from this visit  Results for orders placed or performed in visit on 06/19/23   XR Shoulder Left G/E 3 Views     Status: None    Narrative    XR SHOULDER LEFT G/E 3 VIEWS  6/19/2023 3:50 PM     HISTORY: Decreased ROM of left shoulder  COMPARISON: None      Impression    IMPRESSION: No acute fracture or malalignment. There is normal joint  spacing.     VIGNESH LEONARD MD         SYSTEM ID:  [id number]   Results for orders placed or performed in visit on 06/19/23   Hemoglobin A1c     Status: Normal   Result Value Ref Range    Hemoglobin A1C 5.6 0.0 - 5.6 %

## 2023-06-19 NOTE — LETTER
June 19, 2023      Anne Ferreira  [address] [id number]        To Whom It May Concern:    Anne Ferreira was seen in our clinic. Please excuse her from missed work 6/19. She may return to work with the following: limited to light duty - allowed to sit to rest as frequently as every hour for up to 5 minutes at a time on or about 6/26/23.      Sincerely,        Margie Rivera MD

## 2023-06-20 ENCOUNTER — MYC MEDICAL ADVICE (OUTPATIENT)
Dept: FAMILY MEDICINE | Facility: CLINIC | Age: 43
End: 2023-06-20
Payer: COMMERCIAL

## 2023-06-20 DIAGNOSIS — M54.12 CERVICAL RADICULOPATHY: Primary | ICD-10-CM

## 2023-06-20 DIAGNOSIS — Z98.1 S/P CERVICAL SPINAL FUSION: ICD-10-CM

## 2023-06-20 DIAGNOSIS — M54.16 LUMBAR RADICULOPATHY, CHRONIC: ICD-10-CM

## 2023-06-20 LAB — IGA SERPL-MCNC: 275 MG/DL (ref 84–499)

## 2023-06-20 RX ORDER — LORAZEPAM 1 MG/1
1 TABLET ORAL
Qty: 1 TABLET | Refills: 0 | Status: SHIPPED | OUTPATIENT
Start: 2023-06-20 | End: 2023-06-29

## 2023-06-21 ENCOUNTER — HOSPITAL ENCOUNTER (OUTPATIENT)
Dept: MRI IMAGING | Facility: CLINIC | Age: 43
Discharge: HOME | End: 2023-06-21
Attending: STUDENT IN AN ORGANIZED HEALTH CARE EDUCATION/TRAINING PROGRAM
Payer: COMMERCIAL

## 2023-06-21 DIAGNOSIS — Z98.1 S/P CERVICAL SPINAL FUSION: ICD-10-CM

## 2023-06-21 DIAGNOSIS — M54.12 CERVICAL RADICULOPATHY: ICD-10-CM

## 2023-06-21 DIAGNOSIS — M54.16 LUMBAR RADICULOPATHY, CHRONIC: ICD-10-CM

## 2023-06-21 LAB
B LOCUS: NORMAL
B27TEST METHOD: NORMAL

## 2023-06-21 PROCEDURE — A9585 GADOBUTROL INJECTION: HCPCS | Performed by: STUDENT IN AN ORGANIZED HEALTH CARE EDUCATION/TRAINING PROGRAM

## 2023-06-21 PROCEDURE — 72156 MRI NECK SPINE W/O & W/DYE: CPT

## 2023-06-21 PROCEDURE — 255N000002 HC RX 255 OP 636: Performed by: STUDENT IN AN ORGANIZED HEALTH CARE EDUCATION/TRAINING PROGRAM

## 2023-06-21 PROCEDURE — 72158 MRI LUMBAR SPINE W/O & W/DYE: CPT

## 2023-06-21 RX ORDER — GADOBUTROL 604.72 MG/ML
9 INJECTION INTRAVENOUS ONCE
Status: COMPLETED | OUTPATIENT
Start: 2023-06-21 | End: 2023-06-21

## 2023-06-21 RX ADMIN — GADOBUTROL 9 ML: 604.72 INJECTION INTRAVENOUS at 19:49

## 2023-06-23 RX ORDER — PREDNISONE 20 MG/1
10 TABLET ORAL DAILY
Qty: 2 TABLET | Refills: 0 | Status: SHIPPED | OUTPATIENT
Start: 2023-06-23 | End: 2023-06-27

## 2023-06-23 NOTE — TELEPHONE ENCOUNTER
Pls see DialedIN message from 6/23/23.    predniSONE (DELTASONE) 20 MG tablet 20 tablet 0 6/19/2023  --   Sig: Take 3 tabs by mouth daily x 3 days, then 2 tabs daily x 3 days, then 1 tab daily x 3 days, then 1/2 tab daily x 3 days.   Sent to pharmacy as: predniSONE 20 MG Oral Tablet (DELTASONE)   Class: E-Prescribe   Order: [id number]   E-Prescribing Status: Receipt confirmed by pharmacy (6/19/2023  3:21 PM CDT)     Date of last OV with Dr. Rivera: 6/19/23.  Rx pended and message routed to provider for consideration.     Julie Behrendt RN

## 2023-06-26 ENCOUNTER — MYC MEDICAL ADVICE (OUTPATIENT)
Dept: FAMILY MEDICINE | Facility: CLINIC | Age: 43
End: 2023-06-26
Payer: COMMERCIAL

## 2023-06-26 DIAGNOSIS — F90.2 ATTENTION DEFICIT HYPERACTIVITY DISORDER (ADHD), COMBINED TYPE: ICD-10-CM

## 2023-06-26 RX ORDER — DEXMETHYLPHENIDATE HYDROCHLORIDE 10 MG/1
10 TABLET ORAL 2 TIMES DAILY
Qty: 8 TABLET | Refills: 0 | Status: SHIPPED | OUTPATIENT
Start: 2023-06-26 | End: 2023-06-29

## 2023-06-26 RX ORDER — DEXMETHYLPHENIDATE HYDROCHLORIDE 10 MG/1
10 TABLET ORAL 2 TIMES DAILY
Qty: 60 TABLET | Refills: 0 | OUTPATIENT
Start: 2023-06-26

## 2023-06-26 NOTE — TELEPHONE ENCOUNTER
Left message to call care team.     Placed a hold on [facility] scheduled at 9:30 slot for patient to return call and scheduled. That is [facility] only opening that day.

## 2023-06-26 NOTE — TELEPHONE ENCOUNTER
Patient needs an in office appointment for further refills Have refilled until Thursday will work her in the schedule to be seen on Thursday

## 2023-06-26 NOTE — TELEPHONE ENCOUNTER
Notify patient I have refilled her Focalin until Thursday will need a follow-up appointment in office please work on the schedule on Thursday to be seen.

## 2023-06-29 ENCOUNTER — OFFICE VISIT (OUTPATIENT)
Dept: FAMILY MEDICINE | Facility: CLINIC | Age: 43
End: 2023-06-29
Payer: COMMERCIAL

## 2023-06-29 VITALS
RESPIRATION RATE: 16 BRPM | HEIGHT: 64 IN | SYSTOLIC BLOOD PRESSURE: 140 MMHG | TEMPERATURE: 98.9 F | HEART RATE: 82 BPM | DIASTOLIC BLOOD PRESSURE: 80 MMHG | WEIGHT: 210 LBS | BODY MASS INDEX: 35.85 KG/M2

## 2023-06-29 DIAGNOSIS — L20.82 FLEXURAL ECZEMA: ICD-10-CM

## 2023-06-29 DIAGNOSIS — N39.41 URGENCY INCONTINENCE: ICD-10-CM

## 2023-06-29 DIAGNOSIS — M25.50 MULTIPLE JOINT PAIN: ICD-10-CM

## 2023-06-29 DIAGNOSIS — E55.9 VITAMIN D DEFICIENCY: ICD-10-CM

## 2023-06-29 DIAGNOSIS — G89.4 CHRONIC PAIN SYNDROME: ICD-10-CM

## 2023-06-29 DIAGNOSIS — J44.1 CHRONIC OBSTRUCTIVE PULMONARY DISEASE WITH ACUTE EXACERBATION (H): ICD-10-CM

## 2023-06-29 DIAGNOSIS — F90.2 ATTENTION DEFICIT HYPERACTIVITY DISORDER (ADHD), COMBINED TYPE: Primary | ICD-10-CM

## 2023-06-29 LAB — CREAT UR-MCNC: 33 MG/DL

## 2023-06-29 PROCEDURE — 99215 OFFICE O/P EST HI 40 MIN: CPT | Performed by: NURSE PRACTITIONER

## 2023-06-29 PROCEDURE — 36415 COLL VENOUS BLD VENIPUNCTURE: CPT | Performed by: NURSE PRACTITIONER

## 2023-06-29 PROCEDURE — 82306 VITAMIN D 25 HYDROXY: CPT | Performed by: NURSE PRACTITIONER

## 2023-06-29 RX ORDER — DEXMETHYLPHENIDATE HYDROCHLORIDE 10 MG/1
10 TABLET ORAL 2 TIMES DAILY
Qty: 8 TABLET | Refills: 0 | Status: CANCELLED | OUTPATIENT
Start: 2023-06-29

## 2023-06-29 RX ORDER — OXYCODONE HYDROCHLORIDE 5 MG/1
TABLET ORAL
Qty: 42 TABLET | Refills: 0 | Status: SHIPPED | OUTPATIENT
Start: 2023-06-29 | End: 2023-07-11

## 2023-06-29 RX ORDER — DEXMETHYLPHENIDATE HYDROCHLORIDE 10 MG/1
10 TABLET ORAL 2 TIMES DAILY
Qty: 28 TABLET | Refills: 0 | Status: SHIPPED | OUTPATIENT
Start: 2023-06-29 | End: 2023-10-12

## 2023-06-29 RX ORDER — TRIAMCINOLONE ACETONIDE 1 MG/G
CREAM TOPICAL 2 TIMES DAILY
Qty: 45 G | Refills: 0 | Status: SHIPPED | OUTPATIENT
Start: 2023-06-29 | End: 2023-12-16

## 2023-06-29 ASSESSMENT — ANXIETY QUESTIONNAIRES
GAD7 TOTAL SCORE: 14
1. FEELING NERVOUS, ANXIOUS, OR ON EDGE: MORE THAN HALF THE DAYS
3. WORRYING TOO MUCH ABOUT DIFFERENT THINGS: MORE THAN HALF THE DAYS
7. FEELING AFRAID AS IF SOMETHING AWFUL MIGHT HAPPEN: MORE THAN HALF THE DAYS
2. NOT BEING ABLE TO STOP OR CONTROL WORRYING: MORE THAN HALF THE DAYS
4. TROUBLE RELAXING: MORE THAN HALF THE DAYS
6. BECOMING EASILY ANNOYED OR IRRITABLE: MORE THAN HALF THE DAYS
5. BEING SO RESTLESS THAT IT IS HARD TO SIT STILL: MORE THAN HALF THE DAYS
GAD7 TOTAL SCORE: 14

## 2023-06-29 ASSESSMENT — PAIN SCALES - GENERAL: PAINLEVEL: MILD PAIN (3)

## 2023-06-29 NOTE — PROGRESS NOTES
Assessment & Plan     (F90.2) Attention deficit hyperactivity disorder (ADHD), combined type  (primary encounter diagnosis)  Comment: Concern with drug testing focalin not present in prior drug test will retest and have follow-up in 14 days   Plan: Drug Confirmation Panel Urine with Creat - lab         collect, dexmethylphenidate (FOCALIN) 10 MG         tablet      (N39.41) Urgency incontinence  Comment:   Plan: Adult Urology  Referral            (M25.50) Multiple joint pain  Comment: continue joint pain   Plan: Adult Rheumatology  Referral        (L20.82) Flexural eczema  Comment:  Controlled no change in treatment plan  Plan: triamcinolone (KENALOG) 0.1 % external cream            (E55.9) Vitamin D deficiency  Comment:   Plan:     (G89.4) Chronic pain syndrome  Comment:   Plan: oxyCODONE (ROXICODONE) 5 MG tablet            (J44.1) Chronic obstructive pulmonary disease with acute exacerbation (H)  Comment: concern with drug screen =repeat test pending   Plan: oxyCODONE (ROXICODONE) 5 MG tablet            40 minutes spent by me on the date of the encounter doing chart review, history and exam, documentation and further activities per the note       See Patient Instructions    FRANKLYN Christianson CNP  [facility]    Justin Rodríguez is a 42 year old, presenting for the following health issues:  A.D.H.D        6/29/2023     9:16 AM   Additional Questions   Roomed by richie GRIGGS     Medication Followup of  Focalin     Taking Medication as prescribed: yes    Side Effects:  None    Medication Helping Symptoms:  yes          Review of Systems   Constitutional, HEENT, cardiovascular, pulmonary, gi and gu systems are negative, except as otherwise noted.      Objective    LMP 03/02/2022 (LMP Unknown)   There is no height or weight on file to calculate BMI.  Physical Exam   GENERAL: healthy, alert and no distress  EYES: Eyes grossly normal to inspection, PERRL and  conjunctivae and sclerae normal  NECK: no adenopathy, no asymmetry, masses, or scars and thyroid normal to palpation  RESP: lungs clear to auscultation - no rales, rhonchi or wheezes  BREAST: normal without masses, tenderness or nipple discharge and no palpable axillary masses or adenopathy  CV: regular rate and rhythm, normal S1 S2, no S3 or S4, no murmur, click or rub, no peripheral edema and peripheral pulses strong  ABDOMEN: soft, nontender, no hepatosplenomegaly, no masses and bowel sounds normal  MS: no gross musculoskeletal defects noted, no edema  SKIN: no suspicious lesions or rashes  NEURO: Normal strength and tone, mentation intact and speech normal  PSYCH: mentation appears normal, affect normal/bright    Results for orders placed or performed in visit on 06/29/23   Urine Drug Confirmation Panel     Status: Abnormal   Result Value Ref Range    Methylphenidate (Ritalin) ng/mL 156 (H) <50 ng/mL    Methylphenidate (Ritalin) 473 Absent ng/mg [creat]    Ritalinic Acid ng/mL 2,100 (H) <50 ng/mL    Ritalinic Acid 6,364 Absent ng/mg [creat]    Narrative    This test was developed and its performance characteristics determined by the [facility],  Special Chemistry Laboratory. It has not been cleared or approved by the FDA. The laboratory is regulated under CLIA as qualified to perform high-complexity testing. This test is used for clinical purposes. It should not be regarded as investigational or for research.    Drugs with concentrations less than the cutoff will not be reported.  The drugs with applicable detection cutoff limits that are included within the Drug Confirmation Panel are:    The following drugs are detected with a cutoff of 3 ng/ml: FENTANYL    The following drugs are detected with a cutoff of 5 ng/mL: 6-ACETYLMORPHINE, BUPRENORPHINE, NALOXONE, NORBUPRENORPHINE, NORFENTANYL    The following drugs are detected with a cutoff of 10 ng/mL: SUFENTANIL    The following drugs  are detected with a cutoff of 20 ng/mL: PCP (PHENCYCLIDINE)    The following drugs are detected with a cutoff of 50 ng/mL: 7-AMINOCLONAZEPAM, 7-AMINOFLUNITRAZEPAM, ALPRAZOLAM, AMPHETAMINE, A-OH-ALPRAZOLAM, A-OH-MIDAZOLAM, A-OH-TRIAZOLAM, BENZOYLECGONINE (Cocaine Metabolite), CLONAZEPAM, COCAETHYLENE (Cocaine Metabolite), CODEINE, DIAZEPAM, DIHYDROCODEINE, EDDP (Methadone Metabolite), HYDROCODONE, HYDROMORPHONE, LORAZEPAM, MDA (3,4-Methylenedioxyamphetamine), MDEA (3,6-Lgrzyhctemlbeh-I-ethylcathinone), MDMA (Methylenedioxyamphetamine,Ecstasy), MEPERIDINE, METHADONE, METHAMPHETAMINE, METHYLPHENIDATE (Ritalin), MORPHINE, NALTREXONE, N-DESMETH-TAPENTADOL, NORCODEINE, NORDIAZEPAM, NORMEPERIDINE, O-JATIN-TRAMADOL, OXAZEPAM, OXYCODONE, OXYMORPHONE, PROPOXYPHENE, RITALINIC ACID, TAPENTADOL, TEMAZEPAM, THEBAINE, TRAMADOL    The following drugs are detected with a cutoff of 100 ng/mL: GABAPENTIN, KETAMINE    The following drugs are detected with a cutoff of 200 ng/mL: PREGABALIN     Urine Creatinine for Drug Screen Panel     Status: None   Result Value Ref Range    Creatinine Urine for Drug Screen 33 mg/dL   Vitamin D Deficiency     Status: Normal   Result Value Ref Range    Vitamin D, Total (25-Hydroxy) 28 20 - 75 ug/L    Narrative    Season, race, dietary intake, and treatment affect the concentration of 25-hydroxy-Vitamin D. Values may decrease during winter months and increase during summer months. Values 20-29 ug/L may indicate Vitamin D insufficiency and values <20 ug/L may indicate Vitamin D deficiency.    Vitamin D determination is routinely performed by an immunoassay specific for 25 hydroxyvitamin D3.  If an individual is on vitamin D2(ergocalciferol) supplementation, please specify 25 [place] vitamin D2 and D3 level determination by LCMSMS test [id number].     Drug Confirmation Panel Urine with Creat - lab collect     Status: Abnormal    Narrative    The following orders were created for panel order Drug Confirmation Panel  Urine with Creat - lab collect.  Procedure                               Abnormality         Status                     ---------                               -----------         ------                     Urine Drug Confirmation ...[[id number]]  Abnormal            Final result               Urine Creatinine for Abhinav...[[id number]]                      Final result                 Please view results for these tests on the individual orders.

## 2023-06-29 NOTE — PATIENT INSTRUCTIONS
Schedule appointment 711 and one of my same-day spots for follow-up med check.  Patient to go to lab then can go

## 2023-06-30 LAB — DEPRECATED CALCIDIOL+CALCIFEROL SERPL-MC: 28 UG/L (ref 20–75)

## 2023-07-03 LAB
ME-PHENIDATE UR CFM-MCNC: 156 NG/ML
ME-PHENIDATE UR CFM-MCNC: 2100 NG/ML
ME-PHENIDATE/CREAT UR: 473 NG/MG {CREAT}
ME-PHENIDATE/CREAT UR: 6364 NG/MG {CREAT}

## 2023-07-11 ENCOUNTER — OFFICE VISIT (OUTPATIENT)
Dept: FAMILY MEDICINE | Facility: CLINIC | Age: 43
End: 2023-07-11
Payer: COMMERCIAL

## 2023-07-11 VITALS
HEART RATE: 70 BPM | SYSTOLIC BLOOD PRESSURE: 128 MMHG | DIASTOLIC BLOOD PRESSURE: 76 MMHG | OXYGEN SATURATION: 100 % | TEMPERATURE: 97.8 F | RESPIRATION RATE: 18 BRPM | HEIGHT: 64 IN | BODY MASS INDEX: 36.19 KG/M2 | WEIGHT: 212 LBS

## 2023-07-11 DIAGNOSIS — F90.2 ATTENTION DEFICIT HYPERACTIVITY DISORDER (ADHD), COMBINED TYPE: ICD-10-CM

## 2023-07-11 DIAGNOSIS — G89.4 CHRONIC PAIN SYNDROME: Primary | ICD-10-CM

## 2023-07-11 DIAGNOSIS — J44.1 CHRONIC OBSTRUCTIVE PULMONARY DISEASE WITH ACUTE EXACERBATION (H): ICD-10-CM

## 2023-07-11 DIAGNOSIS — M25.50 MULTIPLE JOINT PAIN: ICD-10-CM

## 2023-07-11 LAB — CREAT UR-MCNC: 96 MG/DL

## 2023-07-11 PROCEDURE — 99214 OFFICE O/P EST MOD 30 MIN: CPT | Performed by: NURSE PRACTITIONER

## 2023-07-11 RX ORDER — DEXMETHYLPHENIDATE HYDROCHLORIDE 10 MG/1
10 TABLET ORAL 2 TIMES DAILY
Qty: 60 TABLET | Refills: 0 | Status: SHIPPED | OUTPATIENT
Start: 2023-08-13 | End: 2023-09-12

## 2023-07-11 RX ORDER — DEXMETHYLPHENIDATE HYDROCHLORIDE 10 MG/1
10 TABLET ORAL DAILY
Qty: 30 TABLET | Refills: 0 | Status: SHIPPED | OUTPATIENT
Start: 2023-08-13 | End: 2023-07-11 | Stop reason: ALTCHOICE

## 2023-07-11 RX ORDER — DEXMETHYLPHENIDATE HYDROCHLORIDE 10 MG/1
10 TABLET ORAL DAILY
Qty: 30 TABLET | Refills: 0 | Status: SHIPPED | OUTPATIENT
Start: 2023-09-13 | End: 2023-07-11 | Stop reason: ALTCHOICE

## 2023-07-11 RX ORDER — DEXMETHYLPHENIDATE HYDROCHLORIDE 10 MG/1
10 TABLET ORAL DAILY
Qty: 30 TABLET | Refills: 0 | Status: SHIPPED | OUTPATIENT
Start: 2023-07-13 | End: 2023-07-11 | Stop reason: ALTCHOICE

## 2023-07-11 RX ORDER — OXYCODONE HYDROCHLORIDE 5 MG/1
TABLET ORAL
Qty: 90 TABLET | Refills: 0 | Status: SHIPPED | OUTPATIENT
Start: 2023-07-17 | End: 2023-08-15

## 2023-07-11 RX ORDER — DEXMETHYLPHENIDATE HYDROCHLORIDE 10 MG/1
10 TABLET ORAL 2 TIMES DAILY
Qty: 60 TABLET | Refills: 0 | Status: SHIPPED | OUTPATIENT
Start: 2023-09-13 | End: 2023-10-09

## 2023-07-11 RX ORDER — DEXMETHYLPHENIDATE HYDROCHLORIDE 10 MG/1
10 TABLET ORAL 2 TIMES DAILY
Qty: 60 TABLET | Refills: 0 | Status: SHIPPED | OUTPATIENT
Start: 2023-07-13 | End: 2023-08-12

## 2023-07-11 ASSESSMENT — PAIN SCALES - GENERAL: PAINLEVEL: NO PAIN (0)

## 2023-07-11 ASSESSMENT — ANXIETY QUESTIONNAIRES
2. NOT BEING ABLE TO STOP OR CONTROL WORRYING: SEVERAL DAYS
IF YOU CHECKED OFF ANY PROBLEMS ON THIS QUESTIONNAIRE, HOW DIFFICULT HAVE THESE PROBLEMS MADE IT FOR YOU TO DO YOUR WORK, TAKE CARE OF THINGS AT HOME, OR GET ALONG WITH OTHER PEOPLE: SOMEWHAT DIFFICULT
3. WORRYING TOO MUCH ABOUT DIFFERENT THINGS: SEVERAL DAYS
5. BEING SO RESTLESS THAT IT IS HARD TO SIT STILL: SEVERAL DAYS
1. FEELING NERVOUS, ANXIOUS, OR ON EDGE: MORE THAN HALF THE DAYS
6. BECOMING EASILY ANNOYED OR IRRITABLE: MORE THAN HALF THE DAYS
7. FEELING AFRAID AS IF SOMETHING AWFUL MIGHT HAPPEN: SEVERAL DAYS
4. TROUBLE RELAXING: MORE THAN HALF THE DAYS
GAD7 TOTAL SCORE: 10
GAD7 TOTAL SCORE: 10

## 2023-07-11 ASSESSMENT — PATIENT HEALTH QUESTIONNAIRE - PHQ9: SUM OF ALL RESPONSES TO PHQ QUESTIONS 1-9: 11

## 2023-07-11 NOTE — PROGRESS NOTES
Assessment & Plan     Chronic pain syndrome  Patient was last drug screen did show focalin did not show oxycodone patient states she did not take oxycodone the day of the testing patient was rescheduled for today for repeat test.  Drug screen pending if drug screen is expected we will continue to refill for 3 months awaiting blood test results  - Drug Confirmation Panel Urine with Creat - lab collect  - oxyCODONE (ROXICODONE) 5 MG tablet  Dispense: 90 tablet; Refill: 0    Multiple joint pain      Attention deficit hyperactivity disorder (ADHD), combined type  Patient was last drug screen did show focalin did not show oxycodone patient states she did not take oxycodone the day of the testing patient was rescheduled for today for repeat test.  Drug screen pending if drug screen is expected we will continue to refill for 3 months awaiting blood test results  - dexmethylphenidate (FOCALIN) 10 MG tablet  Dispense: 60 tablet; Refill: 0  - dexmethylphenidate (FOCALIN) 10 MG tablet  Dispense: 60 tablet; Refill: 0  - dexmethylphenidate (FOCALIN) 10 MG tablet  Dispense: 60 tablet; Refill: 0    Chronic obstructive pulmonary disease with acute exacerbation (H)  - oxyCODONE (ROXICODONE) 5 MG tablet  Dispense: 90 tablet; Refill: 0                   FRANKLYN Christianson CNP  [facility]    Justin Rodríguez is a 42 year old, presenting for the following health issues:  Depression and Anxiety        7/11/2023     9:29 AM   Additional Questions   Roomed by Lavonne BRIGHT     History of Present Illness       Reason for visit:  Med check    She eats 0-1 servings of fruits and vegetables daily.She consumes 3 sweetened beverage(s) daily.She exercises with enough effort to increase her heart rate 9 or less minutes per day.  She exercises with enough effort to increase her heart rate 3 or less days per week. She is missing 1 dose(s) of medications per week.  Today's MILES-7 Score: 10       Depression and Anxiety  "Follow-Up  How are you doing with your depression since your last visit? No change  How are you doing with your anxiety since your last visit?  No change  Are you having other symptoms that might be associated with depression or anxiety? No  Have you had a significant life event? No   Do you have any concerns with your use of alcohol or other drugs? No    Social History     Tobacco Use    Smoking status: Every Day     Packs/day: 0.50     Types: Cigarettes    Smokeless tobacco: Never   Vaping Use    Vaping Use: Never used   Substance Use Topics    Alcohol use: Yes     Comment: occassionally    Drug use: Never         2/21/2023     7:11 AM 5/18/2023     7:15 AM 6/19/2023     2:15 PM   PHQ   PHQ-9 Total Score 8 17 15   Q9: Thoughts of better off dead/self-harm past 2 weeks Not at all Not at all Not at all         6/19/2023     2:16 PM 6/29/2023     9:19 AM 7/11/2023     9:12 AM   MILES-7 SCORE   Total Score 13 (moderate anxiety)  10 (moderate anxiety)   Total Score 13 14 10         Suicide Assessment Five-step Evaluation and Treatment (SAFE-T)          Review of Systems   Constitutional, HEENT, cardiovascular, pulmonary, gi and gu systems are negative, except as otherwise noted.      Objective    LMP 03/02/2022 (LMP Unknown)   Body mass index is 36.39 kg/m . Vital signs:  Temp: 97.8  F (36.6  C) Temp src: Tympanic BP: 128/76 Pulse: 70   Resp: 18 SpO2: 100 %     Height: 162.6 cm (5' 4\") Weight: 96.2 kg (212 lb)  Estimated body mass index is 36.39 kg/m  as calculated from the following:    Height as of this encounter: 1.626 m (5' 4\").    Weight as of this encounter: 96.2 kg (212 lb).          Physical Exam   GENERAL: healthy, alert and no distress  EYES: Eyes grossly normal to inspection, PERRL and conjunctivae and sclerae normal  HENT: ear canals and TM's normal, nose and mouth without ulcers or lesions  NECK: no adenopathy, no asymmetry, masses, or scars and thyroid normal to palpation  RESP: lungs clear to auscultation " - no rales, rhonchi or wheezes  BREAST: normal without masses, tenderness or nipple discharge and no palpable axillary masses or adenopathy  CV: regular rate and rhythm, normal S1 S2, no S3 or S4, no murmur, click or rub, no peripheral edema and peripheral pulses strong  ABDOMEN: soft, nontender, no hepatosplenomegaly, no masses and bowel sounds normal  MS: no gross musculoskeletal defects noted, no edema  SKIN: no suspicious lesions or rashes  NEURO: Normal strength and tone, mentation intact and speech normal  PSYCH: mentation appears normal, affect normal/bright    No results found for any visits on 07/11/23.

## 2023-07-13 ENCOUNTER — TELEPHONE (OUTPATIENT)
Dept: FAMILY MEDICINE | Facility: CLINIC | Age: 43
End: 2023-07-13
Payer: COMMERCIAL

## 2023-07-13 LAB
ME-PHENIDATE UR CFM-MCNC: 4760 NG/ML
ME-PHENIDATE UR CFM-MCNC: 65 NG/ML
ME-PHENIDATE/CREAT UR: 4958 NG/MG {CREAT}
ME-PHENIDATE/CREAT UR: 68 NG/MG {CREAT}
OXYCODONE UR CFM-MCNC: 411 NG/ML
OXYCODONE/CREAT UR: 428 NG/MG {CREAT}
OXYMORPHONE UR CFM-MCNC: 171 NG/ML
OXYMORPHONE/CREAT UR: 178 NG/MG {CREAT}

## 2023-07-13 NOTE — TELEPHONE ENCOUNTER
Prior Authorization Retail Medication Request    Medication/Dose: Dexmethylphenidate 10mg   ICD code (if different than what is on RX):    Previously Tried and Failed:    Rationale:  rx is for 60 tabs but ins only let us give 24 tabs     Insurance Name:  [employer] [phone]  Insurance ID:  [id number]      Pharmacy Information (if different than what is on RX)  Name:    Phone:

## 2023-07-13 NOTE — TELEPHONE ENCOUNTER
Central Prior Authorization Team   Phone: [phone]    PA Initiation    Medication: Dexmethylphenidate 10mg   Insurance Company: [employer] - Phone [phone] Fax [phone]  Pharmacy Filling the Rx: [place], [place] - [address] [place] [address]  Filling Pharmacy Phone: [phone]  Filling Pharmacy Fax:    Start Date: 7/13/2023    Manually faxed urgent request to [employer]

## 2023-07-14 NOTE — TELEPHONE ENCOUNTER
Per pharmacy patient received quantity 24 for a 12 days supply on 7/13, this is too soon to refill.      Let them know due to rolling fill days for control substances for [employer] I am not sure if the patient will be able to fill the full amount of 60 per 30.  Next fill date is 7/24 and I will be out of the office all week,  Let pharmacy know that if they have issues they can contact the PA team and someone will be able to assist.

## 2023-07-14 NOTE — TELEPHONE ENCOUNTER
Prior Authorization Approval    Authorization Effective Date: 6/13/2023  Authorization Expiration Date: 7/12/2024  Medication: Dexmethylphenidate 10mg   Approved Dose/Quantity: 2 per day  Reference #:     Insurance Company: [employer] - Phone [phone] Fax [phone]  Expected CoPay:       CoPay Card Available:      Foundation Assistance Needed:    Which Pharmacy is filling the prescription (Not needed for infusion/clinic administered): [facility] [place] [id number] [place] [address]  Pharmacy Notified: Yes  Patient Notified: Yes

## 2023-07-24 DIAGNOSIS — I10 BENIGN ESSENTIAL HYPERTENSION: ICD-10-CM

## 2023-07-25 RX ORDER — AMLODIPINE BESYLATE 2.5 MG/1
2.5 TABLET ORAL DAILY
Qty: 90 TABLET | Refills: 3 | OUTPATIENT
Start: 2023-07-25

## 2023-07-25 NOTE — TELEPHONE ENCOUNTER
amLODIPine (NORVASC) 2.5 MG tablet 90 tablet 3 9/16/2022  --   Sig - Route: Take 1 tablet (2.5 mg) by mouth daily - Oral   Sent to pharmacy as: amLODIPine Besylate 2.5 MG Oral Tablet (NORVASC)   Class: E-Prescribe   Order: [id number]   E-Prescribing Status: Receipt confirmed by pharmacy (9/16/2022  4:02 PM CDT)     Printout Tracking    External Result Report     Pharmacy    [facility] [id number] [place] [address]   Julie Behrendt RN

## 2023-07-31 ENCOUNTER — MYC MEDICAL ADVICE (OUTPATIENT)
Dept: FAMILY MEDICINE | Facility: CLINIC | Age: 43
End: 2023-07-31
Payer: COMMERCIAL

## 2023-08-01 NOTE — TELEPHONE ENCOUNTER
RN spoke with SAMRA Perez [facility] pharm, who confirms Focalin LR 07-13-23 qty 24.   08-10-23 script  submitted to insurance and now approved for qty 60.   Verbal approval given to fill Rx today as well as 9/10 script early 30 days from now.  Forwarded to Bertha as FYI. Please advise if recommend alternate plan affecting future scripts. Pt informed via Poll Everywhere.  CHANELLE Ramsay RN

## 2023-08-08 NOTE — PROGRESS NOTES
Outpatient Physical Therapy Discharge Note     Patient: Anne Ferreira  : 1980    Evaluation date:  22     Referring Provider: Oleg Rome Diagnosis: Neck/back pain    Client Self Report:   Current status is unknown since patient did not return for further PT visits.    Objective Measurements:  Current objective status is unknown since patient failed to complete treatment     Goals:  Goal Identifier feeding - sitting   Goal Description Pt will be able to sit for 30+ min w/o pain to be able to feed patients   Target Date 22   Date Met      Progress (detail required for progress note):       Goal Identifier standing   Goal Description Pt will be able to demonstrate 5/5 hip strength to be able to stand w/o pain   Target Date 22   Date Met      Progress (detail required for progress note):       Goal Identifier transfering patients   Goal Description Pt will be able to transfer patientes w less than 1/10 LBP   Target Date 22   Date Met      Progress (detail required for progress note):       Goal Identifier TONIO   Goal Description Pt will report <10% disability on TONIO to demonstrate improved ability to complete daily activities and demonstrate significant clinical improvement.   Target Date 22   Date Met      Progress (detail required for progress note):       Goal Identifier     Goal Description     Target Date     Date Met      Progress (detail required for progress note):       Goal Identifier     Goal Description     Target Date     Date Met      Progress (detail required for progress note):       Goal Identifier     Goal Description     Target Date     Date Met      Progress (detail required for progress note):       Goal Identifier     Goal Description     Target Date     Date Met      Progress (detail required for progress note):         Progress towards Goals:   Progress this reporting period: Pt has failed to schedule f/u visits within 30 days from last visit thus is being  d/c from therapy at this time. Objective measures are all taken from last visit. Current status is unknown at this time.    Plan:  Discharge from therapy.    Discharge:    Reason for Discharge: Patient has failed to schedule further appointments.    Equipment Issued: none    Discharge Plan:  Not completed since patient failed to schedule further appointments.    Anne Ramsay  Physical Therapist  [address] [address]  [email or web address]   [email or web address]   Office: [phone] Fax: [phone]

## 2023-08-14 ENCOUNTER — MYC MEDICAL ADVICE (OUTPATIENT)
Dept: FAMILY MEDICINE | Facility: CLINIC | Age: 43
End: 2023-08-14
Payer: COMMERCIAL

## 2023-08-14 DIAGNOSIS — G89.4 CHRONIC PAIN SYNDROME: ICD-10-CM

## 2023-08-14 DIAGNOSIS — J44.1 CHRONIC OBSTRUCTIVE PULMONARY DISEASE WITH ACUTE EXACERBATION (H): ICD-10-CM

## 2023-08-15 RX ORDER — OXYCODONE HYDROCHLORIDE 5 MG/1
TABLET ORAL
Qty: 90 TABLET | Refills: 0 | Status: SHIPPED | OUTPATIENT
Start: 2023-08-15 | End: 2023-09-14

## 2023-08-31 ENCOUNTER — APPOINTMENT (OUTPATIENT)
Dept: CT IMAGING | Facility: CLINIC | Age: 43
End: 2023-08-31
Attending: EMERGENCY MEDICINE
Payer: COMMERCIAL

## 2023-08-31 ENCOUNTER — HOSPITAL ENCOUNTER (EMERGENCY)
Facility: CLINIC | Age: 43
Discharge: HOME | End: 2023-08-31
Attending: EMERGENCY MEDICINE | Admitting: EMERGENCY MEDICINE
Payer: COMMERCIAL

## 2023-08-31 VITALS
RESPIRATION RATE: 16 BRPM | DIASTOLIC BLOOD PRESSURE: 66 MMHG | SYSTOLIC BLOOD PRESSURE: 113 MMHG | WEIGHT: 205 LBS | OXYGEN SATURATION: 98 % | BODY MASS INDEX: 35 KG/M2 | HEIGHT: 64 IN | TEMPERATURE: 98.4 F | HEART RATE: 62 BPM

## 2023-08-31 DIAGNOSIS — R10.31 RIGHT LOWER QUADRANT ABDOMINAL PAIN: ICD-10-CM

## 2023-08-31 LAB
ALBUMIN SERPL BCG-MCNC: 4 G/DL (ref 3.5–5.2)
ALBUMIN UR-MCNC: NEGATIVE MG/DL
ALP SERPL-CCNC: 86 U/L (ref 35–104)
ALT SERPL W P-5'-P-CCNC: 21 U/L (ref 0–50)
ANION GAP SERPL CALCULATED.3IONS-SCNC: 12 MMOL/L (ref 7–15)
APPEARANCE UR: ABNORMAL
AST SERPL W P-5'-P-CCNC: 19 U/L (ref 0–45)
BASOPHILS # BLD AUTO: 0.1 10E3/UL (ref 0–0.2)
BASOPHILS NFR BLD AUTO: 0 %
BILIRUB SERPL-MCNC: 0.2 MG/DL
BILIRUB UR QL STRIP: NEGATIVE
BUN SERPL-MCNC: 9.7 MG/DL (ref 6–20)
CALCIUM SERPL-MCNC: 9.7 MG/DL (ref 8.6–10)
CHLORIDE SERPL-SCNC: 106 MMOL/L (ref 98–107)
COLOR UR AUTO: YELLOW
CREAT SERPL-MCNC: 0.57 MG/DL (ref 0.51–0.95)
DEPRECATED HCO3 PLAS-SCNC: 22 MMOL/L (ref 22–29)
EOSINOPHIL # BLD AUTO: 0.2 10E3/UL (ref 0–0.7)
EOSINOPHIL NFR BLD AUTO: 1 %
ERYTHROCYTE [DISTWIDTH] IN BLOOD BY AUTOMATED COUNT: 13.6 % (ref 10–15)
GFR SERPL CREATININE-BSD FRML MDRD: >90 ML/MIN/1.73M2
GLUCOSE SERPL-MCNC: 107 MG/DL (ref 70–99)
GLUCOSE UR STRIP-MCNC: NEGATIVE MG/DL
HCT VFR BLD AUTO: 36.7 % (ref 35–47)
HGB BLD-MCNC: 12.2 G/DL (ref 11.7–15.7)
HGB UR QL STRIP: NEGATIVE
HOLD SPECIMEN: NORMAL
IMM GRANULOCYTES # BLD: 0 10E3/UL
IMM GRANULOCYTES NFR BLD: 0 %
KETONES UR STRIP-MCNC: NEGATIVE MG/DL
LEUKOCYTE ESTERASE UR QL STRIP: NEGATIVE
LIPASE SERPL-CCNC: 29 U/L (ref 13–60)
LYMPHOCYTES # BLD AUTO: 3.8 10E3/UL (ref 0.8–5.3)
LYMPHOCYTES NFR BLD AUTO: 34 %
MCH RBC QN AUTO: 29.4 PG (ref 26.5–33)
MCHC RBC AUTO-ENTMCNC: 33.2 G/DL (ref 31.5–36.5)
MCV RBC AUTO: 88 FL (ref 78–100)
MONOCYTES # BLD AUTO: 0.9 10E3/UL (ref 0–1.3)
MONOCYTES NFR BLD AUTO: 8 %
MUCOUS THREADS #/AREA URNS LPF: PRESENT /LPF
NEUTROPHILS # BLD AUTO: 6.5 10E3/UL (ref 1.6–8.3)
NEUTROPHILS NFR BLD AUTO: 57 %
NITRATE UR QL: NEGATIVE
NRBC # BLD AUTO: 0 10E3/UL
NRBC BLD AUTO-RTO: 0 /100
PH UR STRIP: 5 [PH] (ref 5–7)
PLATELET # BLD AUTO: 279 10E3/UL (ref 150–450)
POTASSIUM SERPL-SCNC: 4.2 MMOL/L (ref 3.4–5.3)
PROT SERPL-MCNC: 7 G/DL (ref 6.4–8.3)
RBC # BLD AUTO: 4.15 10E6/UL (ref 3.8–5.2)
RBC URINE: 2 /HPF
SODIUM SERPL-SCNC: 140 MMOL/L (ref 136–145)
SP GR UR STRIP: 1.02 (ref 1–1.03)
SQUAMOUS EPITHELIAL: 2 /HPF
UROBILINOGEN UR STRIP-MCNC: NORMAL MG/DL
WBC # BLD AUTO: 11.4 10E3/UL (ref 4–11)
WBC URINE: 1 /HPF

## 2023-08-31 PROCEDURE — 258N000003 HC RX IP 258 OP 636: Performed by: EMERGENCY MEDICINE

## 2023-08-31 PROCEDURE — 80053 COMPREHEN METABOLIC PANEL: CPT | Performed by: EMERGENCY MEDICINE

## 2023-08-31 PROCEDURE — 99285 EMERGENCY DEPT VISIT HI MDM: CPT | Mod: 25 | Performed by: EMERGENCY MEDICINE

## 2023-08-31 PROCEDURE — 85014 HEMATOCRIT: CPT | Performed by: EMERGENCY MEDICINE

## 2023-08-31 PROCEDURE — 250N000011 HC RX IP 250 OP 636: Performed by: EMERGENCY MEDICINE

## 2023-08-31 PROCEDURE — 250N000009 HC RX 250: Performed by: EMERGENCY MEDICINE

## 2023-08-31 PROCEDURE — 99285 EMERGENCY DEPT VISIT HI MDM: CPT | Performed by: EMERGENCY MEDICINE

## 2023-08-31 PROCEDURE — 74177 CT ABD & PELVIS W/CONTRAST: CPT

## 2023-08-31 PROCEDURE — 81001 URINALYSIS AUTO W/SCOPE: CPT | Performed by: EMERGENCY MEDICINE

## 2023-08-31 PROCEDURE — 96361 HYDRATE IV INFUSION ADD-ON: CPT | Performed by: EMERGENCY MEDICINE

## 2023-08-31 PROCEDURE — 83690 ASSAY OF LIPASE: CPT | Performed by: EMERGENCY MEDICINE

## 2023-08-31 PROCEDURE — 96374 THER/PROPH/DIAG INJ IV PUSH: CPT | Mod: 59 | Performed by: EMERGENCY MEDICINE

## 2023-08-31 PROCEDURE — 36415 COLL VENOUS BLD VENIPUNCTURE: CPT | Performed by: EMERGENCY MEDICINE

## 2023-08-31 RX ORDER — HYDROMORPHONE HYDROCHLORIDE 1 MG/ML
0.5 INJECTION, SOLUTION INTRAMUSCULAR; INTRAVENOUS; SUBCUTANEOUS ONCE
Status: COMPLETED | OUTPATIENT
Start: 2023-08-31 | End: 2023-08-31

## 2023-08-31 RX ORDER — IOPAMIDOL 755 MG/ML
100 INJECTION, SOLUTION INTRAVASCULAR ONCE
Status: COMPLETED | OUTPATIENT
Start: 2023-08-31 | End: 2023-08-31

## 2023-08-31 RX ADMIN — HYDROMORPHONE HYDROCHLORIDE 0.5 MG: 1 INJECTION, SOLUTION INTRAMUSCULAR; INTRAVENOUS; SUBCUTANEOUS at 14:23

## 2023-08-31 RX ADMIN — SODIUM CHLORIDE 1000 ML: 9 INJECTION, SOLUTION INTRAVENOUS at 14:23

## 2023-08-31 RX ADMIN — SODIUM CHLORIDE 55 ML: 9 INJECTION, SOLUTION INTRAVENOUS at 14:29

## 2023-08-31 RX ADMIN — IOPAMIDOL 100 ML: 755 INJECTION, SOLUTION INTRAVENOUS at 14:29

## 2023-08-31 ASSESSMENT — ACTIVITIES OF DAILY LIVING (ADL)
ADLS_ACUITY_SCORE: 35
ADLS_ACUITY_SCORE: 35

## 2023-08-31 NOTE — ED PROVIDER NOTES
History     Chief Complaint   Patient presents with    Abdominal Pain     Right sided abd pain x2 days, states cannot take it anymore.  OTC and prescribed medication (for back), not improving pain.  NO gallbladder, pt still has appendix.  Afebrile. Nausea and diarrhea yesterday.     HPI  Anne Ferreira is a 42 year old female who lower quadrant pain and tenderness of last couple days.  Anorectic.  Low-grade temp.  Bowel movements baseline urinating without complaint, prior cholecystectomy and hysterectomy.  Chronic opiate for neck pain oxycodone 5 mg 3 times daily.    Allergies:  Allergies   Allergen Reactions    Bupropion Nausea    Sulfa Antibiotics        Problem List:    Patient Active Problem List    Diagnosis Date Noted    F11.2 - Continuous opioid dependence (H) 05/18/2023     Priority: Medium    S/P cervical spinal fusion 03/20/2023     Priority: Medium     Added automatically from request for surgery [id number]      COPD (chronic obstructive pulmonary disease) (H) 07/27/2021     Priority: Medium    Moderate episode of recurrent major depressive disorder (H) 05/21/2021     Priority: Medium    Symptomatic cholelithiasis 12/15/2020     Priority: Medium     Added automatically from request for surgery [id number]      Morbid obesity (H) 12/11/2020     Priority: Medium    Dyslipidemia 11/10/2020     Priority: Medium    Dysmenorrhea 11/10/2020     Priority: Medium     treated with continuous OCPs      GERD (gastroesophageal reflux disease) 11/10/2020     Priority: Medium    Thoracic back pain 11/10/2020     Priority: Medium     left, approx T10      H/O LEEP      Priority: Medium     1998 LEEP- Unknown results.  2010 NIL pap.  2/2/12 COLP and ECC- DARREN 1.  2013 NIL pap  4/7/15 LSIL pap, + HR HPV   8/13/15 COLP- DARREN 1, ECC- Negative.  6/10/16 NIL pap, Neg HPV.  5/14/19 NIL pap, Neg HPV.  Above results found in CE  10/15/20 NIL pap, Neg HPV. Plan cotest in 3 years.       Cervical spinal stenosis 07/01/2019     Priority:  Medium     7/2019 MRI - At C5-6, broad-based central disc extrusion demonstrating caudal migration mildly deforms the cord and severely narrows the spinal canal. Uncovertebral joint spurring contributes to mild to moderate left neural foraminal narrowing with potential left C6 nerve root encroachment.      Herniation of cervical intervertebral disc with radiculopathy 07/01/2019     Priority: Medium     see MRI 7/2019      Attention deficit hyperactivity disorder, combined type, mild 08/29/2018     Priority: Medium    Generalized anxiety disorder 08/29/2018     Priority: Medium    Allergic rhinitis 04/01/2008     Priority: Medium        Past Medical History:    Past Medical History:   Diagnosis Date    Abnormal Pap smear of cervix        Past Surgical History:    Past Surgical History:   Procedure Laterality Date    CYSTOSCOPY N/A 4/25/2022    Procedure: CYSTOSCOPY;  Surgeon: Julius Montejo MD;  Location: [facility] OR    INJECT EPIDURAL CERVICAL  3/30/2023    Procedure: INJECTION, SPINE, CERVICAL, EPIDURAL;  Surgeon: Naresh Zuniga MD;  [place] [facility]    LAPAROSCOPIC CHOLECYSTECTOMY N/A 12/21/2020    Procedure: Laparoscopic cholecystectomy;  Surgeon: Manuel Durham DO;  Location: [facility] OR    LAPAROSCOPIC HYSTERECTOMY TOTAL, BILATERAL SALPINGO-OOPHORECTOMY, COMBINED Bilateral 4/25/2022    Procedure: Laparoscopic total hysterectomy, Bilateral salpingectomy, with sparing of ovaries;  Surgeon: Julius Montejo MD;  Location: [facility] OR       Family History:    Family History   Problem Relation Age of Onset    Hypertension Father     Hypertension Brother     Hypertension Sister     Hypertension Brother        Social History:  Marital Status:   [4]  Social History     Tobacco Use    Smoking status: Every Day     Packs/day: 0.50     Types: Cigarettes    Smokeless tobacco: Never   Vaping Use    Vaping Use: Never used   Substance Use Topics    Alcohol use: Yes     Comment: occassionally    Drug use: Never  "       Medications:    acetaminophen (TYLENOL) 325 MG tablet  albuterol (PROAIR HFA/PROVENTIL HFA/VENTOLIN HFA) 108 (90 Base) MCG/ACT inhaler  amLODIPine (NORVASC) 2.5 MG tablet  cyclobenzaprine (FLEXERIL) 10 MG tablet  dexmethylphenidate (FOCALIN) 10 MG tablet  [START ON 9/13/2023] dexmethylphenidate (FOCALIN) 10 MG tablet  dexmethylphenidate (FOCALIN) 10 MG tablet  diphenhydrAMINE (BENADRYL) 25 MG tablet  escitalopram (LEXAPRO) 20 MG tablet  hydrOXYzine (ATARAX) 25 MG tablet  ibuprofen (ADVIL/MOTRIN) 200 MG tablet  ipratropium - albuterol 0.5 mg/2.5 mg/3 mL (DUONEB) 0.5-2.5 (3) MG/3ML neb solution  lisinopril (ZESTRIL) 40 MG tablet  montelukast (SINGULAIR) 10 MG tablet  omeprazole (PRILOSEC) 20 MG DR capsule  oxyCODONE (ROXICODONE) 5 MG tablet  propranolol (INDERAL) 80 MG tablet  triamcinolone (KENALOG) 0.1 % external cream          Review of Systems    Physical Exam   BP: (!) 150/89  Pulse: 67  Temp: 98.4  F (36.9  C)  Resp: 16  Height: 162.6 cm (5' 4\")  Weight: 93 kg (205 lb)  SpO2: 100 %      Physical Exam  Nontoxic appearing no respiratory distress alert and oriented ×3  Head atraumatic normocephalic  Neck supple full active painless range of motion  Lungs clear to auscultation  Heart regular no murmur  Abdomen soft bowel sounds diminished tender right lower quadrant without guarding or rebound  Strength and sensation grossly intact throughout the extremities, gait and station normal  Speech is fluent, good eye contact, thought processes are rational      ED Course                 Procedures         Results for orders placed or performed during the hospital encounter of 08/31/23 (from the past 24 hour(s))   [facility] Draw    Narrative    The following orders were created for panel order [facility] Draw.  Procedure                               Abnormality         Status                     ---------                               -----------         ------                     Extra Blue Top Tube[[id number]]           "                    Final result               Extra Red Top Tube[[id number]]                               Final result               Extra Green Top (Lithium...[[id number]]                      Final result               Extra Purple Top Tube[[id number]]                            Final result               Extra Urine Collection[[id number]]                           Final result                 Please view results for these tests on the individual orders.   Extra Blue Top Tube   Result Value Ref Range    Hold Specimen JIC    Extra Red Top Tube   Result Value Ref Range    Hold Specimen JIC    Extra Green Top (Lithium Heparin) Tube   Result Value Ref Range    Hold Specimen JIC    Extra Purple Top Tube   Result Value Ref Range    Hold Specimen JIC    Extra Urine Collection   Result Value Ref Range    Hold Specimen JIC    CBC with Platelets & Differential    Narrative    The following orders were created for panel order CBC with Platelets & Differential.  Procedure                               Abnormality         Status                     ---------                               -----------         ------                     CBC with platelets and d...[[id number]]  Abnormal            Final result                 Please view results for these tests on the individual orders.   Comprehensive metabolic panel   Result Value Ref Range    Sodium 140 136 - 145 mmol/L    Potassium 4.2 3.4 - 5.3 mmol/L    Chloride 106 98 - 107 mmol/L    Carbon Dioxide (CO2) 22 22 - 29 mmol/L    Anion Gap 12 7 - 15 mmol/L    Urea Nitrogen 9.7 6.0 - 20.0 mg/dL    Creatinine 0.57 0.51 - 0.95 mg/dL    Calcium 9.7 8.6 - 10.0 mg/dL    Glucose 107 (H) 70 - 99 mg/dL    Alkaline Phosphatase 86 35 - 104 U/L    AST 19 0 - 45 U/L    ALT 21 0 - 50 U/L    Protein Total 7.0 6.4 - 8.3 g/dL    Albumin 4.0 3.5 - 5.2 g/dL    Bilirubin Total 0.2 <=1.2 mg/dL    GFR Estimate >90 >60 mL/min/1.73m2   Lipase   Result Value Ref Range    Lipase 29 13 - 60 U/L   UA with  Microscopic reflex to Culture    Specimen: Urine, Midstream   Result Value Ref Range    Color Urine Yellow Colorless, Straw, Light Yellow, Yellow    Appearance Urine Slightly Cloudy (A) Clear    Glucose Urine Negative Negative mg/dL    Bilirubin Urine Negative Negative    Ketones Urine Negative Negative mg/dL    Specific Gravity Urine 1.018 1.003 - 1.035    Blood Urine Negative Negative    pH Urine 5.0 5.0 - 7.0    Protein Albumin Urine Negative Negative mg/dL    Urobilinogen Urine Normal Normal, 2.0 mg/dL    Nitrite Urine Negative Negative    Leukocyte Esterase Urine Negative Negative    Mucus Urine Present (A) None Seen /LPF    RBC Urine 2 <=2 /HPF    WBC Urine 1 <=5 /HPF    Squamous Epithelials Urine 2 (H) <=1 /HPF    Narrative    Urine Culture not indicated   CBC with platelets and differential   Result Value Ref Range    WBC Count 11.4 (H) 4.0 - 11.0 10e3/uL    RBC Count 4.15 3.80 - 5.20 10e6/uL    Hemoglobin 12.2 11.7 - 15.7 g/dL    Hematocrit 36.7 35.0 - 47.0 %    MCV 88 78 - 100 fL    MCH 29.4 26.5 - 33.0 pg    MCHC 33.2 31.5 - 36.5 g/dL    RDW 13.6 10.0 - 15.0 %    Platelet Count 279 150 - 450 10e3/uL    % Neutrophils 57 %    % Lymphocytes 34 %    % Monocytes 8 %    % Eosinophils 1 %    % Basophils 0 %    % Immature Granulocytes 0 %    NRBCs per 100 WBC 0 <1 /100    Absolute Neutrophils 6.5 1.6 - 8.3 10e3/uL    Absolute Lymphocytes 3.8 0.8 - 5.3 10e3/uL    Absolute Monocytes 0.9 0.0 - 1.3 10e3/uL    Absolute Eosinophils 0.2 0.0 - 0.7 10e3/uL    Absolute Basophils 0.1 0.0 - 0.2 10e3/uL    Absolute Immature Granulocytes 0.0 <=0.4 10e3/uL    Absolute NRBCs 0.0 10e3/uL   CT Abdomen Pelvis w Contrast    Narrative    CT ABDOMEN PELVIS W CONTRAST 8/31/2023 2:39 PM    CLINICAL HISTORY: rlq pain and tenderness    TECHNIQUE: CT scan of the abdomen and pelvis was performed following  injection of IV contrast. Multiplanar reformats were obtained. Dose  reduction techniques were used.  CONTRAST: 100mL  Isovue-370    COMPARISON: None.    FINDINGS:   LOWER CHEST: Normal.    HEPATOBILIARY: Hepatic steatosis. Cholecystectomy.    PANCREAS: Normal.    SPLEEN: Small cyst within the spleen.    ADRENAL GLANDS: Normal.    KIDNEYS/BLADDER: 5 mm stone noted in the interpolar region of the  right kidney. Kidneys are otherwise unremarkable with no  hydronephrosis.     BOWEL: No obstruction or inflammatory change. Appendix normal.    PELVIC ORGANS: Bladder within normal limits.    ADDITIONAL FINDINGS: No adenopathy.    MUSCULOSKELETAL: Normal.      Impression    IMPRESSION:   1.  No acute findings in the abdomen and pelvis. Appendix is normal.  2.  5 mm nonobstructive stone within the right kidney.  3.  Hepatic steatosis.    TERRENCE ARITA MD         SYSTEM ID:  [id number]       Medications   HYDROmorphone (PF) (DILAUDID) injection 0.5 mg (0.5 mg Intravenous $Given 8/31/23 1423)   0.9% sodium chloride BOLUS (0 mLs Intravenous Stopped 8/31/23 1637)   iopamidol (ISOVUE-370) solution 100 mL (100 mLs Intravenous $Given 8/31/23 1429)   sodium chloride 0.9 % bag 500mL for CT scan flush use (55 mLs As instructed $Given 8/31/23 1429)       Assessments & Plan (with Medical Decision Making)  Patient presents with abdominal pain as detailed per HPI.  Usual differential considered including but not limited to peptic ulcer disease, bowel obstruction, biliary disease, intra-abdominal infection, ischemia, urinary tract infection, urinary tract stone, diverticulitis, appendicitis, bowel perforation versus other.  Work-up including CBC, CMP, lipase and urinalysis all within normal limits.  CT scan abdomen pelvis is negative for acute finding by my review of images as well as radiology read.  Etiology of abdominal pain remains undetermined.  She is safe for discharged home, she can follow-up with primary care for further evaluation, return criteria reviewed.     I have reviewed the nursing notes.    I have reviewed the findings, diagnosis, plan and  need for follow up with the patient.        Discharge Medication List as of 8/31/2023  4:39 PM          Final diagnoses:   Right lower quadrant abdominal pain       8/31/2023   [facility] EMERGENCY DEPT       Josh Cruz MD  08/31/23 0348

## 2023-08-31 NOTE — ED TRIAGE NOTES
Right sided abd pain x2 days, states cannot take it anymore.  OTC and prescribed medication (for back), not improving pain.  NO gallbladder, pt still has appendix.  Afebrile. Nausea and diarrhea yesterday.     Triage Assessment       Row Name 08/31/23 [id number]       Triage Assessment (Adult)    Airway WDL WDL       Respiratory WDL    Respiratory WDL WDL       Skin Circulation/Temperature WDL    Skin Circulation/Temperature WDL WDL       Cardiac WDL    Cardiac WDL WDL       Peripheral/Neurovascular WDL    Peripheral Neurovascular WDL WDL       Cognitive/Neuro/Behavioral WDL    Cognitive/Neuro/Behavioral WDL WDL

## 2023-09-01 ENCOUNTER — TELEPHONE (OUTPATIENT)
Dept: FAMILY MEDICINE | Facility: CLINIC | Age: 43
End: 2023-09-01
Payer: COMMERCIAL

## 2023-09-01 NOTE — TELEPHONE ENCOUNTER
"ED/Discharge Protocol    \"Hi, my name is Julie Behrendt, RN, a registered nurse, and I am calling on behalf of Bertha Loya NP's office at [facility].  I am calling to follow up and see how things are going for you after your recent visit.\"    \"I see that you were in the (ER) on 8/31/23.    How are you doing now that you are home?\" I am just drinking lots of water, trying to flush my system out. The pain is about the same, just want to pass this kidney stone.    Is patient experiencing symptoms that may require a hospital visit?  No    Discharge Instructions    \"Let's review your discharge instructions.  What is/are the follow-up recommendations?  Pt. Response: Push fluids, avoid NSAIDS use APAP and my oxycodone, rest and return if my symptoms worsen or do not improve.    \"Were you instructed to make a follow-up appointment?\"  Pt. Response: No.   Only PRN.    \"When you see the provider, I would recommend that you bring your discharge instructions with you.    Medications    \"How many new medications are you on since your hospitalization/ED visit?\"    0-1  \"How many of your current medicines changed (dose, timing, name, etc.) while you were in the hospital/ED visit?\"   0-1  \"Do you have questions about your medications?\"   No  \"Were you newly diagnosed with heart failure, COPD, diabetes or did you have a heart attack?\"   No  For patients on insulin: \"Did you start on insulin in the hospital or did you have your insulin dose changed?\"   No  Post Discharge Medication Reconciliation Status: medication reconcilation previously completed during another office visit.    Was MTM referral placed (*Make sure to put transitions as reason for referral)?   No    Call Summary    \"Do you have any questions or concerns about your condition or care plan at the moment?\"    No  Triage nurse advice given: How can you care for yourself at home?  Rest until you feel better.  To prevent dehydration, drink plenty of fluids. Choose water and " "other clear liquids until you feel better. If you  have kidney, heart, or liver disease and have to limit fluids, talk with your doctor before you increase the amount  of fluids you drink.  When you feel like eating, start with small amounts. Do not have alcohol, caffeine, or spicy, hot, or high-fat foods  for a day or two.  Avoid anti-inflammatory medicines such as aspirin, ibuprofen (Advil, Motrin), and naproxen (Aleve). These can  cause stomach upset. Talk to your doctor if you take daily aspirin for another health problem.  Watch for fever, chills and/or signs of UTI notify care team if they develop.    Patient was in ER x 2 in the past year (assess appropriateness of ER visits.)      \"If you have questions or things don't continue to improve, we encourage you contact us through the main clinic number,  [phone].  Even if the clinic is not open, triage nurses are available 24/7 to help you.     We would like you to know that our clinic has extended hours (provide information).  We also have urgent care (provide details on closest location and hours/contact info)\"      \"Thank you for your time and take care!\"    Julie Behrendt RN        "

## 2023-09-05 ENCOUNTER — VIRTUAL VISIT (OUTPATIENT)
Dept: FAMILY MEDICINE | Facility: CLINIC | Age: 43
End: 2023-09-05
Payer: COMMERCIAL

## 2023-09-05 ENCOUNTER — LAB (OUTPATIENT)
Dept: LAB | Facility: CLINIC | Age: 43
End: 2023-09-05
Attending: FAMILY MEDICINE
Payer: COMMERCIAL

## 2023-09-05 DIAGNOSIS — N20.0 NEPHROLITHIASIS: ICD-10-CM

## 2023-09-05 DIAGNOSIS — R10.31 RLQ ABDOMINAL PAIN: ICD-10-CM

## 2023-09-05 DIAGNOSIS — R10.31 RLQ ABDOMINAL PAIN: Primary | ICD-10-CM

## 2023-09-05 LAB
ALBUMIN UR-MCNC: NEGATIVE MG/DL
APPEARANCE UR: CLEAR
BASOPHILS # BLD AUTO: 0 10E3/UL (ref 0–0.2)
BASOPHILS NFR BLD AUTO: 0 %
BILIRUB UR QL STRIP: NEGATIVE
COLOR UR AUTO: YELLOW
EOSINOPHIL # BLD AUTO: 0.3 10E3/UL (ref 0–0.7)
EOSINOPHIL NFR BLD AUTO: 3 %
ERYTHROCYTE [DISTWIDTH] IN BLOOD BY AUTOMATED COUNT: 13 % (ref 10–15)
GLUCOSE UR STRIP-MCNC: NEGATIVE MG/DL
HCT VFR BLD AUTO: 38.5 % (ref 35–47)
HGB BLD-MCNC: 13 G/DL (ref 11.7–15.7)
HGB UR QL STRIP: ABNORMAL
IMM GRANULOCYTES # BLD: 0 10E3/UL
IMM GRANULOCYTES NFR BLD: 0 %
KETONES UR STRIP-MCNC: NEGATIVE MG/DL
LEUKOCYTE ESTERASE UR QL STRIP: NEGATIVE
LYMPHOCYTES # BLD AUTO: 3.5 10E3/UL (ref 0.8–5.3)
LYMPHOCYTES NFR BLD AUTO: 28 %
MCH RBC QN AUTO: 29.6 PG (ref 26.5–33)
MCHC RBC AUTO-ENTMCNC: 33.8 G/DL (ref 31.5–36.5)
MCV RBC AUTO: 88 FL (ref 78–100)
MONOCYTES # BLD AUTO: 0.8 10E3/UL (ref 0–1.3)
MONOCYTES NFR BLD AUTO: 7 %
MUCOUS THREADS #/AREA URNS LPF: PRESENT /LPF
NEUTROPHILS # BLD AUTO: 7.8 10E3/UL (ref 1.6–8.3)
NEUTROPHILS NFR BLD AUTO: 62 %
NITRATE UR QL: NEGATIVE
PH UR STRIP: 6 [PH] (ref 5–7)
PLATELET # BLD AUTO: 337 10E3/UL (ref 150–450)
RBC # BLD AUTO: 4.39 10E6/UL (ref 3.8–5.2)
RBC #/AREA URNS AUTO: ABNORMAL /HPF
SP GR UR STRIP: 1.02 (ref 1–1.03)
SQUAMOUS #/AREA URNS AUTO: ABNORMAL /LPF
UROBILINOGEN UR STRIP-ACNC: 0.2 E.U./DL
WBC # BLD AUTO: 12.6 10E3/UL (ref 4–11)
WBC #/AREA URNS AUTO: ABNORMAL /HPF

## 2023-09-05 PROCEDURE — 36415 COLL VENOUS BLD VENIPUNCTURE: CPT

## 2023-09-05 PROCEDURE — 81001 URINALYSIS AUTO W/SCOPE: CPT

## 2023-09-05 PROCEDURE — 85025 COMPLETE CBC W/AUTO DIFF WBC: CPT

## 2023-09-05 PROCEDURE — 99214 OFFICE O/P EST MOD 30 MIN: CPT | Mod: VID | Performed by: FAMILY MEDICINE

## 2023-09-05 NOTE — PROGRESS NOTES
Anne is a 42 year old who is being evaluated via a billable video visit.      How would you like to obtain your AVS? MyChart  If the video visit is dropped, the invitation should be resent by: Text to cell phone: [phone]  Will anyone else be joining your video visit? No          Assessment & Plan     RLQ abdominal pain  Unclear etiology based on history and appropriate evaluation in the ER 8/31/2023.  Repeat CBC And urinalysis.  Patient was advised inadequate eval using video visit since he still reports waxing and waning pain. Patient was advised to schedule in-clinic visit for more thorough evaluation.  Patient agreed to plan.  Return precautions discussed and given to patient.  Reasons to go to ER discussed in detail with patient.  Doubt RLQ pain is due to the non-obstructing stone in the right kidney on CT scan; has no other urinary symptoms to suspect multiple passing stones.  - CBC with Platelets & Differential  - Urine Macroscopic with reflex to Microscopic    Nephrolithiasis  Non-obstructing in right kidney.  Referred to urology for further management in case patient does have recurrent stones.  - Urine Macroscopic with reflex to Microscopic  - Adult Urology  Referral     below to document or use med rec list, use list to pull in response :[id number]}  Post Medication Reconciliation Status: discharge medications reconciled, continue medications without change      Patient Instructions   Referrals to urology has been signed. Schedulers will call you in the next 3-5 business days.     Schedule lab only appointment to repeat complete blood count and urinalysis    Schedule in-clinic visit with your primary clinic for a more thorough reassessment of your abdomen.  Go to urgent care if pain is rapidly worsening,    Franck Parks MD  [facility]    Justin Rodríguez is a 42 year old, presenting for the following health issues:  ER F/U      9/5/2023    10:50 AM   Additional  Questions   Roomed by Shameka PLASCENCIA   Accompanied by self         9/5/2023    10:50 AM   Patient Reported Additional Medications   Patient reports taking the following new medications none       HPI     ED/UC Followup:    Facility:  [facility]  Date of visit: 8/31/23  Reason for visit: right lower quadrant abdominal pain  Current Status: still having RLQ pain, needs to follow up on kidney stone     Patient states still waxing and waning RLQ pain but no fever, BM changes or urinary symptoms.    Not known to have urinary stones.    States she used to get cysts in the ovaries that caused her pain. This is not seen in CT scan.      Review of Systems   Constitutional, HEENT, cardiovascular, pulmonary, GI, , musculoskeletal, neuro, skin, endocrine and psych systems are negative, except as otherwise noted.      Objective           Vitals:  No vitals were obtained today due to virtual visit.    Physical Exam   GENERAL: alert and no distress  EYES: Eyes grossly normal to inspection.  No discharge or erythema, or obvious scleral/conjunctival abnormalities.  RESP: No audible wheeze, cough, or visible cyanosis.  No visible retractions or increased work of breathing.    SKIN: Visible skin clear. No significant rash, abnormal pigmentation or lesions.  NEURO: Cranial nerves grossly intact.  Mentation and speech appropriate for age.  PSYCH: Mentation appears normal, affect normal/bright, judgement and insight intact, normal speech and appearance well-groomed.    No results found for any visits on 09/05/23.            Video-Visit Details    Type of service:  Video Visit   Video Start Time:  11:09 AM  Video End Time:11:21 AM    Originating Location (pt. Location): Home    Distant Location (provider location):  Off-site  Platform used for Video Visit: Kids Movie

## 2023-09-05 NOTE — PATIENT INSTRUCTIONS
Referrals to urology has been signed. Schedulers will call you in the next 3-5 business days.     Schedule lab only appointment to repeat complete blood count and urinalysis    Schedule in-clinic visit with your primary clinic for a more thorough reassessment of your abdomen.  Go to urgent care if pain is rapidly worsening,

## 2023-09-09 ENCOUNTER — APPOINTMENT (OUTPATIENT)
Dept: CT IMAGING | Facility: CLINIC | Age: 43
End: 2023-09-09
Attending: EMERGENCY MEDICINE
Payer: COMMERCIAL

## 2023-09-09 ENCOUNTER — HOSPITAL ENCOUNTER (EMERGENCY)
Facility: CLINIC | Age: 43
Discharge: HOME | End: 2023-09-09
Attending: EMERGENCY MEDICINE | Admitting: EMERGENCY MEDICINE
Payer: COMMERCIAL

## 2023-09-09 VITALS
HEIGHT: 64 IN | OXYGEN SATURATION: 96 % | WEIGHT: 205 LBS | DIASTOLIC BLOOD PRESSURE: 65 MMHG | BODY MASS INDEX: 35 KG/M2 | TEMPERATURE: 98.3 F | RESPIRATION RATE: 22 BRPM | HEART RATE: 67 BPM | SYSTOLIC BLOOD PRESSURE: 113 MMHG

## 2023-09-09 DIAGNOSIS — R10.9 RIGHT SIDED ABDOMINAL PAIN: ICD-10-CM

## 2023-09-09 DIAGNOSIS — Z87.898 HISTORY OF CHRONIC PAIN: ICD-10-CM

## 2023-09-09 LAB
ALBUMIN SERPL BCG-MCNC: 4.3 G/DL (ref 3.5–5.2)
ALBUMIN UR-MCNC: NEGATIVE MG/DL
ALP SERPL-CCNC: 110 U/L (ref 35–104)
ALT SERPL W P-5'-P-CCNC: 25 U/L (ref 0–50)
ANION GAP SERPL CALCULATED.3IONS-SCNC: 12 MMOL/L (ref 7–15)
APPEARANCE UR: CLEAR
AST SERPL W P-5'-P-CCNC: 25 U/L (ref 0–45)
BASOPHILS # BLD AUTO: 0 10E3/UL (ref 0–0.2)
BASOPHILS NFR BLD AUTO: 1 %
BILIRUB SERPL-MCNC: 0.3 MG/DL
BILIRUB UR QL STRIP: NEGATIVE
BUN SERPL-MCNC: 6.9 MG/DL (ref 6–20)
CALCIUM SERPL-MCNC: 9.4 MG/DL (ref 8.6–10)
CHLORIDE SERPL-SCNC: 100 MMOL/L (ref 98–107)
COLOR UR AUTO: ABNORMAL
CREAT SERPL-MCNC: 0.6 MG/DL (ref 0.51–0.95)
CRP SERPL-MCNC: 12.26 MG/L
DEPRECATED HCO3 PLAS-SCNC: 24 MMOL/L (ref 22–29)
EGFRCR SERPLBLD CKD-EPI 2021: >90 ML/MIN/1.73M2
EOSINOPHIL # BLD AUTO: 0.2 10E3/UL (ref 0–0.7)
EOSINOPHIL NFR BLD AUTO: 3 %
ERYTHROCYTE [DISTWIDTH] IN BLOOD BY AUTOMATED COUNT: 13.2 % (ref 10–15)
GLUCOSE SERPL-MCNC: 157 MG/DL (ref 70–99)
GLUCOSE UR STRIP-MCNC: NEGATIVE MG/DL
HCT VFR BLD AUTO: 40.3 % (ref 35–47)
HGB BLD-MCNC: 13.3 G/DL (ref 11.7–15.7)
HGB UR QL STRIP: NEGATIVE
IMM GRANULOCYTES # BLD: 0 10E3/UL
IMM GRANULOCYTES NFR BLD: 1 %
KETONES UR STRIP-MCNC: NEGATIVE MG/DL
LEUKOCYTE ESTERASE UR QL STRIP: NEGATIVE
LYMPHOCYTES # BLD AUTO: 2.2 10E3/UL (ref 0.8–5.3)
LYMPHOCYTES NFR BLD AUTO: 25 %
MCH RBC QN AUTO: 29.2 PG (ref 26.5–33)
MCHC RBC AUTO-ENTMCNC: 33 G/DL (ref 31.5–36.5)
MCV RBC AUTO: 88 FL (ref 78–100)
MONOCYTES # BLD AUTO: 0.5 10E3/UL (ref 0–1.3)
MONOCYTES NFR BLD AUTO: 5 %
NEUTROPHILS # BLD AUTO: 5.9 10E3/UL (ref 1.6–8.3)
NEUTROPHILS NFR BLD AUTO: 65 %
NITRATE UR QL: NEGATIVE
NRBC # BLD AUTO: 0 10E3/UL
NRBC BLD AUTO-RTO: 0 /100
PH UR STRIP: 6 [PH] (ref 5–7)
PLATELET # BLD AUTO: 328 10E3/UL (ref 150–450)
POTASSIUM SERPL-SCNC: 4.1 MMOL/L (ref 3.4–5.3)
PROT SERPL-MCNC: 7.4 G/DL (ref 6.4–8.3)
RBC # BLD AUTO: 4.56 10E6/UL (ref 3.8–5.2)
RBC URINE: 1 /HPF
SODIUM SERPL-SCNC: 136 MMOL/L (ref 136–145)
SP GR UR STRIP: 1 (ref 1–1.03)
SQUAMOUS EPITHELIAL: 2 /HPF
UROBILINOGEN UR STRIP-MCNC: NORMAL MG/DL
WBC # BLD AUTO: 8.8 10E3/UL (ref 4–11)
WBC URINE: 1 /HPF

## 2023-09-09 PROCEDURE — 81001 URINALYSIS AUTO W/SCOPE: CPT | Performed by: EMERGENCY MEDICINE

## 2023-09-09 PROCEDURE — 99285 EMERGENCY DEPT VISIT HI MDM: CPT | Performed by: EMERGENCY MEDICINE

## 2023-09-09 PROCEDURE — 86140 C-REACTIVE PROTEIN: CPT | Performed by: EMERGENCY MEDICINE

## 2023-09-09 PROCEDURE — 99285 EMERGENCY DEPT VISIT HI MDM: CPT | Mod: 25

## 2023-09-09 PROCEDURE — 250N000011 HC RX IP 250 OP 636: Performed by: EMERGENCY MEDICINE

## 2023-09-09 PROCEDURE — 96375 TX/PRO/DX INJ NEW DRUG ADDON: CPT

## 2023-09-09 PROCEDURE — 250N000011 HC RX IP 250 OP 636: Mod: JZ | Performed by: EMERGENCY MEDICINE

## 2023-09-09 PROCEDURE — 74177 CT ABD & PELVIS W/CONTRAST: CPT

## 2023-09-09 PROCEDURE — 96374 THER/PROPH/DIAG INJ IV PUSH: CPT | Mod: 59

## 2023-09-09 PROCEDURE — 85025 COMPLETE CBC W/AUTO DIFF WBC: CPT | Performed by: EMERGENCY MEDICINE

## 2023-09-09 PROCEDURE — 96376 TX/PRO/DX INJ SAME DRUG ADON: CPT

## 2023-09-09 PROCEDURE — 36415 COLL VENOUS BLD VENIPUNCTURE: CPT | Performed by: EMERGENCY MEDICINE

## 2023-09-09 PROCEDURE — 258N000003 HC RX IP 258 OP 636: Performed by: EMERGENCY MEDICINE

## 2023-09-09 PROCEDURE — 96361 HYDRATE IV INFUSION ADD-ON: CPT

## 2023-09-09 PROCEDURE — 250N000009 HC RX 250: Performed by: EMERGENCY MEDICINE

## 2023-09-09 PROCEDURE — 80053 COMPREHEN METABOLIC PANEL: CPT | Performed by: EMERGENCY MEDICINE

## 2023-09-09 RX ORDER — IOPAMIDOL 755 MG/ML
100 INJECTION, SOLUTION INTRAVASCULAR ONCE
Status: COMPLETED | OUTPATIENT
Start: 2023-09-09 | End: 2023-09-09

## 2023-09-09 RX ORDER — HYDROMORPHONE HYDROCHLORIDE 1 MG/ML
0.5 INJECTION, SOLUTION INTRAMUSCULAR; INTRAVENOUS; SUBCUTANEOUS EVERY 30 MIN PRN
Status: DISCONTINUED | OUTPATIENT
Start: 2023-09-09 | End: 2023-09-09 | Stop reason: HOSPADM

## 2023-09-09 RX ORDER — OXYCODONE HYDROCHLORIDE 5 MG/1
5 TABLET ORAL EVERY 8 HOURS PRN
Qty: 6 TABLET | Refills: 0 | Status: SHIPPED | OUTPATIENT
Start: 2023-09-09 | End: 2023-09-12

## 2023-09-09 RX ORDER — ONDANSETRON 2 MG/ML
4 INJECTION INTRAMUSCULAR; INTRAVENOUS
Status: COMPLETED | OUTPATIENT
Start: 2023-09-09 | End: 2023-09-09

## 2023-09-09 RX ORDER — SODIUM CHLORIDE 9 MG/ML
1000 INJECTION, SOLUTION INTRAVENOUS CONTINUOUS
Status: DISCONTINUED | OUTPATIENT
Start: 2023-09-09 | End: 2023-09-09 | Stop reason: HOSPADM

## 2023-09-09 RX ADMIN — HYDROMORPHONE HYDROCHLORIDE 0.5 MG: 1 INJECTION, SOLUTION INTRAMUSCULAR; INTRAVENOUS; SUBCUTANEOUS at 14:15

## 2023-09-09 RX ADMIN — ONDANSETRON 4 MG: 2 INJECTION INTRAMUSCULAR; INTRAVENOUS at 10:03

## 2023-09-09 RX ADMIN — SODIUM CHLORIDE 500 ML: 9 INJECTION, SOLUTION INTRAVENOUS at 09:57

## 2023-09-09 RX ADMIN — HYDROMORPHONE HYDROCHLORIDE 0.5 MG: 1 INJECTION, SOLUTION INTRAMUSCULAR; INTRAVENOUS; SUBCUTANEOUS at 10:06

## 2023-09-09 RX ADMIN — SODIUM CHLORIDE 65 ML: 9 INJECTION, SOLUTION INTRAVENOUS at 11:28

## 2023-09-09 RX ADMIN — HYDROMORPHONE HYDROCHLORIDE 0.5 MG: 1 INJECTION, SOLUTION INTRAMUSCULAR; INTRAVENOUS; SUBCUTANEOUS at 11:19

## 2023-09-09 RX ADMIN — IOPAMIDOL 100 ML: 755 INJECTION, SOLUTION INTRAVENOUS at 11:28

## 2023-09-09 ASSESSMENT — ENCOUNTER SYMPTOMS
RESPIRATORY NEGATIVE: 1
NEUROLOGICAL NEGATIVE: 1
MUSCULOSKELETAL NEGATIVE: 1
ALLERGIC/IMMUNOLOGIC NEGATIVE: 1
EYES NEGATIVE: 1
HEMATOLOGIC/LYMPHATIC NEGATIVE: 1
PSYCHIATRIC NEGATIVE: 1
ABDOMINAL PAIN: 1
CARDIOVASCULAR NEGATIVE: 1
ENDOCRINE NEGATIVE: 1

## 2023-09-09 ASSESSMENT — ACTIVITIES OF DAILY LIVING (ADL)
ADLS_ACUITY_SCORE: 35
ADLS_ACUITY_SCORE: 35

## 2023-09-09 NOTE — ED PROVIDER NOTES
History     Chief Complaint   Patient presents with    Abdominal Pain     Pt reports she was here last Thursday with right sided abdominal pain. Pt reports pain worsened yesterday, difficulty sleeping. Pt reports pain 9/10, pt reports pain to lower umbilicus that radiates to right lower quadrants and wraps around to right flank      HPI  Anne Ferreira is a 42 year old female who presents with report of abdominal pain over the right side.    Medical records show she was evaluated on 8/31/2023 for right lower quadrant abdominal pain.  History of chronic opioid use for neck pain, and prior history of abdominal surgery including cholecystectomy and hysterectomy.  Her evaluation revealed on imaging that she had a 5 mm nonobstructing stone in the right kidney and hepatic steatosis.  But no other acute findings with    Patient's prior diagnosis include COPD, history of spinal fusion, GERD, dyslipidemia,    On examination on arrival patient reports since her evaluation 10 days prior she has had persistent right upper quadrant and right-sided abdominal pain.  She had a virtual visit for follow-up visit 4 days earlier and had repeat blood work.  She reports that her pain is a 6-8 out of 10.  It radiates to the right flank.  She reports no personal history of kidney stone but is aware that she had a nonobstructing stone during her evaluation 10 days ago.  No abnormal spotting or bleeding.  And no vaginal discharge.  No rash or lesions.  Due to persistent discomfort she presents for further care.    Allergies:  Allergies   Allergen Reactions    Bupropion Nausea    Sulfa Antibiotics        Problem List:    Patient Active Problem List    Diagnosis Date Noted    F11.2 - Continuous opioid dependence (H) 05/18/2023     Priority: Medium    S/P cervical spinal fusion 03/20/2023     Priority: Medium     Added automatically from request for surgery [id number]      COPD (chronic obstructive pulmonary disease) (H) 07/27/2021     Priority:  Medium    Moderate episode of recurrent major depressive disorder (H) 05/21/2021     Priority: Medium    Symptomatic cholelithiasis 12/15/2020     Priority: Medium     Added automatically from request for surgery [id number]      Morbid obesity (H) 12/11/2020     Priority: Medium    Dyslipidemia 11/10/2020     Priority: Medium    Dysmenorrhea 11/10/2020     Priority: Medium     treated with continuous OCPs      GERD (gastroesophageal reflux disease) 11/10/2020     Priority: Medium    Thoracic back pain 11/10/2020     Priority: Medium     left, approx T10      H/O LEEP      Priority: Medium     1998 LEEP- Unknown results.  2010 NIL pap.  2/2/12 COLP and ECC- DARREN 1.  2013 NIL pap  4/7/15 LSIL pap, + HR HPV   8/13/15 COLP- DARREN 1, ECC- Negative.  6/10/16 NIL pap, Neg HPV.  5/14/19 NIL pap, Neg HPV.  Above results found in CE  10/15/20 NIL pap, Neg HPV. Plan cotest in 3 years.       Cervical spinal stenosis 07/01/2019     Priority: Medium     7/2019 MRI - At C5-6, broad-based central disc extrusion demonstrating caudal migration mildly deforms the cord and severely narrows the spinal canal. Uncovertebral joint spurring contributes to mild to moderate left neural foraminal narrowing with potential left C6 nerve root encroachment.      Herniation of cervical intervertebral disc with radiculopathy 07/01/2019     Priority: Medium     see MRI 7/2019      Attention deficit hyperactivity disorder, combined type, mild 08/29/2018     Priority: Medium    Generalized anxiety disorder 08/29/2018     Priority: Medium    Allergic rhinitis 04/01/2008     Priority: Medium        Past Medical History:    Past Medical History:   Diagnosis Date    Abnormal Pap smear of cervix        Past Surgical History:    Past Surgical History:   Procedure Laterality Date    CYSTOSCOPY N/A 4/25/2022    Procedure: CYSTOSCOPY;  Surgeon: Julius Montejo MD;  Location: [facility] OR    INJECT EPIDURAL CERVICAL  3/30/2023    Procedure: INJECTION, SPINE, CERVICAL,  EPIDURAL;  Surgeon: Naresh Zuniga MD;  Location: [facility] OR    LAPAROSCOPIC CHOLECYSTECTOMY N/A 12/21/2020    Procedure: Laparoscopic cholecystectomy;  Surgeon: Manuel Durham DO;  Location: [facility] OR    LAPAROSCOPIC HYSTERECTOMY TOTAL, BILATERAL SALPINGO-OOPHORECTOMY, COMBINED Bilateral 4/25/2022    Procedure: Laparoscopic total hysterectomy, Bilateral salpingectomy, with sparing of ovaries;  Surgeon: Julius Montejo MD;  Location: [facility] OR       Family History:    Family History   Problem Relation Age of Onset    Hypertension Father     Hypertension Brother     Hypertension Sister     Hypertension Brother        Social History:  Marital Status:   [4]  Social History     Tobacco Use    Smoking status: Every Day     Packs/day: 0.50     Types: Cigarettes    Smokeless tobacco: Never   Vaping Use    Vaping Use: Never used   Substance Use Topics    Alcohol use: Yes     Comment: occassionally    Drug use: Never        Medications:    oxyCODONE (ROXICODONE) 5 MG tablet  acetaminophen (TYLENOL) 325 MG tablet  albuterol (PROAIR HFA/PROVENTIL HFA/VENTOLIN HFA) 108 (90 Base) MCG/ACT inhaler  amLODIPine (NORVASC) 2.5 MG tablet  cyclobenzaprine (FLEXERIL) 10 MG tablet  dexmethylphenidate (FOCALIN) 10 MG tablet  [START ON 9/13/2023] dexmethylphenidate (FOCALIN) 10 MG tablet  dexmethylphenidate (FOCALIN) 10 MG tablet  diphenhydrAMINE (BENADRYL) 25 MG tablet  escitalopram (LEXAPRO) 20 MG tablet  hydrOXYzine (ATARAX) 25 MG tablet  ibuprofen (ADVIL/MOTRIN) 200 MG tablet  ipratropium - albuterol 0.5 mg/2.5 mg/3 mL (DUONEB) 0.5-2.5 (3) MG/3ML neb solution  lisinopril (ZESTRIL) 40 MG tablet  montelukast (SINGULAIR) 10 MG tablet  omeprazole (PRILOSEC) 20 MG DR capsule  oxyCODONE (ROXICODONE) 5 MG tablet  propranolol (INDERAL) 80 MG tablet  triamcinolone (KENALOG) 0.1 % external cream          Review of Systems   HENT: Negative.     Eyes: Negative.    Respiratory: Negative.     Cardiovascular: Negative.   "  Gastrointestinal:  Positive for abdominal pain.   Endocrine: Negative.    Genitourinary: Negative.    Musculoskeletal: Negative.    Skin: Negative.    Allergic/Immunologic: Negative.    Neurological: Negative.    Hematological: Negative.    Psychiatric/Behavioral: Negative.     All other systems reviewed and are negative.      Physical Exam   BP: (!) 140/74  Pulse: 80  Temp: 98.3  F (36.8  C)  Resp: 22  Height: 162.6 cm (5' 4\")  Weight: 93 kg (205 lb)  SpO2: 98 %      Physical Exam  Constitutional:       General: She is not in acute distress.     Appearance: She is well-developed. She is not ill-appearing, toxic-appearing or diaphoretic.   HENT:      Head: Normocephalic and atraumatic.      Mouth/Throat:      Mouth: Mucous membranes are moist.   Cardiovascular:      Rate and Rhythm: Normal rate and regular rhythm.   Pulmonary:      Effort: Pulmonary effort is normal. No respiratory distress.      Breath sounds: Normal breath sounds. No stridor. No wheezing, rhonchi or rales.   Chest:      Chest wall: No tenderness.   Abdominal:      General: Abdomen is protuberant. Bowel sounds are normal. There is no distension.      Palpations: Abdomen is soft.      Tenderness: There is abdominal tenderness in the right upper quadrant.       Skin:     Capillary Refill: Capillary refill takes less than 2 seconds.      Coloration: Skin is not cyanotic, jaundiced, mottled or pale.      Findings: No erythema or rash.   Neurological:      General: No focal deficit present.      Mental Status: She is alert and oriented to person, place, and time.      Cranial Nerves: No cranial nerve deficit.      Motor: No weakness.   Psychiatric:         Mood and Affect: Mood normal. Mood is not anxious or depressed.         Behavior: Behavior normal.         ED Course                 Procedures              Critical Care time:  none               ED medications:  Medications   sodium chloride 0.9% infusion (has no administration in time range) " "  HYDROmorphone (PF) (DILAUDID) injection 0.5 mg (0.5 mg Intravenous $Given 9/9/23 1119)   0.9% sodium chloride BOLUS (0 mLs Intravenous Stopped 9/9/23 1103)   ondansetron (ZOFRAN) injection 4 mg (4 mg Intravenous $Given 9/9/23 1003)   iopamidol (ISOVUE-370) solution 100 mL (100 mLs Intravenous $Given 9/9/23 1128)   sodium chloride 0.9 % bag 500mL for CT scan flush use (65 mLs As instructed $Given 9/9/23 1128)       ED Vitals:  Vitals:    09/09/23 0922 09/09/23 1013 09/09/23 1033   BP: (!) 140/74 127/56 113/65   Pulse: 80  67   Resp: 22     Temp: 98.3  F (36.8  C)     TempSrc: Tympanic     SpO2: 98% 98% 96%   Weight: 93 kg (205 lb)     Height: 1.626 m (5' 4\")        ED labs and imaging:  Results for orders placed or performed during the hospital encounter of 09/09/23   CT Abdomen Pelvis w Contrast     Status: None    Narrative    EXAM: CT ABDOMEN PELVIS W CONTRAST  LOCATION: [facility]  DATE: 9/9/2023    INDICATION: Assistant right sided abdominal pain.  History of cholecystectomy.  Recent imaging with hepatic steatosis and nonobstructing right sided stone. (8 31 23). Evaluate for interval change.  COMPARISON: CT abdomen pelvis 08/31/2023.  TECHNIQUE: CT scan of the abdomen and pelvis was performed following injection of IV contrast. Multiplanar reformats were obtained. Dose reduction techniques were used.  CONTRAST: 100mL Isovue 370    FINDINGS:   LOWER CHEST: Unremarkable.    HEPATOBILIARY: Hepatic steatosis. No suspicious hepatic mass. Status post cholecystectomy.    PANCREAS: Normal.    SPLEEN: Similar superior pole subcentimeter hypodensity, likely a benign hemangiomatous lesion.    ADRENAL GLANDS: Normal.    KIDNEYS/BLADDER: Nonobstructing right renal calculi. Similar superior pole subcentimeter hypodensity too small to characterize, likely benign. No collecting system dilatation. Urinary bladder is unremarkable.    BOWEL: No bowel dilatation or bowel wall thickening. Appendix is " normal.    LYMPH NODES: Normal.    VASCULATURE: Unremarkable.    PELVIC ORGANS: Uterus is surgically absent. No adnexal mass.    MUSCULOSKELETAL: Mild degenerative changes of the spine.      Impression    IMPRESSION:   1.  Since 08/31/2023, no significant interval change or acute findings within the abdomen or pelvis.  2.  Similar hepatic steatosis and nonobstructing right renal calculi.   Comprehensive metabolic panel     Status: Abnormal   Result Value Ref Range    Sodium 136 136 - 145 mmol/L    Potassium 4.1 3.4 - 5.3 mmol/L    Chloride 100 98 - 107 mmol/L    Carbon Dioxide (CO2) 24 22 - 29 mmol/L    Anion Gap 12 7 - 15 mmol/L    Urea Nitrogen 6.9 6.0 - 20.0 mg/dL    Creatinine 0.60 0.51 - 0.95 mg/dL    Calcium 9.4 8.6 - 10.0 mg/dL    Glucose 157 (H) 70 - 99 mg/dL    Alkaline Phosphatase 110 (H) 35 - 104 U/L    AST 25 0 - 45 U/L    ALT 25 0 - 50 U/L    Protein Total 7.4 6.4 - 8.3 g/dL    Albumin 4.3 3.5 - 5.2 g/dL    Bilirubin Total 0.3 <=1.2 mg/dL    GFR Estimate >90 >60 mL/min/1.73m2   CRP Inflammation     Status: Abnormal   Result Value Ref Range    CRP Inflammation 12.26 (H) <5.00 mg/L   UA with Microscopic reflex to Culture     Status: Abnormal    Specimen: Urine, Midstream   Result Value Ref Range    Color Urine Straw Colorless, Straw, Light Yellow, Yellow    Appearance Urine Clear Clear    Glucose Urine Negative Negative mg/dL    Bilirubin Urine Negative Negative    Ketones Urine Negative Negative mg/dL    Specific Gravity Urine 1.003 1.003 - 1.035    Blood Urine Negative Negative    pH Urine 6.0 5.0 - 7.0    Protein Albumin Urine Negative Negative mg/dL    Urobilinogen Urine Normal Normal, 2.0 mg/dL    Nitrite Urine Negative Negative    Leukocyte Esterase Urine Negative Negative    RBC Urine 1 <=2 /HPF    WBC Urine 1 <=5 /HPF    Squamous Epithelials Urine 2 (H) <=1 /HPF    Narrative    Urine Culture not indicated   CBC with platelets and differential     Status: None   Result Value Ref Range    WBC Count  8.8 4.0 - 11.0 10e3/uL    RBC Count 4.56 3.80 - 5.20 10e6/uL    Hemoglobin 13.3 11.7 - 15.7 g/dL    Hematocrit 40.3 35.0 - 47.0 %    MCV 88 78 - 100 fL    MCH 29.2 26.5 - 33.0 pg    MCHC 33.0 31.5 - 36.5 g/dL    RDW 13.2 10.0 - 15.0 %    Platelet Count 328 150 - 450 10e3/uL    % Neutrophils 65 %    % Lymphocytes 25 %    % Monocytes 5 %    % Eosinophils 3 %    % Basophils 1 %    % Immature Granulocytes 1 %    NRBCs per 100 WBC 0 <1 /100    Absolute Neutrophils 5.9 1.6 - 8.3 10e3/uL    Absolute Lymphocytes 2.2 0.8 - 5.3 10e3/uL    Absolute Monocytes 0.5 0.0 - 1.3 10e3/uL    Absolute Eosinophils 0.2 0.0 - 0.7 10e3/uL    Absolute Basophils 0.0 0.0 - 0.2 10e3/uL    Absolute Immature Granulocytes 0.0 <=0.4 10e3/uL    Absolute NRBCs 0.0 10e3/uL   CBC with platelets differential     Status: None    Narrative    The following orders were created for panel order CBC with platelets differential.  Procedure                               Abnormality         Status                     ---------                               -----------         ------                     CBC with platelets and d...[[id number]]                      Final result                 Please view results for these tests on the individual orders.      Assessments & Plan (with Medical Decision Making)   Assessment Summary and Clinical Impression: 42-year-old female who presented with right sided abdominal pain.  Patient was evaluated 10 days earlier with right lower quadrant abdominal pain and work-up revealed no emergent findings.  She was noted to have a nonobstructing stone in the right kidney and hepatic steatosis.  She has a history of chronic back pain on oxycodone and arrived reporting persistent discomfort since her last assessment.  She arrived hemodynamically normal and afebrile.  Appeared uncomfortable but in no acute distress she rated her discomfort a 6-8 out of 10.  Worsening discomfort since her ED visit 10 days earlier in her virtual visit  follow-up 4 days earlier.  We discussed probable causes of her pain given imaging completed during her initial assessment 10 days ago.  We agreed to repeat work-up and discussed options for imaging.  Repeat imaging today revealed no interval change from recent evaluation of the last 10 days.  After period of care discussion patient was comfortable going home understanding the cause of her pain reported is not clear with plan for outpatient follow-up if persistent symptoms.    ED course and plan:  Reviewed the medical record.  Visit on 8/31/2023.  Imaging on 8/31/2023.  See details in the medical record.  Reviewed options for work-up given her recent assessments and her concern about persistent symptoms.  She was offered medications to manage her pain based on symptoms reported.  Reviewed lab studies from 8/31/2023 and 9/5/2023.  Reviewed prior urinlaysis.    Work-up revealed a normal white count today. CRP-12.  Urinalysis today was negative for infection or microscopic hematuria. After reviewing options for imaging patient  elected to proceed with repeat imaging given recurrence of pain despite medications given to manage her discomfort although we discussed suspicion that symptoms could be related to findings on imaging 10 days prior. Abdominal imaging with contrast today revealed no interval change from comparison 10 days prior. See details in radiology report.   After period of care we discussed that the cause of her pain is not clear and that her work-up was reassuring.  There is no clear indication for further imaging such as MRCP or endoscopy emergently. Patient expressed comfort going home.  She requested oxycodone refill because she had been using more of her oxycodone by reports due to her pain and that her neck scheduled monthly refills not on 9/15/23.  I advised that additional medication needs need to be provided by her prescribing provider.  She was discharged with right-sided abdominal pain of unclear  cause      Disclaimer: This note consists of symbols derived from keyboarding, dictation and/or voice recognition software. As a result, there may be errors in the script that have gone undetected. Please consider this when interpreting information found in this chart.   I have reviewed the nursing notes.    I have reviewed the findings, diagnosis, plan and need for follow up with the patient.         Medical Decision Making  The patient's presentation was of moderate complexity (an acute illness with systemic symptoms).    The patient's evaluation involved:  review of 2 test result(s) ordered prior to this encounter (diagnostic imaging and labs)    The patient's management necessitated moderate risk (prescription drug management including medications given in the ED).        New Prescriptions    OXYCODONE (ROXICODONE) 5 MG TABLET    Take 1 tablet (5 mg) by mouth every 8 hours as needed for severe pain       Final diagnoses:   Right sided abdominal pain - persistent since 8/31/23   History of chronic pain       9/9/2023   [facility] EMERGENCY DEPT       Cesario Jimenez MD  09/09/23 9037

## 2023-09-09 NOTE — ED TRIAGE NOTES
Pt reports she was here last Thursday with right sided abdominal pain. Pt reports pain worsened yesterday, difficulty sleeping. Pt reports pain 9/10, pt reports pain to lower umbilicus that radiates to right lower quadrants and wraps around to right flank

## 2023-09-09 NOTE — DISCHARGE INSTRUCTIONS
1) Your evaluation did not reveal an emergency diagnosis or the cause of your persistent pain over the last 10 days as reported. Imaging did not reveal any new findings since your evaluation 10 days ago. Your blood work appears much improved since your last 2 visits in the last 10 days.  We have reviewed options for follow-up care including additional work-up that could be considered if symptoms persist.    2) You are provided 6 tablets of oxycodone as requested pending your scheduled medication refill on 9/15/2023.  Additional medication management and refills should be obtained from your prescribing provider- (WOODROW Loya).    3) You appear stable for discharge to home at this time however if if you develop new concerns you should return to be reevaluated

## 2023-09-12 ENCOUNTER — TELEPHONE (OUTPATIENT)
Dept: FAMILY MEDICINE | Facility: CLINIC | Age: 43
End: 2023-09-12
Payer: COMMERCIAL

## 2023-09-12 DIAGNOSIS — G89.4 CHRONIC PAIN SYNDROME: ICD-10-CM

## 2023-09-12 DIAGNOSIS — J44.1 CHRONIC OBSTRUCTIVE PULMONARY DISEASE WITH ACUTE EXACERBATION (H): ICD-10-CM

## 2023-09-13 RX ORDER — OXYCODONE HYDROCHLORIDE 5 MG/1
TABLET ORAL
Qty: 90 TABLET | Refills: 0 | OUTPATIENT
Start: 2023-09-13

## 2023-09-13 RX ORDER — OXYCODONE HYDROCHLORIDE 5 MG/1
TABLET ORAL
Qty: 90 TABLET | Refills: 0 | OUTPATIENT
Start: 2023-09-13 | End: 2024-08-08

## 2023-09-13 NOTE — TELEPHONE ENCOUNTER
Patient calls to check status of her oxycodone refill. She states she received a notification from the pharmacy a little while ago that it was declined. RN doesn't see this on our end, and PCP is out today, so doesn't appear to have been addressed at all.   Last regular Rx was 8/15/23 for a month supply. Then patient seen in ER on 23 for acute pain and was given a 6 tab supply by them, which is now .    Patient is hoping to get her next allotment sent in.  Rx pended for PCP's review. Patient notified that PCP is out today and will likely address tomorrow.     Radha Corrales RN  [facility]

## 2023-09-14 ENCOUNTER — VIRTUAL VISIT (OUTPATIENT)
Dept: UROLOGY | Facility: CLINIC | Age: 43
End: 2023-09-14
Attending: FAMILY MEDICINE
Payer: COMMERCIAL

## 2023-09-14 DIAGNOSIS — N20.0 NEPHROLITHIASIS: Primary | ICD-10-CM

## 2023-09-14 DIAGNOSIS — J44.1 CHRONIC OBSTRUCTIVE PULMONARY DISEASE WITH ACUTE EXACERBATION (H): ICD-10-CM

## 2023-09-14 DIAGNOSIS — N39.46 MIXED STRESS AND URGE URINARY INCONTINENCE: ICD-10-CM

## 2023-09-14 DIAGNOSIS — G89.4 CHRONIC PAIN SYNDROME: ICD-10-CM

## 2023-09-14 PROCEDURE — 99203 OFFICE O/P NEW LOW 30 MIN: CPT | Mod: VID | Performed by: NURSE PRACTITIONER

## 2023-09-14 RX ORDER — OXYCODONE HYDROCHLORIDE 5 MG/1
TABLET ORAL
Qty: 90 TABLET | Refills: 0 | Status: SHIPPED | OUTPATIENT
Start: 2023-09-14 | End: 2023-10-12

## 2023-09-14 NOTE — NURSING NOTE
Is the patient currently in the state of [place]? YES    Visit mode:VIDEO    If the visit is dropped, the patient can be reconnected by: TELEPHONE VISIT: Phone number:   Telephone Information:   Mobile [phone]       Will anyone else be joining the visit? NO  (If patient encounters technical issues they should call [phone]    How would you like to obtain your AVS? MyChart    Are changes needed to the allergy or medication list? Pt stated no med changes    Reason for visit: Video Visit (Nephrolithiasis )    Alisson VIEYRA

## 2023-09-14 NOTE — PROGRESS NOTES
"Virtual Visit Details    Type of service:  Video Visit   Video Start Time: {video visit start/end time for provider to select:533525}  Video End Time:{video visit start/end time for provider to select:495506}    Originating Location (pt. Location): {video visit patient location:615268::\"Home\"}  {PROVIDER LOCATION On-site should be selected for visits conducted from your clinic location or adjoining [facility] hospital, academic office, or other nearby [facility] building. Off-site should be selected for all other provider locations, including home:904955}  Distant Location (provider location):  {virtual location provider:924476}  Platform used for Video Visit: {Virtual Visit Platforms:544635::\"CrowdTogether\"}    "

## 2023-09-14 NOTE — PROGRESS NOTES
Urology Video Office Visit    Video-Visit Details    Type of service:  Video Visit    Video Start Time: 1230    Video End Time: 1247    Originating Location (pt. Location): Home    Distant Location (provider location):  Off-site     Platform used for Video Visit: Omnisens           Assessment and Plan:     Assessment:  42 year old female with nonobstructing right renal stone and ELLA/UUI    Plan:  -Reviewed CT scan with patient. Noted stable right nonobstructing renal stone.   -We discussed the nature of nonobstructing renal stones and pain.  We reviewed the fact that widely held opinion in the field of urology is that nonobstructing stones should not cause pain; however, several publications have demonstrated improvement with stone removal in such circumstances.  We discussed that stone removal for pain in the absence of obstruction is not a guarantee to improve pain and that the potential for pain improvement is likely around 50%.   -We discussed treatment options including observation vs. ureteroscopy and laser lithotripsy. I counseled the patient regarding the potential need for a ureteral stent after treatment and the necessity of removing the stent after surgery. I also discussed the possibility of additional procedures to render the patient stone free. Pt would like to observe stone at this time.   -Discussed behavior modifications for ELLA/UUI. Discussed option of pelvic floor physical therapy and medication. Pt would like to start with PFPT at this time.   -Discussed option to follow up in 1 year. Pt would like to follow up PRN regarding nephrolithiasis and ELLA/UUI.      Savanah Son CNP  Department of Urology  September 14, 2023    I spent a total of 30 minutes spent on the date of the encounter doing chart review, history and exam, documentation, and further activities as noted above.          Chief Complaint:   Nephrolithiasis         History of Present Illness:    Anne Ferreira is a pleasant 42 year  old female who presents with concerns of a right renal stone. PMHx: COPD, GERD, cervical spinal stenosis, depression, and anxiety.     Ms. Ferreira notes about a week ago she started to have right sided abdominal pain. CT scan 09/09/2023 revealed a stable elongated 5mm x 1mm nonobstructing right upper pole stone with right lower pole punctate stone. No noted right hydronephrosis. No noted left hydronephrosis or nephrolithiasis.     Denies any fevers, chills, nausea, vomiting, hematuria, or dysuria.     She is also concerned about urinary frequency and urinary leakage. Notes urinary leakage upon laughing, coughing, sneezing, and with urgency. 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 She notes she will often void more in the evening/night.          Past Medical History:     Past Medical History:   Diagnosis Date    Abnormal Pap smear of cervix             Past Surgical History:     Past Surgical History:   Procedure Laterality Date    CYSTOSCOPY N/A 4/25/2022    Procedure: CYSTOSCOPY;  Surgeon: Julius Montejo MD;  Location: [facility] OR    INJECT EPIDURAL CERVICAL  3/30/2023    Procedure: INJECTION, SPINE, CERVICAL, EPIDURAL;  Surgeon: Naresh Zuniga MD;  Location: [facility] OR    LAPAROSCOPIC CHOLECYSTECTOMY N/A 12/21/2020    Procedure: Laparoscopic cholecystectomy;  Surgeon: Manuel Durham DO;  Location: [facility] OR    LAPAROSCOPIC HYSTERECTOMY TOTAL, BILATERAL SALPINGO-OOPHORECTOMY, COMBINED Bilateral 4/25/2022    Procedure: Laparoscopic total hysterectomy, Bilateral salpingectomy, with sparing of ovaries;  Surgeon: Julius Montejo MD;  Location: [facility] OR            Medications     Current Outpatient Medications   Medication    acetaminophen (TYLENOL) 325 MG tablet    albuterol (PROAIR HFA/PROVENTIL HFA/VENTOLIN HFA) 108 (90 Base) MCG/ACT inhaler    amLODIPine (NORVASC) 2.5 MG tablet    cyclobenzaprine (FLEXERIL) 10 MG tablet    dexmethylphenidate (FOCALIN) 10 MG tablet    dexmethylphenidate  (FOCALIN) 10 MG tablet    diphenhydrAMINE (BENADRYL) 25 MG tablet    escitalopram (LEXAPRO) 20 MG tablet    hydrOXYzine (ATARAX) 25 MG tablet    ibuprofen (ADVIL/MOTRIN) 200 MG tablet    ipratropium - albuterol 0.5 mg/2.5 mg/3 mL (DUONEB) 0.5-2.5 (3) MG/3ML neb solution    lisinopril (ZESTRIL) 40 MG tablet    montelukast (SINGULAIR) 10 MG tablet    omeprazole (PRILOSEC) 20 MG DR capsule    oxyCODONE (ROXICODONE) 5 MG tablet    propranolol (INDERAL) 80 MG tablet    triamcinolone (KENALOG) 0.1 % external cream     No current facility-administered medications for this visit.            Family History:     Family History   Problem Relation Age of Onset    Hypertension Father     Hypertension Brother     Hypertension Sister     Hypertension Brother             Social History:     Social History     Socioeconomic History    Marital status:      Spouse name: Not on file    Number of children: Not on file    Years of education: Not on file    Highest education level: Not on file   Occupational History    Not on file   Tobacco Use    Smoking status: Every Day     Packs/day: 0.50     Types: Cigarettes    Smokeless tobacco: Never   Vaping Use    Vaping Use: Never used   Substance and Sexual Activity    Alcohol use: Yes     Comment: occassionally    Drug use: Never    Sexual activity: Not Currently     Partners: Male   Other Topics Concern    Not on file   Social History Narrative    Not on file     Social Determinants of Health     Financial Resource Strain: Not on file   Food Insecurity: Not on file   Transportation Needs: Not on file   Physical Activity: Not on file   Stress: Not on file   Social Connections: Not on file   Intimate Partner Violence: Not on file   Housing Stability: Not on file            Allergies:   Bupropion and Sulfa antibiotics         Review of Systems:  From intake questionnaire   Negative 14 system review except as noted on HPI, nurse's note.         Physical Exam:   General Appearance: Well  groomed, hygenic  Eyes: No redness, discharge  Respiratory: No cough, no respiratory distress or labored breathing  Musculoskeletal: Grossly normal, full range of motion in upper extremities, no gross deficits  Skin: No discoloration or apparent rashes  Neurologic - No tremors  Psychiatric - Alert and oriented  The rest of a comprehensive physical examination is deferred due to video visit restrictions        Labs:    I personally reviewed all applicable laboratory data and went over findings with patient  Significant for:    CBC RESULTS:  Recent Labs   Lab Test 09/09/23  1002 09/05/23  1400 08/31/23  1223 09/06/22  1009   WBC 8.8 12.6* 11.4* 9.3   HGB 13.3 13.0 12.2 13.3    337 279 294        BMP RESULTS:  Recent Labs   Lab Test 09/09/23  1002 08/31/23  1223 05/18/23  0808 09/06/22  1009 12/14/21  2145 12/10/20  1500 12/08/20  2028 11/10/20  1630 10/15/20  0825    140 138 138   < > 137 140 139 134   POTASSIUM 4.1 4.2 4.4 4.4   < > 3.8 4.0 4.4 3.9   CHLORIDE 100 106 105 103   < > 106 109 109 104   CO2 24 22 22 26   < > 25 24 27 28   ANIONGAP 12 12 11 9   < > 6 7 3 2*   * 107* 118* 93   < > 120* 121* 83 90   BUN 6.9 9.7 8.2 9.8   < > 10 8 9 10   CR 0.60 0.57 0.58 0.74   < > 0.58 0.60 0.66 0.64   GFRESTIMATED >90 >90 >90 >90   < > >90 >90 >90 >90   GFRESTBLACK  --   --   --   --   --  >90 >90 >90 >90   JAIME 9.4 9.7 9.5 8.9   < > 8.9 9.2 9.2 8.7    < > = values in this interval not displayed.       UA RESULTS:   Recent Labs   Lab Test 09/09/23  1004 09/05/23  1400 08/31/23  1223   SG 1.003 1.025 1.018   URINEPH 6.0 6.0 5.0   NITRITE Negative Negative Negative   RBCU 1 2-5* 2   WBCU 1 None Seen 1       CALCIUM RESULTS  Lab Results   Component Value Date    JAIME 9.4 09/09/2023    JAIME 9.7 08/31/2023    JAIME 9.5 05/18/2023    JAIME 8.9 12/10/2020    JAIME 9.2 12/08/2020    JAIME 9.2 11/10/2020           Imaging:    I personally reviewed all applicable imaging and went over the below findings with  patient.    Results for orders placed or performed during the hospital encounter of 09/09/23   CT Abdomen Pelvis w Contrast    Narrative    EXAM: CT ABDOMEN PELVIS W CONTRAST  LOCATION: [facility]  DATE: 9/9/2023    INDICATION: Assistant right sided abdominal pain.  History of cholecystectomy.  Recent imaging with hepatic steatosis and nonobstructing right sided stone. (8 31 23). Evaluate for interval change.  COMPARISON: CT abdomen pelvis 08/31/2023.  TECHNIQUE: CT scan of the abdomen and pelvis was performed following injection of IV contrast. Multiplanar reformats were obtained. Dose reduction techniques were used.  CONTRAST: 100mL Isovue 370    FINDINGS:   LOWER CHEST: Unremarkable.    HEPATOBILIARY: Hepatic steatosis. No suspicious hepatic mass. Status post cholecystectomy.    PANCREAS: Normal.    SPLEEN: Similar superior pole subcentimeter hypodensity, likely a benign hemangiomatous lesion.    ADRENAL GLANDS: Normal.    KIDNEYS/BLADDER: Nonobstructing right renal calculi. Similar superior pole subcentimeter hypodensity too small to characterize, likely benign. No collecting system dilatation. Urinary bladder is unremarkable.    BOWEL: No bowel dilatation or bowel wall thickening. Appendix is normal.    LYMPH NODES: Normal.    VASCULATURE: Unremarkable.    PELVIC ORGANS: Uterus is surgically absent. No adnexal mass.    MUSCULOSKELETAL: Mild degenerative changes of the spine.      Impression    IMPRESSION:   1.  Since 08/31/2023, no significant interval change or acute findings within the abdomen or pelvis.  2.  Similar hepatic steatosis and nonobstructing right renal calculi.

## 2023-09-14 NOTE — PATIENT INSTRUCTIONS
Behavioral interventions   Behavioral interventions are the first line in helping you manage your overactive bladder. If you experience urge incontinence these interventions alone aren't likely to result in complete dryness, but they may significantly reduce the number of incontinence episodes. The interventions your doctor recommends may cover the following areas:   Fluid consumption. Your doctor may recommend the amount and timing of your fluid consumption. It may seem sensible to cut back on the amount that you drink so the bladder does not fill so quickly. However, this can make symptoms worse.  As you cut back on fluid, the urine becomes more concentrated which may irritate the bladder muscle.  Bladder irritants.   Caffeine. This is in tea, coffee, cola, and is part of some painkiller tablets. Caffeine has a diuretic effect (will make urine form more quickly). Caffeine may also directly stimulate the bladder to make urgency symptoms worse. It may be worth trying to avoid caffeine for a week or so to see if symptoms improve. If symptoms do improve, you may not want to give up caffeine completely. However, you may wish to limit the times that you have a caffeine-containing drink. Also, you will know to be near to a toilet whenever you have caffeine.  Other potential bladder irritants. Chocolate, carbonation,  and citrus. For some people alcohol, citrus, spicy foods, sugary beverages and artificial sweeteners may make OAB symptoms worse. If you notice that this is the case for you, it may be best to avoid these foods or beverages and drink purely water.  Getting to the toilet. Make this as easy as possible. If you have difficulty standing or walking to get to the toilet, consider special adaptations like a handrail or a raised seat on your toilet. Sometimes a commode in the bedroom can make life much easier, especially at night.  Go to the toilet only when you need to. Some people get into the habit of going to  "the toilet more often than they need. They may go when their bladder only has a small amount of urine so as \"not to be caught short\". This again may sound sensible, as some people think that symptoms of an overactive bladder will not develop if the bladder does not fill very much and is emptied regularly. However, again, this can make symptoms worse in the long run. If you go to the toilet too often the bladder becomes used to holding less urine. The bladder may then become even more sensitive and overactive at times when it is stretched a little. So, you may find that when you need to hold on a bit longer (for example, if you go out), symptoms are worse than ever.  Bladder training. Occasionally, your doctor may recommend a strategy to train yourself to delay voiding when you feel an urge to urinate. You'll begin with very small delays, such as 10 minutes, and gradually work your way up to urinating every three to five hours.  Double voiding. Some people have problems with emptying their bladders. This is diagnosed by significant elevations of residual urine volumes and may be helped by double voiding. After urinating, you wait a few minutes and then try again to empty your bladder completely. Other strategies for emptying your bladder more fully include pushing on your bladder with your hands while you urinate, leaning forward while sitting on the toilet to urinate, or gently straining to empty more urine.  Your doctor can tell you if these ideas might help.  Pelvic floor muscle exercises. Working with a Pelvic Floor Physical Therapist can help you work on strengthen your bladder muscles. If you muscles are too tight, they can also help with strategies to help relax the pelvic floor.     "

## 2023-09-16 ENCOUNTER — OFFICE VISIT (OUTPATIENT)
Dept: URGENT CARE | Facility: URGENT CARE | Age: 43
End: 2023-09-16
Payer: COMMERCIAL

## 2023-09-16 ENCOUNTER — NURSE TRIAGE (OUTPATIENT)
Dept: NURSING | Facility: CLINIC | Age: 43
End: 2023-09-16
Payer: COMMERCIAL

## 2023-09-16 VITALS
DIASTOLIC BLOOD PRESSURE: 70 MMHG | RESPIRATION RATE: 16 BRPM | BODY MASS INDEX: 35.36 KG/M2 | TEMPERATURE: 98.1 F | WEIGHT: 206 LBS | SYSTOLIC BLOOD PRESSURE: 124 MMHG | HEART RATE: 76 BPM | OXYGEN SATURATION: 98 %

## 2023-09-16 DIAGNOSIS — K04.7 INFECTED TOOTH: Primary | ICD-10-CM

## 2023-09-16 PROCEDURE — 99213 OFFICE O/P EST LOW 20 MIN: CPT | Performed by: FAMILY MEDICINE

## 2023-09-16 NOTE — PROGRESS NOTES
Assessment & Plan     Infected tooth  Will treat she will call to get an appointment to have the tooth extracted  - amoxicillin-clavulanate (AUGMENTIN) 875-125 MG tablet  Dispense: 20 tablet; Refill: 0             No follow-ups on file.    Elvie Carr MD  [facility]    Justin Rodríguez is a 42 year old female who presents to clinic today for the following health issues:  Chief Complaint   Patient presents with    Infection     Patient reports left side of jaw  by chin/hard/swollen-Here to discus a infected tooth and possible getting on some antibiotics to relieve sx reported.      HPI    As above  she has had it broken for a few months but has not       Review of Systems  Constitutional, HEENT, cardiovascular, pulmonary, gi and gu systems are negative, except as otherwise noted.      Objective    /70   Pulse 76   Temp 98.1  F (36.7  C) (Tympanic)   Resp 16   Wt 93.4 kg (206 lb)   LMP 03/02/2022 (LMP Unknown)   SpO2 98%   BMI 35.36 kg/m    Physical Exam   GENERAL: healthy, alert and no distress  HENT: normal cephalic/atraumatic, nose and mouth without ulcers or lesions, oropharynx clear, oral mucous membranes moist, and swollen glands and buccal mucosa   NECK: no asymmetry, masses, or scars and angle of left jaw

## 2023-09-16 NOTE — TELEPHONE ENCOUNTER
"Nurse Triage SBAR    Situation:   -tooth pressure    Background:   -Patient calling, It is okay to leave a detailed message at this number.     Assessment:   -broken tooth (not new)  -think its infected  -ressues and a lump near jarw line  -can see on her face  -getting bigger since this AM  -no fever    Recommendation:   See Dentist Within 24 Hours >>> be seen today as the swelling has increased since this AM  -RN offered emergency dentists, but patient unable to go to them due to cost (she is on medical assistance)  -she plans to go to /[facility] in [place] today    KYAW GODWIN RN on 9/16/2023 at 11:39 AM     Reason for Disposition   [1] Face is swollen AND [2] no fever    Additional Information   Negative: Chest pain   Negative: Toothache followed tooth injury   Negative: Toothache or mouth pain after tooth extraction   Negative: Canker sore (i.e., aphthous ulcer)   Negative: [1] Similar pain previously AND [2] it was from \"heart attack\"   Negative: [1] Similar pain previously AND [2] it was from \"angina\" AND [3] not relieved by nitroglycerin   Negative: Sounds like a life-threatening emergency to the triager   Negative: Shock suspected (e.g., cold/pale/clammy skin, too weak to stand, low BP, rapid pulse)   Negative: Patient sounds very sick or weak to the triager   Negative: [1] SEVERE pain (e.g., excruciating, unable to eat, unable to do any normal activities) AND [2] not improved 2 hours after pain medicine   Negative: [1] Face is swollen AND [2] fever   Negative: Tongue is very swollen and tender   Negative: Fever   Negative: Face is very swollen    Protocols used: Toothache-A-AH    "

## 2023-09-21 ENCOUNTER — MYC MEDICAL ADVICE (OUTPATIENT)
Dept: FAMILY MEDICINE | Facility: CLINIC | Age: 43
End: 2023-09-21
Payer: COMMERCIAL

## 2023-09-22 ENCOUNTER — APPOINTMENT (OUTPATIENT)
Dept: ULTRASOUND IMAGING | Facility: CLINIC | Age: 43
End: 2023-09-22
Attending: STUDENT IN AN ORGANIZED HEALTH CARE EDUCATION/TRAINING PROGRAM
Payer: COMMERCIAL

## 2023-09-22 ENCOUNTER — HOSPITAL ENCOUNTER (EMERGENCY)
Facility: CLINIC | Age: 43
Discharge: HOME | End: 2023-09-22
Attending: STUDENT IN AN ORGANIZED HEALTH CARE EDUCATION/TRAINING PROGRAM | Admitting: STUDENT IN AN ORGANIZED HEALTH CARE EDUCATION/TRAINING PROGRAM
Payer: COMMERCIAL

## 2023-09-22 VITALS
TEMPERATURE: 98.5 F | OXYGEN SATURATION: 98 % | WEIGHT: 205 LBS | DIASTOLIC BLOOD PRESSURE: 68 MMHG | HEIGHT: 64 IN | SYSTOLIC BLOOD PRESSURE: 125 MMHG | RESPIRATION RATE: 18 BRPM | BODY MASS INDEX: 35 KG/M2 | HEART RATE: 70 BPM

## 2023-09-22 DIAGNOSIS — R10.9 RIGHT-SIDED ABDOMINAL PAIN OF UNKNOWN CAUSE: ICD-10-CM

## 2023-09-22 DIAGNOSIS — N76.0 BV (BACTERIAL VAGINOSIS): ICD-10-CM

## 2023-09-22 DIAGNOSIS — B96.89 BV (BACTERIAL VAGINOSIS): ICD-10-CM

## 2023-09-22 LAB
ALBUMIN SERPL BCG-MCNC: 4.1 G/DL (ref 3.5–5.2)
ALBUMIN UR-MCNC: NEGATIVE MG/DL
ALP SERPL-CCNC: 91 U/L (ref 35–104)
ALT SERPL W P-5'-P-CCNC: 29 U/L (ref 0–50)
ANION GAP SERPL CALCULATED.3IONS-SCNC: 11 MMOL/L (ref 7–15)
APPEARANCE UR: CLEAR
AST SERPL W P-5'-P-CCNC: 24 U/L (ref 0–45)
BACTERIA #/AREA URNS HPF: ABNORMAL /HPF
BASOPHILS # BLD AUTO: 0.1 10E3/UL (ref 0–0.2)
BASOPHILS NFR BLD AUTO: 1 %
BILIRUB SERPL-MCNC: 0.3 MG/DL
BILIRUB UR QL STRIP: NEGATIVE
BUN SERPL-MCNC: 7.5 MG/DL (ref 6–20)
CALCIUM SERPL-MCNC: 9.3 MG/DL (ref 8.6–10)
CHLORIDE SERPL-SCNC: 102 MMOL/L (ref 98–107)
CLUE CELLS: PRESENT
COLOR UR AUTO: YELLOW
CREAT SERPL-MCNC: 0.55 MG/DL (ref 0.51–0.95)
CRP SERPL-MCNC: 6.36 MG/L
DEPRECATED HCO3 PLAS-SCNC: 23 MMOL/L (ref 22–29)
EGFRCR SERPLBLD CKD-EPI 2021: >90 ML/MIN/1.73M2
EOSINOPHIL # BLD AUTO: 0.2 10E3/UL (ref 0–0.7)
EOSINOPHIL NFR BLD AUTO: 2 %
ERYTHROCYTE [DISTWIDTH] IN BLOOD BY AUTOMATED COUNT: 13.2 % (ref 10–15)
GLUCOSE SERPL-MCNC: 101 MG/DL (ref 70–99)
GLUCOSE UR STRIP-MCNC: NEGATIVE MG/DL
HCT VFR BLD AUTO: 37.5 % (ref 35–47)
HGB BLD-MCNC: 12.5 G/DL (ref 11.7–15.7)
HGB UR QL STRIP: ABNORMAL
HOLD SPECIMEN: NORMAL
HOLD SPECIMEN: NORMAL
IMM GRANULOCYTES # BLD: 0 10E3/UL
IMM GRANULOCYTES NFR BLD: 0 %
KETONES UR STRIP-MCNC: NEGATIVE MG/DL
LEUKOCYTE ESTERASE UR QL STRIP: NEGATIVE
LIPASE SERPL-CCNC: 23 U/L (ref 13–60)
LYMPHOCYTES # BLD AUTO: 2.4 10E3/UL (ref 0.8–5.3)
LYMPHOCYTES NFR BLD AUTO: 29 %
MCH RBC QN AUTO: 29.2 PG (ref 26.5–33)
MCHC RBC AUTO-ENTMCNC: 33.3 G/DL (ref 31.5–36.5)
MCV RBC AUTO: 88 FL (ref 78–100)
MONOCYTES # BLD AUTO: 0.7 10E3/UL (ref 0–1.3)
MONOCYTES NFR BLD AUTO: 8 %
MUCOUS THREADS #/AREA URNS LPF: PRESENT /LPF
NEUTROPHILS # BLD AUTO: 5 10E3/UL (ref 1.6–8.3)
NEUTROPHILS NFR BLD AUTO: 60 %
NITRATE UR QL: NEGATIVE
NRBC # BLD AUTO: 0 10E3/UL
NRBC BLD AUTO-RTO: 0 /100
PH UR STRIP: 5 [PH] (ref 5–7)
PLATELET # BLD AUTO: 280 10E3/UL (ref 150–450)
POTASSIUM SERPL-SCNC: 4 MMOL/L (ref 3.4–5.3)
PROT SERPL-MCNC: 7 G/DL (ref 6.4–8.3)
RBC # BLD AUTO: 4.28 10E6/UL (ref 3.8–5.2)
RBC URINE: 52 /HPF
SODIUM SERPL-SCNC: 136 MMOL/L (ref 136–145)
SP GR UR STRIP: 1.01 (ref 1–1.03)
SQUAMOUS EPITHELIAL: 2 /HPF
TRICHOMONAS, WET PREP: ABNORMAL
UROBILINOGEN UR STRIP-MCNC: NORMAL MG/DL
WBC # BLD AUTO: 8.3 10E3/UL (ref 4–11)
WBC URINE: 2 /HPF
WBC'S/HIGH POWER FIELD, WET PREP: ABNORMAL
YEAST, WET PREP: ABNORMAL

## 2023-09-22 PROCEDURE — 80053 COMPREHEN METABOLIC PANEL: CPT | Performed by: STUDENT IN AN ORGANIZED HEALTH CARE EDUCATION/TRAINING PROGRAM

## 2023-09-22 PROCEDURE — 87491 CHLMYD TRACH DNA AMP PROBE: CPT | Performed by: STUDENT IN AN ORGANIZED HEALTH CARE EDUCATION/TRAINING PROGRAM

## 2023-09-22 PROCEDURE — 86140 C-REACTIVE PROTEIN: CPT | Performed by: STUDENT IN AN ORGANIZED HEALTH CARE EDUCATION/TRAINING PROGRAM

## 2023-09-22 PROCEDURE — 250N000011 HC RX IP 250 OP 636: Mod: JZ | Performed by: STUDENT IN AN ORGANIZED HEALTH CARE EDUCATION/TRAINING PROGRAM

## 2023-09-22 PROCEDURE — 87210 SMEAR WET MOUNT SALINE/INK: CPT | Performed by: STUDENT IN AN ORGANIZED HEALTH CARE EDUCATION/TRAINING PROGRAM

## 2023-09-22 PROCEDURE — 81001 URINALYSIS AUTO W/SCOPE: CPT | Performed by: STUDENT IN AN ORGANIZED HEALTH CARE EDUCATION/TRAINING PROGRAM

## 2023-09-22 PROCEDURE — 36415 COLL VENOUS BLD VENIPUNCTURE: CPT | Performed by: STUDENT IN AN ORGANIZED HEALTH CARE EDUCATION/TRAINING PROGRAM

## 2023-09-22 PROCEDURE — 99285 EMERGENCY DEPT VISIT HI MDM: CPT | Mod: 25 | Performed by: STUDENT IN AN ORGANIZED HEALTH CARE EDUCATION/TRAINING PROGRAM

## 2023-09-22 PROCEDURE — 93976 VASCULAR STUDY: CPT

## 2023-09-22 PROCEDURE — 85025 COMPLETE CBC W/AUTO DIFF WBC: CPT | Performed by: STUDENT IN AN ORGANIZED HEALTH CARE EDUCATION/TRAINING PROGRAM

## 2023-09-22 PROCEDURE — 96375 TX/PRO/DX INJ NEW DRUG ADDON: CPT | Performed by: STUDENT IN AN ORGANIZED HEALTH CARE EDUCATION/TRAINING PROGRAM

## 2023-09-22 PROCEDURE — 83690 ASSAY OF LIPASE: CPT | Performed by: STUDENT IN AN ORGANIZED HEALTH CARE EDUCATION/TRAINING PROGRAM

## 2023-09-22 PROCEDURE — 87591 N.GONORRHOEAE DNA AMP PROB: CPT | Performed by: STUDENT IN AN ORGANIZED HEALTH CARE EDUCATION/TRAINING PROGRAM

## 2023-09-22 PROCEDURE — 99284 EMERGENCY DEPT VISIT MOD MDM: CPT | Performed by: STUDENT IN AN ORGANIZED HEALTH CARE EDUCATION/TRAINING PROGRAM

## 2023-09-22 PROCEDURE — 96374 THER/PROPH/DIAG INJ IV PUSH: CPT | Performed by: STUDENT IN AN ORGANIZED HEALTH CARE EDUCATION/TRAINING PROGRAM

## 2023-09-22 RX ORDER — KETOROLAC TROMETHAMINE 15 MG/ML
15 INJECTION, SOLUTION INTRAMUSCULAR; INTRAVENOUS ONCE
Status: COMPLETED | OUTPATIENT
Start: 2023-09-22 | End: 2023-09-22

## 2023-09-22 RX ORDER — ONDANSETRON 4 MG/1
4 TABLET, ORALLY DISINTEGRATING ORAL EVERY 8 HOURS PRN
Qty: 10 TABLET | Refills: 0 | Status: SHIPPED | OUTPATIENT
Start: 2023-09-22 | End: 2023-12-19

## 2023-09-22 RX ORDER — HYDROMORPHONE HYDROCHLORIDE 1 MG/ML
0.5 INJECTION, SOLUTION INTRAMUSCULAR; INTRAVENOUS; SUBCUTANEOUS
Status: COMPLETED | OUTPATIENT
Start: 2023-09-22 | End: 2023-09-22

## 2023-09-22 RX ORDER — METRONIDAZOLE 500 MG/1
500 TABLET ORAL 2 TIMES DAILY
Qty: 14 TABLET | Refills: 0 | Status: SHIPPED | OUTPATIENT
Start: 2023-09-22 | End: 2023-09-29

## 2023-09-22 RX ADMIN — KETOROLAC TROMETHAMINE 15 MG: 15 INJECTION, SOLUTION INTRAMUSCULAR; INTRAVENOUS at 12:15

## 2023-09-22 RX ADMIN — HYDROMORPHONE HYDROCHLORIDE 0.5 MG: 1 INJECTION, SOLUTION INTRAMUSCULAR; INTRAVENOUS; SUBCUTANEOUS at 10:14

## 2023-09-22 ASSESSMENT — ACTIVITIES OF DAILY LIVING (ADL)
ADLS_ACUITY_SCORE: 35
ADLS_ACUITY_SCORE: 35

## 2023-09-22 NOTE — ED PROVIDER NOTES
History     Chief Complaint   Patient presents with    Abdominal Pain     HPI  Anne Ferreira is a 42 year old female who has COPD, history of spinal fusion, GERD, hyperlipidemia, history of chronic opioid use for neck pain, history of cholecystectomy and hysterectomy who presents to the emergency department with abdominal pain.  Patient has been seen twice in this emergency department over the last month for the same symptoms.  She has had 2 CT scans that have shown no acute findings.  She was noted to have a nonobstructing right renal stone as well as hepatic steatosis.  It was not thought that this was causing her symptoms.  Her labs on the most recent visit on 9/9 showed a CRP of 12, normal BMP and normal CBC.  Patient states that initially she was doing better, however over the last 2 weeks she has had intermittent pain.  This morning, pain was much more severe.  She describes it as a pain in her right side that radiates to her right lower abdomen.  Pain is constant this morning.  She states these are the same symptoms that she has had each other time.  There are no new characteristics to the pain.  She denies nausea or vomiting.  She does endorse a white/watery vaginal discharge.  She denies any vaginal bleeding.  She denies diarrhea, melena, hematochezia.  She denies fever.  No chest pain or trouble breathing.  She is very frustrated that no etiology of her symptoms has been elucidated.  He is wondering if it could be something to do with her female reproductive organs she does take oxycodone for chronic pain and states she has been using more due to the pain.  Denies alcohol or drug use.    Allergies:  Allergies   Allergen Reactions    Bupropion Nausea    Sulfa Antibiotics        Problem List:    Patient Active Problem List    Diagnosis Date Noted    F11.2 - Continuous opioid dependence (H) 05/18/2023     Priority: Medium    S/P cervical spinal fusion 03/20/2023     Priority: Medium     Added automatically  from request for surgery [id number]      COPD (chronic obstructive pulmonary disease) (H) 07/27/2021     Priority: Medium    Moderate episode of recurrent major depressive disorder (H) 05/21/2021     Priority: Medium    Symptomatic cholelithiasis 12/15/2020     Priority: Medium     Added automatically from request for surgery [id number]      Morbid obesity (H) 12/11/2020     Priority: Medium    Dyslipidemia 11/10/2020     Priority: Medium    Dysmenorrhea 11/10/2020     Priority: Medium     treated with continuous OCPs      GERD (gastroesophageal reflux disease) 11/10/2020     Priority: Medium    Thoracic back pain 11/10/2020     Priority: Medium     left, approx T10      H/O LEEP      Priority: Medium     1998 LEEP- Unknown results.  2010 NIL pap.  2/2/12 COLP and ECC- DARREN 1.  2013 NIL pap  4/7/15 LSIL pap, + HR HPV   8/13/15 COLP- DARREN 1, ECC- Negative.  6/10/16 NIL pap, Neg HPV.  5/14/19 NIL pap, Neg HPV.  Above results found in [facility]  10/15/20 NIL pap, Neg HPV. Plan cotest in 3 years.       Cervical spinal stenosis 07/01/2019     Priority: Medium     7/2019 MRI - At C5-6, broad-based central disc extrusion demonstrating caudal migration mildly deforms the cord and severely narrows the spinal canal. Uncovertebral joint spurring contributes to mild to moderate left neural foraminal narrowing with potential left C6 nerve root encroachment.      Herniation of cervical intervertebral disc with radiculopathy 07/01/2019     Priority: Medium     see MRI 7/2019      Attention deficit hyperactivity disorder, combined type, mild 08/29/2018     Priority: Medium    Generalized anxiety disorder 08/29/2018     Priority: Medium    Allergic rhinitis 04/01/2008     Priority: Medium        Past Medical History:    Past Medical History:   Diagnosis Date    Abnormal Pap smear of cervix        Past Surgical History:    Past Surgical History:   Procedure Laterality Date    CYSTOSCOPY N/A 4/25/2022    Procedure: CYSTOSCOPY;  Surgeon: Nikia  Julius Wetzel MD;  Location: [facility] OR    INJECT EPIDURAL CERVICAL  3/30/2023    Procedure: INJECTION, SPINE, CERVICAL, EPIDURAL;  Surgeon: Naresh Zuniga MD;  Location: [facility] OR    LAPAROSCOPIC CHOLECYSTECTOMY N/A 12/21/2020    Procedure: Laparoscopic cholecystectomy;  Surgeon: Manuel Durham DO;  Location: [facility] OR    LAPAROSCOPIC HYSTERECTOMY TOTAL, BILATERAL SALPINGO-OOPHORECTOMY, COMBINED Bilateral 4/25/2022    Procedure: Laparoscopic total hysterectomy, Bilateral salpingectomy, with sparing of ovaries;  Surgeon: Julius Montejo MD;  Location: [facility] OR       Family History:    Family History   Problem Relation Age of Onset    Hypertension Father     Hypertension Brother     Hypertension Sister     Hypertension Brother        Social History:  Marital Status:   [4]  Social History     Tobacco Use    Smoking status: Every Day     Packs/day: 0.50     Types: Cigarettes    Smokeless tobacco: Never   Vaping Use    Vaping Use: Never used   Substance Use Topics    Alcohol use: Yes     Comment: occassionally    Drug use: Never        Medications:    metroNIDAZOLE (FLAGYL) 500 MG tablet  ondansetron (ZOFRAN ODT) 4 MG ODT tab  acetaminophen (TYLENOL) 325 MG tablet  albuterol (PROAIR HFA/PROVENTIL HFA/VENTOLIN HFA) 108 (90 Base) MCG/ACT inhaler  amLODIPine (NORVASC) 2.5 MG tablet  amoxicillin-clavulanate (AUGMENTIN) 875-125 MG tablet  cyclobenzaprine (FLEXERIL) 10 MG tablet  dexmethylphenidate (FOCALIN) 10 MG tablet  dexmethylphenidate (FOCALIN) 10 MG tablet  diphenhydrAMINE (BENADRYL) 25 MG tablet  escitalopram (LEXAPRO) 20 MG tablet  hydrOXYzine (ATARAX) 25 MG tablet  ibuprofen (ADVIL/MOTRIN) 200 MG tablet  ipratropium - albuterol 0.5 mg/2.5 mg/3 mL (DUONEB) 0.5-2.5 (3) MG/3ML neb solution  lisinopril (ZESTRIL) 40 MG tablet  montelukast (SINGULAIR) 10 MG tablet  omeprazole (PRILOSEC) 20 MG DR capsule  oxyCODONE (ROXICODONE) 5 MG tablet  propranolol (INDERAL) 80 MG tablet  triamcinolone (KENALOG) 0.1 % external  "cream          Review of Systems    Physical Exam   BP: (!) 169/100  Pulse: 74  Temp: 98.4  F (36.9  C)  Resp: 18  Height: 162.6 cm (5' 4\")  Weight: 93 kg (205 lb)  SpO2: 99 %      Physical Exam  /68   Pulse 70   Temp 98.5  F (36.9  C) (Oral)   Resp 18   Ht 1.626 m (5' 4\")   Wt 93 kg (205 lb)   LMP 03/02/2022 (LMP Unknown)   SpO2 98%   BMI 35.19 kg/m    General: alert, interactive, in no apparent distress  Head: atraumatic  Nose: no rhinorrhea or epistaxis  Ears: no external auditory canal discharge or bleeding.    Eyes: Sclera nonicteric. Conjunctiva noninjected. PERRL, EOMI  Mouth: no tonsillar erythema, edema, or exudate.  Moist mucous membranes  Neck: supple, moving spontaneously no midline cervical tenderness  Lungs: No increased work of breathing.  Clear to auscultation bilaterally.  CV: RRR, peripheral pulses palpable and symmetric  Abdomen: soft, tender to palpation in the suprapubic area as well as the right flank.  No rebound or guarding  : Normal-appearing external genitalia.  Internal vaginal exam shows white discharge.  She does not have a cervix.  Extremities: Warm and well-perfused.  No edema or calf tenderness.  Skin: no rash or diaphoresis  Neuro: CN II-XII grossly intact, strength 5/5 in UE and LEs bilaterally, sensation intact to light touch in UE and LEs bilaterally;     ED Course                 Procedures           Critical Care time:  none         Results for orders placed or performed during the hospital encounter of 09/22/23 (from the past 24 hour(s))   CBC with platelets differential    Narrative    The following orders were created for panel order CBC with platelets differential.  Procedure                               Abnormality         Status                     ---------                               -----------         ------                     CBC with platelets and d...[[id number]]                      Final result                 Please view results for these tests " on the individual orders.   Comprehensive metabolic panel   Result Value Ref Range    Sodium 136 136 - 145 mmol/L    Potassium 4.0 3.4 - 5.3 mmol/L    Chloride 102 98 - 107 mmol/L    Carbon Dioxide (CO2) 23 22 - 29 mmol/L    Anion Gap 11 7 - 15 mmol/L    Urea Nitrogen 7.5 6.0 - 20.0 mg/dL    Creatinine 0.55 0.51 - 0.95 mg/dL    Calcium 9.3 8.6 - 10.0 mg/dL    Glucose 101 (H) 70 - 99 mg/dL    Alkaline Phosphatase 91 35 - 104 U/L    AST 24 0 - 45 U/L    ALT 29 0 - 50 U/L    Protein Total 7.0 6.4 - 8.3 g/dL    Albumin 4.1 3.5 - 5.2 g/dL    Bilirubin Total 0.3 <=1.2 mg/dL    GFR Estimate >90 >60 mL/min/1.73m2   Lipase   Result Value Ref Range    Lipase 23 13 - 60 U/L   UA with Microscopic reflex to Culture    Specimen: Urine, Clean Catch   Result Value Ref Range    Color Urine Yellow Colorless, Straw, Light Yellow, Yellow    Appearance Urine Clear Clear    Glucose Urine Negative Negative mg/dL    Bilirubin Urine Negative Negative    Ketones Urine Negative Negative mg/dL    Specific Gravity Urine 1.009 1.003 - 1.035    Blood Urine Large (A) Negative    pH Urine 5.0 5.0 - 7.0    Protein Albumin Urine Negative Negative mg/dL    Urobilinogen Urine Normal Normal, 2.0 mg/dL    Nitrite Urine Negative Negative    Leukocyte Esterase Urine Negative Negative    Bacteria Urine Few (A) None Seen /HPF    Mucus Urine Present (A) None Seen /LPF    RBC Urine 52 (H) <=2 /HPF    WBC Urine 2 <=5 /HPF    Squamous Epithelials Urine 2 (H) <=1 /HPF    Narrative    Urine Culture not indicated   CRP inflammation   Result Value Ref Range    CRP Inflammation 6.36 (H) <5.00 mg/L   CBC with platelets and differential   Result Value Ref Range    WBC Count 8.3 4.0 - 11.0 10e3/uL    RBC Count 4.28 3.80 - 5.20 10e6/uL    Hemoglobin 12.5 11.7 - 15.7 g/dL    Hematocrit 37.5 35.0 - 47.0 %    MCV 88 78 - 100 fL    MCH 29.2 26.5 - 33.0 pg    MCHC 33.3 31.5 - 36.5 g/dL    RDW 13.2 10.0 - 15.0 %    Platelet Count 280 150 - 450 10e3/uL    % Neutrophils 60 %     % Lymphocytes 29 %    % Monocytes 8 %    % Eosinophils 2 %    % Basophils 1 %    % Immature Granulocytes 0 %    NRBCs per 100 WBC 0 <1 /100    Absolute Neutrophils 5.0 1.6 - 8.3 10e3/uL    Absolute Lymphocytes 2.4 0.8 - 5.3 10e3/uL    Absolute Monocytes 0.7 0.0 - 1.3 10e3/uL    Absolute Eosinophils 0.2 0.0 - 0.7 10e3/uL    Absolute Basophils 0.1 0.0 - 0.2 10e3/uL    Absolute Immature Granulocytes 0.0 <=0.4 10e3/uL    Absolute NRBCs 0.0 10e3/uL   [place] Draw    Narrative    The following orders were created for panel order [place] Draw.  Procedure                               Abnormality         Status                     ---------                               -----------         ------                     Extra Blue Top Tube[[id number]]                              Final result               Extra Red Top Tube[[id number]]                               Final result                 Please view results for these tests on the individual orders.   Extra Blue Top Tube   Result Value Ref Range    Hold Specimen JIC    Extra Red Top Tube   Result Value Ref Range    Hold Specimen JIC    Wet prep    Specimen: Vagina; Swab   Result Value Ref Range    Trichomonas Absent Absent    Yeast Absent Absent    Clue Cells Present (A) Absent    WBCs/high power field 1+ (A) None   US Pelvis Cmplt w Transvag & Doppler LmtPel Duplex Limited    Narrative    ULTRASOUND PELVIS DOPPLER   WITH TRANSVAGINAL IMAGING  9/22/2023 11:13  AM     HISTORY: Pelvic pain    COMPARISON: CT 9/19/2023. Ultrasound 1/7/2022.    TECHNIQUE: Endovaginal sonography was added to the transabdominal  scans. Grayscale, color Doppler, and Doppler spectral waveform  analysis performed.    FINDINGS: The uterus is surgically absent.     The right ovary measures 1.9 x 1.8 x 1.6 cm. Visualization of the  right ovary is limited due to partial obscuration by bowel gas.  Visualized portions are unremarkable.    The left ovary measures 2.9 x 2.9 x 2.2 cm. There is a mildly  complex  1.9 cm cyst, likely physiologic.    Doppler spectral waveform analysis demonstrates blood flow to both  ovaries.     No free pelvic fluid is present.      Impression    IMPRESSION: No sonographic evidence of ovarian torsion.  1.  Mildly complex 1.9 cm cyst of the left ovary, likely physiologic.  2.  The uterus is surgically absent.    KENDALL MUSTAFA MD         SYSTEM ID:  [id number]       Medications   HYDROmorphone (PF) (DILAUDID) injection 0.5 mg (0.5 mg Intravenous $Given 9/22/23 1014)   ketorolac (TORADOL) injection 15 mg (15 mg Intravenous $Given 9/22/23 1215)       Assessments & Plan (with Medical Decision Making)     I have reviewed the nursing notes.    I have reviewed the findings, diagnosis, plan and need for follow up with the patient.  Medical Decision Making  Anne Ferreira is a 42 year old female who has COPD, history of spinal fusion, GERD, hyperlipidemia, history of chronic opioid use for neck pain, history of cholecystectomy and hysterectomy who presents to the emergency department with abdominal pain.  Vital signs notable for hypertension, otherwise reassuring.  This the patient's third visit to the ER for her intermittent abdominal pain.  I reviewed the records of the recent visits and as documented above in the HPI.  Repeat lab work-up was obtained.  Toradol and Dilaudid for pain.  Her leukocytosis is completely resolved.  She has no anemia.  CRP has decreased and is only modestly elevated right now.  Her UA shows hematuria without signs of UTI.  Lipase is negative.  Renal function and electrolytes and LFTs are normal.  Wet prep is notable for bacterial vaginosis.  Gonorrhea and Chlamydia swabs are sent to the lab and are pending at the time of discharge.  Patient has a low suspicion for these and does not want empiric treatment.  She will get a call if these are positive.  Pelvic ultrasound was obtained that shows no ovarian torsion.  She did have a cyst on the left ovary, that is  unlikely to be contributing to her pain.  The exact etiology of her abdominal pain is unclear.  It is possible that the BV could be causing the pain, but I am also wondering if she may be having ureteral colic.  Regardless, we will treat with metronidazole to see if this helps her symptoms.  Previous CT showed a 5 mm nonobstructive stone within the right kidney.  It is possible that she could be passing this stone.  I offered the patient repeat imaging, but explained that is unlikely to  and she is in agreement that we forego this.  I did recommend that we treat her for a kidney stone.  I recommended ibuprofen and pain medications.  She already has oxycodone at home.  She is given a prescription for Zofran.  He is offered a prescription for Flomax to see if this helps facilitate passage of possible stone and she declined.  Patient's symptoms eventually improved and she was tolerating oral intake.  She was discharged with a prescription for Flagyl and Zofran.  She is instructed return to the ER with new or worsening symptoms.  She expressed understanding and all questions answered.  She is discharged in stable condition.        Discharge Medication List as of 9/22/2023  1:04 PM        START taking these medications    Details   metroNIDAZOLE (FLAGYL) 500 MG tablet Take 1 tablet (500 mg) by mouth 2 times daily for 7 days, Disp-14 tablet, R-0, E-PrescribeEat yogurt or cottage cheese daily to prevent diarrhea that can be caused by taking this medication.      ondansetron (ZOFRAN ODT) 4 MG ODT tab Take 1 tablet (4 mg) by mouth every 8 hours as needed for nausea, Disp-10 tablet, R-0, E-Prescribe             Final diagnoses:   BV (bacterial vaginosis)   Right-sided abdominal pain of unknown cause       9/22/2023   [facility] EMERGENCY DEPT       Virgil Wright MD  09/22/23 7968

## 2023-09-22 NOTE — ED TRIAGE NOTES
"Intermittent right abdominal pain x 2 weeks.   \" Isa been seen multiple times and they can't figure it out\"   pt referred to specialist     Triage Assessment       Row Name 09/22/23 0947       Triage Assessment (Adult)    Airway WDL WDL       Respiratory WDL    Respiratory WDL WDL                    "

## 2023-09-22 NOTE — DISCHARGE INSTRUCTIONS
Today you were seen and evaluated in the emergency department.  You were diagnosed with a condition called bacterial vaginosis.  This is treated with a weeks worth of antibiotics.  This may be the cause of your unexplained abdominal pain, but I am not convinced that your pain is not from a kidney stone.  You should continue to rotate between Tylenol and ibuprofen.  Use the oxycodone for severe pain.  You have been given a prescription for antinausea medication.  You should follow-up with your regular doctor soon as you are able.  If you develop fever, intractable nausea vomiting, severe pain, or are unable to urinate return to the emergency department.

## 2023-09-23 LAB
C TRACH DNA SPEC QL NAA+PROBE: NEGATIVE
N GONORRHOEA DNA SPEC QL NAA+PROBE: NEGATIVE

## 2023-09-23 NOTE — RESULT ENCOUNTER NOTE
Final result for both N. Gonorrhoeae PCR and Chlamydia Trachomatis PCR are NEGATIVE.  No treatment or change in treatment per [facility] ED Lab Result N. Gonorrhea AND/OR Chlamydia T. protocol.

## 2023-09-28 ENCOUNTER — OFFICE VISIT (OUTPATIENT)
Dept: FAMILY MEDICINE | Facility: CLINIC | Age: 43
End: 2023-09-28
Payer: COMMERCIAL

## 2023-09-28 VITALS
DIASTOLIC BLOOD PRESSURE: 60 MMHG | OXYGEN SATURATION: 98 % | HEART RATE: 75 BPM | HEIGHT: 64 IN | TEMPERATURE: 98.7 F | BODY MASS INDEX: 34.59 KG/M2 | WEIGHT: 202.6 LBS | RESPIRATION RATE: 22 BRPM | SYSTOLIC BLOOD PRESSURE: 110 MMHG

## 2023-09-28 DIAGNOSIS — R10.2 PELVIC PAIN IN FEMALE: ICD-10-CM

## 2023-09-28 DIAGNOSIS — N76.0 BACTERIAL VAGINOSIS: ICD-10-CM

## 2023-09-28 DIAGNOSIS — B37.31 YEAST INFECTION OF THE VAGINA: ICD-10-CM

## 2023-09-28 DIAGNOSIS — B96.89 BACTERIAL VAGINOSIS: ICD-10-CM

## 2023-09-28 DIAGNOSIS — R10.11 RUQ ABDOMINAL PAIN: Primary | ICD-10-CM

## 2023-09-28 DIAGNOSIS — F17.200 NICOTINE DEPENDENCE, UNCOMPLICATED, UNSPECIFIED NICOTINE PRODUCT TYPE: ICD-10-CM

## 2023-09-28 LAB
ALBUMIN SERPL BCG-MCNC: 4.1 G/DL (ref 3.5–5.2)
ALBUMIN UR-MCNC: 30 MG/DL
ALP SERPL-CCNC: 96 U/L (ref 35–104)
ALT SERPL W P-5'-P-CCNC: 24 U/L (ref 0–50)
ANION GAP SERPL CALCULATED.3IONS-SCNC: 10 MMOL/L (ref 7–15)
APPEARANCE UR: CLEAR
AST SERPL W P-5'-P-CCNC: 25 U/L (ref 0–45)
BACTERIA #/AREA URNS HPF: ABNORMAL /HPF
BILIRUB DIRECT SERPL-MCNC: <0.2 MG/DL (ref 0–0.3)
BILIRUB SERPL-MCNC: 0.2 MG/DL
BILIRUB UR QL STRIP: NEGATIVE
BUN SERPL-MCNC: 6.4 MG/DL (ref 6–20)
CALCIUM SERPL-MCNC: 9.4 MG/DL (ref 8.6–10)
CAOX CRY #/AREA URNS HPF: ABNORMAL /HPF
CHLORIDE SERPL-SCNC: 105 MMOL/L (ref 98–107)
CLUE CELLS: PRESENT
COLOR UR AUTO: YELLOW
CREAT SERPL-MCNC: 0.63 MG/DL (ref 0.51–0.95)
EGFRCR SERPLBLD CKD-EPI 2021: >90 ML/MIN/1.73M2
ERYTHROCYTE [DISTWIDTH] IN BLOOD BY AUTOMATED COUNT: 13.3 % (ref 10–15)
GLUCOSE SERPL-MCNC: 104 MG/DL (ref 70–99)
GLUCOSE UR STRIP-MCNC: NEGATIVE MG/DL
HCO3 SERPL-SCNC: 24 MMOL/L (ref 22–29)
HCT VFR BLD AUTO: 40.5 % (ref 35–47)
HGB BLD-MCNC: 13.4 G/DL (ref 11.7–15.7)
HGB UR QL STRIP: NEGATIVE
KETONES UR STRIP-MCNC: ABNORMAL MG/DL
LEUKOCYTE ESTERASE UR QL STRIP: ABNORMAL
LIPASE SERPL-CCNC: 40 U/L (ref 13–60)
MCH RBC QN AUTO: 29.3 PG (ref 26.5–33)
MCHC RBC AUTO-ENTMCNC: 33.1 G/DL (ref 31.5–36.5)
MCV RBC AUTO: 89 FL (ref 78–100)
MUCOUS THREADS #/AREA URNS LPF: PRESENT /LPF
NITRATE UR QL: NEGATIVE
PH UR STRIP: 5.5 [PH] (ref 5–7)
PLATELET # BLD AUTO: 332 10E3/UL (ref 150–450)
POTASSIUM SERPL-SCNC: 4.2 MMOL/L (ref 3.4–5.3)
PROT SERPL-MCNC: 7.4 G/DL (ref 6.4–8.3)
RBC # BLD AUTO: 4.57 10E6/UL (ref 3.8–5.2)
RBC #/AREA URNS AUTO: ABNORMAL /HPF
SODIUM SERPL-SCNC: 139 MMOL/L (ref 135–145)
SP GR UR STRIP: >=1.03 (ref 1–1.03)
SQUAMOUS #/AREA URNS AUTO: ABNORMAL /LPF
TRICHOMONAS, WET PREP: ABNORMAL
UROBILINOGEN UR STRIP-ACNC: 0.2 E.U./DL
WBC # BLD AUTO: 10.5 10E3/UL (ref 4–11)
WBC #/AREA URNS AUTO: ABNORMAL /HPF
WBC'S/HIGH POWER FIELD, WET PREP: ABNORMAL
YEAST, WET PREP: PRESENT

## 2023-09-28 PROCEDURE — 80053 COMPREHEN METABOLIC PANEL: CPT | Performed by: NURSE PRACTITIONER

## 2023-09-28 PROCEDURE — 87086 URINE CULTURE/COLONY COUNT: CPT | Performed by: NURSE PRACTITIONER

## 2023-09-28 PROCEDURE — 36415 COLL VENOUS BLD VENIPUNCTURE: CPT | Performed by: NURSE PRACTITIONER

## 2023-09-28 PROCEDURE — 82248 BILIRUBIN DIRECT: CPT | Performed by: NURSE PRACTITIONER

## 2023-09-28 PROCEDURE — 81001 URINALYSIS AUTO W/SCOPE: CPT | Performed by: NURSE PRACTITIONER

## 2023-09-28 PROCEDURE — 83690 ASSAY OF LIPASE: CPT | Performed by: NURSE PRACTITIONER

## 2023-09-28 PROCEDURE — 87210 SMEAR WET MOUNT SALINE/INK: CPT | Performed by: NURSE PRACTITIONER

## 2023-09-28 PROCEDURE — 99214 OFFICE O/P EST MOD 30 MIN: CPT | Performed by: NURSE PRACTITIONER

## 2023-09-28 PROCEDURE — 85027 COMPLETE CBC AUTOMATED: CPT | Performed by: NURSE PRACTITIONER

## 2023-09-28 RX ORDER — CLINDAMYCIN HCL 300 MG
300 CAPSULE ORAL 2 TIMES DAILY
Qty: 14 CAPSULE | Refills: 0 | Status: SHIPPED | OUTPATIENT
Start: 2023-09-28 | End: 2023-10-05

## 2023-09-28 RX ORDER — FLUCONAZOLE 150 MG/1
150 TABLET ORAL
Qty: 3 TABLET | Refills: 0 | Status: SHIPPED | OUTPATIENT
Start: 2023-09-28 | End: 2023-10-05

## 2023-09-28 ASSESSMENT — PATIENT HEALTH QUESTIONNAIRE - PHQ9
10. IF YOU CHECKED OFF ANY PROBLEMS, HOW DIFFICULT HAVE THESE PROBLEMS MADE IT FOR YOU TO DO YOUR WORK, TAKE CARE OF THINGS AT HOME, OR GET ALONG WITH OTHER PEOPLE: VERY DIFFICULT
SUM OF ALL RESPONSES TO PHQ QUESTIONS 1-9: 13
SUM OF ALL RESPONSES TO PHQ QUESTIONS 1-9: 13

## 2023-09-28 ASSESSMENT — PAIN SCALES - GENERAL: PAINLEVEL: MODERATE PAIN (5)

## 2023-09-28 NOTE — LETTER
September 28, 2023      Anne Ferreira  [address] [address]        To Whom It May Concern:    Anne Ferreira  was seen on 928/2023.  Please excuse her from work  due to illness.        Sincerely,        FRANKLYN Christianson CNP

## 2023-09-30 LAB — BACTERIA UR CULT: NO GROWTH

## 2023-10-01 DIAGNOSIS — I10 BENIGN ESSENTIAL HYPERTENSION: ICD-10-CM

## 2023-10-02 ENCOUNTER — OFFICE VISIT (OUTPATIENT)
Dept: URGENT CARE | Facility: URGENT CARE | Age: 43
End: 2023-10-02
Payer: COMMERCIAL

## 2023-10-02 VITALS
SYSTOLIC BLOOD PRESSURE: 142 MMHG | HEART RATE: 66 BPM | BODY MASS INDEX: 35.02 KG/M2 | DIASTOLIC BLOOD PRESSURE: 74 MMHG | WEIGHT: 204 LBS | OXYGEN SATURATION: 98 % | TEMPERATURE: 98.5 F

## 2023-10-02 DIAGNOSIS — R30.0 DYSURIA: ICD-10-CM

## 2023-10-02 DIAGNOSIS — R10.31 RLQ ABDOMINAL PAIN: Primary | ICD-10-CM

## 2023-10-02 LAB
ALBUMIN UR-MCNC: NEGATIVE MG/DL
APPEARANCE UR: CLEAR
BACTERIA #/AREA URNS HPF: ABNORMAL /HPF
BILIRUB UR QL STRIP: NEGATIVE
CLUE CELLS: NORMAL
CLUE CELLS: NORMAL
COLOR UR AUTO: YELLOW
GLUCOSE UR STRIP-MCNC: NEGATIVE MG/DL
HGB UR QL STRIP: ABNORMAL
KETONES UR STRIP-MCNC: NEGATIVE MG/DL
LEUKOCYTE ESTERASE UR QL STRIP: NEGATIVE
MUCOUS THREADS #/AREA URNS LPF: PRESENT /LPF
NITRATE UR QL: NEGATIVE
PH UR STRIP: 6 [PH] (ref 5–7)
RBC #/AREA URNS AUTO: ABNORMAL /HPF
SP GR UR STRIP: >=1.03 (ref 1–1.03)
SQUAMOUS #/AREA URNS AUTO: ABNORMAL /LPF
TRICHOMONAS, WET PREP: NORMAL
TRICHOMONAS, WET PREP: NORMAL
UROBILINOGEN UR STRIP-ACNC: 0.2 E.U./DL
WBC #/AREA URNS AUTO: ABNORMAL /HPF
WBC'S/HIGH POWER FIELD, WET PREP: NORMAL
WBC'S/HIGH POWER FIELD, WET PREP: NORMAL
YEAST, WET PREP: NORMAL
YEAST, WET PREP: NORMAL

## 2023-10-02 PROCEDURE — 81001 URINALYSIS AUTO W/SCOPE: CPT | Performed by: PHYSICIAN ASSISTANT

## 2023-10-02 PROCEDURE — 99215 OFFICE O/P EST HI 40 MIN: CPT | Performed by: PHYSICIAN ASSISTANT

## 2023-10-02 PROCEDURE — 87086 URINE CULTURE/COLONY COUNT: CPT | Performed by: PHYSICIAN ASSISTANT

## 2023-10-02 PROCEDURE — 87210 SMEAR WET MOUNT SALINE/INK: CPT | Performed by: PHYSICIAN ASSISTANT

## 2023-10-02 RX ORDER — AMLODIPINE BESYLATE 2.5 MG/1
2.5 TABLET ORAL DAILY
Qty: 90 TABLET | Refills: 3 | Status: SHIPPED | OUTPATIENT
Start: 2023-10-02 | End: 2024-05-07

## 2023-10-02 NOTE — PROGRESS NOTES
Assessment & Plan     RLQ abdominal pain  Patient having ongoing RLQ abdominal pan x 1 month. Has had 2 CT scans and 1 ultrasound which have been within normal limits. Lab work has been unremarkable. Patient is frustrated with ongoing discomfort. Would recommend follow up with general surgery. Discussed in detail symptoms that would warrant emergent evaluation in the ED. Patient agrees with plan.      - Adult General Surg Referral; Future  - Urine Culture Aerobic Bacterial - lab collect; Future  - Urine Culture Aerobic Bacterial - lab collect    Dysuria  Culture pending. Will treat based on culture.   - UA Macroscopic with reflex to Microscopic and Culture - Clinic Collect  - Wet prep - Clinic Collect  - UA Microscopic with Reflex to Culture  - Wet prep - Clinic Collect      40 minutes spent by me on the date of the encounter doing chart review, history and exam, documentation and further activities per the note           Return in about 1 week (around 10/9/2023) for Follow up.    Mayte De Jesus PA-C  [facility]              Subjective   Chief Complaint   Patient presents with    Urinary Problem     Still has pain on the side, says has so much pressure in lower abdominal area, says pain is very bad, 5th day on clindamycin, says has UTI, and BV going on right now.        HPI     Abdominal Pain    Location: RUQ and lower abdominal pain   Pain character: dull and aching,   Severity: 4 on a scale of 1-10.    Duration: 1 month(s)   Course of Illness: fluctuating.  Exacerbated by: movement  Relieved by: sitting makes it better.  Associated Symptoms: nausea and dysuria.  Female : hysterectomy 4/22  Surgical History: cholecystectomy                   Review of Systems         Objective    BP (!) 142/74   Pulse 66   Temp 98.5  F (36.9  C) (Tympanic)   Wt 92.5 kg (204 lb)   LMP 03/02/2022 (LMP Unknown)   SpO2 98%   BMI 35.02 kg/m    Body mass index is 35.02 kg/m .  Physical  Exam  Constitutional:       General: She is not in acute distress.  Abdominal:      General: Bowel sounds are normal.      Palpations: Abdomen is soft.      Tenderness: There is abdominal tenderness in the right upper quadrant, right lower quadrant and periumbilical area. There is no guarding or rebound.   Genitourinary:     Vagina: No foreign body. Vaginal discharge (white) present. No erythema or bleeding.      Comments: Wet prep collected given moderate white discharge with speculum exam  Neurological:      Mental Status: She is alert.            Results for orders placed or performed in visit on 10/02/23 (from the past 24 hour(s))   UA Macroscopic with reflex to Microscopic and Culture - Clinic Collect    Specimen: Urine, Clean Catch   Result Value Ref Range    Color Urine Yellow Colorless, Straw, Light Yellow, Yellow    Appearance Urine Clear Clear    Glucose Urine Negative Negative mg/dL    Bilirubin Urine Negative Negative    Ketones Urine Negative Negative mg/dL    Specific Gravity Urine >=1.030 1.003 - 1.035    Blood Urine Moderate (A) Negative    pH Urine 6.0 5.0 - 7.0    Protein Albumin Urine Negative Negative mg/dL    Urobilinogen Urine 0.2 0.2, 1.0 E.U./dL    Nitrite Urine Negative Negative    Leukocyte Esterase Urine Negative Negative   Wet prep - Clinic Collect    Specimen: Vagina; Swab   Result Value Ref Range    Trichomonas Absent Absent    Yeast Absent Absent    Clue Cells Absent Absent    WBCs/high power field None None   UA Microscopic with Reflex to Culture   Result Value Ref Range    Bacteria Urine Few (A) None Seen /HPF    RBC Urine 2-5 (A) 0-2 /HPF /HPF    WBC Urine 0-5 0-5 /HPF /HPF    Squamous Epithelials Urine Moderate (A) None Seen /LPF    Mucus Urine Present (A) None Seen /LPF    Narrative    Urine Culture not indicated   Wet prep - Clinic Collect    Specimen: Vagina; Swab   Result Value Ref Range    Trichomonas Absent Absent    Yeast Absent Absent    Clue Cells Absent Absent     WBCs/high power field None None

## 2023-10-04 ENCOUNTER — TELEPHONE (OUTPATIENT)
Dept: NEUROSURGERY | Facility: CLINIC | Age: 43
End: 2023-10-04
Payer: COMMERCIAL

## 2023-10-04 ENCOUNTER — TELEPHONE (OUTPATIENT)
Dept: FAMILY MEDICINE | Facility: CLINIC | Age: 43
End: 2023-10-04
Payer: COMMERCIAL

## 2023-10-04 ENCOUNTER — TELEPHONE (OUTPATIENT)
Dept: NEUROSURGERY | Facility: CLINIC | Age: 43
End: 2023-10-04

## 2023-10-04 LAB — BACTERIA UR CULT: NO GROWTH

## 2023-10-04 NOTE — TELEPHONE ENCOUNTER
Patient was scheduled to see Dr. Alvarado today, same day add on.     Called patient to reschedule as Dr. Alvarado does not see same day add ons.     Patient should be scheduled with an RODRÍGUEZ for further work up.     Patient in agreement.     Patient reporting Low back pain and neck pain. She reports bowel and bladder incontinence. Reviewed that bowel/bladder incontinence are red flag symptoms are require ED.

## 2023-10-04 NOTE — TELEPHONE ENCOUNTER
Patient was told by clinic she can't be seen today by  due to not having recent MRI. I tried to reschedule her appt with RODRÍGUEZ and nothing available till end of November. Patient prefers to be seen at a different clinic because she wants to be seen earlier.

## 2023-10-04 NOTE — TELEPHONE ENCOUNTER
General Call      Reason for Call:      What are your questions or concerns:  Pt calling because she has been seeing Bertha for lower back pain and other issues, was supposed to have an appt today with neurosurgery for red flag spine appointment, but provider she was scheduled with does not see same day add ons. They tried rescheduling pt but nothing available until end of November. Pt does not want to wait this long. Not sure what to do.      [facility] or would you prefer to receive a phone call?:   Patient would prefer a phone call   Okay to leave a detailed message?: Yes at Home number on file [phone] (home)

## 2023-10-05 NOTE — TELEPHONE ENCOUNTER
Referral for neurosurgery was made in June 19, 2023 was a nonurgent referral.  So patient will need to be seen by neurosurgery at the earliest, appointment.  If patient has symptoms changing and new red flag symptoms such as urinary incontinence that she states she should be seen in the emergency room.    Bertha Loya CNP

## 2023-10-09 DIAGNOSIS — F90.2 ATTENTION DEFICIT HYPERACTIVITY DISORDER (ADHD), COMBINED TYPE: ICD-10-CM

## 2023-10-09 RX ORDER — DEXMETHYLPHENIDATE HYDROCHLORIDE 10 MG/1
10 TABLET ORAL 2 TIMES DAILY
Qty: 60 TABLET | Refills: 0 | Status: SHIPPED | OUTPATIENT
Start: 2023-10-09 | End: 2023-10-12

## 2023-10-11 ENCOUNTER — APPOINTMENT (OUTPATIENT)
Dept: CT IMAGING | Facility: CLINIC | Age: 43
End: 2023-10-11
Attending: EMERGENCY MEDICINE
Payer: COMMERCIAL

## 2023-10-11 ENCOUNTER — HOSPITAL ENCOUNTER (EMERGENCY)
Facility: CLINIC | Age: 43
Discharge: HOME | End: 2023-10-11
Attending: EMERGENCY MEDICINE | Admitting: EMERGENCY MEDICINE
Payer: COMMERCIAL

## 2023-10-11 ENCOUNTER — HOSPITAL ENCOUNTER (EMERGENCY)
Facility: CLINIC | Age: 43
Discharge: HOME | End: 2023-10-11
Attending: FAMILY MEDICINE | Admitting: FAMILY MEDICINE
Payer: COMMERCIAL

## 2023-10-11 VITALS
BODY MASS INDEX: 35.02 KG/M2 | WEIGHT: 204 LBS | TEMPERATURE: 98.2 F | OXYGEN SATURATION: 99 % | DIASTOLIC BLOOD PRESSURE: 58 MMHG | HEART RATE: 74 BPM | RESPIRATION RATE: 20 BRPM | SYSTOLIC BLOOD PRESSURE: 132 MMHG

## 2023-10-11 VITALS
OXYGEN SATURATION: 94 % | TEMPERATURE: 98.5 F | HEART RATE: 74 BPM | SYSTOLIC BLOOD PRESSURE: 131 MMHG | DIASTOLIC BLOOD PRESSURE: 75 MMHG | RESPIRATION RATE: 18 BRPM

## 2023-10-11 DIAGNOSIS — N20.1 URETERAL STONE: ICD-10-CM

## 2023-10-11 DIAGNOSIS — N20.1 RIGHT URETERAL STONE: ICD-10-CM

## 2023-10-11 DIAGNOSIS — R10.9 FLANK PAIN: ICD-10-CM

## 2023-10-11 LAB
ALBUMIN UR-MCNC: NEGATIVE MG/DL
ANION GAP SERPL CALCULATED.3IONS-SCNC: 10 MMOL/L (ref 7–15)
ANION GAP SERPL CALCULATED.3IONS-SCNC: 15 MMOL/L (ref 7–15)
APPEARANCE UR: ABNORMAL
BASO+EOS+MONOS # BLD AUTO: ABNORMAL 10*3/UL
BASO+EOS+MONOS # BLD AUTO: ABNORMAL 10*3/UL
BASO+EOS+MONOS NFR BLD AUTO: ABNORMAL %
BASO+EOS+MONOS NFR BLD AUTO: ABNORMAL %
BASOPHILS # BLD AUTO: 0.1 10E3/UL (ref 0–0.2)
BASOPHILS # BLD AUTO: ABNORMAL 10*3/UL
BASOPHILS # BLD MANUAL: 0 10E3/UL (ref 0–0.2)
BASOPHILS NFR BLD AUTO: 0 %
BASOPHILS NFR BLD AUTO: ABNORMAL %
BASOPHILS NFR BLD MANUAL: 0 %
BILIRUB UR QL STRIP: NEGATIVE
BUN SERPL-MCNC: 15 MG/DL (ref 6–20)
BUN SERPL-MCNC: 16.1 MG/DL (ref 6–20)
CALCIUM SERPL-MCNC: 8.9 MG/DL (ref 8.6–10)
CALCIUM SERPL-MCNC: 9.6 MG/DL (ref 8.6–10)
CHLORIDE SERPL-SCNC: 103 MMOL/L (ref 98–107)
CHLORIDE SERPL-SCNC: 99 MMOL/L (ref 98–107)
COLOR UR AUTO: YELLOW
CREAT SERPL-MCNC: 0.66 MG/DL (ref 0.51–0.95)
CREAT SERPL-MCNC: 0.94 MG/DL (ref 0.51–0.95)
DEPRECATED HCO3 PLAS-SCNC: 20 MMOL/L (ref 22–29)
DEPRECATED HCO3 PLAS-SCNC: 24 MMOL/L (ref 22–29)
EGFRCR SERPLBLD CKD-EPI 2021: 77 ML/MIN/1.73M2
EGFRCR SERPLBLD CKD-EPI 2021: >90 ML/MIN/1.73M2
EOSINOPHIL # BLD AUTO: 0.2 10E3/UL (ref 0–0.7)
EOSINOPHIL # BLD AUTO: ABNORMAL 10*3/UL
EOSINOPHIL # BLD MANUAL: 0.2 10E3/UL (ref 0–0.7)
EOSINOPHIL NFR BLD AUTO: 2 %
EOSINOPHIL NFR BLD AUTO: ABNORMAL %
EOSINOPHIL NFR BLD MANUAL: 1 %
ERYTHROCYTE [DISTWIDTH] IN BLOOD BY AUTOMATED COUNT: 13.2 % (ref 10–15)
ERYTHROCYTE [DISTWIDTH] IN BLOOD BY AUTOMATED COUNT: 13.2 % (ref 10–15)
GLUCOSE SERPL-MCNC: 111 MG/DL (ref 70–99)
GLUCOSE SERPL-MCNC: 112 MG/DL (ref 70–99)
GLUCOSE UR STRIP-MCNC: NEGATIVE MG/DL
HCT VFR BLD AUTO: 36.3 % (ref 35–47)
HCT VFR BLD AUTO: 38.8 % (ref 35–47)
HGB BLD-MCNC: 12 G/DL (ref 11.7–15.7)
HGB BLD-MCNC: 12.9 G/DL (ref 11.7–15.7)
HGB UR QL STRIP: ABNORMAL
HOLD SPECIMEN: NORMAL
IMM GRANULOCYTES # BLD: 0.1 10E3/UL
IMM GRANULOCYTES # BLD: ABNORMAL 10*3/UL
IMM GRANULOCYTES NFR BLD: 1 %
IMM GRANULOCYTES NFR BLD: ABNORMAL %
KETONES UR STRIP-MCNC: NEGATIVE MG/DL
LEUKOCYTE ESTERASE UR QL STRIP: NEGATIVE
LYMPHOCYTES # BLD AUTO: 2.1 10E3/UL (ref 0.8–5.3)
LYMPHOCYTES # BLD AUTO: ABNORMAL 10*3/UL
LYMPHOCYTES # BLD MANUAL: 4.7 10E3/UL (ref 0.8–5.3)
LYMPHOCYTES NFR BLD AUTO: 17 %
LYMPHOCYTES NFR BLD AUTO: ABNORMAL %
LYMPHOCYTES NFR BLD MANUAL: 22 %
MCH RBC QN AUTO: 29.6 PG (ref 26.5–33)
MCH RBC QN AUTO: 29.7 PG (ref 26.5–33)
MCHC RBC AUTO-ENTMCNC: 33.1 G/DL (ref 31.5–36.5)
MCHC RBC AUTO-ENTMCNC: 33.2 G/DL (ref 31.5–36.5)
MCV RBC AUTO: 89 FL (ref 78–100)
MCV RBC AUTO: 89 FL (ref 78–100)
MONOCYTES # BLD AUTO: 1 10E3/UL (ref 0–1.3)
MONOCYTES # BLD AUTO: ABNORMAL 10*3/UL
MONOCYTES # BLD MANUAL: 1.9 10E3/UL (ref 0–1.3)
MONOCYTES NFR BLD AUTO: 9 %
MONOCYTES NFR BLD AUTO: ABNORMAL %
MONOCYTES NFR BLD MANUAL: 9 %
MUCOUS THREADS #/AREA URNS LPF: PRESENT /LPF
NEUTROPHILS # BLD AUTO: 8.6 10E3/UL (ref 1.6–8.3)
NEUTROPHILS # BLD AUTO: ABNORMAL 10*3/UL
NEUTROPHILS # BLD MANUAL: 14.4 10E3/UL (ref 1.6–8.3)
NEUTROPHILS NFR BLD AUTO: 71 %
NEUTROPHILS NFR BLD AUTO: ABNORMAL %
NEUTROPHILS NFR BLD MANUAL: 68 %
NITRATE UR QL: NEGATIVE
NRBC # BLD AUTO: 0 10E3/UL
NRBC # BLD AUTO: 0 10E3/UL
NRBC BLD AUTO-RTO: 0 /100
NRBC BLD AUTO-RTO: 0 /100
PH UR STRIP: 5 [PH] (ref 5–7)
PLAT MORPH BLD: ABNORMAL
PLATELET # BLD AUTO: 304 10E3/UL (ref 150–450)
PLATELET # BLD AUTO: 389 10E3/UL (ref 150–450)
POTASSIUM SERPL-SCNC: 4.3 MMOL/L (ref 3.4–5.3)
POTASSIUM SERPL-SCNC: 4.4 MMOL/L (ref 3.4–5.3)
RBC # BLD AUTO: 4.06 10E6/UL (ref 3.8–5.2)
RBC # BLD AUTO: 4.35 10E6/UL (ref 3.8–5.2)
RBC MORPH BLD: ABNORMAL
RBC URINE: 41 /HPF
SODIUM SERPL-SCNC: 134 MMOL/L (ref 135–145)
SODIUM SERPL-SCNC: 137 MMOL/L (ref 135–145)
SP GR UR STRIP: 1.02 (ref 1–1.03)
SQUAMOUS EPITHELIAL: 6 /HPF
UROBILINOGEN UR STRIP-MCNC: NORMAL MG/DL
WBC # BLD AUTO: 12.1 10E3/UL (ref 4–11)
WBC # BLD AUTO: 21.2 10E3/UL (ref 4–11)
WBC URINE: 6 /HPF

## 2023-10-11 PROCEDURE — 250N000011 HC RX IP 250 OP 636: Mod: JZ | Performed by: FAMILY MEDICINE

## 2023-10-11 PROCEDURE — 96375 TX/PRO/DX INJ NEW DRUG ADDON: CPT

## 2023-10-11 PROCEDURE — 85025 COMPLETE CBC W/AUTO DIFF WBC: CPT | Performed by: EMERGENCY MEDICINE

## 2023-10-11 PROCEDURE — 51798 US URINE CAPACITY MEASURE: CPT

## 2023-10-11 PROCEDURE — 80048 BASIC METABOLIC PNL TOTAL CA: CPT | Performed by: EMERGENCY MEDICINE

## 2023-10-11 PROCEDURE — 81001 URINALYSIS AUTO W/SCOPE: CPT | Performed by: EMERGENCY MEDICINE

## 2023-10-11 PROCEDURE — 36415 COLL VENOUS BLD VENIPUNCTURE: CPT | Performed by: FAMILY MEDICINE

## 2023-10-11 PROCEDURE — 99285 EMERGENCY DEPT VISIT HI MDM: CPT | Mod: 25

## 2023-10-11 PROCEDURE — 96374 THER/PROPH/DIAG INJ IV PUSH: CPT

## 2023-10-11 PROCEDURE — 80048 BASIC METABOLIC PNL TOTAL CA: CPT | Performed by: FAMILY MEDICINE

## 2023-10-11 PROCEDURE — 96361 HYDRATE IV INFUSION ADD-ON: CPT

## 2023-10-11 PROCEDURE — 85007 BL SMEAR W/DIFF WBC COUNT: CPT | Performed by: EMERGENCY MEDICINE

## 2023-10-11 PROCEDURE — 250N000013 HC RX MED GY IP 250 OP 250 PS 637: Performed by: EMERGENCY MEDICINE

## 2023-10-11 PROCEDURE — 99285 EMERGENCY DEPT VISIT HI MDM: CPT | Performed by: EMERGENCY MEDICINE

## 2023-10-11 PROCEDURE — 74176 CT ABD & PELVIS W/O CONTRAST: CPT

## 2023-10-11 PROCEDURE — 36415 COLL VENOUS BLD VENIPUNCTURE: CPT | Performed by: EMERGENCY MEDICINE

## 2023-10-11 PROCEDURE — 258N000003 HC RX IP 258 OP 636: Performed by: EMERGENCY MEDICINE

## 2023-10-11 PROCEDURE — 250N000011 HC RX IP 250 OP 636: Mod: JZ | Performed by: EMERGENCY MEDICINE

## 2023-10-11 PROCEDURE — 85027 COMPLETE CBC AUTOMATED: CPT | Performed by: FAMILY MEDICINE

## 2023-10-11 PROCEDURE — 258N000003 HC RX IP 258 OP 636: Performed by: FAMILY MEDICINE

## 2023-10-11 PROCEDURE — 99284 EMERGENCY DEPT VISIT MOD MDM: CPT | Performed by: FAMILY MEDICINE

## 2023-10-11 PROCEDURE — 99284 EMERGENCY DEPT VISIT MOD MDM: CPT | Mod: 25

## 2023-10-11 RX ORDER — OXYCODONE HYDROCHLORIDE 5 MG/1
10 TABLET ORAL EVERY 4 HOURS PRN
Status: DISCONTINUED | OUTPATIENT
Start: 2023-10-11 | End: 2023-10-11 | Stop reason: HOSPADM

## 2023-10-11 RX ORDER — HYDROMORPHONE HYDROCHLORIDE 1 MG/ML
0.5 INJECTION, SOLUTION INTRAMUSCULAR; INTRAVENOUS; SUBCUTANEOUS ONCE
Status: DISCONTINUED | OUTPATIENT
Start: 2023-10-11 | End: 2023-10-11 | Stop reason: HOSPADM

## 2023-10-11 RX ORDER — FENTANYL CITRATE 50 UG/ML
50 INJECTION, SOLUTION INTRAMUSCULAR; INTRAVENOUS ONCE
Status: COMPLETED | OUTPATIENT
Start: 2023-10-11 | End: 2023-10-11

## 2023-10-11 RX ORDER — TAMSULOSIN HYDROCHLORIDE 0.4 MG/1
0.4 CAPSULE ORAL DAILY
Qty: 7 CAPSULE | Refills: 0 | Status: SHIPPED | OUTPATIENT
Start: 2023-10-11 | End: 2023-12-19

## 2023-10-11 RX ORDER — KETOROLAC TROMETHAMINE 15 MG/ML
10 INJECTION, SOLUTION INTRAMUSCULAR; INTRAVENOUS ONCE
Status: COMPLETED | OUTPATIENT
Start: 2023-10-11 | End: 2023-10-11

## 2023-10-11 RX ORDER — LORAZEPAM 2 MG/ML
1 INJECTION INTRAMUSCULAR ONCE
Status: COMPLETED | OUTPATIENT
Start: 2023-10-11 | End: 2023-10-11

## 2023-10-11 RX ORDER — ONDANSETRON 4 MG/1
4 TABLET, ORALLY DISINTEGRATING ORAL EVERY 8 HOURS PRN
Qty: 10 TABLET | Refills: 0 | Status: SHIPPED | OUTPATIENT
Start: 2023-10-11 | End: 2023-12-19

## 2023-10-11 RX ORDER — LORAZEPAM 0.5 MG/1
.5-1 TABLET ORAL EVERY 6 HOURS PRN
Qty: 6 TABLET | Refills: 0 | Status: SHIPPED | OUTPATIENT
Start: 2023-10-11 | End: 2023-12-19

## 2023-10-11 RX ORDER — TAMSULOSIN HYDROCHLORIDE 0.4 MG/1
0.4 CAPSULE ORAL ONCE
Status: COMPLETED | OUTPATIENT
Start: 2023-10-11 | End: 2023-10-11

## 2023-10-11 RX ORDER — OXYCODONE HYDROCHLORIDE 5 MG/1
5 TABLET ORAL EVERY 6 HOURS PRN
Qty: 10 TABLET | Refills: 0 | Status: SHIPPED | OUTPATIENT
Start: 2023-10-11 | End: 2023-12-19

## 2023-10-11 RX ORDER — HYDROMORPHONE HYDROCHLORIDE 2 MG/1
2-4 TABLET ORAL EVERY 8 HOURS PRN
Qty: 12 TABLET | Refills: 0 | Status: SHIPPED | OUTPATIENT
Start: 2023-10-11 | End: 2023-12-19

## 2023-10-11 RX ORDER — KETOROLAC TROMETHAMINE 15 MG/ML
15 INJECTION, SOLUTION INTRAMUSCULAR; INTRAVENOUS ONCE
Status: COMPLETED | OUTPATIENT
Start: 2023-10-11 | End: 2023-10-11

## 2023-10-11 RX ORDER — HYDROMORPHONE HYDROCHLORIDE 1 MG/ML
0.5 INJECTION, SOLUTION INTRAMUSCULAR; INTRAVENOUS; SUBCUTANEOUS EVERY 30 MIN PRN
Status: DISCONTINUED | OUTPATIENT
Start: 2023-10-11 | End: 2023-10-11 | Stop reason: HOSPADM

## 2023-10-11 RX ORDER — HYDROMORPHONE HYDROCHLORIDE 1 MG/ML
0.5 INJECTION, SOLUTION INTRAMUSCULAR; INTRAVENOUS; SUBCUTANEOUS
Status: DISCONTINUED | OUTPATIENT
Start: 2023-10-11 | End: 2023-10-11 | Stop reason: HOSPADM

## 2023-10-11 RX ORDER — ONDANSETRON 2 MG/ML
4 INJECTION INTRAMUSCULAR; INTRAVENOUS EVERY 30 MIN PRN
Status: DISCONTINUED | OUTPATIENT
Start: 2023-10-11 | End: 2023-10-11 | Stop reason: HOSPADM

## 2023-10-11 RX ADMIN — TAMSULOSIN HYDROCHLORIDE 0.4 MG: 0.4 CAPSULE ORAL at 03:32

## 2023-10-11 RX ADMIN — FENTANYL CITRATE 50 MCG: 50 INJECTION INTRAMUSCULAR; INTRAVENOUS at 01:29

## 2023-10-11 RX ADMIN — KETOROLAC TROMETHAMINE 15 MG: 15 INJECTION, SOLUTION INTRAMUSCULAR; INTRAVENOUS at 01:35

## 2023-10-11 RX ADMIN — HYDROMORPHONE HYDROCHLORIDE 0.5 MG: 1 INJECTION, SOLUTION INTRAMUSCULAR; INTRAVENOUS; SUBCUTANEOUS at 14:12

## 2023-10-11 RX ADMIN — SODIUM CHLORIDE 1000 ML: 9 INJECTION, SOLUTION INTRAVENOUS at 01:30

## 2023-10-11 RX ADMIN — ONDANSETRON 4 MG: 2 INJECTION INTRAMUSCULAR; INTRAVENOUS at 01:28

## 2023-10-11 RX ADMIN — KETOROLAC TROMETHAMINE 10 MG: 15 INJECTION, SOLUTION INTRAMUSCULAR; INTRAVENOUS at 14:21

## 2023-10-11 RX ADMIN — SODIUM CHLORIDE 1000 ML: 9 INJECTION, SOLUTION INTRAVENOUS at 14:21

## 2023-10-11 RX ADMIN — SODIUM CHLORIDE 1000 ML: 9 INJECTION, SOLUTION INTRAVENOUS at 03:33

## 2023-10-11 RX ADMIN — HYDROMORPHONE HYDROCHLORIDE 0.5 MG: 1 INJECTION, SOLUTION INTRAMUSCULAR; INTRAVENOUS; SUBCUTANEOUS at 03:20

## 2023-10-11 RX ADMIN — LORAZEPAM 1 MG: 2 INJECTION INTRAMUSCULAR; INTRAVENOUS at 03:32

## 2023-10-11 ASSESSMENT — ACTIVITIES OF DAILY LIVING (ADL)
ADLS_ACUITY_SCORE: 35
ADLS_ACUITY_SCORE: 35

## 2023-10-11 NOTE — ED PROVIDER NOTES
HPI   Patient is a 42-year-old female presenting with her mom by private car for persistent right flank pain and decreased urine output.  Patient was seen overnight and discharged this morning at about 6:00 AM.  She had a CT scan of her abdomen and pelvis showing a UVJ 3 mm stone on the right.  Her urine analysis did not suggest infection.  Her white blood cell count was high.  She did not have a fever.  She was given oxycodone and Zofran.  She denies vomiting.  She has taken one oxycodone at 6:00 AM and then another oxycodone at 10:30 AM.  She normally takes 3 oxycodone daily for neck pain.  She has hypertension and takes Norvasc and lisinopril.  She has anxiety/depression and ADHD, taking Lexapro and propanolol.  She has had a total hysterectomy.    The patient has right lower quadrant abdominal and pelvic pain.  She describes it as radiating from the right flank to the right lower quadrant.  She has urinary urgency and small drops of urine produced.  No dysuria.  No fever.  No vomiting.  Pain is similar to what she has experienced overnight.      Allergies:  Allergies   Allergen Reactions    Bupropion Nausea    Sulfa Antibiotics      Problem List:    Patient Active Problem List    Diagnosis Date Noted    F11.2 - Continuous opioid dependence (H) 05/18/2023     Priority: Medium    S/P cervical spinal fusion 03/20/2023     Priority: Medium     Added automatically from request for surgery [id number]      COPD (chronic obstructive pulmonary disease) (H) 07/27/2021     Priority: Medium    Moderate episode of recurrent major depressive disorder (H) 05/21/2021     Priority: Medium    Symptomatic cholelithiasis 12/15/2020     Priority: Medium     Added automatically from request for surgery [id number]      Morbid obesity (H) 12/11/2020     Priority: Medium    Dyslipidemia 11/10/2020     Priority: Medium    Dysmenorrhea 11/10/2020     Priority: Medium     treated with continuous OCPs      GERD (gastroesophageal reflux disease)  11/10/2020     Priority: Medium    Thoracic back pain 11/10/2020     Priority: Medium     left, approx T10      H/O LEEP      Priority: Medium     1998 LEEP- Unknown results.  2010 NIL pap.  2/2/12 COLP and ECC- DARREN 1.  2013 NIL pap  4/7/15 LSIL pap, + HR HPV   8/13/15 COLP- DARREN 1, ECC- Negative.  6/10/16 NIL pap, Neg HPV.  5/14/19 NIL pap, Neg HPV.  Above results found in [facility]  10/15/20 NIL pap, Neg HPV. Plan cotest in 3 years.       Cervical spinal stenosis 07/01/2019     Priority: Medium     7/2019 MRI - At C5-6, broad-based central disc extrusion demonstrating caudal migration mildly deforms the cord and severely narrows the spinal canal. Uncovertebral joint spurring contributes to mild to moderate left neural foraminal narrowing with potential left C6 nerve root encroachment.      Herniation of cervical intervertebral disc with radiculopathy 07/01/2019     Priority: Medium     see MRI 7/2019      Attention deficit hyperactivity disorder, combined type, mild 08/29/2018     Priority: Medium    Generalized anxiety disorder 08/29/2018     Priority: Medium    Allergic rhinitis 04/01/2008     Priority: Medium      Past Medical History:    Past Medical History:   Diagnosis Date    Abnormal Pap smear of cervix      Past Surgical History:    Past Surgical History:   Procedure Laterality Date    CYSTOSCOPY N/A 4/25/2022    Procedure: CYSTOSCOPY;  Surgeon: Julius Montejo MD;  Location: [facility] OR    INJECT EPIDURAL CERVICAL  3/30/2023    Procedure: INJECTION, SPINE, CERVICAL, EPIDURAL;  Surgeon: Naresh Zuniga MD;  [place] [facility] [place]    LAPAROSCOPIC CHOLECYSTECTOMY N/A 12/21/2020    Procedure: Laparoscopic cholecystectomy;  Surgeon: Manuel Durham DO;  Location: [facility] OR    LAPAROSCOPIC HYSTERECTOMY TOTAL, BILATERAL SALPINGO-OOPHORECTOMY, COMBINED Bilateral 4/25/2022    Procedure: Laparoscopic total hysterectomy, Bilateral salpingectomy, with sparing of ovaries;  Surgeon: Julius Montejo MD;  Location: [facility] OR      Family History:    Family History   Problem Relation Age of Onset    Hypertension Father     Hypertension Brother     Hypertension Sister     Hypertension Brother      Social History:  Marital Status:   [4]  Social History     Tobacco Use    Smoking status: Every Day     Packs/day: .5     Types: Cigarettes    Smokeless tobacco: Never   Vaping Use    Vaping Use: Never used   Substance Use Topics    Alcohol use: Yes     Comment: occassionally    Drug use: Never      Medications:    HYDROmorphone (DILAUDID) 2 MG tablet  LORazepam (ATIVAN) 0.5 MG tablet  acetaminophen (TYLENOL) 325 MG tablet  albuterol (PROAIR HFA/PROVENTIL HFA/VENTOLIN HFA) 108 (90 Base) MCG/ACT inhaler  amLODIPine (NORVASC) 2.5 MG tablet  cyclobenzaprine (FLEXERIL) 10 MG tablet  dexmethylphenidate (FOCALIN) 10 MG tablet  dexmethylphenidate (FOCALIN) 10 MG tablet  diphenhydrAMINE (BENADRYL) 25 MG tablet  escitalopram (LEXAPRO) 20 MG tablet  hydrOXYzine (ATARAX) 25 MG tablet  ibuprofen (ADVIL/MOTRIN) 200 MG tablet  ipratropium - albuterol 0.5 mg/2.5 mg/3 mL (DUONEB) 0.5-2.5 (3) MG/3ML neb solution  lisinopril (ZESTRIL) 40 MG tablet  montelukast (SINGULAIR) 10 MG tablet  omeprazole (PRILOSEC) 20 MG DR capsule  ondansetron (ZOFRAN ODT) 4 MG ODT tab  ondansetron (ZOFRAN ODT) 4 MG ODT tab  oxyCODONE (ROXICODONE) 5 MG tablet  oxyCODONE (ROXICODONE) 5 MG tablet  propranolol (INDERAL) 80 MG tablet  tamsulosin (FLOMAX) 0.4 MG capsule  triamcinolone (KENALOG) 0.1 % external cream      Review of Systems   All other systems reviewed and are negative.      PE   BP: (!) 144/87  Pulse: 77  Temp: 98.2  F (36.8  C)  Resp: 20  Weight: 92.5 kg (204 lb)  SpO2: 97 %  Physical Exam  Vitals reviewed.   Constitutional:       General: She is in acute distress.      Appearance: She is well-developed.      Comments: Tearful, frustrated.   HENT:      Head: Normocephalic and atraumatic.      Right Ear: External ear normal.      Left Ear: External ear normal.      Nose:  Nose normal.      Mouth/Throat:      Mouth: Mucous membranes are moist.      Pharynx: Oropharynx is clear.   Eyes:      Extraocular Movements: Extraocular movements intact.      Conjunctiva/sclera: Conjunctivae normal.      Pupils: Pupils are equal, round, and reactive to light.   Cardiovascular:      Rate and Rhythm: Normal rate and regular rhythm.   Pulmonary:      Effort: Pulmonary effort is normal.   Abdominal:      Comments: Generalized discomfort with tenderness on the right.  Soft throughout.   Musculoskeletal:         General: Normal range of motion.      Cervical back: Normal range of motion.   Skin:     General: Skin is warm and dry.   Neurological:      Mental Status: She is alert and oriented to person, place, and time.   Psychiatric:         Behavior: Behavior normal.         ED COURSE and MDM   1349.  Patient has symptoms and signs as described above.  Patient has a UVJ stone on the right.  Low concern for infectious complication.  White blood cell count elevation thought to be related to demargination, stress response.  I think this is reasonable.  Toradol, Dilaudid, fluid bolus, Zofran as needed.  Repeat blood work.    2144.  Alternately, patient improved and wanted to be discharged home.  I switch from oxycodone to Dilaudid.  White blood cell count improved, no fever.  Low concern for ureteral obstruction and UTI.  No antibiotics at this time.  Follow-up discussed.  Return for worsening as discussed.    Electronic medical chart reviewed, including medical problems, medications, medical allergies, social history.  Recent hospitalizations and surgical procedures reviewed.  Recent clinic visits and consultations reviewed.  Recent labs and test results reviewed.  Nursing notes reviewed.    The patient, their parent if applicable, and/or their medical decision maker(s) and I have reviewed all of the available historical information, applicable PMH, physical exam findings, and objective diagnostic data  gathered during this ED visit.  We then discussed all work-up options and then together agreed upon the course taken during this visit.  The ultimate disposition and plan was a cooperative decision made between myself and the patient, their parent if applicable, and/or their legal decision maker(s).  The risks and benefits of all decisions made during this visit were discussed to the best of my abilities given the circumstances, and all parties are understanding of the pertinent ramifications of these decisions.      LABS  Labs Ordered and Resulted from Time of ED Arrival to Time of ED Departure   BASIC METABOLIC PANEL - Abnormal       Result Value    Sodium 137      Potassium 4.3      Chloride 103      Carbon Dioxide (CO2) 24      Anion Gap 10      Urea Nitrogen 16.1      Creatinine 0.94      GFR Estimate 77      Calcium 8.9      Glucose 111 (*)    CBC WITH PLATELETS AND DIFFERENTIAL - Abnormal    WBC Count 12.1 (*)     RBC Count 4.06      Hemoglobin 12.0      Hematocrit 36.3      MCV 89      MCH 29.6      MCHC 33.1      RDW 13.2      Platelet Count 304      % Neutrophils 71      % Lymphocytes 17      % Monocytes 9      Mids % (Monos, Eos, Basos)        % Eosinophils 2      % Basophils 0      % Immature Granulocytes 1      NRBCs per 100 WBC 0      Absolute Neutrophils 8.6 (*)     Absolute Lymphocytes 2.1      Absolute Monocytes 1.0      Mids Abs (Monos, Eos, Basos)        Absolute Eosinophils 0.2      Absolute Basophils 0.1      Absolute Immature Granulocytes 0.1      Absolute NRBCs 0.0         IMAGING  Images reviewed by me.  Radiology report also reviewed.  No orders to display       Procedures    Medications   ketorolac (TORADOL) injection 10 mg (10 mg Intravenous $Given 10/11/23 1421)   sodium chloride 0.9% BOLUS 1,000 mL (0 mLs Intravenous Stopped 10/11/23 1620)         IMPRESSION       ICD-10-CM    1. Ureteral stone  N20.1                Medication List        Started      HYDROmorphone 2 MG tablet  Commonly  known as: DILAUDID  2-4 mg, Oral, EVERY 8 HOURS PRN     LORazepam 0.5 MG tablet  Commonly known as: ATIVAN  0.5-1 mg, Oral, EVERY 6 HOURS PRN                          Sami Fischer MD  10/11/23 3121

## 2023-10-11 NOTE — ED TRIAGE NOTES
"   Pt states she was here last night dx with kidney stones and now can't void and is in more pain.  \"I can't take this any more\".        " Positive

## 2023-10-11 NOTE — DISCHARGE INSTRUCTIONS
Tylenol and ibuprofen can be taken as needed for pain.    Oxycodone for  severe pains.    Zofran as needed for nausea, or vomiting.    Flomax as prescribed to help the stone pass.    Urology follow-up recommended.

## 2023-10-11 NOTE — ED TRIAGE NOTES
Pt reports sever R sided flank pain that woke her up around 0100. Pt reports she feels a pressure all the way into her bladder.

## 2023-10-11 NOTE — DISCHARGE INSTRUCTIONS
RETURN TO THE EMERGENCY ROOM FOR THE FOLLOWING:    Fever greater than 101, repeated vomiting and dehydration, or at anytime for any concern.    FOLLOW UP:    No changes.    TREATMENT RECOMMENDATIONS:    Ibuprofen 600 mg every six hours for pain (7 days duration).  Tylenol 1000 mg every six hours for pain (7 days duration).  Therefore, you can alternate these every three hours and do it safely.    Discontinue oxycodone while taking Dilaudid.  Consider Dilaudid for pain not relieved by the above.    Ativan as needed for anxiety not relieved by the above medicine.    Consider MiraLAX to help promote a regular stool pattern.  The goal is to have a soft, easy stool either every day or every other day.  You can titrate MiraLAX to achieve this goal.  MiraLAX is a nonsoluble fiber and is not addictive.  It will not cause bowel dysfunction or pathology.      NURSE ADVICE LINE:  [phone] or [phone]

## 2023-10-11 NOTE — ED PROVIDER NOTES
ED Provider Note  [facility]      History     Chief Complaint   Patient presents with    Flank Pain    Abdominal Pain     HPI  Anne Ferreira is a 42-year-old female, presenting the emergency department with concerns regarding acute onset of right-sided flank pain.  Pain began about 1 to 2 hours prior to ED arrival.  Patient was attempting to sleep, however had acute onset of right-sided flank pain, radiating towards the right groin.  Has had similar types of pain over the past 1 month, stated that she has not had known causes of her pain.  No fever.  No specific urinary symptoms other than the patient has had some difficulty with urination, only having dribbles of urine.  No chest pain, cough, or shortness of breath.  States that during her past visit she has been unable to obtain a specific cause of her symptoms.    I reviewed urgent care visit from October 2, 9 days ago.  Patient had right lower quadrant pain at that time, and general surgery referral has been placed.  Patient also had wet prep and urinalysis performed at that time        Independent Historian:        Review of External Notes:  As above        Allergies:  Allergies   Allergen Reactions    Bupropion Nausea    Sulfa Antibiotics        Problem List:    Patient Active Problem List    Diagnosis Date Noted    F11.2 - Continuous opioid dependence (H) 05/18/2023     Priority: Medium    S/P cervical spinal fusion 03/20/2023     Priority: Medium     Added automatically from request for surgery [id number]      COPD (chronic obstructive pulmonary disease) (H) 07/27/2021     Priority: Medium    Moderate episode of recurrent major depressive disorder (H) 05/21/2021     Priority: Medium    Symptomatic cholelithiasis 12/15/2020     Priority: Medium     Added automatically from request for surgery [id number]      Morbid obesity (H) 12/11/2020     Priority: Medium    Dyslipidemia 11/10/2020     Priority: Medium    Dysmenorrhea 11/10/2020      Priority: Medium     treated with continuous OCPs      GERD (gastroesophageal reflux disease) 11/10/2020     Priority: Medium    Thoracic back pain 11/10/2020     Priority: Medium     left, approx T10      H/O LEEP      Priority: Medium     1998 LEEP- Unknown results.  2010 NIL pap.  2/2/12 COLP and ECC- DARREN 1.  2013 NIL pap  4/7/15 LSIL pap, + HR HPV   8/13/15 COLP- DARREN 1, ECC- Negative.  6/10/16 NIL pap, Neg HPV.  5/14/19 NIL pap, Neg HPV.  Above results found in CE  10/15/20 NIL pap, Neg HPV. Plan cotest in 3 years.       Cervical spinal stenosis 07/01/2019     Priority: Medium     7/2019 MRI - At C5-6, broad-based central disc extrusion demonstrating caudal migration mildly deforms the cord and severely narrows the spinal canal. Uncovertebral joint spurring contributes to mild to moderate left neural foraminal narrowing with potential left C6 nerve root encroachment.      Herniation of cervical intervertebral disc with radiculopathy 07/01/2019     Priority: Medium     see MRI 7/2019      Attention deficit hyperactivity disorder, combined type, mild 08/29/2018     Priority: Medium    Generalized anxiety disorder 08/29/2018     Priority: Medium    Allergic rhinitis 04/01/2008     Priority: Medium        Past Medical History:    Past Medical History:   Diagnosis Date    Abnormal Pap smear of cervix        Past Surgical History:    Past Surgical History:   Procedure Laterality Date    CYSTOSCOPY N/A 4/25/2022    Procedure: CYSTOSCOPY;  Surgeon: Julius Montejo MD;  Location: [facility] OR    INJECT EPIDURAL CERVICAL  3/30/2023    Procedure: INJECTION, SPINE, CERVICAL, EPIDURAL;  Surgeon: Naresh Zuniga MD;  [place] [facility] [place]    LAPAROSCOPIC CHOLECYSTECTOMY N/A 12/21/2020    Procedure: Laparoscopic cholecystectomy;  Surgeon: Manuel Durham DO;  Location: [facility] OR    LAPAROSCOPIC HYSTERECTOMY TOTAL, BILATERAL SALPINGO-OOPHORECTOMY, COMBINED Bilateral 4/25/2022    Procedure: Laparoscopic total hysterectomy,  Bilateral salpingectomy, with sparing of ovaries;  Surgeon: Julius Montejo MD;  Location: [facility] OR       Family History:    Family History   Problem Relation Age of Onset    Hypertension Father     Hypertension Brother     Hypertension Sister     Hypertension Brother        Social History:  Marital Status:   [4]  Social History     Tobacco Use    Smoking status: Every Day     Packs/day: .5     Types: Cigarettes    Smokeless tobacco: Never   Vaping Use    Vaping Use: Never used   Substance Use Topics    Alcohol use: Yes     Comment: occassionally    Drug use: Never        Medications:    ondansetron (ZOFRAN ODT) 4 MG ODT tab  oxyCODONE (ROXICODONE) 5 MG tablet  tamsulosin (FLOMAX) 0.4 MG capsule  acetaminophen (TYLENOL) 325 MG tablet  albuterol (PROAIR HFA/PROVENTIL HFA/VENTOLIN HFA) 108 (90 Base) MCG/ACT inhaler  amLODIPine (NORVASC) 2.5 MG tablet  cyclobenzaprine (FLEXERIL) 10 MG tablet  dexmethylphenidate (FOCALIN) 10 MG tablet  dexmethylphenidate (FOCALIN) 10 MG tablet  diphenhydrAMINE (BENADRYL) 25 MG tablet  escitalopram (LEXAPRO) 20 MG tablet  hydrOXYzine (ATARAX) 25 MG tablet  ibuprofen (ADVIL/MOTRIN) 200 MG tablet  ipratropium - albuterol 0.5 mg/2.5 mg/3 mL (DUONEB) 0.5-2.5 (3) MG/3ML neb solution  lisinopril (ZESTRIL) 40 MG tablet  montelukast (SINGULAIR) 10 MG tablet  omeprazole (PRILOSEC) 20 MG DR capsule  ondansetron (ZOFRAN ODT) 4 MG ODT tab  oxyCODONE (ROXICODONE) 5 MG tablet  propranolol (INDERAL) 80 MG tablet  triamcinolone (KENALOG) 0.1 % external cream          Review of Systems  A medically appropriate review of systems was performed with pertinent positives and negatives noted in the HPI, and all other systems negative.    Physical Exam   Patient Vitals for the past 24 hrs:   BP Temp Temp src Pulse Resp SpO2   10/11/23 0323 -- -- -- -- -- 96 %   10/11/23 0308 -- -- -- -- -- 95 %   10/11/23 0253 -- -- -- -- -- 95 %   10/11/23 0238 129/69 -- -- -- -- 97 %   10/11/23 0153 -- -- -- -- --  95 %   10/11/23 0138 -- -- -- -- -- 94 %   10/11/23 0123 (!) 140/99 -- -- 73 -- 98 %   10/11/23 0122 (!) 140/99 98.5  F (36.9  C) Oral 69 18 98 %          Physical Exam  General: alert and in  acute distress on arrival  Head: atraumatic, normocephalic  Lungs:  nonlabored  CV:  extremities warm and perfused  Abd: nondistended.  Diffusely tender over the right abdomen  Skin: no rashes, no diaphoresis and skin color normal  Neuro: Patient awake, alert, speech is fluent,   Psychiatric: affect/mood normal,        ED Course                 Procedures                           Results for orders placed or performed during the hospital encounter of 10/11/23 (from the past 24 hour(s))   CBC with platelets differential    Narrative    The following orders were created for panel order CBC with platelets differential.  Procedure                               Abnormality         Status                     ---------                               -----------         ------                     CBC with platelets and d...[[id number]]  Abnormal            Final result               Manual Differential[[id number]]          Abnormal            Final result                 Please view results for these tests on the individual orders.   Basic metabolic panel   Result Value Ref Range    Sodium 134 (L) 135 - 145 mmol/L    Potassium 4.4 3.4 - 5.3 mmol/L    Chloride 99 98 - 107 mmol/L    Carbon Dioxide (CO2) 20 (L) 22 - 29 mmol/L    Anion Gap 15 7 - 15 mmol/L    Urea Nitrogen 15.0 6.0 - 20.0 mg/dL    Creatinine 0.66 0.51 - 0.95 mg/dL    GFR Estimate >90 >60 mL/min/1.73m2    Calcium 9.6 8.6 - 10.0 mg/dL    Glucose 112 (H) 70 - 99 mg/dL   CBC with platelets and differential   Result Value Ref Range    WBC Count 21.2 (H) 4.0 - 11.0 10e3/uL    RBC Count 4.35 3.80 - 5.20 10e6/uL    Hemoglobin 12.9 11.7 - 15.7 g/dL    Hematocrit 38.8 35.0 - 47.0 %    MCV 89 78 - 100 fL    MCH 29.7 26.5 - 33.0 pg    MCHC 33.2 31.5 - 36.5 g/dL    RDW 13.2 10.0 - 15.0  %    Platelet Count 389 150 - 450 10e3/uL    % Neutrophils      % Lymphocytes      % Monocytes      Mids % (Monos, Eos, Basos)      % Eosinophils      % Basophils      % Immature Granulocytes      NRBCs per 100 WBC 0 <1 /100    Absolute Neutrophils      Absolute Lymphocytes      Absolute Monocytes      Mids Abs (Monos, Eos, Basos)      Absolute Eosinophils      Absolute Basophils      Absolute Immature Granulocytes      Absolute NRBCs 0.0 10e3/uL   Manual Differential   Result Value Ref Range    % Neutrophils 68 %    % Lymphocytes 22 %    % Monocytes 9 %    % Eosinophils 1 %    % Basophils 0 %    Absolute Neutrophils 14.4 (H) 1.6 - 8.3 10e3/uL    Absolute Lymphocytes 4.7 0.8 - 5.3 10e3/uL    Absolute Monocytes 1.9 (H) 0.0 - 1.3 10e3/uL    Absolute Eosinophils 0.2 0.0 - 0.7 10e3/uL    Absolute Basophils 0.0 0.0 - 0.2 10e3/uL    RBC Morphology Confirmed RBC Indices     Platelet Assessment  Automated Count Confirmed. Platelet morphology is normal.     Automated Count Confirmed. Platelet morphology is normal.   CT Abdomen Pelvis w/o Contrast    Narrative    EXAM: CT ABDOMEN PELVIS W/O CONTRAST  LOCATION: [facility]  DATE: 10/11/2023    INDICATION: left flank pain  COMPARISON: 09/09/2023.  TECHNIQUE: CT scan of the abdomen and pelvis was performed without IV contrast. Multiplanar reformats were obtained. Dose reduction techniques were used.  CONTRAST: None.    FINDINGS:   LOWER CHEST: Normal.    HEPATOBILIARY: Hepatic steatosis. Postcholecystectomy.    PANCREAS: Normal.    SPLEEN: Normal.    ADRENAL GLANDS: Normal.    KIDNEYS/BLADDER: 3 mm stone at the right ureterovesical junction causes mild-to-moderate hydronephrosis. Additional 2 mm nonobstructing right intrarenal stone. Normal left kidney and bladder.    BOWEL: Normal. No obstruction or inflammation. Normal appendix.    LYMPH NODES: Normal.    VASCULATURE: Unremarkable.    PELVIC ORGANS: Post hysterectomy.    MUSCULOSKELETAL: Mild  degenerative changes of the spine.      Impression    IMPRESSION:   1.  3 mm stone at the right ureterovesical junction causes mild-to-moderate hydronephrosis.  2.  Additional 2 mm nonobstructing right intrarenal stone.  3.  Hepatic steatosis.  4.  Postcholecystectomy and hysterectomy.     UA with Microscopic reflex to Culture    Specimen: Urine, Midstream   Result Value Ref Range    Color Urine Yellow Colorless, Straw, Light Yellow, Yellow    Appearance Urine Slightly Cloudy (A) Clear    Glucose Urine Negative Negative mg/dL    Bilirubin Urine Negative Negative    Ketones Urine Negative Negative mg/dL    Specific Gravity Urine 1.019 1.003 - 1.035    Blood Urine Moderate (A) Negative    pH Urine 5.0 5.0 - 7.0    Protein Albumin Urine Negative Negative mg/dL    Urobilinogen Urine Normal Normal, 2.0 mg/dL    Nitrite Urine Negative Negative    Leukocyte Esterase Urine Negative Negative    Mucus Urine Present (A) None Seen /LPF    RBC Urine 41 (H) <=2 /HPF    WBC Urine 6 (H) <=5 /HPF    Squamous Epithelials Urine 6 (H) <=1 /HPF    Narrative    Urine Culture not indicated       MEDICATIONS GIVEN IN THE EMERGENCY DEPARTMENT:  Medications   ondansetron (ZOFRAN) injection 4 mg (4 mg Intravenous $Given 10/11/23 0128)   oxyCODONE (ROXICODONE) tablet 10 mg (has no administration in time range)   HYDROmorphone (PF) (DILAUDID) injection 0.5 mg (has no administration in time range)   HYDROmorphone (PF) (DILAUDID) injection 0.5 mg (0.5 mg Intravenous $Given 10/11/23 0320)   sodium chloride 0.9% BOLUS 1,000 mL (1,000 mLs Intravenous $New Bag 10/11/23 0333)   sodium chloride 0.9% BOLUS 1,000 mL (0 mLs Intravenous Stopped 10/11/23 0243)   fentaNYL (PF) (SUBLIMAZE) injection 50 mcg (50 mcg Intravenous $Given 10/11/23 0129)   ketorolac (TORADOL) injection 15 mg (15 mg Intravenous $Given 10/11/23 0135)   LORazepam (ATIVAN) injection 1 mg (1 mg Intravenous $Given 10/11/23 0332)   tamsulosin (FLOMAX) capsule 0.4 mg (0.4 mg Oral $Given  10/11/23 6725)           Independent Interpretation (X-rays, CTs, rhythm strip):  Personal review of CT shows right-sided hydronephrosis.  Intrarenal in addition to distal ureteral stone present    Consultations/Discussion of Management or Tests:  None       Social Determinants of Health affecting care:         Assessments & Plan (with Medical Decision Making)  42 year old female who presents to the Emergency Department for evaluation of right-sided flank pain, with nausea.  Patient with ongoing right-sided flank pains over the past month.  However, acutely appears worsened during the day today.  Differential broad, however concerning for potential ureteral stone etiology.  I did review prior CT studies which have had prior evidence of kidney stones.  In early September, patient had 5 mm right-sided ureteral stone present.  Today CT scan does not show any 5 mm stone, rather patient has 2 mm intrarenal stone, and 3 mm distal UVJ stone which resulted in hydronephrosis, and appears to be the cause of patient's symptoms today.    Patient's white blood cell count is elevated, however I feel this is demargination, with episode of vomiting, likely contributing to elevated white blood cell count.  Urinalysis showing no signs of infected kidney stone.  No other acute findings noted on imaging studies.  Patient given multiple analgesic and antiemetic type medications.  Felt improved.  I discussed symptomatic treatment with patient.  Patient was somewhat concerned because she had been told previously that kidney stones are not the cause of her pains, and she is convinced that the pain she is experiencing has been similar over the past many weeks.  I attempted to discuss etiology and symptoms caused by kidney and ureteral stones.  Patient will be referred for outpatient urology.  Symptomatic cares recommended, with home medications prescribed     I have reviewed the nursing notes.    I have reviewed the findings, diagnosis,  plan and need for follow up with the patient.       Critical Care time:  none      NEW PRESCRIPTIONS STARTED AT TODAY'S ER VISIT  New Prescriptions    ONDANSETRON (ZOFRAN ODT) 4 MG ODT TAB    Take 1 tablet (4 mg) by mouth every 8 hours as needed for nausea    OXYCODONE (ROXICODONE) 5 MG TABLET    Take 1 tablet (5 mg) by mouth every 6 hours as needed for severe pain    TAMSULOSIN (FLOMAX) 0.4 MG CAPSULE    Take 1 capsule (0.4 mg) by mouth daily       Final diagnoses:   Right ureteral stone   Flank pain       10/11/2023   [facility] EMERGENCY DEPT       KikoTeofilo torres MD  10/11/23 0425

## 2023-10-12 ENCOUNTER — VIRTUAL VISIT (OUTPATIENT)
Dept: FAMILY MEDICINE | Facility: CLINIC | Age: 43
End: 2023-10-12
Attending: NURSE PRACTITIONER
Payer: COMMERCIAL

## 2023-10-12 DIAGNOSIS — F90.2 ATTENTION DEFICIT HYPERACTIVITY DISORDER (ADHD), COMBINED TYPE: ICD-10-CM

## 2023-10-12 DIAGNOSIS — J44.1 CHRONIC OBSTRUCTIVE PULMONARY DISEASE WITH ACUTE EXACERBATION (H): ICD-10-CM

## 2023-10-12 DIAGNOSIS — G89.4 CHRONIC PAIN SYNDROME: Primary | ICD-10-CM

## 2023-10-12 PROCEDURE — 99214 OFFICE O/P EST MOD 30 MIN: CPT | Mod: VID | Performed by: NURSE PRACTITIONER

## 2023-10-12 RX ORDER — DEXMETHYLPHENIDATE HYDROCHLORIDE 10 MG/1
10 TABLET ORAL 2 TIMES DAILY
Qty: 60 TABLET | Refills: 0 | Status: SHIPPED | OUTPATIENT
Start: 2023-11-12 | End: 2023-12-12

## 2023-10-12 RX ORDER — DEXMETHYLPHENIDATE HYDROCHLORIDE 10 MG/1
10 TABLET ORAL 2 TIMES DAILY
Qty: 60 TABLET | Refills: 0 | Status: SHIPPED | OUTPATIENT
Start: 2023-12-13 | End: 2023-12-08

## 2023-10-12 RX ORDER — OXYCODONE HYDROCHLORIDE 5 MG/1
5 TABLET ORAL EVERY 8 HOURS PRN
Qty: 90 TABLET | Refills: 0 | Status: SHIPPED | OUTPATIENT
Start: 2023-12-12 | End: 2024-01-09

## 2023-10-12 RX ORDER — DEXMETHYLPHENIDATE HYDROCHLORIDE 10 MG/1
20 CAPSULE, EXTENDED RELEASE ORAL 2 TIMES DAILY
Qty: 60 CAPSULE | Refills: 0 | Status: SHIPPED | OUTPATIENT
Start: 2023-10-12 | End: 2023-10-12

## 2023-10-12 RX ORDER — DEXMETHYLPHENIDATE HYDROCHLORIDE 10 MG/1
10 TABLET ORAL 2 TIMES DAILY
Qty: 60 TABLET | Refills: 0 | Status: CANCELLED | OUTPATIENT
Start: 2023-10-12

## 2023-10-12 RX ORDER — OXYCODONE HYDROCHLORIDE 5 MG/1
TABLET ORAL
Qty: 90 TABLET | Refills: 0 | Status: SHIPPED | OUTPATIENT
Start: 2023-10-12 | End: 2023-12-19

## 2023-10-12 RX ORDER — DEXMETHYLPHENIDATE HYDROCHLORIDE 10 MG/1
10 TABLET ORAL 2 TIMES DAILY
Qty: 60 TABLET | Refills: 0 | Status: SHIPPED | OUTPATIENT
Start: 2023-10-12 | End: 2023-11-11

## 2023-10-12 RX ORDER — OXYCODONE HYDROCHLORIDE 5 MG/1
5 TABLET ORAL EVERY 8 HOURS PRN
Qty: 90 TABLET | Refills: 0 | Status: SHIPPED | OUTPATIENT
Start: 2023-11-12 | End: 2023-12-12

## 2023-10-12 RX ORDER — DEXMETHYLPHENIDATE HYDROCHLORIDE 10 MG/1
10 CAPSULE, EXTENDED RELEASE ORAL 2 TIMES DAILY
Qty: 60 CAPSULE | Refills: 0 | Status: SHIPPED | OUTPATIENT
Start: 2023-11-12 | End: 2023-10-12

## 2023-10-12 RX ORDER — DEXMETHYLPHENIDATE HYDROCHLORIDE 10 MG/1
10 CAPSULE, EXTENDED RELEASE ORAL 2 TIMES DAILY
Qty: 60 CAPSULE | Refills: 0 | Status: SHIPPED | OUTPATIENT
Start: 2023-12-13 | End: 2023-10-12

## 2023-10-12 NOTE — PROGRESS NOTES
Anne is a 42 year old who is being evaluated via a billable video visit.      How would you like to obtain your AVS? MyChart  If the video visit is dropped, the invitation should be resent by: Text to cell phone: [phone]  Will anyone else be joining your video visit? No          Assessment & Plan     (G89.4) Chronic pain syndrome  (primary encounter diagnosis)  Comment: Patient is presently on Dilaudid due to her kidney stone will have 1 day left we will refill oxycodone patient knows not to take oxycodone with Dilaudid  Plan: oxyCODONE (ROXICODONE) 5 MG tablet, oxyCODONE         (ROXICODONE) 5 MG tablet, oxyCODONE         (ROXICODONE) 5 MG tablet            (F90.2) Attention deficit hyperactivity disorder (ADHD), combined type  Comment:  Controlled no change in treatment plan   Plan: dexmethylphenidate (FOCALIN XR) 10 MG 24 hr         capsule, dexmethylphenidate (FOCALIN XR) 10 MG         24 hr capsule, dexmethylphenidate (FOCALIN XR)         10 MG 24 hr capsule    (J44.1) Chronic obstructive pulmonary disease with acute exacerbation (H)  Comment:   Plan:             FRANKLYN Christianson CNP  [facility]    Subjective   Anne is a 42 year old, presenting for the following health issues:  No chief complaint on file.      10/12/2023     2:45 PM   Additional Questions   Roomed by Lavonne GRIGGS     Medication Followup of oxycodone  Taking Medication as prescribed: yes  Side Effects:  None  Medication Helping Symptoms:  yes    Medication Followup of focalin  Taking Medication as prescribed: yes  Side Effects:  None  Medication Helping Symptoms:  yes            Review of Systems   Constitutional, HEENT, cardiovascular, pulmonary, gi and gu systems are negative, except as otherwise noted.      Objective           Vitals:  No vitals were obtained today due to virtual visit.    Physical Exam   GENERAL: Healthy, alert and no distress  EYES: Eyes grossly normal to inspection.  No discharge or  erythema, or obvious scleral/conjunctival abnormalities.  RESP: No audible wheeze, cough, or visible cyanosis.  No visible retractions or increased work of breathing.    SKIN: Visible skin clear. No significant rash, abnormal pigmentation or lesions.  NEURO: Cranial nerves grossly intact.  Mentation and speech appropriate for age.  PSYCH: Mentation appears normal, affect normal/bright, judgement and insight intact, normal speech and appearance well-groomed.    No results found for any visits on 10/12/23.            Video-Visit Details    Type of service:  Video Visit     Originating Location (pt. Location): Home    Distant Location (provider location):  On-site  Platform used for Video Visit: Juliette

## 2023-10-13 ENCOUNTER — PATIENT OUTREACH (OUTPATIENT)
Dept: CARE COORDINATION | Facility: CLINIC | Age: 43
End: 2023-10-13
Payer: COMMERCIAL

## 2023-10-13 NOTE — PROGRESS NOTES
[facility] Resource Center Contact  [facility]/Voicemail     Clinical Data: Care Coordination ED-sourced Outreach-     Outreach attempted x 2.  Left message on patient's voicemail, providing [facility]'s 24/7 scheduling and nurse triage phone number [phone] ([phone]) for questions/concerns and/or to schedule an appt with an [facility] provider.      Care Coordination introduction letter with explanation of Clinic Care Coordination services sent to patient via [employer]. Clinic Care Coordination services remain available via referral if needed.    Plan: [facility] will do no further outreaches at this time.       Ana Sen  Community Health Worker  [facility] Care Resource [place], [facility]    *Connected Care Resource Team does NOT follow patient ongoing. Referrals are identified based on internal discharge reports and the outreach is to ensure patient has an understanding of their discharge instructions.

## 2023-10-13 NOTE — LETTER
Anne Ferreira  [address] [address]    Dear Anne Ferreira,      I am a team member within the [facility] Care Resource Center with [facility]. I recently tried to reach you to ensure you were doing well following a recent visit within our health system. I also wanted to take this chance to introduce Clinic Care Coordination.     Below is a description of Clinic Care Coordination and how this team can further assist you:       The Clinic Care Coordination team is made up of a Registered Nurse, , Financial Resource Worker, and a Community Health Worker who understand and can help navigate the health care system. The goal of clinic care coordination is to help you manage your health, improve access to care, and achieve optimal health outcomes. They work alongside your provider to assist you in determining your health and social needs, obtain health care and community resources, and provide you with necessary information and education. Clinic Care Coordination can work with you through any barriers and develop a care plan that helps coordinate and strengthen the relationship between you and your care team.    If you wish to connect with the Clinic Care Coordination Team, please let your [facility] or Clinic Care Team know and they can place a referral. The Clinic Care Coordination team will then reach out by phone to further support you.    We are focused on providing you with the highest-quality healthcare experience possible.    Sincerely,   Your care team with [facility]

## 2023-10-17 ENCOUNTER — VIRTUAL VISIT (OUTPATIENT)
Dept: ENDOCRINOLOGY | Facility: CLINIC | Age: 43
End: 2023-10-17
Payer: COMMERCIAL

## 2023-10-17 VITALS — BODY MASS INDEX: 35.68 KG/M2 | WEIGHT: 209 LBS | HEIGHT: 64 IN

## 2023-10-17 DIAGNOSIS — E66.01 MORBID OBESITY (H): ICD-10-CM

## 2023-10-17 PROCEDURE — 99203 OFFICE O/P NEW LOW 30 MIN: CPT | Mod: VID | Performed by: INTERNAL MEDICINE

## 2023-10-17 ASSESSMENT — PATIENT HEALTH QUESTIONNAIRE - PHQ9: SUM OF ALL RESPONSES TO PHQ QUESTIONS 1-9: 14

## 2023-10-17 ASSESSMENT — PAIN SCALES - GENERAL: PAINLEVEL: MODERATE PAIN (4)

## 2023-10-17 NOTE — PROGRESS NOTES
Virtual Visit Details    Type of service:  Video Visit     Originating Location (pt. Location): Home    Distant Location (provider location):  Off-site  Platform used for Video Visit: Trey

## 2023-10-17 NOTE — LETTER
"10/17/2023       RE: Anne Ferreira  [address] [facility] [address]     Dear Colleague,    Thank you for referring your patient, Anne Ferreira, to the [facility]. Please see a copy of my visit note below.        New Medical Weight Management Consult      PATIENT:  Anne Ferreira  MRN:         [id number]  :         1980  ANGELIKA:         10/17/2023    Dear PCP,    I had the pleasure of seeing your patient, Anne Ferreira. Full intake/assessment was done to determine barriers to weight loss success and develop a treatment plan. Anne Ferreira is a 42 year old female interested in treatment of medical problems associated with excess weight. She has a height of 5' 4.016\", a weight of 209 lbs 0 oz, and the calculated Body mass index is 35.86 kg/m .    She has the following co-morbidities: hypertension, fatty liver, ADHD, chronic pain syndrome     Weight history: weight was around 175 lbs and then gradually gained weight since. Gained 20 lbs in the past year due to having pain all the time -- neck pain and back pain and being less active  Has kidney stone    Attempts: watching calories, meal replacement shake -- did not feel it helped  Eating habit: eat irregularly, on the go a lot, mostly carbohydrate   Has not met with nutritionist    Exercise: not much due to back pain, active at work    Sleep: not really due to back pain          10/17/2023    11:57 AM   --   I have the following health issues associated with obesity High Blood Pressure    Fatty Liver    Asthma   I have the following symptoms associated with obesity Depression    Back Pain    Fatigue    Hip Pain            No data to display                      10/17/2023    11:57 AM   Referring Provider   Please name the provider who referred you to Medical Weight Management  If you do not know, please answer \"I Don't Know\" Bertha Loya           10/17/2023    " 11:57 AM   Weight History   How concerned are you about your weight? Very Concerned   I became overweight As an Adult   The following factors have contributed to my weight gain Mental Health Issues    Change in Schedule    Eating Wrong Types of Food    Eating Too Much    Lack of Exercise    Stress   I have tried the following methods to lose weight Watching Portions or Calories    Exercise    Slim Fast or Other Liquid Diets   My lowest weight since age 17 was 140   My highest weight since age 17 was 209   The most weight I have ever lost was (lbs) 209   I have the following family history of obesity/being overweight Many of my relatives are overweight   How has your weight changed over the last year? Gained           10/17/2023    11:57 AM   Diet Recall Review with Patient   If you do eat supper, what types of food do you typically eat? Easy to prepare meals   If you do snack, what types of food do you typically eat? Chips, cheese, candy, fruit   How many glasses of juice do you drink in a typical day? 2   How many of glasses of milk do you drink in a typical day? 1   If you do drink milk, what type? 1%   How many 8oz glasses of sugar containing drinks such as Wayne-Aid/sweet tea do you drink in a day? 0   How many cans/bottles of sugar pop/soda/tea/sports drinks do you drink in a day? 3   How many cans/bottles of diet pop/soda/tea or sports drink do you drink in a day? 0   How often do you have a drink of alcohol? Monthly or Less           10/17/2023    11:57 AM   Eating Habits   Generally, my meals include foods like these bread, pasta, rice, potatoes, corn, crackers, sweet dessert, pop, or juice Everyday   Generally, my meals include foods like these fried meats, brats, burgers, french fries, pizza, cheese, chips, or ice cream Almost Everyday   Eat fast food (like McDonal[employer], [employer]rell, Taco Bell) Less Than Weekly   Eat at a buffet or sit-down restaurant Less Than Weekly   Eat most of my meals in front of the TV  or computer Almost Everyday   Often skip meals, eat at random times, have no regular eating times Everyday   Rarely sit down for a meal but snack or graze throughout Almost Everyday   Eat extra snacks between meals Almost Everyday   Eat most of my food at the end of the day A Few Times a Week   Eat in the middle of the night or wake up at night to eat A Few Times a Week   Eat extra snacks to prevent or correct low blood sugar A Few Times a Week   Eat to prevent acid reflux or stomach pain Never   Worry about not having enough food to eat Never   I eat when I am depressed Once a Week   I eat when I am stressed A Few Times a Week   I eat when I am bored A Few Times a Week   I eat when I am anxious A Few Times a Week   I eat when I am happy or as a reward Less Than Weekly   I feel hungry all the time even if I just have eaten Less Than Weekly   Feeling full is important to me Never   I finish all the food on my plate even if I am already full A Few Times a Week   I can't resist eating delicious food or walk past the good food/smell Less Than Weekly   I eat/snack without noticing that I am eating A Few Times a Week   I eat when I am preparing the meal A Few Times a Week   I eat more than usual when I see others eating Less Than Weekly   I have trouble not eating sweets, ice cream, cookies, or chips if they are around the house Almost Everyday   I think about food all day Less Than Weekly   What foods, if any, do you crave? Chips/Crackers           10/17/2023    11:57 AM   Amount of Food   I feel out of control when eating Monthly   I eat a large amount of food, like a loaf of bread, a box of cookies, a pint/quart of ice cream, all at once Never   I eat a large amount of food even when I am not hungry Never   I eat rapidly Never   I eat alone because I feel embarrassed and do not want others to see how much I have eaten Monthly   I eat until I am uncomfortably full Monthly   I feel bad, disgusted, or guilty after I  overeat Almost Everyday           10/17/2023    11:57 AM   Activity/Exercise History   How much of a typical 12 hour day do you spend sitting? Half the Day   How much of a typical 12 hour day do you spend lying down? Less Than Half the Day   How much of a typical day do you spend walking/standing? Half the Day   How many hours (not including work) do you spend on the TV/Video Games/Computer/Tablet/Phone? 2-3 Hours   How many times a week are you active for the purpose of exercise? Never   What keeps you from being more active? Pain    Shortness of Breath    Too tired   How many total minutes do you spend doing some activity for the purpose of exercising when you exercise? 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 Less Than 50%   How many hours do you spend commuting to work daily? 1   What is your marital status? Single   If you have children, are they overweight? No   Who do you live with? Daughter and friend   Who does the food shopping? Both           10/17/2023    11:57 AM   Mental Health History Reviewed With Patient   Have you ever been physically or sexually abused? No   How often in the past 2 weeks have you felt little interest or pleasure in doing things? More Than Half the Days   Over the past 2 weeks how often have you felt down, depressed, or hopeless? More Than Half the Days           10/17/2023    11:57 AM   Sleep History Reviewed With Patient   How many hours do you sleep at night? 6       MEDICATIONS:   Current Outpatient Medications   Medication Sig Dispense Refill    acetaminophen (TYLENOL) 325 MG tablet Take 3 tablets (975 mg) by mouth every 6 hours as needed for mild pain 50 tablet 0    albuterol (PROAIR HFA/PROVENTIL HFA/VENTOLIN HFA) 108  (90 Base) MCG/ACT inhaler Inhale 2 puffs into the lungs every 6 hours 1 Inhaler 1    amLODIPine (NORVASC) 2.5 MG tablet TAKE 1 TABLET (2.5 MG) BY MOUTH DAILY 90 tablet 3    cyclobenzaprine (FLEXERIL) 10 MG tablet Take 1 tablet by mouth twice daily as needed 60 tablet 0    dexmethylphenidate (FOCALIN) 10 MG tablet Take 1 tablet (10 mg) by mouth 2 times daily for 30 days 60 tablet 0    [START ON 11/12/2023] dexmethylphenidate (FOCALIN) 10 MG tablet Take 1 tablet (10 mg) by mouth 2 times daily for 30 days 60 tablet 0    [START ON 12/13/2023] dexmethylphenidate (FOCALIN) 10 MG tablet Take 1 tablet (10 mg) by mouth 2 times daily for 30 days 60 tablet 0    diphenhydrAMINE (BENADRYL) 25 MG tablet Take 1-2 tablets (25-50 mg) by mouth every 6 hours as needed for itching or allergies 30 tablet 1    escitalopram (LEXAPRO) 20 MG tablet TAKE 1 TABLET (20 MG) BY MOUTH DAILY 90 tablet 0    HYDROmorphone (DILAUDID) 2 MG tablet Take 1-2 tablets (2-4 mg) by mouth every 8 hours as needed for pain 12 tablet 0    hydrOXYzine (ATARAX) 25 MG tablet Take 1 tablet (25 mg) by mouth every 6 hours as needed for itching TAKE 1 TO 2 TABLETS BY MOUTH EVERY 6 HOURS AS NEEDED FOR ANXIETY Strength: 25 mg 60 tablet 0    ibuprofen (ADVIL/MOTRIN) 200 MG tablet Take 4 tablets (800 mg) by mouth every 6 hours as needed for other (mild and/or inflammatory pain)      ipratropium - albuterol 0.5 mg/2.5 mg/3 mL (DUONEB) 0.5-2.5 (3) MG/3ML neb solution Take 1 vial (3 mLs) by nebulization every 6 hours as needed for shortness of breath / dyspnea or wheezing 90 mL 0    lisinopril (ZESTRIL) 40 MG tablet TAKE 1 TABLET (40 MG) BY MOUTH DAILY 90 tablet 0    LORazepam (ATIVAN) 0.5 MG tablet Take 1-2 tablets (0.5-1 mg) by mouth every 6 hours as needed for anxiety (Patient not taking: Reported on 10/12/2023) 6 tablet 0    montelukast (SINGULAIR) 10 MG tablet TAKE ONE TABLET BY MOUTH AT BEDTIME 90 tablet 3    omeprazole (PRILOSEC) 20 MG DR capsule Take 20 mg by mouth as  "needed PRN      ondansetron (ZOFRAN ODT) 4 MG ODT tab Take 1 tablet (4 mg) by mouth every 8 hours as needed for nausea 10 tablet 0    ondansetron (ZOFRAN ODT) 4 MG ODT tab Take 1 tablet (4 mg) by mouth every 8 hours as needed for nausea 10 tablet 0    oxyCODONE (ROXICODONE) 5 MG tablet TAKE 1 TABLET (5 MG) BY MOUTH EVERY 8 HOURS AS NEEDED FOR PAIN MAX 3 TABLETS PER DAY 90 tablet 0    [START ON 11/12/2023] oxyCODONE (ROXICODONE) 5 MG tablet Take 1 tablet (5 mg) by mouth every 8 hours as needed for pain Max 3 tablets per day 90 tablet 0    [START ON 12/12/2023] oxyCODONE (ROXICODONE) 5 MG tablet Take 1 tablet (5 mg) by mouth every 8 hours as needed for pain Max 3 tablets per day 90 tablet 0    oxyCODONE (ROXICODONE) 5 MG tablet Take 1 tablet (5 mg) by mouth every 6 hours as needed for severe pain 10 tablet 0    propranolol (INDERAL) 80 MG tablet TAKE 1 TABLET (80 MG) BY MOUTH DAILY 90 tablet 3    tamsulosin (FLOMAX) 0.4 MG capsule Take 1 capsule (0.4 mg) by mouth daily 7 capsule 0    triamcinolone (KENALOG) 0.1 % external cream Apply topically 2 times daily 45 g 0       ALLERGIES:   Allergies   Allergen Reactions    Bupropion Nausea    Sulfa Antibiotics        PHYSICAL EXAM:  Ht 1.626 m (5' 4.02\")   Wt 94.8 kg (209 lb)   LMP 03/02/2022 (LMP Unknown)   BMI 35.86 kg/m      Waist circumference:      Wt Readings from Last 4 Encounters:   10/17/23 94.8 kg (209 lb)   10/11/23 92.5 kg (204 lb)   10/02/23 92.5 kg (204 lb)   09/28/23 91.9 kg (202 lb 9.6 oz)     A & O x 3  HEENT: NCAT, mucous membranes moist  Respirations unlabored  Location of obesity: Mixed Obesity    Lab Results   Component Value Date    A1C 5.6 06/19/2023      Latest Ref Rng 9/28/2023  2:04 PM   ENDO DIABETES     AST 0 - 45 U/L 25        ASSESSMENT/PLAN:  Anne is a patient with mature onset obesity with significant element of familial/genetic influence and with current health consequences. She does not need aggressive weight loss plan.  Anne Ferreira eats a " high carb diet, eats a high fat diet, mostly eats during the evening, tends to snack/graze throughout day, rarely sitting to eat a true meal, and has a disorganized meal pattern.    Her problem is complicated by strong craving/reward pathways and poor lifestyle choices    Her ability to lose weight is impacted by current work life, physical impairment, and lack of motivation.    PLAN:    Decrease portion sizes  Decrease eating out  Purge house of food triggers  Dietician visit of education  Volumetrics eating plan  Calorie/low fat diet  Meal planning  Increase activity    Craving/Reward   Ancillary testing:  N/A.  Food Plan:  High protein/low carbohydrate.   Activity Plan:  Exercise after meals.  Supplementary:  N/A.   Medication:  The patient will begin medication in pursuit of improved medical status as influenced by body weight. She will start Saxenda injection  Week 1- Inject 0.6 mg daily;   Week 3- Inject 1.2 mg daily;   Week 5- Inject 1.8 mg daily;   Week 7- Inject 2.4 mg daily;   Week 9 and thereafter- Inject 3.0 mg daily thereafter    There is a mutual understanding of the goals and risks of this therapy. The patient is in agreement. She is educated on dosage regimen and possible side effects.    Orders Placed This Encounter   Procedures    Med Therapy Management Referral     FOLLOW-UP:   See Lauren Bloch, PharmD in 2 month(s)  See Dr.Tasma MCNEAL in 4 month(s).    Joined the call at 10/17/2023, 2:23:51 pm.  Left the call at 10/17/2023, 2:35:37 pm.  You were on the call for 11 minutes 45 seconds .      External notes/medical records independently reviewed, labs and imaging independently reviewed, medical management and tests to be discussed/communicated to patient.    Time: I spent 30 minutes spent on the date of the encounter preparing to see patient (including chart review and preparation), obtaining and or reviewing additional medical history, performing a physical exam and evaluation, documenting clinical  information in the electronic health record, independently interpreting results, communicating results to the patient and coordinating care.      Sincerely,    Jonel Flores MD

## 2023-10-17 NOTE — PATIENT INSTRUCTIONS
Start Saxenda injection    Week 1- Inject 0.6 mg daily;   Week 3- Inject 1.2 mg daily;   Week 5- Inject 1.8 mg daily;   Week 7- Inject 2.4 mg daily;   Week 9 and thereafter- Inject 3.0 mg daily thereafter    See Lauren Bloch, PharmD in 2 month(s)  See Dr.Tasma MCNEAL in 4 month(s)    If you have any questions, please do not hesitate to call Weight management clinic at [phone] or [phone].  If you need to fax, please fax to [phone].    Sincerely,    Jonel Flores MD  Endocrinology

## 2023-10-17 NOTE — PROGRESS NOTES
"New Medical Weight Management Consult      PATIENT:  Anne Ferreira  MRN:         [id number]  :         1980  ANGELIKA:         10/17/2023    Dear PCP,    I had the pleasure of seeing your patient, Anne Ferreira. Full intake/assessment was done to determine barriers to weight loss success and develop a treatment plan. Anne Ferreira is a 42 year old female interested in treatment of medical problems associated with excess weight. She has a height of 5' 4.016\", a weight of 209 lbs 0 oz, and the calculated Body mass index is 35.86 kg/m .    She has the following co-morbidities: hypertension, fatty liver, ADHD, chronic pain syndrome     Weight history: weight was around 175 lbs and then gradually gained weight since. Gained 20 lbs in the past year due to having pain all the time -- neck pain and back pain and being less active  Has kidney stone    Attempts: watching calories, meal replacement shake -- did not feel it helped  Eating habit: eat irregularly, on the go a lot, mostly carbohydrate   Has not met with nutritionist    Exercise: not much due to back pain, active at work    Sleep: not really due to back pain          10/17/2023    11:57 AM   --   I have the following health issues associated with obesity High Blood Pressure    Fatty Liver    Asthma   I have the following symptoms associated with obesity Depression    Back Pain    Fatigue    Hip Pain            No data to display                      10/17/2023    11:57 AM   Referring Provider   Please name the provider who referred you to Medical Weight Management  If you do not know, please answer \"I Don't Know\" Bertha Loya           10/17/2023    11:57 AM   Weight History   How concerned are you about your weight? Very Concerned   I became overweight As an Adult   The following factors have contributed to my weight gain Mental Health Issues    Change in Schedule    Eating Wrong Types of Food    Eating Too Much    Lack of Exercise    Stress   I have tried the " following methods to lose weight Watching Portions or Calories    Exercise    Slim Fast or Other Liquid Diets   My lowest weight since age 17 was 140   My highest weight since age 17 was 209   The most weight I have ever lost was (lbs) 209   I have the following family history of obesity/being overweight Many of my relatives are overweight   How has your weight changed over the last year? Gained           10/17/2023    11:57 AM   Diet Recall Review with Patient   If you do eat supper, what types of food do you typically eat? Easy to prepare meals   If you do snack, what types of food do you typically eat? Chips, cheese, candy, fruit   How many glasses of juice do you drink in a typical day? 2   How many of glasses of milk do you drink in a typical day? 1   If you do drink milk, what type? 1%   How many 8oz glasses of sugar containing drinks such as Wayne-Aid/sweet tea do you drink in a day? 0   How many cans/bottles of sugar pop/soda/tea/sports drinks do you drink in a day? 3   How many cans/bottles of diet pop/soda/tea or sports drink do you drink in a day? 0   How often do you have a drink of alcohol? Monthly or Less           10/17/2023    11:57 AM   Eating Habits   Generally, my meals include foods like these bread, pasta, rice, potatoes, corn, crackers, sweet dessert, pop, or juice Everyday   Generally, my meals include foods like these fried meats, brats, burgers, french fries, pizza, cheese, chips, or ice cream Almost Everyday   Eat fast food (like McDonalds, Burger Terrell, Taco Bell) Less Than Weekly   Eat at a buffet or sit-down restaurant Less Than Weekly   Eat most of my meals in front of the TV or computer Almost Everyday   Often skip meals, eat at random times, have no regular eating times Everyday   Rarely sit down for a meal but snack or graze throughout Almost Everyday   Eat extra snacks between meals Almost Everyday   Eat most of my food at the end of the day A Few Times a Week   Eat in the middle of  the night or wake up at night to eat A Few Times a Week   Eat extra snacks to prevent or correct low blood sugar A Few Times a Week   Eat to prevent acid reflux or stomach pain Never   Worry about not having enough food to eat Never   I eat when I am depressed Once a Week   I eat when I am stressed A Few Times a Week   I eat when I am bored A Few Times a Week   I eat when I am anxious A Few Times a Week   I eat when I am happy or as a reward Less Than Weekly   I feel hungry all the time even if I just have eaten Less Than Weekly   Feeling full is important to me Never   I finish all the food on my plate even if I am already full A Few Times a Week   I can't resist eating delicious food or walk past the good food/smell Less Than Weekly   I eat/snack without noticing that I am eating A Few Times a Week   I eat when I am preparing the meal A Few Times a Week   I eat more than usual when I see others eating Less Than Weekly   I have trouble not eating sweets, ice cream, cookies, or chips if they are around the house Almost Everyday   I think about food all day Less Than Weekly   What foods, if any, do you crave? Chips/Crackers           10/17/2023    11:57 AM   Amount of Food   I feel out of control when eating Monthly   I eat a large amount of food, like a loaf of bread, a box of cookies, a pint/quart of ice cream, all at once Never   I eat a large amount of food even when I am not hungry Never   I eat rapidly Never   I eat alone because I feel embarrassed and do not want others to see how much I have eaten Monthly   I eat until I am uncomfortably full Monthly   I feel bad, disgusted, or guilty after I overeat Almost Everyday           10/17/2023    11:57 AM   Activity/Exercise History   How much of a typical 12 hour day do you spend sitting? Half the Day   How much of a typical 12 hour day do you spend lying down? Less Than Half the Day   How much of a typical day do you spend walking/standing? Half the Day   How many  hours (not including work) do you spend on the TV/Video Games/Computer/Tablet/Phone? 2-3 Hours   How many times a week are you active for the purpose of exercise? Never   What keeps you from being more active? Pain    Shortness of Breath    Too tired   How many total minutes do you spend doing some activity for the purpose of exercising when you exercise? 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 Less Than 50%   How many hours do you spend commuting to work daily? 1   What is your marital status? Single   If you have children, are they overweight? No   Who do you live with? Daughter and friend   Who does the food shopping? Both           10/17/2023    11:57 AM   Mental Health History Reviewed With Patient   Have you ever been physically or sexually abused? No   How often in the past 2 weeks have you felt little interest or pleasure in doing things? More Than Half the Days   Over the past 2 weeks how often have you felt down, depressed, or hopeless? More Than Half the Days           10/17/2023    11:57 AM   Sleep History Reviewed With Patient   How many hours do you sleep at night? 6       MEDICATIONS:   Current Outpatient Medications   Medication Sig Dispense Refill    acetaminophen (TYLENOL) 325 MG tablet Take 3 tablets (975 mg) by mouth every 6 hours as needed for mild pain 50 tablet 0    albuterol (PROAIR HFA/PROVENTIL HFA/VENTOLIN HFA) 108 (90 Base) MCG/ACT inhaler Inhale 2 puffs into the lungs every 6 hours 1 Inhaler 1    amLODIPine (NORVASC) 2.5 MG tablet TAKE 1 TABLET (2.5 MG) BY MOUTH DAILY 90 tablet 3    cyclobenzaprine (FLEXERIL) 10 MG tablet Take 1 tablet by mouth twice daily as needed 60 tablet 0    dexmethylphenidate (FOCALIN) 10 MG tablet Take 1  tablet (10 mg) by mouth 2 times daily for 30 days 60 tablet 0    [START ON 11/12/2023] dexmethylphenidate (FOCALIN) 10 MG tablet Take 1 tablet (10 mg) by mouth 2 times daily for 30 days 60 tablet 0    [START ON 12/13/2023] dexmethylphenidate (FOCALIN) 10 MG tablet Take 1 tablet (10 mg) by mouth 2 times daily for 30 days 60 tablet 0    diphenhydrAMINE (BENADRYL) 25 MG tablet Take 1-2 tablets (25-50 mg) by mouth every 6 hours as needed for itching or allergies 30 tablet 1    escitalopram (LEXAPRO) 20 MG tablet TAKE 1 TABLET (20 MG) BY MOUTH DAILY 90 tablet 0    HYDROmorphone (DILAUDID) 2 MG tablet Take 1-2 tablets (2-4 mg) by mouth every 8 hours as needed for pain 12 tablet 0    hydrOXYzine (ATARAX) 25 MG tablet Take 1 tablet (25 mg) by mouth every 6 hours as needed for itching TAKE 1 TO 2 TABLETS BY MOUTH EVERY 6 HOURS AS NEEDED FOR ANXIETY Strength: 25 mg 60 tablet 0    ibuprofen (ADVIL/MOTRIN) 200 MG tablet Take 4 tablets (800 mg) by mouth every 6 hours as needed for other (mild and/or inflammatory pain)      ipratropium - albuterol 0.5 mg/2.5 mg/3 mL (DUONEB) 0.5-2.5 (3) MG/3ML neb solution Take 1 vial (3 mLs) by nebulization every 6 hours as needed for shortness of breath / dyspnea or wheezing 90 mL 0    lisinopril (ZESTRIL) 40 MG tablet TAKE 1 TABLET (40 MG) BY MOUTH DAILY 90 tablet 0    LORazepam (ATIVAN) 0.5 MG tablet Take 1-2 tablets (0.5-1 mg) by mouth every 6 hours as needed for anxiety (Patient not taking: Reported on 10/12/2023) 6 tablet 0    montelukast (SINGULAIR) 10 MG tablet TAKE ONE TABLET BY MOUTH AT BEDTIME 90 tablet 3    omeprazole (PRILOSEC) 20 MG DR capsule Take 20 mg by mouth as needed PRN      ondansetron (ZOFRAN ODT) 4 MG ODT tab Take 1 tablet (4 mg) by mouth every 8 hours as needed for nausea 10 tablet 0    ondansetron (ZOFRAN ODT) 4 MG ODT tab Take 1 tablet (4 mg) by mouth every 8 hours as needed for nausea 10 tablet 0    oxyCODONE (ROXICODONE) 5 MG tablet TAKE 1 TABLET (5 MG) BY MOUTH  "EVERY 8 HOURS AS NEEDED FOR PAIN MAX 3 TABLETS PER DAY 90 tablet 0    [START ON 11/12/2023] oxyCODONE (ROXICODONE) 5 MG tablet Take 1 tablet (5 mg) by mouth every 8 hours as needed for pain Max 3 tablets per day 90 tablet 0    [START ON 12/12/2023] oxyCODONE (ROXICODONE) 5 MG tablet Take 1 tablet (5 mg) by mouth every 8 hours as needed for pain Max 3 tablets per day 90 tablet 0    oxyCODONE (ROXICODONE) 5 MG tablet Take 1 tablet (5 mg) by mouth every 6 hours as needed for severe pain 10 tablet 0    propranolol (INDERAL) 80 MG tablet TAKE 1 TABLET (80 MG) BY MOUTH DAILY 90 tablet 3    tamsulosin (FLOMAX) 0.4 MG capsule Take 1 capsule (0.4 mg) by mouth daily 7 capsule 0    triamcinolone (KENALOG) 0.1 % external cream Apply topically 2 times daily 45 g 0       ALLERGIES:   Allergies   Allergen Reactions    Bupropion Nausea    Sulfa Antibiotics        PHYSICAL EXAM:  Ht 1.626 m (5' 4.02\")   Wt 94.8 kg (209 lb)   LMP 03/02/2022 (LMP Unknown)   BMI 35.86 kg/m      Waist circumference:      Wt Readings from Last 4 Encounters:   10/17/23 94.8 kg (209 lb)   10/11/23 92.5 kg (204 lb)   10/02/23 92.5 kg (204 lb)   09/28/23 91.9 kg (202 lb 9.6 oz)     A & O x 3  HEENT: NCAT, mucous membranes moist  Respirations unlabored  Location of obesity: Mixed Obesity    Lab Results   Component Value Date    A1C 5.6 06/19/2023      Latest Ref Rng 9/28/2023  2:04 PM   ENDO DIABETES     AST 0 - 45 U/L 25        ASSESSMENT/PLAN:  Anne is a patient with mature onset obesity with significant element of familial/genetic influence and with current health consequences. She does not need aggressive weight loss plan.  Anne Ferreira eats a high carb diet, eats a high fat diet, mostly eats during the evening, tends to snack/graze throughout day, rarely sitting to eat a true meal, and has a disorganized meal pattern.    Her problem is complicated by strong craving/reward pathways and poor lifestyle choices    Her ability to lose weight is impacted by " current work life, physical impairment, and lack of motivation.    PLAN:    Decrease portion sizes  Decrease eating out  Purge house of food triggers  Dietician visit of education  Volumetrics eating plan  Calorie/low fat diet  Meal planning  Increase activity    Craving/Reward   Ancillary testing:  N/A.  Food Plan:  High protein/low carbohydrate.   Activity Plan:  Exercise after meals.  Supplementary:  N/A.   Medication:  The patient will begin medication in pursuit of improved medical status as influenced by body weight. She will start Saxenda injection  Week 1- Inject 0.6 mg daily;   Week 3- Inject 1.2 mg daily;   Week 5- Inject 1.8 mg daily;   Week 7- Inject 2.4 mg daily;   Week 9 and thereafter- Inject 3.0 mg daily thereafter    There is a mutual understanding of the goals and risks of this therapy. The patient is in agreement. She is educated on dosage regimen and possible side effects.    Orders Placed This Encounter   Procedures    Med Therapy Management Referral     FOLLOW-UP:   See Lauren Bloch, PharmD in 2 month(s)  See Dr.Tasma MCNEAL in 4 month(s).    Joined the call at 10/17/2023, 2:23:51 pm.  Left the call at 10/17/2023, 2:35:37 pm.  You were on the call for 11 minutes 45 seconds .      External notes/medical records independently reviewed, labs and imaging independently reviewed, medical management and tests to be discussed/communicated to patient.    Time: I spent 30 minutes spent on the date of the encounter preparing to see patient (including chart review and preparation), obtaining and or reviewing additional medical history, performing a physical exam and evaluation, documenting clinical information in the electronic health record, independently interpreting results, communicating results to the patient and coordinating care.      Sincerely,    Jonel Flores MD

## 2023-10-17 NOTE — NURSING NOTE
Is the patient currently in the state of [place]? YES    Visit mode:VIDEO    If the visit is dropped, the patient can be reconnected by: VIDEO VISIT: Text to cell phone:   Telephone Information:   Mobile [phone]       Will anyone else be joining the visit? NO  (If patient encounters technical issues they should call [phone]    How would you like to obtain your AVS? MyChart    Are changes needed to the allergy or medication list? Pt stated no changes to allergies and Pt stated no med changes    Reason for visit: Consult    Kimberly Leblanc VVF    High PHQ2&9, decline triage, seeing someone else for depression already.

## 2023-10-19 ENCOUNTER — VIRTUAL VISIT (OUTPATIENT)
Dept: ENDOCRINOLOGY | Facility: CLINIC | Age: 43
End: 2023-10-19
Payer: COMMERCIAL

## 2023-10-19 ENCOUNTER — TELEPHONE (OUTPATIENT)
Dept: ENDOCRINOLOGY | Facility: CLINIC | Age: 43
End: 2023-10-19

## 2023-10-19 VITALS — HEIGHT: 64 IN | WEIGHT: 209 LBS | BODY MASS INDEX: 35.68 KG/M2

## 2023-10-19 DIAGNOSIS — E66.01 MORBID OBESITY (H): ICD-10-CM

## 2023-10-19 DIAGNOSIS — Z71.3 NUTRITIONAL COUNSELING: Primary | ICD-10-CM

## 2023-10-19 PROCEDURE — 99207 PR NO CHARGE LOS: CPT | Mod: VID | Performed by: DIETITIAN, REGISTERED

## 2023-10-19 PROCEDURE — 97802 MEDICAL NUTRITION INDIV IN: CPT | Mod: VID | Performed by: DIETITIAN, REGISTERED

## 2023-10-19 ASSESSMENT — PAIN SCALES - GENERAL: PAINLEVEL: MILD PAIN (3)

## 2023-10-19 NOTE — TELEPHONE ENCOUNTER
PRIOR AUTHORIZATION DENIED    Medication: SAXENDA 18 MG/3ML SC SOPN  Insurance Company: [employer]-Specialty [employer]'s - Phone [phone] Fax [phone]  Denial Date: 10/19/2023  Denial Rational:       Appeal Information:       Patient Notified:

## 2023-10-19 NOTE — PATIENT INSTRUCTIONS
"Bruce Rodríguez,    Follow-up with RD in 1-2 months.     Thank you,    Betty Zaman, RD, LD  If you would like to schedule or reschedule an appointment with the RD, please call [phone]    Nutrition Goals  1) Increase intake at breakfast and lunch, aiming for protein and fiber combo.  2) Consider adding in a afternoon snack, 1-2 hours before dinner - cheese stick, protein bar  3) Continue to reduce sugar/calorie containing beverages. Limit juice to <1 glass daily.   4) 1400 jacob/day - \"Lose It\" naomi for tracking. Eatthismuch.com for meal ideas within calorie goal.     The Plate Method:  Plate method can be used for general guidance on balanced meals/portion sizes (see link below for picture/more information)                 Make 1/2 your plate non starchy vegetables (cauliflower, broccoli, asparagus, Sparks sprouts, lettuce, carrots, for example).                3+ oz lean protein sources (salmon/skinless chicken/turkey breast/pork loin/lean cuts of beef/ or non-animal protein such as black, kidney or villar beans, tofu/edamame/tempeh)    (Note: 3 oz = deck of cards size)                1/2 to 1 cup carbohydrate choices such as whole grain starches or starchy vegetables or fruit. For example: quinoa, brown rice, barley, potatoes, sweet potatoes, winter squash, peas, corn, or fruit.                Choose ~0-2 added fat servings at a meal (avocados, nut butters, nuts or seeds, olive oil, vegetable oils).    Http://www.Wire/032660.pdf    https://Wire/324828.pdf    https://www.cdc.gov/diabetes/images/managing/Diabetes-Manage-Eat-Well-Plate-Graphic_600px.jpg    Protein Sources   http://Wire/215875.pdf     Carbohydrates  http://[email or web address]/[email or web address]     Mindful Eating  http://Wire/000572.pdf     Summary of Volumetrics Eating Plan  http://[email or web address]     Healthy Recipe Resources:  Books:  \"The Volumetrics Eating Plan\" by Martha Hdz, Ph.D.  \"Cooking that Counts\" by editors of " "CookingLight  \"Calorie Smart Meals\" cookbook by [employer] (200-500 calorie meals)    Websites:  www.Applied DNA Sciences.myhub  https://www.Comet Solutions.myhub/  www.HiConversion.Cangrade  https://www.diabetesfoodhub.org/all-recipes.html  https://www.ExpoPromoter.myhub/  Https://www.Openbuildsmyplate.gov/myplatekitchen  https://snaped.Everpurses.usda.gov/recipes-menus   https://www.JAZIO/gallery/1827530/dietitian-budget-high-protein-dinner-recipes/  https://www.Personal Style Finder/recipes/84/healthy-recipes/   Https://www.U.S. Silica.com/c/MediciNovacoen   EatthiDarkWorks.com       Cultural Cuisines:  Various Regions of African Cuisine: https://www.JAZIO/recipes/51650/cuisines-regions//   Cuisine: https://www.Exinda/knowledge-center/recipes/  Mexican and Vegan Cuisine:   https://Mobibase/  https://PipelineDB/recipe-index/  Chinese Cuisine: https://www.CDNlion.myhub/  South Asian Cuisine:  Mckenna Bergman on social media- South  high-protein/low-calorie meal/food ideas  Lali Easley RD, [facility], plant-based [facility]  Resources: https://www.Celebrations.com/    Apps:  MealPlumzi naomi (or website, EverPower.com)   [facility]         COMPREHENSIVE WEIGHT MANAGEMENT PROGRAM  VIRTUAL SUPPORT GROUPS    For Support Group Information:      We offer support groups for patients who are working on weight loss and considering, preparing for or have had weight loss surgery.   There is no cost for this opportunity.  You are invited to attend the?Virtual Support Groups?provided by any of the following locations:    [facility] via 91 Golf Teams with Katie Thompson RN  2.   [place] via 91 Golf Teams with Mark Villasenor, PhD, LP  3.   [place] via 91 Golf Teams with Ana Lopez RN  4.   [facility] via 91 Golf Teams with TUSHAR [facility]    The following Support Group information can also be found on our " "website:  https://www.eal[facility].org/treatments/weight-loss-surgery-support-groups    https://www.[facility].org/treatments/weight-loss-and-weight-loss-surgery-support-groups      [facility] Weight Loss Surgery Support Group    [facility] Weight Loss Surgery Support Group  The support group is a patient-lead forum that meets monthly to share experiences, encouragement and education. It is open to those who have had weight loss surgery, are scheduled for surgery, or are considering surgery.   WHEN: This group meets on the 3rd Wednesday of each month from 5:00PM - 6:00PM virtually using Microsoft Teams.   FACILITATOR: Led by Katie Ybarra RD, IRISH, RN, the program's Clinical Coordinator.   TO REGISTER: Please contact the clinic via [email or web address] or call the nurse line directly at [phone] to inform our staff that you would like an invite sent to you and to let us know the email you would like the invite sent to. Prior to the meeting, a link with directions on how to join the meeting will be sent to you.    2023 Meetings   April 19: Guest Speaker, Alexys Agarwal RD, LD, \"Maintaining Weight Loss after Bariatric Surgery\".  May 17: \"Let's Talk\" a time for the group to share.  June 21: \"Let's Talk\" a time for the group to share.  July 19  August 16  September 20  October 18  November 15  December 20    [facility] Support Groups    Connections Bariatric Care Support Group?  This is open to all pre- and post- operative bariatric surgery patients as well as their support system.   WHEN: This group meets the 2nd Tuesday of each month from 6:30 PM - 8:00 PM virtually using Microsoft Teams.   FACILITATOR: Led by Mark Villasenor, Ph.D who is a Licensed Psychologist with the [facility] Comprehensive Weight Management Program.   TO REGISTER: Please send an email to Mark Villasenor, Ph.D., LP at?[email or web address]?if you would like an invitation to " "the group and to learn about using Microsoft Teams.    2023 Meetings  April 11: Guest speaker, Le Mak MD, Bariatrician, [facility] Comprehensive Weight Management Program, \"Injectable Weight Loss Medications\".  May 9  Toshia 13  July 11  August 8  September 12  October 10  November 14  December 12    Connections Post-Operative Bariatric Surgery Support Group  This is a support group for [facility] bariatric patients (and those external to [facility]) who have had bariatric surgery and are at least 3 months post-surgery.  WHEN: This support group meets the 4th Wednesday of the month from 11:00 AM - 12:00 PM virtually using Microsoft Teams.   FACILITATOR: Led by Certified Bariatric Nurse, Ana Lopez RN.   TO REGISTER: Please send an email to Ana at [email or web address] if you would like an invitation to the group and to learn about using Microsoft Teams.    2023 Meetings  April 26  May 24  Toshia 28  July 26  August 23  September 27  October 25  November 22  December 27      [employer]   Healthy Lifestyle Group    Healthy Lifestyle Group?  This is a 60 minute virtual coaching group for those who want to lead a healthier lifestyle. Come together to set goals and overcome barriers in a supportive group environment. We will address the four pillars of health--nutrition, exercise, sleep and emotional well-being.  This group is highly recommended for those who are participating in the 24 week Healthy Lifestyle Plan and our Health Coaching sessions.    WHEN: This group meets the first Friday of the month, 12:30 PM - 1:30 PM online, via a zoom meeting.      FACILITATOR: Led by National Board Certified Health and , Ana Paredes, [facility].     TO REGISTER: Please call the Call Center at [phone] to register. You will get an appointment to attend in [facility]. Fifteen minutes prior to the meeting, complete the e-check in and you will get the " "link to join the meeting.  There is no charge to attend this group and space is limited.       2023 and 2024 Meeting Topics and Dates:  November 3: Introduction to Mindfulness (Learn simple and effective mindfulness practices and how it can benefit you)  December 8: Let's Talk (guided discussion on our wins and challenges)  January 5: New Years Vision: Manifest your Best 2024! (Guided imagery,  journaling and discussion)  February 2: Let's Talk  March 1: 10 Percent Happier by Dominick Decker (Book Bites; a guided discussion on the nuggets of wisdom from favorite wellness books; no need to read the book but highly encouraged)  April 5: Let's Talk  May 3: \"Essentialism; The Disciplined Pursuit of Less by David Banda (book bites discussion)  June 7: Let's Talk  July 5: NO MEETING, off for the 4th of July Holiday  August 2: The Blue Zones, Secrets for Living a Longer Life by Dominick Pinzon (book bites discussion)                         "

## 2023-10-19 NOTE — NURSING NOTE
Is the patient currently in the state of [place]? YES    Visit mode:VIDEO    If the visit is dropped, the patient can be reconnected by: VIDEO VISIT: Text to cell phone:   Telephone Information:   Mobile [phone]       Will anyone else be joining the visit? NO  (If patient encounters technical issues they should call [phone])    How would you like to obtain your AVS? MyChart    Are changes needed to the allergy or medication list? Yes Pt states please remove duplicates from med list.    Pt states 3/10 low back pain with upper/middle pain as well-chronic.    Reason for visit: Consult    Josh VIEYRA

## 2023-10-19 NOTE — LETTER
"10/19/2023       RE: Anne Ferreira  [address] [facility] [address]     Dear Colleague,    Thank you for referring your patient, Anne Ferreira, to the [facility] [place] at [facility]. Please see a copy of my visit note below.    Video-Visit Details    Type of service:  Video Visit    Video Start Time: 7:58 AM   Video End Time: 8:34 AM    Originating Location (pt. Location): Home    Distant Location (provider location): Offsite (providers home) [facility] [place]     Platform used for Video Visit: IntroNet      New Weight Management Nutrition Consultation    Anne Ferreira is a 42 year old female presents today for new weight management nutrition consultation.  Patient referred by Dr. Remy on October 19, 2023.    Patient with Co-morbidities of obesity including:      10/17/2023    11:57 AM   --   I have the following health issues associated with obesity High Blood Pressure    Fatty Liver    Asthma   I have the following symptoms associated with obesity Depression    Back Pain    Fatigue    Hip Pain     hypertension, fatty liver, ADHD, chronic pain syndrome      Anthropometrics:  Estimated body mass index is 35.86 kg/m  as calculated from the following:    Height as of 10/17/23: 1.626 m (5' 4.02\").    Weight as of 10/17/23: 94.8 kg (209 lb).    Medications for Weight Loss:  Saxenda prescribed at initial visit with MD. Pt has yet to start (unsure if insurance will cover)    NUTRITION HISTORY  Food allergies: None  Food intolerances: Sometimes Chinese or Mexican foods \"can run right through\" her. H/o cholecystectomy   Dislikes spicy foods  Vitamin/Mineral Supplements: None. H/o vitamin D   Previous methods of diet modification for weight loss: Avoids being too restrictive (has not worked well in the past), protein shakes.     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   Beverages: Coffee, water with lemon, Ocean Spray cran/pineapple juice (can be several ounces), 1-2% milk (1 glass daily)   Alcohol: 1-2 times per month  Dining Out/take-out: Pizza once weekly, stopping at [employer] for lunch           10/17/2023    11:57 AM   Diet Recall Review with Patient   If you do eat supper, what types of food do you typically eat? Easy to prepare meals   If you do snack, what types of food do you typically eat? Chips, cheese, candy, fruit   How many glasses of juice do you drink in a typical day? 2   How many of glasses of milk do you drink in a typical day? 1   If you do drink milk, what type? 1%   How many 8oz glasses of sugar containing drinks such as Wayne-Aid/sweet tea do you drink in a day? 0   How many cans/bottles of sugar pop/soda/tea/sports drinks do you drink in a day? 3   How many cans/bottles of diet pop/soda/tea or sports drink do you drink in a day? 0   How often do you have a drink of alcohol? Monthly or Less           10/17/2023    11:57 AM   Eating Habits   Generally, my meals include foods like these bread, pasta, rice, potatoes, corn, crackers, sweet dessert, pop, or juice Everyday   Generally, my meals include foods like these fried meats, brats, burgers, french fries, pizza, cheese, chips, or ice cream Almost Everyday   Eat fast food (like McDonalds, Burger Terrell, Taco Bell) Less Than Weekly   Eat at a buffet or sit-down restaurant Less Than Weekly   Eat most of my meals in front of the TV or computer Almost Everyday   Often skip meals, eat at random times, have no regular eating times Everyday   Rarely sit down for a meal but snack or graze  throughout Almost Everyday   Eat extra snacks between meals Almost Everyday   Eat most of my food at the end of the day A Few Times a Week   Eat in the middle of the night or wake up at night to eat A Few Times a Week   Eat extra snacks to prevent or correct low blood sugar A Few Times a Week   Eat to prevent acid reflux or stomach pain Never   Worry about not having enough food to eat Never   I eat when I am depressed Once a Week   I eat when I am stressed A Few Times a Week   I eat when I am bored A Few Times a Week   I eat when I am anxious A Few Times a Week   I eat when I am happy or as a reward Less Than Weekly   I feel hungry all the time even if I just have eaten Less Than Weekly   Feeling full is important to me Never   I finish all the food on my plate even if I am already full A Few Times a Week   I can't resist eating delicious food or walk past the good food/smell Less Than Weekly   I eat/snack without noticing that I am eating A Few Times a Week   I eat when I am preparing the meal A Few Times a Week   I eat more than usual when I see others eating Less Than Weekly   I have trouble not eating sweets, ice cream, cookies, or chips if they are around the house Almost Everyday   I think about food all day Less Than Weekly   What foods, if any, do you crave? Chips/Crackers           10/17/2023    11:57 AM   Amount of Food   I feel out of control when eating Monthly   I eat a large amount of food, like a loaf of bread, a box of cookies, a pint/quart of ice cream, all at once Never   I eat a large amount of food even when I am not hungry Never   I eat rapidly Never   I eat alone because I feel embarrassed and do not want others to see how much I have eaten Monthly   I eat until I am uncomfortably full Monthly   I feel bad, disgusted, or guilty after I overeat Almost Everyday       Physical Activity:  Limited by pain (back/neck pain).         10/17/2023    11:57 AM   Activity/Exercise History   How much of a  "typical 12 hour day do you spend sitting? Half the Day   How much of a typical 12 hour day do you spend lying down? Less Than Half the Day   How much of a typical day do you spend walking/standing? Half the Day   How many hours (not including work) do you spend on the TV/Video Games/Computer/Tablet/Phone? 2-3 Hours   How many times a week are you active for the purpose of exercise? Never   What keeps you from being more active? Pain    Shortness of Breath    Too tired   How many total minutes do you spend doing some activity for the purpose of exercising when you exercise? None       Nutrition Prescription  Recommended energy/nutrient modification.    Nutrition Diagnosis  Obesity r/t long history of positive energy balance aeb BMI >30.    Nutrition Intervention  Materials/education provided on hypocaloric diet for weight loss. Discussed 1400 calorie/day diet, Volumetric eating to help satiety level on fewer calories; portion control and healthy food choices (Plate Method and Volumetrics handouts), 100 calorie snack choices, meal and snack planning and websites. Patient demonstrates understanding.  Co-developed goals to work towards.   Provided pt with list of goals and resources below via Dajie.     Expected Engagement: good  Follow-Up Plans: meal/snack planning     Nutrition Goals  1) Increase intake at breakfast and lunch, aiming for protein and fiber combo.  2) Consider adding in a afternoon snack, 1-2 hours before dinner - cheese stick, protein bar  3) Continue to reduce sugar/calorie containing beverages. Limit juice to <1 glass daily.   4) 1400 jacob/day - \"Lose It\" naomi for tracking. Eatthismuch.com for meal ideas within calorie goal.     The Plate Method:  Plate method can be used for general guidance on balanced meals/portion sizes (see link below for picture/more information)                 Make 1/2 your plate non starchy vegetables (cauliflower, broccoli, asparagus, [place] sprouts, lettuce, carrots, for " "example).                3+ oz lean protein sources (salmon/skinless chicken/turkey breast/pork loin/lean cuts of beef/ or non-animal protein such as black, kidney or villar beans, tofu/edamame/tempeh)    (Note: 3 oz = deck of cards size)                1/2 to 1 cup carbohydrate choices such as whole grain starches or starchy vegetables or fruit. For example: quinoa, brown rice, barley, potatoes, sweet potatoes, winter squash, peas, corn, or fruit.                Choose ~0-2 added fat servings at a meal (avocados, nut butters, nuts or seeds, olive oil, vegetable oils).    Http://www.[email or web address] [email or web address]    https://Shanghai Anymoba/[email or web address]    https://www.cdc.gov/diabetes/images/managing/Diabetes-Manage-Eat-Well-Plate-Graphic_600px.jpg    Protein Sources   http://[email or web address] Anymoba/[email or web address]     Carbohydrates  [email or web address]     Mindful Eating  [email or web address]://[email or web address] Anymoba/[email or web address]     Summary of Volumetrics Eating Plan  [email or web address]     Healthy Recipe Resources:  Books:  \"The Volumetrics Eating Plan\" by Martha Hdz, Ph.D.  \"Cooking that Counts\" by editors of Rhapso  \"Calorie Smart Meals\" cookbook by Better Homes and Gardens (200-500 calorie meals)    Websites:  www.Friendly Score  https://www.Uolala.com/  www.Metabolomic Diagnostics.org  https://www.diabetesfoodhub.org/all-recipes.html  https://www.Manifest.Trendy Entertainment/  Https://www.choosemyplate.gov/myplatekitchen  https://snaped.fns.usda.gov/recipes-menus   https://www.Writer.ly/gallery/3259071/dietitian-budget-high-protein-dinner-recipes/  https://www.Yeti Data.Trendy Entertainment/recipes/84/healthy-recipes/   Https://www.OhmDatatHealthCare.com.com/c/zachjoe   Eatthismuch.com       Cultural Cuisines:  Various Regions of African Cuisine: https://www.GoBeMe.Trendy Entertainment/recipes/15865/cuisines-regions//   Cuisine: https://www.FreeBordersnatBlack House.org/knowledge-center/recipes/  Mexican and Vegan Cuisine: "   https://WHATT.NovaSys/  https://"Dynova Laboratories,Inc.".NovaSys/recipe-index/  Chinese Cuisine: https://www.Famigo/  South Asian Cuisine:  Mckenna Bergman on social media- South  high-protein/low-calorie meal/food ideas  Lali Easley RD, [facility], plant-based [facility]  Resources: https://www.CuPcAkE & other things you bake/    Apps:  Fromlab naomi (or website, mealime.com)   SpendSmartEatSmart     Follow-Up:  1-2 months, PRN    Time spent with patient: 36 minutes.  Betty Zaman RD, LD

## 2023-10-19 NOTE — TELEPHONE ENCOUNTER
PA Initiation    Medication: SAXENDA 18 MG/3ML SC SOPN  Insurance Company: [employer] Non-Specialty PA's - Phone [phone] Fax [phone]  Pharmacy Filling the Rx: [place], [place] - [address] [place] [address]  Filling Pharmacy Phone: [phone]  Filling Pharmacy Fax: [phone]  Start Date: 10/19/2023

## 2023-10-19 NOTE — PROGRESS NOTES
"Video-Visit Details    Type of service:  Video Visit    Video Start Time: 7:58 AM   Video End Time: 8:34 AM    Originating Location (pt. Location): Home    Distant Location (provider location): Offsite (providers home) [facility] [place]     Platform used for Video Visit: Site Tour      New Weight Management Nutrition Consultation    Anne Ferreira is a 42 year old female presents today for new weight management nutrition consultation.  Patient referred by Dr. Remy on October 19, 2023.    Patient with Co-morbidities of obesity including:      10/17/2023    11:57 AM   --   I have the following health issues associated with obesity High Blood Pressure    Fatty Liver    Asthma   I have the following symptoms associated with obesity Depression    Back Pain    Fatigue    Hip Pain     hypertension, fatty liver, ADHD, chronic pain syndrome      Anthropometrics:  Estimated body mass index is 35.86 kg/m  as calculated from the following:    Height as of 10/17/23: 1.626 m (5' 4.02\").    Weight as of 10/17/23: 94.8 kg (209 lb).    Medications for Weight Loss:  Saxenda prescribed at initial visit with MD. Pt has yet to start (unsure if insurance will cover)    NUTRITION HISTORY  Food allergies: None  Food intolerances: Sometimes Chinese or Mexican foods \"can run right through\" her. H/o cholecystectomy   Dislikes spicy foods  Vitamin/Mineral Supplements: None. H/o vitamin D   Previous methods of diet modification for weight loss: Avoids being too restrictive (has not worked well in the past), protein shakes.     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   Beverages: Coffee, water with lemon, Ocean Spray cran/pineapple juice (can be several ounces), 1-2% milk (1 glass daily)   Alcohol: 1-2 times per month  Dining Out/take-out: Pizza once weekly, stopping at [employer] for lunch           10/17/2023    11:57 AM   Diet Recall Review with Patient   If you do eat supper, what types of food do you typically eat? Easy to prepare meals   If you do snack, what types of food do you typically eat? Chips, cheese, candy, fruit   How many glasses of juice do you drink in a typical day? 2   How many of glasses of milk do you drink in a typical day? 1   If you do drink milk, what type? 1%   How many 8oz glasses of sugar containing drinks such as Wayne-Aid/sweet tea do you drink in a day? 0   How many cans/bottles of sugar pop/soda/tea/sports drinks do you drink in a day? 3   How many cans/bottles of diet pop/soda/tea or sports drink do you drink in a day? 0   How often do you have a drink of alcohol? Monthly or Less           10/17/2023    11:57 AM   Eating Habits   Generally, my meals include foods like these bread, pasta, rice, potatoes, corn, crackers, sweet dessert, pop, or juice Everyday   Generally, my meals include foods like these fried meats, brats, burgers, french fries, pizza, cheese, chips, or ice cream Almost Everyday   Eat fast food (like FOUNDD, eInstruction by Turning Technologies, Taco Bell) Less Than Weekly   Eat at a buffet or sit-down restaurant Less Than Weekly   Eat most of my meals in front of the TV or computer Almost Everyday   Often skip meals, eat at random times, have no regular eating times Everyday   Rarely sit down for a meal but snack or graze throughout Almost Everyday   Eat extra snacks between meals Almost Everyday   Eat most of my food at the end of the day A Few Times a Week   Eat in the middle of the night or wake up at night to eat A Few Times a Week   Eat extra snacks to prevent or correct low blood sugar A Few Times a Week   Eat to prevent acid reflux  or stomach pain Never   Worry about not having enough food to eat Never   I eat when I am depressed Once a Week   I eat when I am stressed A Few Times a Week   I eat when I am bored A Few Times a Week   I eat when I am anxious A Few Times a Week   I eat when I am happy or as a reward Less Than Weekly   I feel hungry all the time even if I just have eaten Less Than Weekly   Feeling full is important to me Never   I finish all the food on my plate even if I am already full A Few Times a Week   I can't resist eating delicious food or walk past the good food/smell Less Than Weekly   I eat/snack without noticing that I am eating A Few Times a Week   I eat when I am preparing the meal A Few Times a Week   I eat more than usual when I see others eating Less Than Weekly   I have trouble not eating sweets, ice cream, cookies, or chips if they are around the house Almost Everyday   I think about food all day Less Than Weekly   What foods, if any, do you crave? Chips/Crackers           10/17/2023    11:57 AM   Amount of Food   I feel out of control when eating Monthly   I eat a large amount of food, like a loaf of bread, a box of cookies, a pint/quart of ice cream, all at once Never   I eat a large amount of food even when I am not hungry Never   I eat rapidly Never   I eat alone because I feel embarrassed and do not want others to see how much I have eaten Monthly   I eat until I am uncomfortably full Monthly   I feel bad, disgusted, or guilty after I overeat Almost Everyday       Physical Activity:  Limited by pain (back/neck pain).         10/17/2023    11:57 AM   Activity/Exercise History   How much of a typical 12 hour day do you spend sitting? Half the Day   How much of a typical 12 hour day do you spend lying down? Less Than Half the Day   How much of a typical day do you spend walking/standing? Half the Day   How many hours (not including work) do you spend on the TV/Video Games/Computer/Tablet/Phone? 2-3 Hours   How  "many times a week are you active for the purpose of exercise? Never   What keeps you from being more active? Pain    Shortness of Breath    Too tired   How many total minutes do you spend doing some activity for the purpose of exercising when you exercise? None       Nutrition Prescription  Recommended energy/nutrient modification.    Nutrition Diagnosis  Obesity r/t long history of positive energy balance aeb BMI >30.    Nutrition Intervention  Materials/education provided on hypocaloric diet for weight loss. Discussed 1400 calorie/day diet, Volumetric eating to help satiety level on fewer calories; portion control and healthy food choices (Plate Method and Volumetrics handouts), 100 calorie snack choices, meal and snack planning and websites. Patient demonstrates understanding.  Co-developed goals to work towards.   Provided pt with list of goals and resources below via NMB Bank.     Expected Engagement: good  Follow-Up Plans: meal/snack planning     Nutrition Goals  1) Increase intake at breakfast and lunch, aiming for protein and fiber combo.  2) Consider adding in a afternoon snack, 1-2 hours before dinner - cheese stick, protein bar  3) Continue to reduce sugar/calorie containing beverages. Limit juice to <1 glass daily.   4) 1400 jacob/day - \"Lose It\" naomi for tracking. Eatthismuch.com for meal ideas within calorie goal.     The Plate Method:  Plate method can be used for general guidance on balanced meals/portion sizes (see link below for picture/more information)                 Make 1/2 your plate non starchy vegetables (cauliflower, broccoli, asparagus, [place] sprouts, lettuce, carrots, for example).                3+ oz lean protein sources (salmon/skinless chicken/turkey breast/pork loin/lean cuts of beef/ or non-animal protein such as black, kidney or villar beans, tofu/edamame/tempeh)    (Note: 3 oz = deck of cards size)                1/2 to 1 cup carbohydrate choices such as whole grain starches or " "starchy vegetables or fruit. For example: quinoa, brown rice, barley, potatoes, sweet potatoes, winter squash, peas, corn, or fruit.                Choose ~0-2 added fat servings at a meal (avocados, nut butters, nuts or seeds, olive oil, vegetable oils).    Http://www.Xipin/[email or web address]    https://Xipin/785863.pdf    https://www.cdc.gov/diabetes/images/managing/Diabetes-Manage-Eat-Well-Plate-Graphic_600px.jpg    Protein Sources   http://Xipin/[email or web address]     Carbohydrates  [email or web address]://[email or web address]     Mindful Eating  http://Xipin/[email or web address]     Summary of Volumetrics Eating Plan  [email or web address]     Healthy Recipe Resources:  Books:  \"The Volumetrics Eating Plan\" by Martha Hdz, Ph.D.  \"Cooking that Counts\" by editors of Pharmly  \"Calorie Smart Meals\" cookbook by Better Homes and Real Time Translations (200-500 calorie meals)    Websites:  www.Azingo  https://www.NeoNova Network Services/  www.MD.Voice.AlterPoint  https://www.diabetesfoodhub.org/all-recipes.html  https://www.Retail Optimization.Incanthera/  Https://www.Etonkidsmyplate.gov/myplatekitchen  https://snaped.fns.usda.gov/recipes-menus   https://www.NewACT.Incanthera/gallery/2619951/dietitian-budget-high-protein-dinner-recipes/  https://www.Motif BioSciences.com/recipes/84/healthy-recipes/   Https://www.youNextinitube.com/c/Enrich Social Productionsen   Eatthismuch.com       Cultural Cuisines:  Various Regions of African Cuisine: https://www.NewACT.com/recipes/08995/cuisines-regions//   Cuisine: https://www.firstnations.org/knowledge-center/recipes/  Mexican and Vegan Cuisine:   https://VenuCare Medical.Incanthera/  https://barcoo/recipe-index/  Chinese Cuisine: https://www.Lightwave Logic/  South Asian Cuisine:  Mckenna Bergman on social media- South  high-protein/low-calorie meal/food ideas  Lali Easley RD, [facility], plant-based [facility] Resources: https://www.Fraktalia Studios/    Apps:  DuXplore naomi (or website, mealime.com) "   SpendSmartEatSmart     Follow-Up:  1-2 months, PRN    Time spent with patient: 36 minutes.  Betty Zaman RD, LD

## 2023-10-20 ENCOUNTER — VIRTUAL VISIT (OUTPATIENT)
Dept: UROLOGY | Facility: CLINIC | Age: 43
End: 2023-10-20
Payer: COMMERCIAL

## 2023-10-20 DIAGNOSIS — N20.1 RIGHT URETERAL STONE: Primary | ICD-10-CM

## 2023-10-20 DIAGNOSIS — N20.0 NEPHROLITHIASIS: ICD-10-CM

## 2023-10-20 DIAGNOSIS — N20.1 CALCULUS OF URETEROVESICAL JUNCTION (UVJ): ICD-10-CM

## 2023-10-20 PROCEDURE — 99214 OFFICE O/P EST MOD 30 MIN: CPT | Mod: VID | Performed by: NURSE PRACTITIONER

## 2023-10-20 RX ORDER — TAMSULOSIN HYDROCHLORIDE 0.4 MG/1
0.4 CAPSULE ORAL DAILY
Qty: 30 CAPSULE | Refills: 0 | Status: SHIPPED | OUTPATIENT
Start: 2023-10-20

## 2023-10-20 NOTE — PATIENT INSTRUCTIONS
UROLOGY CLINIC VISIT PATIENT INSTRUCTIONS    -Return to clinic in 3 weeks with CT scan.   -If having severe flank pain, fevers, chills, nausea, or vomiting please notify Urology clinic or be seen in the ER.     If you have any issues, questions or concerns in the meantime, do not hesitate to contact us at [facility] at [phone] or via Sarta.     Savanah Son CNP  Department of Urology     Medicines to Control Your Kidney Stone Symptoms    Control Pain: First Line Treatment    Dramamine (Please use the drowsy version, nongeneric formulation)  Available over the counter  **This medicine will cause increased drowsiness. DON T DRIVE OR OPERATE MACHINERY FOR 6 HOURS**    How to take:   Take 50 mg at bedtime every night until the stone passes  In addition, take 50 mg every 6 hours as needed    What it does:  Decreases spasm of the ureter  Decreases recurrence of pain for next 24 hours  Decreases severe pain  Decreases nausea  Will help you sleep    Ibuprofen (Advil or Motrin)  Available over the counter  **Please do not take if advise to avoid NSAIDS, history of stomach ulcers/bleeding issues, blood thinners, or already on NSAIDS**    How to take:   Take 2 to 4 (200 mg) tablets every 6 hours for the first 48 hours. After that, use only as needed    What it does:  Decreases pain  Prevents spasm of the ureter    Acetaminophen (Tylenol)   Available over the counter    How to Take:  Take 2 (500 mg) tablets every 6 hours as needed. Do not exceed 8 tablets (4,000 mg total) in 24 hours    What it does:  Highly effective in controlling pain      Other medicines we may give you:    Tamsulosin (Flomax): Take 0.4 mg daily with food     What it does:   May decrease stone pain   May help stones pass faster   May make surgery more successful by improving access to stone   May decrease discomfort from ureteral stent, if used    Possible side effects:   Lightheadedness when standing too quickly (especially  in older people)   Stuffy nose

## 2023-10-20 NOTE — PROGRESS NOTES
"Virtual Visit Details    Type of service:  Video Visit   Video Start Time: {video visit start/end time for provider to select:428306}  Video End Time:{video visit start/end time for provider to select:056835}    Originating Location (pt. Location): {video visit patient location:394338::\"Home\"}  {PROVIDER LOCATION On-site should be selected for visits conducted from your clinic location or adjoining [facility] hospital, academic office, or other nearby [facility] building. Off-site should be selected for all other provider locations, including home:549220}  Distant Location (provider location):  {virtual location provider:865705}  Platform used for Video Visit: {Virtual Visit Platforms:729502::\"Kiveda\"}  "

## 2023-10-20 NOTE — PROGRESS NOTES
Urology Video Office Visit    Video-Visit Details    Type of service:  Video Visit    Video Start Time: 1127    Video End Time: 1144    Originating Location (pt. Location): Home    Distant Location (provider location):  Off-site     Platform used for Video Visit: [employer]           Assessment and Plan:     Assessment: 42 year old female with right UVJ stone    Plan:  -Reviewed CT scan with patient. Noted right 3mm UVJ stone and 2mm nonobstructing stone.   -The patient and I discussed the diagnosis and natural history of urolithiasis.   -We discussed treatment options including observation with or without expulsive therapy  vs. ureteroscopy and laser lithotripsy. I counseled the patient regarding the potential need for a ureteral stent after treatment and the necessity of removing the stent after surgery. After discussing risks, benefits, and alternatives to treatment, patient elects to proceed with MET x 3 weeks.  -Please use acetaminophen, ibuprofen, and dimenhydrinate PRN for pain control.   -Will start tamsulosin 0.4mg PO daily to help with stone passage.   -If having severe flank pain, fevers, chills, nausea, or vomiting please notify Urology clinic or be seen in the ER.   -RTC in 3 weeks with CT scan.        Savanah Son CNP  Department of Urology  October 20, 2023    I spent a total of 35 minutes spent on the date of the encounter doing chart review, history and exam, documentation, and further activities as noted above.          Chief Complaint:   Right UVJ Stone         History of Present Illness:    Anne Ferreira is a pleasant 42 year old female who presents with concerns of a right UVJ stone.    Ms. Ferreira was initially seen in the ER on 08/31/23 for concerns of RLQ pain. CT scan on 08/31/23 (images personally reviewed) revealed a 5mm right renal stone without hydronephrosis. She notes that pain persisted and was seen again in the ER on 09/09/23. CT scan on 09/09/23 revealed no interval changes when  compared to CT scan on 08/31/23. She was seen again for ongoing symptoms on 09/22/23 and was treated for BV.  Symptoms of right flank pain did not improve and she was seen again on 10/11/23 in the ER.     CT scan on 10/11/23 noted no longer seen right 5mm nonobstructing stone. Noted a 2mm right nonobstructing stone and a right 3mm UVJ stone with hydronephrosis. No noted left hydronephrosis or nephrolithiasis.     She notes that pain has significantly improved since her ER visit. Is having intermittent nausea without emesis. Able to tolerate fluids/food. Notes passage of a crystal like sediment in urine. Denies any passage of renal stone at this time. Continues to have intermittent hematuria with dysuria. Denies any fevers/chills.    This is her first stone episode.            Past Medical History:     Past Medical History:   Diagnosis Date    Abnormal Pap smear of cervix             Past Surgical History:     Past Surgical History:   Procedure Laterality Date    CYSTOSCOPY N/A 4/25/2022    Procedure: CYSTOSCOPY;  Surgeon: Julius Montejo MD;  Location: [facility]    INJECT EPIDURAL CERVICAL  3/30/2023    Procedure: INJECTION, SPINE, CERVICAL, EPIDURAL;  Surgeon: Naresh Zuniga MD;  [place] [facility] [place]    LAPAROSCOPIC CHOLECYSTECTOMY N/A 12/21/2020    Procedure: Laparoscopic cholecystectomy;  Surgeon: Manuel Durham DO;  Location: [facility]    LAPAROSCOPIC HYSTERECTOMY TOTAL, BILATERAL SALPINGO-OOPHORECTOMY, COMBINED Bilateral 4/25/2022    Procedure: Laparoscopic total hysterectomy, Bilateral salpingectomy, with sparing of ovaries;  Surgeon: Julius Montejo MD;  Location: [facility] OR            Medications     Current Outpatient Medications   Medication    acetaminophen (TYLENOL) 325 MG tablet    albuterol (PROAIR HFA/PROVENTIL HFA/VENTOLIN HFA) 108 (90 Base) MCG/ACT inhaler    amLODIPine (NORVASC) 2.5 MG tablet    cyclobenzaprine (FLEXERIL) 10 MG tablet    dexmethylphenidate (FOCALIN) 10 MG tablet     diphenhydrAMINE (BENADRYL) 25 MG tablet    escitalopram (LEXAPRO) 20 MG tablet    HYDROmorphone (DILAUDID) 2 MG tablet    hydrOXYzine (ATARAX) 25 MG tablet    ibuprofen (ADVIL/MOTRIN) 200 MG tablet    ipratropium - albuterol 0.5 mg/2.5 mg/3 mL (DUONEB) 0.5-2.5 (3) MG/3ML neb solution    liraglutide - Weight Management (SAXENDA) 18 MG/3ML pen    lisinopril (ZESTRIL) 40 MG tablet    LORazepam (ATIVAN) 0.5 MG tablet    montelukast (SINGULAIR) 10 MG tablet    omeprazole (PRILOSEC) 20 MG DR capsule    ondansetron (ZOFRAN ODT) 4 MG ODT tab    oxyCODONE (ROXICODONE) 5 MG tablet    propranolol (INDERAL) 80 MG tablet    [START ON 11/12/2023] dexmethylphenidate (FOCALIN) 10 MG tablet    [START ON 12/13/2023] dexmethylphenidate (FOCALIN) 10 MG tablet    ondansetron (ZOFRAN ODT) 4 MG ODT tab    oxyCODONE (ROXICODONE) 5 MG tablet    [START ON 11/12/2023] oxyCODONE (ROXICODONE) 5 MG tablet    [START ON 12/12/2023] oxyCODONE (ROXICODONE) 5 MG tablet    tamsulosin (FLOMAX) 0.4 MG capsule    triamcinolone (KENALOG) 0.1 % external cream     No current facility-administered medications for this visit.            Family History:     Family History   Problem Relation Age of Onset    Hypertension Father     Hypertension Brother     Hypertension Sister     Hypertension Brother             Social History:     Social History     Socioeconomic History    Marital status:      Spouse name: Not on file    Number of children: Not on file    Years of education: Not on file    Highest education level: Not on file   Occupational History    Not on file   Tobacco Use    Smoking status: Every Day     Packs/day: .5     Types: Cigarettes    Smokeless tobacco: Never   Vaping Use    Vaping Use: Never used   Substance and Sexual Activity    Alcohol use: Yes     Comment: occassionally    Drug use: Never    Sexual activity: Not Currently     Partners: Male   Other Topics Concern    Not on file   Social History Narrative    Not on file     Social  Determinants of Health     Financial Resource Strain: Low Risk  (9/28/2023)    Financial Resource Strain     Within the past 12 months, have you or your family members you live with been unable to get utilities (heat, electricity) when it was really needed?: No   Food Insecurity: Low Risk  (9/28/2023)    Food Insecurity     Within the past 12 months, did you worry that your food would run out before you got money to buy more?: No     Within the past 12 months, did the food you bought just not last and you didn t have money to get more?: No   Transportation Needs: Low Risk  (9/28/2023)    Transportation Needs     Within the past 12 months, has lack of transportation kept you from medical appointments, getting your medicines, non-medical meetings or appointments, work, or from getting things that you need?: No   Physical Activity: Not on file   Stress: Not on file   Social Connections: Not on file   Interpersonal Safety: Low Risk  (9/28/2023)    Interpersonal Safety     Do you feel physically and emotionally safe where you currently live?: Yes     Within the past 12 months, have you been hit, slapped, kicked or otherwise physically hurt by someone?: No     Within the past 12 months, have you been humiliated or emotionally abused in other ways by your partner or ex-partner?: No   Housing Stability: Low Risk  (9/28/2023)    Housing Stability     Do you have housing? : Yes     Are you worried about losing your housing?: No            Allergies:   Bupropion and Sulfa antibiotics         Review of Systems:  From intake questionnaire   Negative 14 system review except as noted on HPI, nurse's note.         Physical Exam:   General Appearance: Well groomed, hygenic  Eyes: No redness, discharge  Respiratory: No cough, no respiratory distress or labored breathing  Musculoskeletal: Grossly normal, full range of motion in upper extremities, no gross deficits  Skin: No discoloration or apparent rashes  Neurologic - No  tremors  Psychiatric - Alert and oriented  The rest of a comprehensive physical examination is deferred due to video visit restrictions        Labs:    I personally reviewed all applicable laboratory data and went over findings with patient  Significant for:    CBC RESULTS:  Recent Labs   Lab Test 10/11/23  1337 10/11/23  0125 09/28/23  1404 09/22/23  1008   WBC 12.1* 21.2* 10.5 8.3   HGB 12.0 12.9 13.4 12.5    389 332 280        BMP RESULTS:  Recent Labs   Lab Test 10/11/23  1337 10/11/23  0125 09/28/23  1404 09/22/23  1008 12/14/21  2145 12/10/20  1500 12/08/20  2028 11/10/20  1630 10/15/20  0825    134* 139 136   < > 137 140 139 134   POTASSIUM 4.3 4.4 4.2 4.0   < > 3.8 4.0 4.4 3.9   CHLORIDE 103 99 105 102   < > 106 109 109 104   CO2 24 20* 24 23   < > 25 24 27 28   ANIONGAP 10 15 10 11   < > 6 7 3 2*   * 112* 104* 101*   < > 120* 121* 83 90   BUN 16.1 15.0 6.4 7.5   < > 10 8 9 10   CR 0.94 0.66 0.63 0.55   < > 0.58 0.60 0.66 0.64   GFRESTIMATED 77 >90 >90 >90   < > >90 >90 >90 >90   GFRESTBLACK  --   --   --   --   --  >90 >90 >90 >90   JAIME 8.9 9.6 9.4 9.3   < > 8.9 9.2 9.2 8.7    < > = values in this interval not displayed.       UA RESULTS:   Recent Labs   Lab Test 10/11/23  0340 10/02/23  1259 09/28/23  1404   SG 1.019 >=1.030 >=1.030   URINEPH 5.0 6.0 5.5   NITRITE Negative Negative Negative   RBCU 41* 2-5* 0-2   WBCU 6* 0-5 5-10*       CALCIUM RESULTS  Lab Results   Component Value Date    JAIME 8.9 10/11/2023    JAIME 9.6 10/11/2023    JAIME 9.4 09/28/2023    JAIME 8.9 12/10/2020    JAIME 9.2 12/08/2020    JAIME 9.2 11/10/2020           Imaging:    I personally reviewed all applicable imaging and went over the below findings with patient.    Results for orders placed or performed during the hospital encounter of 10/11/23   CT Abdomen Pelvis w/o Contrast    Narrative    EXAM: CT ABDOMEN PELVIS W/O CONTRAST  LOCATION: [facility]  DATE: 10/11/2023    INDICATION: left flank  pain  COMPARISON: 09/09/2023.  TECHNIQUE: CT scan of the abdomen and pelvis was performed without IV contrast. Multiplanar reformats were obtained. Dose reduction techniques were used.  CONTRAST: None.    FINDINGS:   LOWER CHEST: Normal.    HEPATOBILIARY: Hepatic steatosis. Postcholecystectomy.    PANCREAS: Normal.    SPLEEN: Normal.    ADRENAL GLANDS: Normal.    KIDNEYS/BLADDER: 3 mm stone at the right ureterovesical junction causes mild-to-moderate hydronephrosis. Additional 2 mm nonobstructing right intrarenal stone. Normal left kidney and bladder.    BOWEL: Normal. No obstruction or inflammation. Normal appendix.    LYMPH NODES: Normal.    VASCULATURE: Unremarkable.    PELVIC ORGANS: Post hysterectomy.    MUSCULOSKELETAL: Mild degenerative changes of the spine.      Impression    IMPRESSION:   1.  3 mm stone at the right ureterovesical junction causes mild-to-moderate hydronephrosis.  2.  Additional 2 mm nonobstructing right intrarenal stone.  3.  Hepatic steatosis.  4.  Postcholecystectomy and hysterectomy.

## 2023-10-20 NOTE — NURSING NOTE
Is the patient currently in the state of [place]? YES    Visit mode:VIDEO    If the visit is dropped, the patient can be reconnected by: TELEPHONE VISIT: Phone number:   Telephone Information:   Mobile [phone]       Will anyone else be joining the visit? NO  (If patient encounters technical issues they should call [phone])    How would you like to obtain your AVS? MyChart    Are changes needed to the allergy or medication list? Pt stated no med changes    Reason for visit: Video Visit (3mm uvj stone and another in the kidney )    Alisson GRIMMF

## 2023-10-23 DIAGNOSIS — E66.01 MORBID OBESITY (H): Primary | ICD-10-CM

## 2023-10-23 RX ORDER — PHENTERMINE HYDROCHLORIDE 15 MG/1
15 CAPSULE ORAL EVERY MORNING
Qty: 30 CAPSULE | Refills: 2 | Status: SHIPPED | OUTPATIENT
Start: 2023-10-23 | End: 2024-01-15

## 2023-10-26 ENCOUNTER — TELEPHONE (OUTPATIENT)
Dept: UROLOGY | Facility: CLINIC | Age: 43
End: 2023-10-26
Payer: COMMERCIAL

## 2023-10-28 ENCOUNTER — HEALTH MAINTENANCE LETTER (OUTPATIENT)
Age: 43
End: 2023-10-28

## 2023-11-01 ENCOUNTER — ANCILLARY PROCEDURE (OUTPATIENT)
Dept: CT IMAGING | Facility: CLINIC | Age: 43
End: 2023-11-01
Attending: NURSE PRACTITIONER
Payer: COMMERCIAL

## 2023-11-01 ENCOUNTER — TELEPHONE (OUTPATIENT)
Dept: ENDOCRINOLOGY | Facility: CLINIC | Age: 43
End: 2023-11-01
Payer: COMMERCIAL

## 2023-11-01 DIAGNOSIS — N20.1 RIGHT URETERAL STONE: ICD-10-CM

## 2023-11-01 PROCEDURE — 74176 CT ABD & PELVIS W/O CONTRAST: CPT | Mod: GC | Performed by: RADIOLOGY

## 2023-11-01 NOTE — TELEPHONE ENCOUNTER
CARLTON and sent mychart    Schedule:  4 mo follow-up appointment with Dr. Rmey (around Feb 2024)  1 mo follow-up appointment with Betty Zaman (around Nov 2023)

## 2023-11-03 ENCOUNTER — VIRTUAL VISIT (OUTPATIENT)
Dept: UROLOGY | Facility: CLINIC | Age: 43
End: 2023-11-03
Payer: COMMERCIAL

## 2023-11-03 DIAGNOSIS — R30.0 DYSURIA: Primary | ICD-10-CM

## 2023-11-03 DIAGNOSIS — R10.9 RIGHT FLANK PAIN: ICD-10-CM

## 2023-11-03 PROCEDURE — 99214 OFFICE O/P EST MOD 30 MIN: CPT | Mod: VID | Performed by: NURSE PRACTITIONER

## 2023-11-03 ASSESSMENT — PAIN SCALES - GENERAL: PAINLEVEL: MODERATE PAIN (4)

## 2023-11-03 NOTE — PROGRESS NOTES
Urology Video Office Visit    Video-Visit Details    Type of service:  Video Visit    Video Start Time: 1132    Video End Time: 1143    Originating Location (pt. Location): Home    Distant Location (provider location):  Off-site     Platform used for Video Visit: [employer]           Assessment and Plan:     Assessment:  43 year old female with right flank pain     Plan:  -Reviewed CT scan with patient. Noted interval passage of right UVJ stone. No other acute stones noted at this time. Stable right 2mm nonobstructing renal stone.   -Discussed possible etiologies for right flank pain. Would recommend UA/UC for further evaluation. Pt amenable to plan of care.   -Recommend to be evaluated by PCP for concerns of vaginal discharge. Discussed if UA/UC is negative would recommend evaluation by PCP.     Savanah Son CNP  Department of Urology  November 3, 2023    I spent a total of 30 minutes spent on the date of the encounter doing chart review, history and exam, documentation, and further activities as noted above.          Chief Complaint:   Right flank pain         History of Present Illness:    Anne Ferreira is a pleasant 43 year old female who presents with concerns of a ongoing right flank pain.     Ms. Ferreira was last seen in clinic on 10/20/23 with concerns of a right UVJ stone.     She initially was seen at the end of August for RLQ pain. CT scan on 0831/23 revealed a 5mm right  nonbstructing renal stone without hydronephrosis. Due to persistent pain was seen in the ER on 09/09/23. CT scan on 09/09/23 revealed no noted interval changes when compared to CT scan on 08/31/23. Again, symptoms did not resolved and she was seen on 09/22/23 and ws treated for BV. Symptoms did not improve and was seen in the ER On 10/11/23. CT scan on 10/11/23 noted no longer seen right 5mm renal stone which suggested interval passage of stone. Noted 2mm right nonobstructing stone and a right 3mm UVJ stone with hydronephrosis. No noted  "left hydronephrosis or nephrolithiasis.     She notes ongoing right flank pain. Denies any fevers or chills. Positive for nausea and dysuria. Noted \"slime-like\" vaginal discharge. Denies any vaginal pruritus  .   CT scan performed on 11/01/23 (images personally reviewed) revealed interval passage of right UVJ stone. No noted right hydronephrosis. Stable and unchanged right 2mm nonobstructing stone. No noted left hydronephrosis or nephrolithiasis.     She is staying adequately hydrated with about 80oz of water per day.          Past Medical History:     Past Medical History:   Diagnosis Date    Abnormal Pap smear of cervix             Past Surgical History:     Past Surgical History:   Procedure Laterality Date    CYSTOSCOPY N/A 4/25/2022    Procedure: CYSTOSCOPY;  Surgeon: Julius Montejo MD;  Location: [facility]    INJECT EPIDURAL CERVICAL  3/30/2023    Procedure: INJECTION, SPINE, CERVICAL, EPIDURAL;  Surgeon: Naresh Zuniga MD;  [place] [facility] [place]    LAPAROSCOPIC CHOLECYSTECTOMY N/A 12/21/2020    Procedure: Laparoscopic cholecystectomy;  Surgeon: Manuel Durham DO;  Location: [facility]    LAPAROSCOPIC HYSTERECTOMY TOTAL, BILATERAL SALPINGO-OOPHORECTOMY, COMBINED Bilateral 4/25/2022    Procedure: Laparoscopic total hysterectomy, Bilateral salpingectomy, with sparing of ovaries;  Surgeon: Julius Montejo MD;  Location: [facility] OR            Medications     Current Outpatient Medications   Medication    acetaminophen (TYLENOL) 325 MG tablet    albuterol (PROAIR HFA/PROVENTIL HFA/VENTOLIN HFA) 108 (90 Base) MCG/ACT inhaler    amLODIPine (NORVASC) 2.5 MG tablet    cyclobenzaprine (FLEXERIL) 10 MG tablet    dexmethylphenidate (FOCALIN) 10 MG tablet    [START ON 11/12/2023] dexmethylphenidate (FOCALIN) 10 MG tablet    diphenhydrAMINE (BENADRYL) 25 MG tablet    escitalopram (LEXAPRO) 20 MG tablet    HYDROmorphone (DILAUDID) 2 MG tablet    hydrOXYzine (ATARAX) 25 MG tablet    ibuprofen (ADVIL/MOTRIN) 200 MG " tablet    ipratropium - albuterol 0.5 mg/2.5 mg/3 mL (DUONEB) 0.5-2.5 (3) MG/3ML neb solution    lisinopril (ZESTRIL) 40 MG tablet    LORazepam (ATIVAN) 0.5 MG tablet    montelukast (SINGULAIR) 10 MG tablet    omeprazole (PRILOSEC) 20 MG DR capsule    ondansetron (ZOFRAN ODT) 4 MG ODT tab    ondansetron (ZOFRAN ODT) 4 MG ODT tab    oxyCODONE (ROXICODONE) 5 MG tablet    phentermine (ADIPEX-P) 15 MG capsule    propranolol (INDERAL) 80 MG tablet    tamsulosin (FLOMAX) 0.4 MG capsule    [START ON 12/13/2023] dexmethylphenidate (FOCALIN) 10 MG tablet    oxyCODONE (ROXICODONE) 5 MG tablet    [START ON 11/12/2023] oxyCODONE (ROXICODONE) 5 MG tablet    [START ON 12/12/2023] oxyCODONE (ROXICODONE) 5 MG tablet    tamsulosin (FLOMAX) 0.4 MG capsule    triamcinolone (KENALOG) 0.1 % external cream     No current facility-administered medications for this visit.            Family History:     Family History   Problem Relation Age of Onset    Hypertension Father     Hypertension Brother     Hypertension Sister     Hypertension Brother             Social History:     Social History     Socioeconomic History    Marital status:      Spouse name: Not on file    Number of children: Not on file    Years of education: Not on file    Highest education level: Not on file   Occupational History    Not on file   Tobacco Use    Smoking status: Every Day     Packs/day: .5     Types: Cigarettes    Smokeless tobacco: Never   Vaping Use    Vaping Use: Never used   Substance and Sexual Activity    Alcohol use: Yes     Comment: occassionally    Drug use: Never    Sexual activity: Not Currently     Partners: Male   Other Topics Concern    Not on file   Social History Narrative    Not on file     Social Determinants of Health     Financial Resource Strain: Low Risk  (9/28/2023)    Financial Resource Strain     Within the past 12 months, have you or your family members you live with been unable to get utilities (heat, electricity) when it was  really needed?: No   Food Insecurity: Low Risk  (9/28/2023)    Food Insecurity     Within the past 12 months, did you worry that your food would run out before you got money to buy more?: No     Within the past 12 months, did the food you bought just not last and you didn t have money to get more?: No   Transportation Needs: Low Risk  (9/28/2023)    Transportation Needs     Within the past 12 months, has lack of transportation kept you from medical appointments, getting your medicines, non-medical meetings or appointments, work, or from getting things that you need?: No   Physical Activity: Not on file   Stress: Not on file   Social Connections: Not on file   Interpersonal Safety: Low Risk  (9/28/2023)    Interpersonal Safety     Do you feel physically and emotionally safe where you currently live?: Yes     Within the past 12 months, have you been hit, slapped, kicked or otherwise physically hurt by someone?: No     Within the past 12 months, have you been humiliated or emotionally abused in other ways by your partner or ex-partner?: No   Housing Stability: Low Risk  (9/28/2023)    Housing Stability     Do you have housing? : Yes     Are you worried about losing your housing?: No            Allergies:   Bupropion and Sulfa antibiotics         Review of Systems:  From intake questionnaire   Negative 14 system review except as noted on HPI, nurse's note.         Physical Exam:   General Appearance: Well groomed, hygenic  Eyes: No redness, discharge  Respiratory: No cough, no respiratory distress or labored breathing  Musculoskeletal: Grossly normal, full range of motion in upper extremities, no gross deficits  Skin: No discoloration or apparent rashes  Neurologic - No tremors  Psychiatric - Alert and oriented  The rest of a comprehensive physical examination is deferred due to video visit restrictions        Labs:    I personally reviewed all applicable laboratory data and went over findings with patient  Significant  for:    CBC RESULTS:  Recent Labs   Lab Test 10/11/23  1337 10/11/23  0125 09/28/23  1404 09/22/23  1008   WBC 12.1* 21.2* 10.5 8.3   HGB 12.0 12.9 13.4 12.5    389 332 280        BMP RESULTS:  Recent Labs   Lab Test 10/11/23  1337 10/11/23  0125 09/28/23  1404 09/22/23  1008 12/14/21  2145 12/10/20  1500 12/08/20  2028 11/10/20  1630 10/15/20  0825    134* 139 136   < > 137 140 139 134   POTASSIUM 4.3 4.4 4.2 4.0   < > 3.8 4.0 4.4 3.9   CHLORIDE 103 99 105 102   < > 106 109 109 104   CO2 24 20* 24 23   < > 25 24 27 28   ANIONGAP 10 15 10 11   < > 6 7 3 2*   * 112* 104* 101*   < > 120* 121* 83 90   BUN 16.1 15.0 6.4 7.5   < > 10 8 9 10   CR 0.94 0.66 0.63 0.55   < > 0.58 0.60 0.66 0.64   GFRESTIMATED 77 >90 >90 >90   < > >90 >90 >90 >90   GFRESTBLACK  --   --   --   --   --  >90 >90 >90 >90   JAIME 8.9 9.6 9.4 9.3   < > 8.9 9.2 9.2 8.7    < > = values in this interval not displayed.       UA RESULTS:   Recent Labs   Lab Test 10/11/23  0340 10/02/23  1259 09/28/23  1404   SG 1.019 >=1.030 >=1.030   URINEPH 5.0 6.0 5.5   NITRITE Negative Negative Negative   RBCU 41* 2-5* 0-2   WBCU 6* 0-5 5-10*       CALCIUM RESULTS  Lab Results   Component Value Date    JAIME 8.9 10/11/2023    JAIME 9.6 10/11/2023    JAIME 9.4 09/28/2023    JAIME 8.9 12/10/2020    JAIME 9.2 12/08/2020    JAIME 9.2 11/10/2020           Imaging:    I personally reviewed all applicable imaging and went over the below findings with patient.    Results for orders placed or performed in visit on 11/01/23   CT Abdomen Pelvis w/o Contrast    Narrative    EXAMINATION: CT ABDOMEN PELVIS W/O CONTRAST, 11/1/2023 4:44 PM    INDICATION: right UVJ stone; Right ureteral stone    COMPARISON STUDY: CT 10/11/2023    TECHNIQUE: CT scan of the chest, abdomen and pelvis was performed on  multidetector CT scanner using volumetric acquisition technique and  images were reconstructed in multiple planes with variable thickness  and reviewed on dedicated workstations.      CONTRAST: None    CT scan radiation dose is optimized to minimum requisite dose using  automated dose modulation techniques.    FINDINGS:    Lower chest: No suspicious mass. No pleural effusion.    Abdomen and Pelvis:  Liver: Hepatic steatosis. No intrahepatic biliary ductal dilation.    Biliary System: Cholecystectomy. No extrahepatic biliary ductal  dilation.    Pancreas: No mass or pancreatic ductal dilation.    Adrenal glands: No mass or nodules.    Spleen: Normal.    Kidneys: No suspicious mass, obstructing calculus or hydronephrosis.   3 mm nonobstructing right renal stone, stable in size and position.  The previously visualized obstructing 3 mm stone at the right  ureterovesicular junction is no longer visualized, presumed to have  passed.    Gastrointestinal tract: Normal appendix. Normal caliber small bowel.    Mesentery/peritoneum/retroperitoneum: No mass. No free fluid or air.    Lymph nodes: No significant lymphadenopathy.    Vasculature: Normal caliber aorta. Pelvic phleboliths.    Pelvis: Urinary bladder is normal. Hysterectomy and bilateral  salpingo-oophorectomy.     Osseous structures: No aggressive or acute osseous lesion.  Mild  multilevel degenerative changes of the spine.      Soft tissues: Within normal limits.      Impression    IMPRESSION:   1. Passage of previously visualized 3 mm stone at the right  ureterovesicular junction.  2. Stable 3 mm nonobstructing right renal stone.  3. Hepatic steatosis and another chronic/incidental findings as above.      I have personally reviewed the examination and initial interpretation  and I agree with the findings.    NELL ARRIAGA MD         SYSTEM ID:  [id number]

## 2023-11-03 NOTE — NURSING NOTE
Is the patient currently in the state of [place]? YES    Visit mode:VIDEO    If the visit is dropped, the patient can be reconnected by: VIDEO VISIT: Text to cell phone:   Telephone Information:   Mobile [phone]       Will anyone else be joining the visit? NO  (If patient encounters technical issues they should call [phone]    How would you like to obtain your AVS? MyChart    Are changes needed to the allergy or medication list? No    Reason for visit: RECHWALDO GRIMMF

## 2023-12-08 DIAGNOSIS — F90.2 ATTENTION DEFICIT HYPERACTIVITY DISORDER (ADHD), COMBINED TYPE: ICD-10-CM

## 2023-12-08 RX ORDER — DEXMETHYLPHENIDATE HYDROCHLORIDE 10 MG/1
10 TABLET ORAL 2 TIMES DAILY
Qty: 60 TABLET | Refills: 0 | Status: SHIPPED | OUTPATIENT
Start: 2023-12-13 | End: 2024-01-09

## 2023-12-08 NOTE — TELEPHONE ENCOUNTER
Routing refill request to provider for review/approval because:  Drug not on the [facility] refill protocol   Last Written Prescription Date:  11/12/23  Last Fill Quantity: 30,  # refills: 0   Last office visit: 9/28/2023 ; last virtual visit: 10/12/2023 with prescribing provider:     Future Office Visit:   Next 5 appointments (look out 90 days)      Dec 19, 2023  1:00 PM  LATOYA Steiner with Lauren Turner Bloch, RPH  [facility] ([facility] ) [address] [place] [address] [id number] [phone]         Julie Behrendt RN

## 2023-12-12 ENCOUNTER — E-VISIT (OUTPATIENT)
Dept: FAMILY MEDICINE | Facility: CLINIC | Age: 43
End: 2023-12-12
Payer: COMMERCIAL

## 2023-12-12 ENCOUNTER — OFFICE VISIT (OUTPATIENT)
Dept: URGENT CARE | Facility: URGENT CARE | Age: 43
End: 2023-12-12
Payer: COMMERCIAL

## 2023-12-12 VITALS
BODY MASS INDEX: 34.67 KG/M2 | HEART RATE: 69 BPM | DIASTOLIC BLOOD PRESSURE: 71 MMHG | SYSTOLIC BLOOD PRESSURE: 126 MMHG | WEIGHT: 202 LBS | TEMPERATURE: 98.7 F | OXYGEN SATURATION: 97 % | RESPIRATION RATE: 16 BRPM

## 2023-12-12 DIAGNOSIS — J01.90 ACUTE SINUSITIS WITH SYMPTOMS > 10 DAYS: Primary | ICD-10-CM

## 2023-12-12 DIAGNOSIS — R05.1 ACUTE COUGH: Primary | ICD-10-CM

## 2023-12-12 DIAGNOSIS — R05.1 ACUTE COUGH: ICD-10-CM

## 2023-12-12 PROCEDURE — 99207 PR NON-BILLABLE SERV PER CHARTING: CPT | Performed by: NURSE PRACTITIONER

## 2023-12-12 PROCEDURE — 99213 OFFICE O/P EST LOW 20 MIN: CPT | Performed by: PHYSICIAN ASSISTANT

## 2023-12-12 RX ORDER — ALBUTEROL SULFATE 0.83 MG/ML
2.5 SOLUTION RESPIRATORY (INHALATION) EVERY 6 HOURS PRN
Qty: 90 ML | Refills: 0 | Status: SHIPPED | OUTPATIENT
Start: 2023-12-12

## 2023-12-12 RX ORDER — ALBUTEROL SULFATE 90 UG/1
AEROSOL, METERED RESPIRATORY (INHALATION)
Qty: 18 G | Refills: 0 | Status: SHIPPED | OUTPATIENT
Start: 2023-12-12

## 2023-12-12 RX ORDER — PREDNISONE 20 MG/1
40 TABLET ORAL DAILY
Qty: 10 TABLET | Refills: 0 | Status: SHIPPED | OUTPATIENT
Start: 2023-12-12 | End: 2023-12-17

## 2023-12-12 NOTE — PATIENT INSTRUCTIONS
Dear Anne Ferreira,    We are sorry you are not feeling well. Based on the responses you provided, it is recommended that you be seen in-person in urgent care so we can better evaluate your symptoms. Please click here to find the nearest urgent care location to you.   You will not be charged for this Visit. Thank you for trusting us with your care.    FRANKLYN Christianson CNP

## 2023-12-12 NOTE — PROGRESS NOTES
"  Assessment & Plan     Acute sinusitis with symptoms > 10 days  Will treat with Augmentin twice daily x 7 days. Get plenty of rest and push fluids. Continue with supportive care. Follow up as needed.   - amoxicillin-clavulanate (AUGMENTIN) 875-125 MG tablet; Take 1 tablet by mouth 2 times daily for 7 days    Acute cough    - albuterol (PROAIR HFA/PROVENTIL HFA/VENTOLIN HFA) 108 (90 Base) MCG/ACT inhaler; Inhale 2 puffs every 4-6 hours as needed for cough, wheezing, or shortness of breath  - predniSONE (DELTASONE) 20 MG tablet; Take 2 tablets (40 mg) by mouth daily for 5 days  - albuterol (PROVENTIL) (2.5 MG/3ML) 0.083% neb solution; Take 1 vial (2.5 mg) by nebulization every 6 hours as needed for wheezing, cough or shortness of breath             BMI:   Estimated body mass index is 34.67 kg/m  as calculated from the following:    Height as of 10/19/23: 1.626 m (5' 4\").    Weight as of this encounter: 91.6 kg (202 lb).           Return in about 1 week (around 12/19/2023), or if symptoms worsen or fail to improve.    MAUREEN Jimenez [facility] [place]              Subjective   Chief Complaint   Patient presents with    Breathing Problem     Shortness of breath, coughing up neon green phlegm, sinus pressure, sinus drainage, headaches, watery eyes, chest congestion, wheezing, body aches x 2 weeks. Pt had covid one month ago.       HPI     URI Adult    Onset of symptoms was 2 week(s) ago.  Course of illness is same.    Severity moderate  Current and Associated symptoms: cough, sinus pain/pressure, colored drainage   Treatment measures tried include Tylenol/Ibuprofen.  Predisposing factors include none                  Review of Systems         Objective    /71   Pulse 69   Temp 98.7  F (37.1  C) (Tympanic)   Resp 16   Wt 91.6 kg (202 lb)   LMP 03/02/2022 (LMP Unknown)   SpO2 97%   BMI 34.67 kg/m    Body mass index is 34.67 kg/m .  Physical Exam  Constitutional:       " Appearance: She is well-developed.   HENT:      Head: Normocephalic.      Right Ear: Tympanic membrane and ear canal normal.      Left Ear: Tympanic membrane and ear canal normal.   Eyes:      Conjunctiva/sclera: Conjunctivae normal.   Cardiovascular:      Rate and Rhythm: Normal rate.      Heart sounds: Normal heart sounds.   Pulmonary:      Effort: Pulmonary effort is normal.      Breath sounds: Normal breath sounds.   Skin:     General: Skin is warm and dry.      Findings: No rash.   Psychiatric:         Behavior: Behavior normal.

## 2023-12-12 NOTE — LETTER
December 12, 2023      Anne Ferreira  [address] [address]        To Whom It May Concern:    Anne Ferreira  was seen and treated in clinic on 12/12/23.        Sincerely,          Mayte De Jesus PA-C

## 2023-12-17 NOTE — PROGRESS NOTES
Rheumatology Clinic Visit  [facility]  JunieJOSELINE Long WOODROW Ferreira MRN# [id number]   YOB: 1980 Age: 43 year old   Date of Visit: 12/20/2023  Primary care provider: Bertha Loya          Assessment and Plan:     Multiple joint Pain  Sicca syndrome    Patient presents today for an initial patient of multiple joint pain.  She states that this has been going on for an extended period of time.  She states that she does have a lot of low back and neck pain that she also reports having pain in multiple joints.  She states that her pain feels achy she notices swelling at times as well.  She does report having dry eyes.  She states that she does have dry mouth but thinks this is related to her medication.  Physical examination today did not show any active synovitis, dactylitis, tenosynovitis or enthesopathy.  She did have significant tenderness to palpation of her midline cervical spine and upper spinal musculature as well as her lumbar spine.  Previous laboratory evaluations and imaging studies were reviewed, results below.    Discussed the specialty of rheumatology with the patient today.  Discussed looking for autoimmune causes for her pain.  Discussed that her HLA-B27 was negative as is the imaging for a condition called ankylosing spondylitis.  Her rheumatoid factor was negative but I would recommend checking the CCP antibodies.  We will also check her SSA and her SSB to evaluate for Sjogren's syndrome.  I would recommend getting an MRI of her right hand to further evaluate for any inflammation.  I will contact the patient with the results of the evaluation once it is complete.    We did discuss the diagnosis of fibromyalgia today.  Discussed that this is a diagnosis of exclusion and is a condition of an overactive nervous system.  As she has not had a full autoimmune workup to eliminate any other causes for her pain I would not give her this diagnosis as of yet.  We did however  discuss that the initial treatment for it is laura chi or gentle yoga.  Given the severity of her back pain would recommend that she start with laura chi.  We also did discuss the Mediterranean diet and how this can be helpful with pain conditions as well.    Plan:     Schedule follow-up with Junie Ortega PA-C as needed.   Imaging: MRI right hand  Labs: SSA, SSB, CCP antibody    Junie Ortega, PAC  Rheumatology         History of Present Illness:   Anne Ferreira presents for evaluation of joint pain.      She states that she has had joint pain for an extended period of time. She states that her body aches. She states that she will get temperatures when she aches more (). She has noticed swelling in multiple joints, stiffness as well. The pain does not seem to correlate with rest or activity. She may have some days where the pain is better. She does take Oxycodone three times a day which helps. She describes the feeling that she has the flu at times. She states that she has a lot of fatigue as well. No unexplained weight loss. She states that she is trying to lose weight and started on medication to help. She gets pain in her fingers, wrists and knees. She has back pain as well.She will get a rash on her back, she describes it as bumps on the sides of her back. Mouth sores s/t dental issues. She has mild dry mouth, but feels this is related to medication. She has dry eyes. No hair loss. No uveitis/iritis. No history of pericarditis or pleuritis. No history of delirium or psychosis. 1 miscarriage. No seizures or blood clots. No raynaud's.     Aunt had many health issues, including fibromyalgia. She states that her symptoms are similar to her aunt. She states that she sweats excessively from her head, her aunt had this as well. No personal or family history of psoriasis, ulcerative colitis or crohn's.          Review of Systems:     Constitutional: negative  Skin: negative  Eyes: negative  Ears/Nose/Throat:  negative  Respiratory: No shortness of breath, dyspnea on exertion, cough, or hemoptysis  Cardiovascular: negative  Gastrointestinal: negative  Genitourinary: negative  Musculoskeletal: as above  Neurologic: negative  Psychiatric: negative  Hematologic/Lymphatic/Immunologic: negative  Endocrine: negative         Active Problem List:     Patient Active Problem List    Diagnosis Date Noted    F11.2 - Continuous opioid dependence (H) 05/18/2023     Priority: Medium    S/P cervical spinal fusion 03/20/2023     Priority: Medium     Added automatically from request for surgery [id number]      COPD (chronic obstructive pulmonary disease) (H) 07/27/2021     Priority: Medium    Moderate episode of recurrent major depressive disorder (H) 05/21/2021     Priority: Medium    Symptomatic cholelithiasis 12/15/2020     Priority: Medium     Added automatically from request for surgery [id number]      Morbid obesity (H) 12/11/2020     Priority: Medium    Dyslipidemia 11/10/2020     Priority: Medium    Dysmenorrhea 11/10/2020     Priority: Medium     treated with continuous OCPs      GERD (gastroesophageal reflux disease) 11/10/2020     Priority: Medium    Thoracic back pain 11/10/2020     Priority: Medium     left, approx T10      H/O LEEP      Priority: Medium     1998 LEEP- Unknown results.  2010 NIL pap.  2/2/12 COLP and ECC- DARREN 1.  2013 NIL pap  4/7/15 LSIL pap, + HR HPV   8/13/15 COLP- DARREN 1, ECC- Negative.  6/10/16 NIL pap, Neg HPV.  5/14/19 NIL pap, Neg HPV.  Above results found in [facility]  10/15/20 NIL pap, Neg HPV. Plan cotest in 3 years.       Cervical spinal stenosis 07/01/2019     Priority: Medium     7/2019 MRI - At C5-6, broad-based central disc extrusion demonstrating caudal migration mildly deforms the cord and severely narrows the spinal canal. Uncovertebral joint spurring contributes to mild to moderate left neural foraminal narrowing with potential left C6 nerve root encroachment.      Herniation of cervical intervertebral  disc with radiculopathy 07/01/2019     Priority: Medium     see MRI 7/2019      Attention deficit hyperactivity disorder, combined type, mild 08/29/2018     Priority: Medium    Generalized anxiety disorder 08/29/2018     Priority: Medium    Allergic rhinitis 04/01/2008     Priority: Medium            Past Medical History:     Past Medical History:   Diagnosis Date    Abnormal Pap smear of cervix      Past Surgical History:   Procedure Laterality Date    CYSTOSCOPY N/A 4/25/2022    Procedure: CYSTOSCOPY;  Surgeon: Julius Montejo MD;  Location: [facility] OR    INJECT EPIDURAL CERVICAL  3/30/2023    Procedure: INJECTION, SPINE, CERVICAL, EPIDURAL;  Surgeon: Naresh Zuniga MD;  Location: [facility] OR    LAPAROSCOPIC CHOLECYSTECTOMY N/A 12/21/2020    Procedure: Laparoscopic cholecystectomy;  Surgeon: Manuel Durham DO;  Location: [facility]    LAPAROSCOPIC HYSTERECTOMY TOTAL, BILATERAL SALPINGO-OOPHORECTOMY, COMBINED Bilateral 4/25/2022    Procedure: Laparoscopic total hysterectomy, Bilateral salpingectomy, with sparing of ovaries;  Surgeon: Julius Montejo MD;  Location: [facility] OR            Social History:     Social History     Socioeconomic History    Marital status:      Spouse name: Not on file    Number of children: Not on file    Years of education: Not on file    Highest education level: Not on file   Occupational History    Not on file   Tobacco Use    Smoking status: Every Day     Packs/day: .5     Types: Cigarettes    Smokeless tobacco: Never   Vaping Use    Vaping Use: Never used   Substance and Sexual Activity    Alcohol use: Yes     Comment: occassionally    Drug use: Never    Sexual activity: Not Currently     Partners: Male   Other Topics Concern    Not on file   Social History Narrative    Not on file     Social Determinants of Health     Financial Resource Strain: Low Risk  (9/28/2023)    Financial Resource Strain     Within the past 12 months, have you or your family members you live with  been unable to get utilities (heat, electricity) when it was really needed?: No   Food Insecurity: Low Risk  (9/28/2023)    Food Insecurity     Within the past 12 months, did you worry that your food would run out before you got money to buy more?: No     Within the past 12 months, did the food you bought just not last and you didn t have money to get more?: No   Transportation Needs: Low Risk  (9/28/2023)    Transportation Needs     Within the past 12 months, has lack of transportation kept you from medical appointments, getting your medicines, non-medical meetings or appointments, work, or from getting things that you need?: No   Physical Activity: Not on file   Stress: Not on file   Social Connections: Not on file   Interpersonal Safety: Low Risk  (9/28/2023)    Interpersonal Safety     Do you feel physically and emotionally safe where you currently live?: Yes     Within the past 12 months, have you been hit, slapped, kicked or otherwise physically hurt by someone?: No     Within the past 12 months, have you been humiliated or emotionally abused in other ways by your partner or ex-partner?: No   Housing Stability: Low Risk  (9/28/2023)    Housing Stability     Do you have housing? : Yes     Are you worried about losing your housing?: No          Family History:     Family History   Problem Relation Age of Onset    Hypertension Father     Hypertension Brother     Hypertension Sister     Hypertension Brother             Allergies:     Allergies   Allergen Reactions    Bupropion Nausea    Sulfa Antibiotics             Medications:     Current Outpatient Medications   Medication Sig Dispense Refill    acetaminophen (TYLENOL) 325 MG tablet Take 3 tablets (975 mg) by mouth every 6 hours as needed for mild pain 50 tablet 0    albuterol (PROAIR HFA/PROVENTIL HFA/VENTOLIN HFA) 108 (90 Base) MCG/ACT inhaler Inhale 2 puffs every 4-6 hours as needed for cough, wheezing, or shortness of breath 18 g 0    albuterol (PROAIR  HFA/PROVENTIL HFA/VENTOLIN HFA) 108 (90 Base) MCG/ACT inhaler Inhale 2 puffs into the lungs every 6 hours 1 Inhaler 1    albuterol (PROVENTIL) (2.5 MG/3ML) 0.083% neb solution Take 1 vial (2.5 mg) by nebulization every 6 hours as needed for wheezing, cough or shortness of breath 90 mL 0    amLODIPine (NORVASC) 2.5 MG tablet TAKE 1 TABLET (2.5 MG) BY MOUTH DAILY 90 tablet 3    amoxicillin-clavulanate (AUGMENTIN) 875-125 MG tablet Take 1 tablet by mouth 2 times daily for 7 days 14 tablet 0    cyclobenzaprine (FLEXERIL) 10 MG tablet Take 1 tablet by mouth twice daily as needed 60 tablet 0    dexmethylphenidate (FOCALIN) 10 MG tablet Take 1 tablet (10 mg) by mouth 2 times daily 60 tablet 0    diphenhydrAMINE (BENADRYL) 25 MG tablet Take 1-2 tablets (25-50 mg) by mouth every 6 hours as needed for itching or allergies 30 tablet 1    escitalopram (LEXAPRO) 20 MG tablet TAKE 1 TABLET (20 MG) BY MOUTH DAILY 90 tablet 0    HYDROmorphone (DILAUDID) 2 MG tablet Take 1-2 tablets (2-4 mg) by mouth every 8 hours as needed for pain 12 tablet 0    hydrOXYzine (ATARAX) 25 MG tablet Take 1 tablet (25 mg) by mouth every 6 hours as needed for itching TAKE 1 TO 2 TABLETS BY MOUTH EVERY 6 HOURS AS NEEDED FOR ANXIETY Strength: 25 mg 60 tablet 0    ibuprofen (ADVIL/MOTRIN) 200 MG tablet Take 4 tablets (800 mg) by mouth every 6 hours as needed for other (mild and/or inflammatory pain)      ipratropium - albuterol 0.5 mg/2.5 mg/3 mL (DUONEB) 0.5-2.5 (3) MG/3ML neb solution Take 1 vial (3 mLs) by nebulization every 6 hours as needed for shortness of breath / dyspnea or wheezing 90 mL 0    lisinopril (ZESTRIL) 40 MG tablet TAKE 1 TABLET (40 MG) BY MOUTH DAILY 90 tablet 0    LORazepam (ATIVAN) 0.5 MG tablet Take 1-2 tablets (0.5-1 mg) by mouth every 6 hours as needed for anxiety 6 tablet 0    montelukast (SINGULAIR) 10 MG tablet TAKE ONE TABLET BY MOUTH AT BEDTIME 90 tablet 3    omeprazole (PRILOSEC) 20 MG DR capsule Take 20 mg by mouth as  needed PRN      ondansetron (ZOFRAN ODT) 4 MG ODT tab Take 1 tablet (4 mg) by mouth every 8 hours as needed for nausea 10 tablet 0    ondansetron (ZOFRAN ODT) 4 MG ODT tab Take 1 tablet (4 mg) by mouth every 8 hours as needed for nausea 10 tablet 0    oxyCODONE (ROXICODONE) 5 MG tablet TAKE 1 TABLET (5 MG) BY MOUTH EVERY 8 HOURS AS NEEDED FOR PAIN MAX 3 TABLETS PER DAY 90 tablet 0    oxyCODONE (ROXICODONE) 5 MG tablet Take 1 tablet (5 mg) by mouth every 8 hours as needed for pain Max 3 tablets per day 90 tablet 0    oxyCODONE (ROXICODONE) 5 MG tablet Take 1 tablet (5 mg) by mouth every 6 hours as needed for severe pain 10 tablet 0    phentermine (ADIPEX-P) 15 MG capsule Take 1 capsule (15 mg) by mouth every morning 30 capsule 2    predniSONE (DELTASONE) 20 MG tablet Take 2 tablets (40 mg) by mouth daily for 5 days 10 tablet 0    propranolol (INDERAL) 80 MG tablet TAKE 1 TABLET (80 MG) BY MOUTH DAILY 90 tablet 3    tamsulosin (FLOMAX) 0.4 MG capsule Take 1 capsule (0.4 mg) by mouth daily 30 capsule 0    tamsulosin (FLOMAX) 0.4 MG capsule Take 1 capsule (0.4 mg) by mouth daily 7 capsule 0            Physical Exam:   Last menstrual period 03/02/2022, not currently breastfeeding.  Wt Readings from Last 6 Encounters:   12/12/23 91.6 kg (202 lb)   10/19/23 94.8 kg (209 lb)   10/17/23 94.8 kg (209 lb)   10/11/23 92.5 kg (204 lb)   10/02/23 92.5 kg (204 lb)   09/28/23 91.9 kg (202 lb 9.6 oz)     Constitutional: well-developed, appearing stated age; cooperative  Eyes: nl PERRLA, conjunctiva, sclera  ENT: nl external ears, nose, hearing, lips, teeth, gums, throat. No mucositis.   No mucous membrane lesions, normal saliva pool  Neck: no mass or thyroid enlargement  Resp: No shortness of breath with normal conversation  Lymph: no cervical, supraclavicular or epitrochlear nodes  MS: No active synovitis or altered joint anatomy. Full joint ROM. Normal  strength. No dactylitis,  tenosynovitis, enthesopathy.  Tenderness to  palpation of her midline cervical and lumbar spine.  Tenderness to palpation of the upper spinal musculature.  Skin: no nail pitting, alopecia, rash, nodules or lesions.   Psych: nl judgement, orientation, memory, affect.           Data:   Imaging:  MRI cervical spine 6/21/2023  IMPRESSION:  1.  Status post prior ACDF at C5-C6.  2.  Similar multilevel spondylosis with mild spinal canal stenosis at C3-C4, mild to moderate at C4-C5. No abnormal spinal cord signal intensity.  3.  Mild multilevel foraminal narrowing, as detailed.    MRI lumbar spine 6/21/2023  IMPRESSION:  1.  Multilevel degenerative changes of the lumbar spine as detailed above, greatest at L5-S1, where there is mild right lateral recess stenosis with abutment of the traversing right S1 nerve root, mild to moderate right neuroforaminal stenosis with   abutment of the exiting right L5 nerve root, and moderate left neuroforaminal stenosis with distortion of the exiting left L5 nerve root.  2.  At L3-L4, a left foraminal disc protrusion produces mild to moderate left neuroforaminal stenosis and abuts the exiting left L3 nerve root.    X-ray left shoulder 6/19/2023  IMPRESSION: No acute fracture or malalignment. There is normal joint  spacing.     X-ray right hand 5/17/2022  IMPRESSION: Mild degenerative changes in the first CMC joint. The  remaining joint spaces are unremarkable. No fractures are evident. The  soft tissues are unremarkable.    Laboratory:  5/18/2023  Creatinine 0.58, GFR greater than 90  Albumin 4.3  ALT 24, AST 20  CRP 6.36  Rheumatoid factor less than 7  Sed rate 18  Lyme disease negative  CHERELLE negative  C3 151, C4 28    6/19/2023  IgA normal  HLA-B27 negative  Hemoglobin A1c 5.6

## 2023-12-19 ENCOUNTER — VIRTUAL VISIT (OUTPATIENT)
Dept: PHARMACY | Facility: CLINIC | Age: 43
End: 2023-12-19
Attending: INTERNAL MEDICINE
Payer: COMMERCIAL

## 2023-12-19 DIAGNOSIS — E66.01 MORBID OBESITY (H): Primary | ICD-10-CM

## 2023-12-19 DIAGNOSIS — G43.809 OTHER MIGRAINE WITHOUT STATUS MIGRAINOSUS, NOT INTRACTABLE: ICD-10-CM

## 2023-12-19 DIAGNOSIS — F90.2 ATTENTION DEFICIT HYPERACTIVITY DISORDER, COMBINED TYPE, MILD: ICD-10-CM

## 2023-12-19 DIAGNOSIS — I10 BENIGN ESSENTIAL HYPERTENSION: ICD-10-CM

## 2023-12-19 DIAGNOSIS — K21.9 GASTROESOPHAGEAL REFLUX DISEASE, UNSPECIFIED WHETHER ESOPHAGITIS PRESENT: ICD-10-CM

## 2023-12-19 DIAGNOSIS — N20.0 KIDNEY STONE: ICD-10-CM

## 2023-12-19 DIAGNOSIS — J45.909 UNCOMPLICATED ASTHMA, UNSPECIFIED ASTHMA SEVERITY, UNSPECIFIED WHETHER PERSISTENT: ICD-10-CM

## 2023-12-19 DIAGNOSIS — M54.6 THORACIC BACK PAIN, UNSPECIFIED BACK PAIN LATERALITY, UNSPECIFIED CHRONICITY: ICD-10-CM

## 2023-12-19 DIAGNOSIS — F33.1 MODERATE EPISODE OF RECURRENT MAJOR DEPRESSIVE DISORDER (H): ICD-10-CM

## 2023-12-19 DIAGNOSIS — J45.909 ASTHMA: ICD-10-CM

## 2023-12-19 PROCEDURE — 99607 MTMS BY PHARM ADDL 15 MIN: CPT | Performed by: PHARMACIST

## 2023-12-19 PROCEDURE — 99605 MTMS BY PHARM NP 15 MIN: CPT | Performed by: PHARMACIST

## 2023-12-19 NOTE — PROGRESS NOTES
"Medication Therapy Management (MTM) Encounter    ASSESSMENT:                            Medication Adherence/Access: No issues identified    Weight Management:   May benefit from increasing Phentermine 15 mg to 30 mg daily. Patient to think about it and get back to MTM on this.     Hypertension:   Stable. Blood pressure at goal < 140/90 mmHg.     Migraine:   Stable.     Chronic Pain:   To follow up with Rheumatology.     History of Kidney Stones:   Stable.     ADHD:   Stable.     Asthma:   Stable.     Depression:  Stable.     GERD:   Stable and per patient preference to continue omeprazole as needed.     PLAN:                            Increase phentermine dose from 15 mg to 30 mg daily - [facility]     Follow-up: Return in about 6 weeks (around 1/30/2024) for Medication Therapy Management Pharmacist Visit if needed.    SUBJECTIVE/OBJECTIVE:                          Anne Ferreira is a 43 year old female called for an initial visit. She was referred to me from Dr. Jonel Flores.    Reason for visit: comprehensive review of medications.    Allergies/ADRs: Reviewed in chart  Past Medical History: Reviewed in chart  Tobacco: She reports that she has been smoking cigarettes. She has been smoking an average of 0.5 packs per day. She has never used smokeless tobacco.Nicotine/Tobacco Cessation Plan:   1/2 pack per day, not interested at this time. \"Think about it every day, but not ready now\".     Alcohol: Less than 1 beverages / week  Caffeine: cut down recently, drinking 1 cup coffee and < 1 can soda (decrease from 2-3 cans/day of Pepsi or Mountain Dew).     Medication Adherence/Access: Patient uses pill box(es).  Patient takes medications 3 time(s) per day.   Per patient, misses medication 0-1 times per week.     Weight Management:   Phentermine 15 mg once daily in AM     Followed by Dr. Jonel Flores, seen 10/23/2023 for [facility]. Patient is " "experiencing the follow side effects: None. She feels that the phentermine started working well at first but now lost efficacy over time. Tried to get Saxenda covered but insurance needed to see at least 3 months of behavior and dietary changes prior to covering. Reports she started making meaningful changes to diet and behaviors starting at the last appointment with Dr. Jonel Flores.   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 She has decreased soda and juice drastically, now less than 1 can soda per day. She describes that she does not overeat at baseline. She is meal prepping vegetables for snacks. Breakfast: [employer] bar, has a couple days during day at work, dinner is largest meal of day. Gets protein mainly at dinner solely.   Wants to work on improving protein intake.   Exercise/Activity: Activity is limited due to chronic pain. Patient reports does not have exercise regimen. She finds that when in greater pain she is much more sedentary. She has been trying to move more during the day, around the house.    Wt Readings from Last 4 Encounters:   12/20/23 203 lb (92.1 kg)   12/12/23 202 lb (91.6 kg)   10/19/23 209 lb (94.8 kg)   10/17/23 209 lb (94.8 kg)     Estimated body mass index is 34.84 kg/m  as calculated from the following:    Height as of 10/19/23: 5' 4\" (1.626 m).    Weight as of 12/20/23: 203 lb (92.1 kg).    Hypertension:   Amlodipine 2.5 mg daily   Lisinopril 40 mg daily   Propranolol 80 mg daily     Patient reports no current medication side effects.  Patient does not self-monitor blood pressure.  Has monitor at home.   BP Readings from Last 3 Encounters:   12/20/23 127/78   12/12/23 126/71   10/11/23 132/58     Pulse Readings from Last 3 Encounters:   12/20/23 77   12/12/23 69   10/11/23 74     Migraine:   Preventive medications  Propranolol 80 mg once daily  Acute " medications  Acetaminophen 500-1000 mg   Ibuprofen 400-600 mg     She reports she hasn't had a migraine in a very long time. THought much of migraines previously were due to stress and blood pressure. Hasn't had to use acetaminophen or ibuprofen for migraine in a while.     Chronic Pain:   Oxycodone 5 mg every 8 hours   Acetaminophen 500-1000 mg   Ibuprofen 400-600 mg   Cyclobenzaprine 10 mg twice daily as needed, rarely using     Greatest area of pain is back and neck. She cannot think of anything that makes the pain better. Having bowel movement at least 1-2 times per day. No concerns with this. Over-the-counter pain medications used ~1-2 time per week mostly.Sh has appt with Rheumatology 12/20/023 for evaluation.     History of Kidney Stones:   Tamsulosin 0.4 mg daily      She is currently taking to assist with passing stone. No medication Side effect.    ADHD:   Dexmethylphenidate 10 mg twice daily    Reports that the medication helps her to focus. No Medication side effects.     Asthma:   Albuterol (ProAir/Ventolin/Proventil)  Albuterol Nebs  Montekulast 10 mg daily     COVID-19 3 weeks ago and then sinus injection last week. Augmentin has relieved symptoms of sinus infection but not resolved.   Patient reports no current medication side effects.      Patient reports the following symptoms: none.    Depression:  Escitalopram 20mg once daily  Hydroxyzine 25 mg as needed for anxiety  Lorazepam 0.5 mg as needed     Used hydroxyzine 3 times in the last week. When she was sick when she feels she can't breathe well she starts panicking and then found the hydroxyzine helpful.   Patient reports no current medication side effects.  Patient reports symptoms are stable.      7/11/2023    10:08 AM 9/28/2023     1:12 PM 10/17/2023     1:48 PM   PHQ-9 SCORE   PHQ-9 Total Score MyChart  13 (Moderate depression)    PHQ-9 Total Score 11 13 14         6/19/2023     2:16 PM 6/29/2023     9:19 AM 7/11/2023     9:12 AM   MILES-7  SCORE   Total Score 13 (moderate anxiety)  10 (moderate anxiety)   Total Score 13 14 10     GERD:   Omeprazole 20 mg once daily as needed     Patient reports no current symptoms. Prefers to use Omeprazole as needed rather than daily. Finds that she is getting effective results this way.     ----------------      I spent 20 minutes with this patient today. All changes were made via collaborative practice agreement with Dr. Jonel Flores. A copy of the visit note was provided to the patient's provider(s).    A summary of these recommendations declined.     Lauren Bloch, PharmD, BCACP   Medication Therapy Management Pharmacist   [facility] [place]    Telemedicine Visit Details  Type of service:  Telephone visit  Start Time:  1:15 PM  End Time:  1:35 PM     Medication Therapy Recommendations  Morbid obesity (H)    Current Medication: phentermine (ADIPEX-P) 15 MG capsule   Rationale: Dose too low - Dosage too low - Effectiveness   Recommendation: Increase Dose - phentermine 30 MG capsule   Status: Declined per Patient

## 2023-12-20 ENCOUNTER — OFFICE VISIT (OUTPATIENT)
Dept: RHEUMATOLOGY | Facility: CLINIC | Age: 43
End: 2023-12-20
Attending: NURSE PRACTITIONER
Payer: COMMERCIAL

## 2023-12-20 VITALS
HEART RATE: 77 BPM | DIASTOLIC BLOOD PRESSURE: 78 MMHG | WEIGHT: 203 LBS | TEMPERATURE: 98.4 F | BODY MASS INDEX: 34.84 KG/M2 | OXYGEN SATURATION: 99 % | RESPIRATION RATE: 16 BRPM | SYSTOLIC BLOOD PRESSURE: 127 MMHG

## 2023-12-20 DIAGNOSIS — M25.50 MULTIPLE JOINT PAIN: Primary | ICD-10-CM

## 2023-12-20 DIAGNOSIS — M35.00 SICCA SYNDROME (H): ICD-10-CM

## 2023-12-20 PROCEDURE — 86235 NUCLEAR ANTIGEN ANTIBODY: CPT | Performed by: PHYSICIAN ASSISTANT

## 2023-12-20 PROCEDURE — 36415 COLL VENOUS BLD VENIPUNCTURE: CPT | Performed by: PHYSICIAN ASSISTANT

## 2023-12-20 PROCEDURE — 99204 OFFICE O/P NEW MOD 45 MIN: CPT | Performed by: PHYSICIAN ASSISTANT

## 2023-12-20 PROCEDURE — 86200 CCP ANTIBODY: CPT | Performed by: PHYSICIAN ASSISTANT

## 2023-12-20 NOTE — PATIENT INSTRUCTIONS
After Visit Instructions:     Thank you for coming to [facility] for your care. It is my goal to partner with you to help you reach your optimal state of health.       Plan:     Schedule follow-up with Junie Ortega PA-C as needed.   Imaging: MRI right hand  Labs: SSA, SSB, CCP antibody    Junie Ortega PA-C  [facility]  [facility]    Contact information: [facility]  Clinic Number:  [phone]  Please call or send a Locai message with any questions about your care

## 2023-12-21 LAB
CCP AB SER IA-ACNC: 1.2 U/ML
ENA SS-A AB SER IA-ACNC: 0.5 U/ML
ENA SS-A AB SER IA-ACNC: NEGATIVE
ENA SS-B IGG SER IA-ACNC: <0.6 U/ML
ENA SS-B IGG SER IA-ACNC: NEGATIVE

## 2024-01-09 ENCOUNTER — MYC MEDICAL ADVICE (OUTPATIENT)
Dept: FAMILY MEDICINE | Facility: CLINIC | Age: 44
End: 2024-01-09

## 2024-01-09 DIAGNOSIS — F90.2 ATTENTION DEFICIT HYPERACTIVITY DISORDER (ADHD), COMBINED TYPE: ICD-10-CM

## 2024-01-09 DIAGNOSIS — G89.4 CHRONIC PAIN SYNDROME: ICD-10-CM

## 2024-01-09 RX ORDER — OXYCODONE HYDROCHLORIDE 5 MG/1
5 TABLET ORAL EVERY 8 HOURS PRN
Qty: 6 TABLET | Refills: 0 | Status: SHIPPED | OUTPATIENT
Start: 2024-01-12 | End: 2024-01-15

## 2024-01-09 RX ORDER — DEXMETHYLPHENIDATE HYDROCHLORIDE 10 MG/1
10 TABLET ORAL 2 TIMES DAILY
Qty: 6 TABLET | Refills: 0 | Status: SHIPPED | OUTPATIENT
Start: 2024-01-12 | End: 2024-01-15

## 2024-01-10 DIAGNOSIS — F41.1 GAD (GENERALIZED ANXIETY DISORDER): ICD-10-CM

## 2024-01-10 DIAGNOSIS — I10 BENIGN ESSENTIAL HYPERTENSION: ICD-10-CM

## 2024-01-10 DIAGNOSIS — G43.009 MIGRAINE WITHOUT AURA AND WITHOUT STATUS MIGRAINOSUS, NOT INTRACTABLE: ICD-10-CM

## 2024-01-11 RX ORDER — LISINOPRIL 40 MG/1
40 TABLET ORAL DAILY
Qty: 90 TABLET | Refills: 0 | Status: SHIPPED | OUTPATIENT
Start: 2024-01-11 | End: 2024-04-02

## 2024-01-11 RX ORDER — PROPRANOLOL HYDROCHLORIDE 80 MG/1
80 TABLET ORAL DAILY
Qty: 90 TABLET | Refills: 3 | Status: SHIPPED | OUTPATIENT
Start: 2024-01-11 | End: 2024-05-07

## 2024-01-11 RX ORDER — ESCITALOPRAM OXALATE 20 MG/1
20 TABLET ORAL DAILY
Qty: 90 TABLET | Refills: 0 | Status: SHIPPED | OUTPATIENT
Start: 2024-01-11 | End: 2024-04-25

## 2024-01-15 ENCOUNTER — VIRTUAL VISIT (OUTPATIENT)
Dept: FAMILY MEDICINE | Facility: CLINIC | Age: 44
End: 2024-01-15
Payer: COMMERCIAL

## 2024-01-15 DIAGNOSIS — G89.4 CHRONIC PAIN SYNDROME: ICD-10-CM

## 2024-01-15 DIAGNOSIS — F90.2 ATTENTION DEFICIT HYPERACTIVITY DISORDER (ADHD), COMBINED TYPE: ICD-10-CM

## 2024-01-15 PROCEDURE — 99214 OFFICE O/P EST MOD 30 MIN: CPT | Mod: 95 | Performed by: NURSE PRACTITIONER

## 2024-01-15 RX ORDER — OXYCODONE HYDROCHLORIDE 5 MG/1
5 TABLET ORAL EVERY 8 HOURS PRN
Qty: 90 TABLET | Refills: 0 | Status: SHIPPED | OUTPATIENT
Start: 2024-01-15 | End: 2024-02-14

## 2024-01-15 RX ORDER — DEXMETHYLPHENIDATE HYDROCHLORIDE 10 MG/1
10 TABLET ORAL 2 TIMES DAILY
Qty: 60 TABLET | Refills: 0 | Status: SHIPPED | OUTPATIENT
Start: 2024-01-15 | End: 2024-02-14

## 2024-01-15 ASSESSMENT — ANXIETY QUESTIONNAIRES
7. FEELING AFRAID AS IF SOMETHING AWFUL MIGHT HAPPEN: NOT AT ALL
3. WORRYING TOO MUCH ABOUT DIFFERENT THINGS: NOT AT ALL
IF YOU CHECKED OFF ANY PROBLEMS ON THIS QUESTIONNAIRE, HOW DIFFICULT HAVE THESE PROBLEMS MADE IT FOR YOU TO DO YOUR WORK, TAKE CARE OF THINGS AT HOME, OR GET ALONG WITH OTHER PEOPLE: VERY DIFFICULT
5. BEING SO RESTLESS THAT IT IS HARD TO SIT STILL: NOT AT ALL
6. BECOMING EASILY ANNOYED OR IRRITABLE: SEVERAL DAYS
GAD7 TOTAL SCORE: 2
1. FEELING NERVOUS, ANXIOUS, OR ON EDGE: SEVERAL DAYS
GAD7 TOTAL SCORE: 2
2. NOT BEING ABLE TO STOP OR CONTROL WORRYING: NOT AT ALL

## 2024-01-15 ASSESSMENT — PATIENT HEALTH QUESTIONNAIRE - PHQ9
SUM OF ALL RESPONSES TO PHQ QUESTIONS 1-9: 8
5. POOR APPETITE OR OVEREATING: NOT AT ALL

## 2024-01-15 NOTE — PROGRESS NOTES
"Anne is a 43 year old who is being evaluated via a billable video visit.      How would you like to obtain your AVS? MyChart  If the video visit is dropped, the invitation should be resent by: Text to cell phone: [phone]  Will anyone else be joining your video visit? No          Assessment & Plan     Attention deficit hyperactivity disorder (ADHD), combined type  Controlled with the medication.  Patient is currently on phentermine not tolerating well recommend not taking phentermine, Focalin and will follow back up weight loss clinic as it has not we will fill for 1 month and recommend followup in office visit in 1  - dexmethylphenidate (FOCALIN) 10 MG tablet; Take 1 tablet (10 mg) by mouth 2 times daily for 30 days    Chronic pain syndrome  Controlled with the medication.  Patient is currently on phentermine not tolerating well recommend not taking phentermine, Focalin and will follow back up weight loss clinic as it has not we will fill for 1 month and recommend followup in office visit in 1  - oxyCODONE (ROXICODONE) 5 MG tablet; Take 1 tablet (5 mg) by mouth every 8 hours as needed for pain Max 3 tablets per day     BMI:   Estimated body mass index is 34.84 kg/m  as calculated from the following:    Height as of 10/19/23: 1.626 m (5' 4\").    Weight as of 12/20/23: 92.1 kg (203 lb).   Weight management plan: Discussed healthy diet and exercise guidelines        FRANKLYN Christianson CNP  [facility]    Subjective   Anne is a 43 year old, presenting for the following health issues:  Back Pain and A.D.H.D        1/15/2024    11:25 AM   Additional Questions   Roomed by Ashley GRIGGS     Medication Followup of Focalin  Taking Medication as prescribed: yes  Side Effects:  None  Medication Helping Symptoms:  yes    Pain History:  When did you first notice your pain? ongoing   Have you seen this provider for your pain in the past? Yes   Where in your body do you have pain? Upper to Lower " Back   Are you seeing anyone else for your pain? No        9/28/2023     1:12 PM 10/17/2023     1:48 PM 1/15/2024     1:35 PM   PHQ-9 SCORE   PHQ-9 Total Score MyChart 13 (Moderate depression)     PHQ-9 Total Score 13 14 8           6/29/2023     9:19 AM 7/11/2023     9:12 AM 1/15/2024     1:35 PM   MILES-7 SCORE   Total Score  10 (moderate anxiety)    Total Score 14 10 2               9/28/2023     1:12 PM 10/17/2023     1:48 PM 1/15/2024     1:35 PM   PHQ-9 SCORE   PHQ-9 Total Score MyChart 13 (Moderate depression)     PHQ-9 Total Score 13 14 8           6/29/2023     9:19 AM 7/11/2023     9:12 AM 1/15/2024     1:35 PM   MILES-7 SCORE   Total Score  10 (moderate anxiety)    Total Score 14 10 2           9/28/2023     1:27 PM 1/15/2024     1:38 PM   PEG Score   PEG Total Score 9 5       Chronic Pain Follow Up:    Location of pain: lower back   Analgesia/pain control:    - Recent changes:  none   - Overall control: Tolerable with discomfort    - Current treatments: pain medications   Adherence:     - Do you ever take more pain medicine than prescribed? No    - When did you take your last dose of pain medicine?  Today    Adverse effects: No   PDMP Review         Value Time User    State PDMP site checked  Yes 9/16/2023 12:46 PM Elvie Carr MD          Last CSA Agreement:   CSA -- Patient Level:     [Media Unavailable] Controlled Substance Agreement - Opioid - Scan on 2/21/2023  8:25 AM   [Media Unavailable] Controlled Substance Agreement - Opioid - Scan on 5/19/2022 12:43 PM       Last UDS: 7/13/2023         No data to display              Low Risk (0-3)  Moderate Risk (4-7)  High Risk (>8)  {Provider  Link to RIORD Assessment  Risk Index for Overdose or Serious Opioid Induced Respiratory Depression. Assess for risk respiratory depression and Narcan order. Completed by Provider prior to initial Opioid Rx; Assess Annually             Review of Systems   Constitutional, HEENT, cardiovascular, pulmonary, gi and gu  systems are negative, except as otherwise noted.      Objective           Vitals:  No vitals were obtained today due to virtual visit.    Physical Exam   GENERAL: Healthy, alert and no distress  EYES: Eyes grossly normal to inspection.  No discharge or erythema, or obvious scleral/conjunctival abnormalities.  RESP: No audible wheeze, cough, or visible cyanosis.  No visible retractions or increased work of breathing.    SKIN: Visible skin clear. No significant rash, abnormal pigmentation or lesions.  NEURO: Cranial nerves grossly intact.  Mentation and speech appropriate for age.  PSYCH: Mentation appears normal, affect normal/bright, judgement and insight intact, normal speech and appearance well-groomed.              Video-Visit Details    Type of service:  Video Visit   Video Start Time: 1:40 PM  Video End Time:1:55 PM    Originating Location (pt. Location): Home    Distant Location (provider location):  On-site  Platform used for Video Visit: Juliette

## 2024-01-24 ENCOUNTER — MYC MEDICAL ADVICE (OUTPATIENT)
Dept: RHEUMATOLOGY | Facility: CLINIC | Age: 44
End: 2024-01-24

## 2024-01-24 DIAGNOSIS — M25.50 MULTIPLE JOINT PAIN: Primary | ICD-10-CM

## 2024-01-24 NOTE — TELEPHONE ENCOUNTER
Last Rheum visit on 12/20/23 for multiple joint pain and Sicca syndrome.     Patient has her MR of right wrist scheduled for 1/25/24. Wondering if she can also get an MR of her left ankle?    Orders pending if ok. Please advise.    Daysi Will RN on 1/24/2024 at 3:28 PM

## 2024-01-25 NOTE — TELEPHONE ENCOUNTER
Order placed for the MRI of her ankle as well.  Patient should reach out to the imaging department to make sure that they can do the other MRI at the same time.    Juine Ortega, PAC

## 2024-02-14 ENCOUNTER — MYC MEDICAL ADVICE (OUTPATIENT)
Dept: FAMILY MEDICINE | Facility: CLINIC | Age: 44
End: 2024-02-14

## 2024-02-14 DIAGNOSIS — G89.4 CHRONIC PAIN SYNDROME: ICD-10-CM

## 2024-02-14 DIAGNOSIS — F90.2 ATTENTION DEFICIT HYPERACTIVITY DISORDER (ADHD), COMBINED TYPE: ICD-10-CM

## 2024-02-15 RX ORDER — OXYCODONE HYDROCHLORIDE 5 MG/1
5 TABLET ORAL EVERY 8 HOURS PRN
Qty: 90 TABLET | Refills: 0 | Status: SHIPPED | OUTPATIENT
Start: 2024-02-15 | End: 2024-03-27

## 2024-02-15 RX ORDER — DEXMETHYLPHENIDATE HYDROCHLORIDE 10 MG/1
10 TABLET ORAL 2 TIMES DAILY
Qty: 60 TABLET | Refills: 0 | Status: SHIPPED | OUTPATIENT
Start: 2024-02-15 | End: 2024-03-27

## 2024-03-16 ENCOUNTER — HEALTH MAINTENANCE LETTER (OUTPATIENT)
Age: 44
End: 2024-03-16

## 2024-04-02 DIAGNOSIS — I10 BENIGN ESSENTIAL HYPERTENSION: ICD-10-CM

## 2024-04-02 RX ORDER — LISINOPRIL 40 MG/1
40 TABLET ORAL DAILY
Qty: 90 TABLET | Refills: 2 | Status: SHIPPED | OUTPATIENT
Start: 2024-04-02

## 2024-04-24 DIAGNOSIS — F41.1 GAD (GENERALIZED ANXIETY DISORDER): ICD-10-CM

## 2024-04-25 RX ORDER — ESCITALOPRAM OXALATE 20 MG/1
20 TABLET ORAL DAILY
Qty: 90 TABLET | Refills: 0 | Status: SHIPPED | OUTPATIENT
Start: 2024-04-25 | End: 2024-05-07

## 2024-05-06 ASSESSMENT — PATIENT HEALTH QUESTIONNAIRE - PHQ9
10. IF YOU CHECKED OFF ANY PROBLEMS, HOW DIFFICULT HAVE THESE PROBLEMS MADE IT FOR YOU TO DO YOUR WORK, TAKE CARE OF THINGS AT HOME, OR GET ALONG WITH OTHER PEOPLE: SOMEWHAT DIFFICULT
SUM OF ALL RESPONSES TO PHQ QUESTIONS 1-9: 8
SUM OF ALL RESPONSES TO PHQ QUESTIONS 1-9: 8

## 2024-05-06 ASSESSMENT — ANXIETY QUESTIONNAIRES
GAD7 TOTAL SCORE: 5
7. FEELING AFRAID AS IF SOMETHING AWFUL MIGHT HAPPEN: SEVERAL DAYS
IF YOU CHECKED OFF ANY PROBLEMS ON THIS QUESTIONNAIRE, HOW DIFFICULT HAVE THESE PROBLEMS MADE IT FOR YOU TO DO YOUR WORK, TAKE CARE OF THINGS AT HOME, OR GET ALONG WITH OTHER PEOPLE: SOMEWHAT DIFFICULT
1. FEELING NERVOUS, ANXIOUS, OR ON EDGE: SEVERAL DAYS
7. FEELING AFRAID AS IF SOMETHING AWFUL MIGHT HAPPEN: SEVERAL DAYS
GAD7 TOTAL SCORE: 5
4. TROUBLE RELAXING: SEVERAL DAYS
8. IF YOU CHECKED OFF ANY PROBLEMS, HOW DIFFICULT HAVE THESE MADE IT FOR YOU TO DO YOUR WORK, TAKE CARE OF THINGS AT HOME, OR GET ALONG WITH OTHER PEOPLE?: SOMEWHAT DIFFICULT
5. BEING SO RESTLESS THAT IT IS HARD TO SIT STILL: NOT AT ALL
3. WORRYING TOO MUCH ABOUT DIFFERENT THINGS: SEVERAL DAYS
2. NOT BEING ABLE TO STOP OR CONTROL WORRYING: SEVERAL DAYS
6. BECOMING EASILY ANNOYED OR IRRITABLE: NOT AT ALL
GAD7 TOTAL SCORE: 5

## 2024-05-07 ENCOUNTER — OFFICE VISIT (OUTPATIENT)
Dept: FAMILY MEDICINE | Facility: CLINIC | Age: 44
End: 2024-05-07

## 2024-05-07 VITALS
TEMPERATURE: 98.9 F | HEART RATE: 70 BPM | SYSTOLIC BLOOD PRESSURE: 130 MMHG | WEIGHT: 203.6 LBS | DIASTOLIC BLOOD PRESSURE: 68 MMHG | HEIGHT: 64 IN | RESPIRATION RATE: 20 BRPM | BODY MASS INDEX: 34.76 KG/M2 | OXYGEN SATURATION: 98 %

## 2024-05-07 DIAGNOSIS — G89.4 CHRONIC PAIN SYNDROME: ICD-10-CM

## 2024-05-07 DIAGNOSIS — F90.2 ATTENTION DEFICIT HYPERACTIVITY DISORDER (ADHD), COMBINED TYPE: Primary | ICD-10-CM

## 2024-05-07 DIAGNOSIS — Z12.31 VISIT FOR SCREENING MAMMOGRAM: ICD-10-CM

## 2024-05-07 DIAGNOSIS — F41.1 GAD (GENERALIZED ANXIETY DISORDER): ICD-10-CM

## 2024-05-07 DIAGNOSIS — B37.2 YEAST INFECTION OF THE SKIN: ICD-10-CM

## 2024-05-07 DIAGNOSIS — I10 BENIGN ESSENTIAL HYPERTENSION: ICD-10-CM

## 2024-05-07 DIAGNOSIS — G43.009 MIGRAINE WITHOUT AURA AND WITHOUT STATUS MIGRAINOSUS, NOT INTRACTABLE: ICD-10-CM

## 2024-05-07 PROCEDURE — 99214 OFFICE O/P EST MOD 30 MIN: CPT | Performed by: NURSE PRACTITIONER

## 2024-05-07 RX ORDER — OXYCODONE HYDROCHLORIDE 5 MG/1
5 TABLET ORAL EVERY 8 HOURS PRN
Qty: 90 TABLET | Refills: 0 | Status: SHIPPED | OUTPATIENT
Start: 2024-05-07 | End: 2024-05-12

## 2024-05-07 RX ORDER — DEXMETHYLPHENIDATE HYDROCHLORIDE 10 MG/1
10 TABLET ORAL 2 TIMES DAILY
Qty: 60 TABLET | Refills: 0 | Status: SHIPPED | OUTPATIENT
Start: 2024-05-07 | End: 2024-05-12

## 2024-05-07 RX ORDER — AMLODIPINE BESYLATE 2.5 MG/1
2.5 TABLET ORAL DAILY
Qty: 90 TABLET | Refills: 3 | Status: SHIPPED | OUTPATIENT
Start: 2024-05-07

## 2024-05-07 RX ORDER — NYSTATIN 100000 U/G
CREAM TOPICAL 2 TIMES DAILY
Qty: 30 G | Refills: 0 | Status: SHIPPED | OUTPATIENT
Start: 2024-05-07

## 2024-05-07 RX ORDER — ESCITALOPRAM OXALATE 20 MG/1
20 TABLET ORAL DAILY
Qty: 90 TABLET | Refills: 0 | Status: SHIPPED | OUTPATIENT
Start: 2024-05-07

## 2024-05-07 RX ORDER — PROPRANOLOL HYDROCHLORIDE 80 MG/1
80 TABLET ORAL DAILY
Qty: 90 TABLET | Refills: 3 | Status: SHIPPED | OUTPATIENT
Start: 2024-05-07

## 2024-05-07 ASSESSMENT — PAIN SCALES - GENERAL: PAINLEVEL: MODERATE PAIN (5)

## 2024-05-07 NOTE — LETTER

## 2024-05-07 NOTE — PROGRESS NOTES
Assessment & Plan     Attention deficit hyperactivity disorder (ADHD), combined type   Controlled no change in treatment plan   - dexmethylphenidate (FOCALIN) 10 MG tablet; Take 1 tablet (10 mg) by mouth 2 times daily    Chronic pain syndrome   Controlled no change in treatment plan   - oxyCODONE (ROXICODONE) 5 MG tablet; Take 1 tablet (5 mg) by mouth every 8 hours as needed for pain Max 3 tablets per day    Yeast infection of the skin  - nystatin (MYCOSTATIN) 546169 UNIT/GM external cream; Apply topically 2 times daily    Benign essential hypertension   Controlled no change in treatment plan   - amLODIPine (NORVASC) 2.5 MG tablet; Take 1 tablet (2.5 mg) by mouth daily  - propranolol (INDERAL) 80 MG tablet; Take 1 tablet (80 mg) by mouth daily    MILES (generalized anxiety disorder)  Stable   - escitalopram (LEXAPRO) 20 MG tablet; Take 1 tablet (20 mg) by mouth daily    Migraine without aura and without status migrainosus, not intractable  Stable   - propranolol (INDERAL) 80 MG tablet; Take 1 tablet (80 mg) by mouth daily    Visit for screening mammogram  Recommend mammogram  /The longitudinal plan of care for the diagnosis(es)/condition(s) as documented were addressed during this visit. Due to the added complexity in care, I will continue to support Anne in the subsequent management and with ongoing continuity of care.     See Patient Instructions    Subjective   Anne is a 43 year old, presenting for the following health issues:  No chief complaint on file.      5/7/2024    12:41 PM   Additional Questions   Roomed by Ashley WEINSTEIN     History of Present Illness       Back Pain:  She presents for follow up of back pain. Patient's back pain is a chronic problem.  Location of back pain:  Right lower back, left lower back, left upper back, left side of neck and left shoulder  Description of back pain: burning, cramping, dull ache and shooting  Back pain spreads: left shoulder and left side of neck    Since patient first  noticed back pain, pain is: gradually worsening  Does back pain interfere with her job:  Yes       Mental Health Follow-up:  Patient presents to follow-up on Depression & Anxiety.Patient's depression since last visit has been:  Medium  The patient is not having other symptoms associated with depression.  Patient's anxiety since last visit has been:  Medium  The patient is not having other symptoms associated with anxiety.  Any significant life events: relationship concerns, financial concerns, housing concerns and health concerns  Patient is feeling anxious or having panic attacks.  Patient has no concerns about alcohol or drug use.    She eats 0-1 servings of fruits and vegetables daily.She consumes 3 sweetened beverage(s) daily.She exercises with enough effort to increase her heart rate 10 to 19 minutes per day.  She exercises with enough effort to increase her heart rate 5 days per week.   She is taking medications regularly.         7/11/2023     9:12 AM 1/15/2024     1:35 PM 5/6/2024     1:53 PM   MILES-7 SCORE   Total Score 10 (moderate anxiety)  5 (mild anxiety)   Total Score 10 2 5            10/17/2023     1:48 PM 1/15/2024     1:35 PM 5/6/2024     1:47 PM   PHQ   PHQ-9 Total Score 14 8 8   Q9: Thoughts of better off dead/self-harm past 2 weeks Not at all Not at all Not at all        Medication Followup of Focalin  Taking Medication as prescribed: yes  Side Effects:  having stomach issues when taking this medication, would like to discuss something else for her ADHD  Medication Helping Symptoms:  yes, have not taken it in the past week because she ran out and has not had stomach issues         Review of Systems  Constitutional, HEENT, cardiovascular, pulmonary, gi and gu systems are negative, except as otherwise noted.      Objective    LMP 03/02/2022 (LMP Unknown)   Body mass index is 34.95 kg/m . Vital signs:  Temp: 98.9  F (37.2  C) Temp src: Tympanic BP: 130/68 Pulse: 70   Resp: 20 SpO2: 98 %     Height:  "162.6 cm (5' 4\") Weight: 92.4 kg (203 lb 9.6 oz)  Estimated body mass index is 34.95 kg/m  as calculated from the following:    Height as of this encounter: 1.626 m (5' 4\").    Weight as of this encounter: 92.4 kg (203 lb 9.6 oz).        Physical Exam   GENERAL: alert and no distress  EYES: Eyes grossly normal to inspection, PERRL and conjunctivae and sclerae normal  HENT: ear canals and TM's normal, nose and mouth without ulcers or lesions  NECK: no adenopathy, no asymmetry, masses, or scars  RESP: lungs clear to auscultation - no rales, rhonchi or wheezes  CV: regular rate and rhythm, normal S1 S2, no S3 or S4, no murmur, click or rub, no peripheral edema  MS: no gross musculoskeletal defects noted, no edema  SKIN: no suspicious lesions or rashes  NEURO: Normal strength and tone, mentation intact and speech normal  PSYCH: mentation appears normal, affect normal/bright    No results found for any visits on 05/07/24.        Signed Electronically by: FRANKLYN Christianson CNP    "

## 2024-05-12 RX ORDER — OXYCODONE HYDROCHLORIDE 5 MG/1
5 TABLET ORAL EVERY 8 HOURS PRN
Qty: 90 TABLET | Refills: 0 | Status: SHIPPED | OUTPATIENT
Start: 2024-06-07 | End: 2024-07-05

## 2024-05-12 RX ORDER — DEXMETHYLPHENIDATE HYDROCHLORIDE 10 MG/1
10 TABLET ORAL 2 TIMES DAILY
Qty: 60 TABLET | Refills: 0 | Status: SHIPPED | OUTPATIENT
Start: 2024-06-07 | End: 2024-07-05

## 2024-06-06 DIAGNOSIS — J45.20 MILD INTERMITTENT ASTHMA WITHOUT COMPLICATION: ICD-10-CM

## 2024-06-06 RX ORDER — MONTELUKAST SODIUM 10 MG/1
TABLET ORAL
Qty: 90 TABLET | Refills: 3 | Status: SHIPPED | OUTPATIENT
Start: 2024-06-06

## 2024-06-06 NOTE — TELEPHONE ENCOUNTER
Pending Prescriptions:                       Disp   Refills    montelukast (SINGULAIR) 10 MG tablet [Pha*90 tab*3            Sig: TAKE ONE TABLET BY MOUTH AT BEDTIME    Routing refill request to provider for review/approval because:  Asthma control assessment      Anthony Wong RN

## 2024-07-05 ENCOUNTER — TELEPHONE (OUTPATIENT)
Dept: FAMILY MEDICINE | Facility: CLINIC | Age: 44
End: 2024-07-05

## 2024-07-05 DIAGNOSIS — F90.2 ATTENTION DEFICIT HYPERACTIVITY DISORDER (ADHD), COMBINED TYPE: ICD-10-CM

## 2024-07-05 DIAGNOSIS — G89.4 CHRONIC PAIN SYNDROME: ICD-10-CM

## 2024-07-05 RX ORDER — DEXMETHYLPHENIDATE HYDROCHLORIDE 10 MG/1
10 TABLET ORAL 2 TIMES DAILY
Qty: 60 TABLET | Refills: 0 | Status: SHIPPED | OUTPATIENT
Start: 2024-07-05 | End: 2024-08-07

## 2024-07-05 RX ORDER — OXYCODONE HYDROCHLORIDE 5 MG/1
5 TABLET ORAL EVERY 8 HOURS PRN
Qty: 90 TABLET | Refills: 0 | Status: SHIPPED | OUTPATIENT
Start: 2024-07-05 | End: 2024-08-07

## 2024-07-05 NOTE — TELEPHONE ENCOUNTER
Medication Question or Refill    Contacts       Contact Date/Time Type Contact Phone/Fax    07/05/2024 11:02 AM CDT Phone (Incoming) Anne Ferreira (Self) [phone] (M)            What medication are you calling about (include dose and sig)?: Pending Prescriptions:                       Disp   Refills    oxyCODONE (ROXICODONE) 5 MG tablet        90 tab*0            Sig: Take 1 tablet (5 mg) by mouth every 8 hours as           needed for pain Max 3 tablets per day    dexmethylphenidate (FOCALIN) 10 MG tablet 60 tab*0            Sig: Take 1 tablet (10 mg) by mouth 2 times daily      Preferred Pharmacy:       [place] [address]  Phone: [phone] Fax: [phone]        Controlled Substance Agreement on file:   CSA -- Patient Level:     [Media Unavailable] Controlled Substance Agreement - Opioid - Scan on 5/7/2024  1:35 PM   [Media Unavailable] Controlled Substance Agreement - Opioid - Scan on 2/21/2023  8:25 AM   [Media Unavailable] Controlled Substance Agreement - Opioid - Scan on 5/19/2022 12:43 PM       Who prescribed the medication?: Bertha Loya    Do you need a refill? Yes      Do you have any questions or concerns?  Yes: Patient is going out of town starting [facility] and is needing meds refilled before she leaves.      Could we send this information to you in [facility] or would you prefer to receive a phone call?:   Patient would prefer a phone call   Okay to leave a detailed message?: Yes at Home number on file [phone] (home)

## 2024-07-05 NOTE — TELEPHONE ENCOUNTER
Patient Quality Outreach    Patient is due for the following:   Depression  -  PHQ-9 needed  Physical Preventive Adult Physical    Next Steps:   Schedule a Adult Preventative    Type of outreach:    Sent [employer] message.      Questions for provider review:    None           Terri Cruz CMA

## 2024-08-06 ENCOUNTER — MYC MEDICAL ADVICE (OUTPATIENT)
Dept: FAMILY MEDICINE | Facility: CLINIC | Age: 44
End: 2024-08-06

## 2024-08-06 DIAGNOSIS — G89.4 CHRONIC PAIN SYNDROME: ICD-10-CM

## 2024-08-06 DIAGNOSIS — F90.2 ATTENTION DEFICIT HYPERACTIVITY DISORDER (ADHD), COMBINED TYPE: ICD-10-CM

## 2024-08-06 RX ORDER — OXYCODONE HYDROCHLORIDE 5 MG/1
5 TABLET ORAL EVERY 8 HOURS PRN
Qty: 90 TABLET | Refills: 0 | OUTPATIENT
Start: 2024-08-06

## 2024-08-06 RX ORDER — DEXMETHYLPHENIDATE HYDROCHLORIDE 10 MG/1
10 TABLET ORAL 2 TIMES DAILY
Qty: 60 TABLET | Refills: 0 | OUTPATIENT
Start: 2024-08-06

## 2024-08-07 RX ORDER — OXYCODONE HYDROCHLORIDE 5 MG/1
5 TABLET ORAL EVERY 8 HOURS PRN
Qty: 90 TABLET | Refills: 0 | Status: SHIPPED | OUTPATIENT
Start: 2024-08-07 | End: 2024-09-03 | Stop reason: ALTCHOICE

## 2024-08-07 RX ORDER — DEXMETHYLPHENIDATE HYDROCHLORIDE 10 MG/1
10 TABLET ORAL 2 TIMES DAILY
Qty: 60 TABLET | Refills: 0 | Status: SHIPPED | OUTPATIENT
Start: 2024-08-07 | End: 2024-09-03

## 2024-08-28 ENCOUNTER — MYC MEDICAL ADVICE (OUTPATIENT)
Dept: FAMILY MEDICINE | Facility: CLINIC | Age: 44
End: 2024-08-28

## 2024-08-28 NOTE — TELEPHONE ENCOUNTER
We not able to order MRIs and was not discussed that would be a standing order.  Patient should be seen for full evaluation before we can order any MRIs or any further diagnostic tests.  Please have patient schedule a clinic appointment.  If urgent concerns patient should be seen in urgent care..    Bertha Loya CNP

## 2024-08-28 NOTE — TELEPHONE ENCOUNTER
"Spoke with patient. She states that she was told you would put in the MRI when her insurance starts again. She states that she has been feeling this way the last 2 months with increased tingling to her hand that feels heavy at times. She states that she notices this more with sitting or standing too long.    Last OV: 5/7/24 \"Back Pain:  She presents for follow up of back pain. Patient's back pain is a chronic problem.  Location of back pain:  Right lower back, left lower back, left upper back, left side of neck and left shoulder  Description of back pain: burning, cramping, dull ache and shooting  Back pain spreads: left shoulder and left side of neck     Since patient first noticed back pain, pain is: gradually worsening  Does back pain interfere with her job:  Yes \"    Please Advise.  Franca Joyce RN on 8/28/2024 at 1:57 PM  "

## 2024-09-02 ENCOUNTER — HOSPITAL ENCOUNTER (EMERGENCY)
Facility: CLINIC | Age: 44
Discharge: LEFT BEFORE DISCHARGE | End: 2024-09-02
Attending: EMERGENCY MEDICINE | Admitting: EMERGENCY MEDICINE
Payer: COMMERCIAL

## 2024-09-02 VITALS
DIASTOLIC BLOOD PRESSURE: 83 MMHG | BODY MASS INDEX: 33.29 KG/M2 | HEIGHT: 64 IN | OXYGEN SATURATION: 97 % | RESPIRATION RATE: 16 BRPM | WEIGHT: 195 LBS | TEMPERATURE: 98 F | SYSTOLIC BLOOD PRESSURE: 140 MMHG | HEART RATE: 73 BPM

## 2024-09-02 DIAGNOSIS — M25.512 ACUTE PAIN OF LEFT SHOULDER: ICD-10-CM

## 2024-09-02 DIAGNOSIS — M54.2 CERVICALGIA: ICD-10-CM

## 2024-09-02 DIAGNOSIS — R20.2 PARESTHESIA OF LEFT ARM: ICD-10-CM

## 2024-09-02 PROCEDURE — 99282 EMERGENCY DEPT VISIT SF MDM: CPT | Performed by: EMERGENCY MEDICINE

## 2024-09-02 PROCEDURE — 99283 EMERGENCY DEPT VISIT LOW MDM: CPT | Performed by: EMERGENCY MEDICINE

## 2024-09-02 ASSESSMENT — COLUMBIA-SUICIDE SEVERITY RATING SCALE - C-SSRS
6. HAVE YOU EVER DONE ANYTHING, STARTED TO DO ANYTHING, OR PREPARED TO DO ANYTHING TO END YOUR LIFE?: NO
1. IN THE PAST MONTH, HAVE YOU WISHED YOU WERE DEAD OR WISHED YOU COULD GO TO SLEEP AND NOT WAKE UP?: NO
2. HAVE YOU ACTUALLY HAD ANY THOUGHTS OF KILLING YOURSELF IN THE PAST MONTH?: NO

## 2024-09-02 NOTE — ED TRIAGE NOTES
"Left shoulder pain, numbness and tingling, ongoing with known \" bulging discs but it has just been getting worse and I cannot stand it. \"    Took oxycodone 1.5 hrs ago without relief. Ibuprofen this am.      Triage Assessment (Adult)       Row Name 09/02/24 1301          Triage Assessment    Airway WDL WDL        Respiratory WDL    Respiratory WDL WDL        Skin Circulation/Temperature WDL    Skin Circulation/Temperature WDL WDL        Cardiac WDL    Cardiac WDL WDL        Peripheral/Neurovascular WDL    Peripheral Neurovascular WDL WDL        Cognitive/Neuro/Behavioral WDL    Cognitive/Neuro/Behavioral WDL WDL                     "

## 2024-09-02 NOTE — ED PROVIDER NOTES
"  History     Chief Complaint   Patient presents with    Shoulder Pain     Left shoulder pain, numbness and tingling, ongoing with known \" bulging discs but it has just been getting worse and I cannot stand it. \"     HPI  Anne Ferreira is a 43 year old female past medical history significant for  cervical spine fusion ,COPD dyslipidemia.  ADHD dysmenorrhea with hysterectomy depression who presents emergency department complaining of left shoulder and arm pain.  Patient states she has always had some issues with her shoulder and arm but is gradually getting worse and pain more intense over the weekend.  Pain is mostly in the trapezius muscle and upper lateral neck muscles.  But she has pain in the shoulder and has some numbness and tingling down her humerus and into her hand.  Denies any significant weakness but the tingling and numbness has worsened significantly over the weekend denies any fevers or chills has not had any headache or visual changes denies any chest pain shortness of breath.  She has not had any abdominal pain or back pain denies any bowel or bladder dysfunction.    Allergies:  Allergies   Allergen Reactions    Bupropion Nausea    Sulfa Antibiotics        Problem List:    Patient Active Problem List    Diagnosis Date Noted    F11.2 - Continuous opioid dependence (H) 05/18/2023     Priority: Medium    S/P cervical spinal fusion 03/20/2023     Priority: Medium     Added automatically from request for surgery [id number]      COPD (chronic obstructive pulmonary disease) (H) 07/27/2021     Priority: Medium    Moderate episode of recurrent major depressive disorder (H) 05/21/2021     Priority: Medium    Symptomatic cholelithiasis 12/15/2020     Priority: Medium     Added automatically from request for surgery [id number]      Morbid obesity (H) 12/11/2020     Priority: Medium    Dyslipidemia 11/10/2020     Priority: Medium    Dysmenorrhea 11/10/2020     Priority: Medium     treated with continuous OCPs      GERD " (gastroesophageal reflux disease) 11/10/2020     Priority: Medium    Thoracic back pain 11/10/2020     Priority: Medium     left, approx T10      H/O LEEP      Priority: Medium     1998 LEEP- Unknown results.  2010 NIL pap.  2/2/12 COLP and ECC- DARREN 1.  2013 NIL pap  4/7/15 LSIL pap, + HR HPV   8/13/15 COLP- DARREN 1, ECC- Negative.  6/10/16 NIL pap, Neg HPV.  5/14/19 NIL pap, Neg HPV.  Above results found in [facility]  10/15/20 NIL pap, Neg HPV. Plan cotest in 3 years.       Cervical spinal stenosis 07/01/2019     Priority: Medium     7/2019 MRI - At C5-6, broad-based central disc extrusion demonstrating caudal migration mildly deforms the cord and severely narrows the spinal canal. Uncovertebral joint spurring contributes to mild to moderate left neural foraminal narrowing with potential left C6 nerve root encroachment.      Herniation of cervical intervertebral disc with radiculopathy 07/01/2019     Priority: Medium     see MRI 7/2019      Attention deficit hyperactivity disorder, combined type, mild 08/29/2018     Priority: Medium    Generalized anxiety disorder 08/29/2018     Priority: Medium    Allergic rhinitis 04/01/2008     Priority: Medium        Past Medical History:    Past Medical History:   Diagnosis Date    Abnormal Pap smear of cervix        Past Surgical History:    Past Surgical History:   Procedure Laterality Date    CYSTOSCOPY N/A 4/25/2022    Procedure: CYSTOSCOPY;  Surgeon: Julius Montejo MD;  Location: [facility] OR    INJECT EPIDURAL CERVICAL  3/30/2023    Procedure: INJECTION, SPINE, CERVICAL, EPIDURAL;  Surgeon: Naresh Zuniga MD;  Location: [facility] [place]    LAPAROSCOPIC CHOLECYSTECTOMY N/A 12/21/2020    Procedure: Laparoscopic cholecystectomy;  Surgeon: Manuel Durham DO;  Location: [facility] OR    LAPAROSCOPIC HYSTERECTOMY TOTAL, BILATERAL SALPINGO-OOPHORECTOMY, COMBINED Bilateral 4/25/2022    Procedure: Laparoscopic total hysterectomy, Bilateral salpingectomy, with sparing of ovaries;  Surgeon:  "Julius Montejo MD;  Location: [facility] OR       Family History:    Family History   Problem Relation Age of Onset    Hypertension Father     Hypertension Brother     Hypertension Sister     Hypertension Brother        Social History:  Marital Status:   [4]  Social History     Tobacco Use    Smoking status: Every Day     Current packs/day: 0.50     Types: Cigarettes    Smokeless tobacco: Never   Vaping Use    Vaping status: Never Used   Substance Use Topics    Alcohol use: Yes     Comment: occassionally    Drug use: Never        Medications:    acetaminophen (TYLENOL) 325 MG tablet  albuterol (PROAIR HFA/PROVENTIL HFA/VENTOLIN HFA) 108 (90 Base) MCG/ACT inhaler  albuterol (PROVENTIL) (2.5 MG/3ML) 0.083% neb solution  amLODIPine (NORVASC) 2.5 MG tablet  cyclobenzaprine (FLEXERIL) 10 MG tablet  dexmethylphenidate (FOCALIN) 10 MG tablet  diphenhydrAMINE (BENADRYL) 25 MG tablet  escitalopram (LEXAPRO) 20 MG tablet  hydrOXYzine (ATARAX) 25 MG tablet  ibuprofen (ADVIL/MOTRIN) 200 MG tablet  ipratropium - albuterol 0.5 mg/2.5 mg/3 mL (DUONEB) 0.5-2.5 (3) MG/3ML neb solution  lisinopril (ZESTRIL) 40 MG tablet  montelukast (SINGULAIR) 10 MG tablet  nystatin (MYCOSTATIN) 370071 UNIT/GM external cream  omeprazole (PRILOSEC) 20 MG DR capsule  oxyCODONE (ROXICODONE) 5 MG tablet  propranolol (INDERAL) 80 MG tablet  tamsulosin (FLOMAX) 0.4 MG capsule          Review of Systems  As per HPI.  Physical Exam   BP: (!) 140/83  Pulse: 73  Temp: 98  F (36.7  C)  Resp: 16  Height: 162.6 cm (5' 4\")  Weight: 88.5 kg (195 lb)  SpO2: 97 %      Physical Exam  Vitals and nursing note reviewed.   Constitutional:       General: She is not in acute distress.     Appearance: She is not ill-appearing, toxic-appearing or diaphoretic.      Comments: Slightly drowsy   HENT:      Head: Normocephalic and atraumatic.      Nose: Nose normal.      Mouth/Throat:      Mouth: Mucous membranes are moist.      Pharynx: Oropharynx is clear.   Eyes:      " Conjunctiva/sclera: Conjunctivae normal.   Neck:      Comments: There is tenderness to palpation of the patient's left posterior lateral neck can proximal trapezius region.  No midline back tenderness.  Trachea is midline.  Musculoskeletal:      Cervical back: Normal range of motion and neck supple.      Right lower leg: No edema.      Left lower leg: No edema.      Comments: Palpation of the left shoulder and proximal shoulder region.  Subjective numbness reported left arm.   Skin:     General: Skin is warm and dry.   Neurological:      Mental Status: She is alert and oriented to person, place, and time.   Psychiatric:         Mood and Affect: Mood normal.         ED Course        Procedures            No results found for this or any previous visit (from the past 24 hour(s)).    Medications - No data to display    Assessments & Plan (with Medical Decision Making) records were reviewed including past medical history medications and allergies.  I reviewed patient's records including last MRI scan done 6/21/2023.  I had also looked into our Labor day holiday MRI scheduled and unfortunately MRI stopped about 1 PM.  In my interview with the patient I had informed her that unfortunately we do not have the ability to get an MRI at this time and this upset her and I did at this time states that I would like to control her pain as best as I could and get MRI set up soon as possible.  Patient did appear drowsy at this point and I did ask if she taken any medication and she stated she  had taken the oxycodone which she gets every month.  She became very upset that I asked this and she showed me her her pill bottle and then stated I was  accusing her of being a drug seeker.  I had in no way intended to accuse her of drug-seeking I was just trying to clarify what she had taken and when.  I at this point tried to  talk to her  calmly about my plan and how I was planning to control her pain in an appropriate manner after I  completed a full physical exam . The patient would not listen to me stating several times that I am accusing her of being a drug seeker.  Patient put her clothes on and left the room.   I never accused her of being a drug seeker.  I only had  informed her of the fact that there was no MRI available today and I also wanted to clarify if  she had taken any medications today so I could  safely and appropriately treat her pain.  Patient came back because she left her phone and I tried to talk to her again she refused to talk to me.  I was unable to complete a full physical exam and history during the time spent with the patient.     I have reviewed the nursing notes.    I have reviewed the findings, diagnosis, plan and need for follow up with the patient.           Discharge Medication List as of 9/2/2024  1:44 PM          Final diagnoses:   Cervicalgia   Paresthesia of left arm   Acute pain of left shoulder - Acute on chronic       9/2/2024   [facility] EMERGENCY DEPT       Teofilo Jernigan MD  09/02/24 [id number]

## 2024-09-03 ENCOUNTER — OFFICE VISIT (OUTPATIENT)
Dept: FAMILY MEDICINE | Facility: CLINIC | Age: 44
End: 2024-09-03
Payer: COMMERCIAL

## 2024-09-03 VITALS
WEIGHT: 193 LBS | SYSTOLIC BLOOD PRESSURE: 124 MMHG | HEART RATE: 68 BPM | HEIGHT: 64 IN | BODY MASS INDEX: 32.95 KG/M2 | RESPIRATION RATE: 16 BRPM | OXYGEN SATURATION: 97 % | DIASTOLIC BLOOD PRESSURE: 62 MMHG

## 2024-09-03 DIAGNOSIS — Z12.31 VISIT FOR SCREENING MAMMOGRAM: ICD-10-CM

## 2024-09-03 DIAGNOSIS — G89.4 CHRONIC PAIN SYNDROME: Primary | ICD-10-CM

## 2024-09-03 DIAGNOSIS — M48.02 CERVICAL SPINAL STENOSIS: ICD-10-CM

## 2024-09-03 DIAGNOSIS — M62.838 MUSCLE SPASM: ICD-10-CM

## 2024-09-03 DIAGNOSIS — M54.12 CERVICAL RADICULOPATHY: ICD-10-CM

## 2024-09-03 LAB — CREAT UR-MCNC: 141 MG/DL

## 2024-09-03 PROCEDURE — G0481 DRUG TEST DEF 8-14 CLASSES: HCPCS | Performed by: STUDENT IN AN ORGANIZED HEALTH CARE EDUCATION/TRAINING PROGRAM

## 2024-09-03 PROCEDURE — 99214 OFFICE O/P EST MOD 30 MIN: CPT | Performed by: STUDENT IN AN ORGANIZED HEALTH CARE EDUCATION/TRAINING PROGRAM

## 2024-09-03 RX ORDER — HYDROCODONE BITARTRATE AND ACETAMINOPHEN 5; 325 MG/1; MG/1
1 TABLET ORAL EVERY 8 HOURS PRN
Qty: 90 TABLET | Refills: 0 | Status: SHIPPED | OUTPATIENT
Start: 2024-09-03 | End: 2024-09-05

## 2024-09-03 RX ORDER — CYCLOBENZAPRINE HCL 10 MG
10 TABLET ORAL 2 TIMES DAILY PRN
Qty: 60 TABLET | Refills: 0 | Status: SHIPPED | OUTPATIENT
Start: 2024-09-03

## 2024-09-03 RX ORDER — NORTRIPTYLINE HCL 10 MG
10 CAPSULE ORAL AT BEDTIME
Qty: 90 CAPSULE | Refills: 3 | Status: SHIPPED | OUTPATIENT
Start: 2024-09-03

## 2024-09-03 ASSESSMENT — ANXIETY QUESTIONNAIRES
8. IF YOU CHECKED OFF ANY PROBLEMS, HOW DIFFICULT HAVE THESE MADE IT FOR YOU TO DO YOUR WORK, TAKE CARE OF THINGS AT HOME, OR GET ALONG WITH OTHER PEOPLE?: VERY DIFFICULT
GAD7 TOTAL SCORE: 14
7. FEELING AFRAID AS IF SOMETHING AWFUL MIGHT HAPPEN: MORE THAN HALF THE DAYS

## 2024-09-03 ASSESSMENT — PATIENT HEALTH QUESTIONNAIRE - PHQ9
SUM OF ALL RESPONSES TO PHQ QUESTIONS 1-9: 12
10. IF YOU CHECKED OFF ANY PROBLEMS, HOW DIFFICULT HAVE THESE PROBLEMS MADE IT FOR YOU TO DO YOUR WORK, TAKE CARE OF THINGS AT HOME, OR GET ALONG WITH OTHER PEOPLE: VERY DIFFICULT
SUM OF ALL RESPONSES TO PHQ QUESTIONS 1-9: 12

## 2024-09-03 ASSESSMENT — PAIN SCALES - GENERAL: PAINLEVEL: SEVERE PAIN (7)

## 2024-09-03 NOTE — PATIENT INSTRUCTIONS
When you go to get scheduled for MR of the cervical spine, then plan to get scheduled for MR right hand and left ankle as well ordered previously by Junie Ortega.    Schedule follow up with neurosurgery    Schedule follow up with rheumatology   Clinic Number: [phone]    Acetaminophen (Tylenol):   You can still take over the counter tylenol, but do not go over a total of 3000 mg of acetaminophen in a 24 hour time frame. There are many other medications that have acetaminophen as a component, including Excedrin, some over the counter cough medications, and Percocet.

## 2024-09-03 NOTE — PROGRESS NOTES
Assessment & Plan     Patient is a 43 year old who presents today for ongoing chronic pain with acute worsening. She has previously had c spine ACDF C5-6. Prior to that was on norco and had better pain control than with oxycodone she has now. Today, we opted for repeat MRI (last done June 2023) for interval worsening, referred back to spine for interventional evaluation, and will have her follow up with rheumatology as previously planned. In addition to MR c spine, plan to do imaging as previously ordered by rheumatology.     If no interventional recommendation, symptoms remain poorly controlled, recommend referral to pain management for consideration of botox injections vs alternative treatment.     In addition to switch of oxycodone to norco, will add nortriptyline given the radiculopathic type pain she is experiencing. Can increase dose further if desired. She is currently on lexapro, but consider switch to cymbalta in the future if needed for pain control.     Chronic pain syndrome  - Drug Confirmation Panel Urine with Creat - lab collect  - MR Cervical Spine w/o & w Contrast  - nortriptyline (PAMELOR) 10 MG capsule  Dispense: 90 capsule; Refill: 3  - HYDROcodone-acetaminophen (NORCO) 5-325 MG tablet  Dispense: 90 tablet; Refill: 0  - Drug Confirmation Panel Urine with Creat - lab collect    Cervical radiculopathy  - Drug Confirmation Panel Urine with Creat - lab collect  - MR Cervical Spine w/o & w Contrast  - nortriptyline (PAMELOR) 10 MG capsule  Dispense: 90 capsule; Refill: 3  - HYDROcodone-acetaminophen (NORCO) 5-325 MG tablet  Dispense: 90 tablet; Refill: 0  - Spine  Referral  - Drug Confirmation Panel Urine with Creat - lab collect    Cervical spinal stenosis  - Drug Confirmation Panel Urine with Creat - lab collect  - MR Cervical Spine w/o & w Contrast  - nortriptyline (PAMELOR) 10 MG capsule  Dispense: 90 capsule; Refill: 3  - HYDROcodone-acetaminophen (NORCO) 5-325 MG tablet  Dispense: 90  "tablet; Refill: 0  - Spine  Referral  - Drug Confirmation Panel Urine with Creat - lab collect    Muscle spasm  Noted on left upper back. Discussed this may be related to trying to hold her neck more stiff with the pain from the spine. Will trial flexeril again.   - cyclobenzaprine (FLEXERIL) 10 MG tablet  Dispense: 60 tablet; Refill: 0    Visit for screening mammogram  Due for mammo, ordered today.   - MA Screening Bilateral w/ Ury      BMI  Estimated body mass index is 33.13 kg/m  as calculated from the following:    Height as of this encounter: 1.626 m (5' 4\").    Weight as of this encounter: 87.5 kg (193 lb).     Justin Rodríguez is a 43 year old, presenting for the following health issues:  Neck Pain (Left side) and Shoulder (Left side)        9/3/2024     2:38 PM   Additional Questions   Roomed by Stephanie SUTTON   Accompanied by Self     History of Present Illness       Back Pain:  She presents for follow up of back pain. Patient's back pain is a chronic problem.  Location of back pain:  Right lower back, left lower back, left upper back, left side of neck, left shoulder and right hip  Description of back pain: burning, dull ache, fullness and shooting  Back pain spreads: left buttocks, left shoulder and left side of neck    Since patient first noticed back pain, pain is: gradually worsening  Does back pain interfere with her job:  Yes       She eats 0-1 servings of fruits and vegetables daily.She consumes 2 sweetened beverage(s) daily.She exercises with enough effort to increase her heart rate 10 to 19 minutes per day.  She exercises with enough effort to increase her heart rate 6 days per week.   She is taking medications regularly.     Neck pain on the left  Worsened over the wekend  Had surgery on the right side of the next 4-5 years ago.     \"Hurt all over\"  Joint pains.   Saw a rheum.   Supposed to get something with wrist, then lost insurance.     Taking ibuprofen when oxycodone wears off. " "    Using multiple creams   Tiger balm helps the most.     In the past for pain control - was on vicodin before first surgery  Now on oxycodone.       Review of Systems  Constitutional, HEENT, cardiovascular, pulmonary, gi and gu systems are negative, except as otherwise noted.      Objective    /62 (BP Location: Right arm, Patient Position: Sitting, Cuff Size: Adult Regular)   Pulse 68   Resp 16   Ht 1.626 m (5' 4\")   Wt 87.5 kg (193 lb)   LMP 03/02/2022 (LMP Unknown)   SpO2 97%   BMI 33.13 kg/m    Body mass index is 33.13 kg/m .  Physical Exam  Constitutional:       Appearance: Normal appearance.   HENT:      Head: Normocephalic.   Eyes:      General: No scleral icterus.     Extraocular Movements: Extraocular movements intact.      Conjunctiva/sclera: Conjunctivae normal.   Neck:      Comments: Tenderness to upper back/neck musculature with palpable spasms. No spinous process tenderness.   Cardiovascular:      Rate and Rhythm: Normal rate.   Pulmonary:      Effort: Pulmonary effort is normal.   Neurological:      General: No focal deficit present.      Mental Status: She is alert and oriented to person, place, and time.                  Signed Electronically by: Margie Rivera MD    "

## 2024-09-05 ENCOUNTER — MYC MEDICAL ADVICE (OUTPATIENT)
Dept: FAMILY MEDICINE | Facility: CLINIC | Age: 44
End: 2024-09-05
Payer: COMMERCIAL

## 2024-09-05 DIAGNOSIS — M54.12 CERVICAL RADICULOPATHY: ICD-10-CM

## 2024-09-05 DIAGNOSIS — M48.02 CERVICAL SPINAL STENOSIS: ICD-10-CM

## 2024-09-05 DIAGNOSIS — G89.4 CHRONIC PAIN SYNDROME: ICD-10-CM

## 2024-09-05 LAB
OXYCODONE UR CFM-MCNC: 1560 NG/ML
OXYCODONE/CREAT UR: 1106 NG/MG {CREAT}
OXYMORPHONE UR CFM-MCNC: 278 NG/ML
OXYMORPHONE/CREAT UR: 197 NG/MG {CREAT}

## 2024-09-05 RX ORDER — HYDROCODONE BITARTRATE AND ACETAMINOPHEN 5; 325 MG/1; MG/1
1 TABLET ORAL EVERY 8 HOURS PRN
Qty: 69 TABLET | Refills: 0 | Status: SHIPPED | OUTPATIENT
Start: 2024-09-05 | End: 2024-09-27

## 2024-09-05 NOTE — TELEPHONE ENCOUNTER
"Spoke with pharmacy. Stated they are only able to fill 7 days worth due to her not having a diagnosis of Chronic pain. They are unable to refill any more from that last prescription and require a new order. Do not see Chronic pain under problems.    OV 9/3/24: \"Chronic pain syndrome  - Drug Confirmation Panel Urine with Creat - lab collect  - MR Cervical Spine w/o & w Contrast  - nortriptyline (PAMELOR) 10 MG capsule  Dispense: 90 capsule; Refill: 3  - HYDROcodone-acetaminophen (NORCO) 5-325 MG tablet  Dispense: 90 tablet; Refill: 0  - Drug Confirmation Panel Urine with Creat - lab collect\"    Pending new script for consideration.  Franca Joyce RN on 9/5/2024 at 4:34 PM      "

## 2024-09-09 ENCOUNTER — TELEPHONE (OUTPATIENT)
Dept: FAMILY MEDICINE | Facility: CLINIC | Age: 44
End: 2024-09-09
Payer: COMMERCIAL

## 2024-09-09 DIAGNOSIS — M54.12 CERVICAL RADICULOPATHY: ICD-10-CM

## 2024-09-09 DIAGNOSIS — M48.02 CERVICAL SPINAL STENOSIS: ICD-10-CM

## 2024-09-09 DIAGNOSIS — G89.4 CHRONIC PAIN SYNDROME: Primary | ICD-10-CM

## 2024-09-09 RX ORDER — HYDROCODONE BITARTRATE AND ACETAMINOPHEN 5; 325 MG/1; MG/1
1 TABLET ORAL EVERY 8 HOURS PRN
Qty: 21 TABLET | Refills: 0 | Status: SHIPPED | OUTPATIENT
Start: 2024-09-09 | End: 2024-09-16

## 2024-09-09 RX ORDER — HYDROCODONE BITARTRATE AND ACETAMINOPHEN 5; 325 MG/1; MG/1
1 TABLET ORAL EVERY 8 HOURS PRN
Qty: 21 TABLET | Refills: 0 | Status: SHIPPED | OUTPATIENT
Start: 2024-09-16 | End: 2024-09-23

## 2024-09-09 NOTE — TELEPHONE ENCOUNTER
Hello!    I wanted to inform you that Anne's insurance is limiting the first 4 fills of her Norco to 7 day supplies. The prescription written for a 23 day supply is being filled today for a 7 day supply (2nd out of the 4), and the remaining tablets will become inactive.    I'm not sure if you would like to just send 2 more Rxs for 7 day supplies, so that we do not have to contact you to inform you that the rest are forfeited because of the insurance limitations.    Thank you.    Meenakshi London, PharmD  Staff Pharmacist  [facility]  [phone]

## 2024-09-15 ENCOUNTER — HOSPITAL ENCOUNTER (OUTPATIENT)
Dept: MRI IMAGING | Facility: CLINIC | Age: 44
Discharge: HOME | End: 2024-09-15
Attending: STUDENT IN AN ORGANIZED HEALTH CARE EDUCATION/TRAINING PROGRAM
Payer: COMMERCIAL

## 2024-09-15 ENCOUNTER — HOSPITAL ENCOUNTER (OUTPATIENT)
Dept: MRI IMAGING | Facility: CLINIC | Age: 44
Discharge: HOME | End: 2024-09-15
Attending: PHYSICIAN ASSISTANT
Payer: COMMERCIAL

## 2024-09-15 DIAGNOSIS — M25.50 MULTIPLE JOINT PAIN: ICD-10-CM

## 2024-09-15 DIAGNOSIS — M54.12 CERVICAL RADICULOPATHY: ICD-10-CM

## 2024-09-15 DIAGNOSIS — G89.4 CHRONIC PAIN SYNDROME: ICD-10-CM

## 2024-09-15 DIAGNOSIS — M48.02 CERVICAL SPINAL STENOSIS: ICD-10-CM

## 2024-09-15 PROCEDURE — 73220 MRI UPPR EXTREMITY W/O&W/DYE: CPT | Mod: RT

## 2024-09-15 PROCEDURE — A9585 GADOBUTROL INJECTION: HCPCS | Performed by: PHYSICIAN ASSISTANT

## 2024-09-15 PROCEDURE — 72156 MRI NECK SPINE W/O & W/DYE: CPT

## 2024-09-15 PROCEDURE — 73723 MRI JOINT LWR EXTR W/O&W/DYE: CPT | Mod: LT

## 2024-09-15 PROCEDURE — 255N000002 HC RX 255 OP 636: Performed by: PHYSICIAN ASSISTANT

## 2024-09-15 RX ORDER — GADOBUTROL 604.72 MG/ML
8.5 INJECTION INTRAVENOUS ONCE
Status: COMPLETED | OUTPATIENT
Start: 2024-09-15 | End: 2024-09-15

## 2024-09-15 RX ADMIN — GADOBUTROL 8.5 ML: 604.72 INJECTION INTRAVENOUS at 15:22

## 2024-09-17 ENCOUNTER — TELEPHONE (OUTPATIENT)
Dept: NEUROSURGERY | Facility: CLINIC | Age: 44
End: 2024-09-17
Payer: COMMERCIAL

## 2024-09-17 DIAGNOSIS — M54.2 CERVICALGIA: Primary | ICD-10-CM

## 2024-09-17 NOTE — CONFIDENTIAL NOTE
NEUROSURGERY - NEW PREVISIT PLANNING    Referring Provider: Margie Rivera MD    OVN 09/03/2024   Reason For Visit: M54.12 (ICD-10-CM) - Cervical radiculopathy   M48.02 (ICD-10-CM) - Cervical spinal stenosis   Additional Information:  worsened disc protrusion with cord compression cervical level on MRI. refer for interventional evaluation       IMAGING STATUS/LOCATION DATE/TYPE   MRI PACS 09/15/2024  Cervical  [facility]   CT N/A    XRAY *scheduled 09/18/2024  Cervical  [facility]   NOTES STATUS/LOCATION DATE/TYPE   Other specialist OVN: N/A    [facility] 07/30/2019   INJECTION Encounters      Imaging 03/30/2023, cervical interlaminar SUSIE    08/11/2022, bilateral SI inj   PHYSICAL THERAPY Encounters 11/02/2022   SURGERY Care Everywhere 11/13/2019, ACDF C5-6

## 2024-09-17 NOTE — TELEPHONE ENCOUNTER
Called Anne to discuss red flag symptoms. She reported some bladder urgency over the past year or so. Denied bowel issues or saddle anesthesia.  Lidia Rees RN, CNRN

## 2024-09-17 NOTE — TELEPHONE ENCOUNTER
Other: Hello, FYI this patient answered yes for leakage of urine, I have her scheduled tomorrow with Dr. Mills.     Could we send this information to you in [employer] or would you prefer to receive a phone call?:   Patient would prefer a phone call   Okay to leave a detailed message?: Yes at Cell number on file:    Telephone Information:   Mobile [phone]

## 2024-09-18 ENCOUNTER — OFFICE VISIT (OUTPATIENT)
Dept: NEUROSURGERY | Facility: CLINIC | Age: 44
End: 2024-09-18
Attending: STUDENT IN AN ORGANIZED HEALTH CARE EDUCATION/TRAINING PROGRAM
Payer: COMMERCIAL

## 2024-09-18 ENCOUNTER — MYC MEDICAL ADVICE (OUTPATIENT)
Dept: FAMILY MEDICINE | Facility: CLINIC | Age: 44
End: 2024-09-18

## 2024-09-18 ENCOUNTER — HOSPITAL ENCOUNTER (OUTPATIENT)
Dept: RADIOLOGY | Facility: HOSPITAL | Age: 44
Discharge: HOME | End: 2024-09-18
Attending: STUDENT IN AN ORGANIZED HEALTH CARE EDUCATION/TRAINING PROGRAM | Admitting: STUDENT IN AN ORGANIZED HEALTH CARE EDUCATION/TRAINING PROGRAM
Payer: COMMERCIAL

## 2024-09-18 ENCOUNTER — PRE VISIT (OUTPATIENT)
Dept: NEUROSURGERY | Facility: CLINIC | Age: 44
End: 2024-09-18

## 2024-09-18 VITALS
OXYGEN SATURATION: 96 % | DIASTOLIC BLOOD PRESSURE: 58 MMHG | HEIGHT: 64 IN | BODY MASS INDEX: 32.95 KG/M2 | SYSTOLIC BLOOD PRESSURE: 118 MMHG | HEART RATE: 60 BPM | WEIGHT: 193 LBS

## 2024-09-18 DIAGNOSIS — M54.12 CERVICAL RADICULOPATHY: ICD-10-CM

## 2024-09-18 DIAGNOSIS — M54.12 CERVICAL RADICULOPATHY: Primary | ICD-10-CM

## 2024-09-18 DIAGNOSIS — M54.2 CERVICALGIA: ICD-10-CM

## 2024-09-18 DIAGNOSIS — G89.4 CHRONIC PAIN SYNDROME: ICD-10-CM

## 2024-09-18 DIAGNOSIS — M48.02 CERVICAL SPINAL STENOSIS: ICD-10-CM

## 2024-09-18 PROCEDURE — 99213 OFFICE O/P EST LOW 20 MIN: CPT | Performed by: STUDENT IN AN ORGANIZED HEALTH CARE EDUCATION/TRAINING PROGRAM

## 2024-09-18 PROCEDURE — 72050 X-RAY EXAM NECK SPINE 4/5VWS: CPT

## 2024-09-18 ASSESSMENT — PAIN SCALES - GENERAL: PAINLEVEL: SEVERE PAIN (6)

## 2024-09-18 NOTE — NURSING NOTE
"Anne Ferreira is a 43 year old female who presents for:  Chief Complaint   Patient presents with    Consult     Cervicalgia, cervical radiculopathy. Patient reports pain in neck and into left shoulder and tingling in arm all the way down to fingers. Sporadic pain in right shoulder, only occasionally.        Initial Vitals:  /58   Pulse 60   Ht 5' 4\" (1.626 m)   Wt 193 lb (87.5 kg)   LMP 03/02/2022 (LMP Unknown)   SpO2 96%   BMI 33.13 kg/m   Estimated body mass index is 33.13 kg/m  as calculated from the following:    Height as of this encounter: 5' 4\" (1.626 m).    Weight as of this encounter: 193 lb (87.5 kg). Body surface area is 1.99 meters squared. BP completed using cuff size: regular  Severe Pain (6)        Rome Beckham    "

## 2024-09-18 NOTE — PATIENT INSTRUCTIONS
Recommend the following:    Ear nose and throat doctor to assess vocal cord paralysis   CT cervical spine  Urine nicotine test  Smoking cessation  EMG of the left arm    Follow-up after above

## 2024-09-18 NOTE — LETTER
"9/18/2024      Anne Ferreira  [address] [address]      Dear Colleague,    Thank you for referring your patient, Anne Ferreira, to the [facility]. Please see a copy of my visit note below.    Dear Dr. Rivera,     Thank you for the opportunity to participate in the care of Anne Ferreira.     As you know, she is a 43 year old right-handed female who presents with acute on chronic shoulder and left arm pain. She describes primarily C5 involvement with some findings of C6 involvement on the left side when askd to describe the location of her pain. She reports pain-limited weakness and denies any ongoing functional limitations. Specifically, she states that she can do \"everything\" but will \"pay for it later\". Her pain impairs her at all times including while sitting and sleeping. She denies any loss of fine motor function specifically when asked about buttoning buttons, zipping zippers, tying shoelaces, and using utensils. For her pain, she has tried Nortriptyline and is currently on narcotics daily.     On further review, she states she had prior cervical surgery. On my review of the prior operative report, Dr. Rubio performed a left sided approach (11/13/2019 ) to gain access to the C5-6 space and placed [employer] instrumentation. It is unclear from the surgical indications section of the report whether the patient had right or left radiculopathy. The patient ultimately states she had good relief of her pain after Dr. Rubio's sugery until about 2022 when her left sided issues described above began to manifest.     For this, the patient underwent a left sided C7-T1 injection with Dr. Zuniga. She denies any relief of pain from this procedure. The patient states she has tried physical therapy without relief of pain in the past.     Medical history is notable for ongoing tobacco use (0.5 packs per day), depression, and hypertension. Surgically, in addition to the " aforementioned cervical operation, she has undergone a cholecystectomy and hysterectomy. On review of social history, the patient has 3 children (ages ranging from 18-23). 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 She is not  and does have family in town.    Her MRI is most notable for post-surgical changes at C5-6 with good decompression of the spinal canal. At the level of C4-5, there is worsening of a left sided paracentral disc herniation with associated cord flattening ventrally. There are also concerning findings for cord signal change. There is also severe foraminal stenosis at C4-5 on the left side.     Her cervical spinal XR was reviewed which demonstrates post-surgical changes at C5-6. I did not appreciate instability at C4-5. There is loss of the normal cervical lordosis throughout the cervical spine.     In assessment, this patient has radiographic findings of myeloradiculopathy given the radiographic findings of cord signal change and clinical findings concerning for left C5 radiculopathy.     We briefly discussed performing another anterior cervical discectomy with fusion (ACDF) at C4-5 given her symptoms and radiographic findings. Given the central disc herniation, an anterior approach would likely yield the most decompression centrally and at the left neuro-foramen. However, given her smoking history, I stated concern for post-surgical pseudoarthrosis associated with an ACDF. I recommended immediate smoking cessation. Thus, we will order a urine nicotine test at the next visit. On my review, I do not believe she is a candidate for a cervical arthroplasty due to how lateral the foraminal stenosis is at C4-5 on the left.     Should she be unable to stop smoking, we may need to consider an alternative surgery which would be a cervical laminoplasty at C4-5 (open door on left and hinge on right) with a left sided foraminotomy at C4-5 as well. The laminoplasty would  "be performed with the goal of obtaining canal and left sided neuro-foramen decompression.     Additionally, she requires a CT cervical spine to assess for any ongoing ossification of the herniated disc at C4-5.     In the meantime, I recommended a EMG/NCS to assess for ongoing C6 chronic radiculopathy given her symptoms as this may represent chronic issues from her prior C5-6 operation as this would not be anticipated to improve with the two above surgeries. For some symptomatic relief, we also discussed performing a left sided transforaminal epidural steroid injection at C4-5. She did not express interest in this option, but I wrote the referral should she change her mind.     As part of her workup, she will need to see an ENT to assess for vocal cord paralysis because a right sided approach would be ideal to facilitate better left sided decompression and to minimize the risks associated with traversing left sided scar tissue from her prior surgery. If there are signs of vocal cord paralysis, I would recommend a posterior cervical operation.     Finally, I did highlight that she is at increased risk of spinal cord injury should she sustain a fall or whiplash such as with a car accident. She stated understanding of this.     She should follow-up after the above to obtain a formal recommendation from me as to which surgical approach (anterior or posterior, fusion or no fusion) to undergo given her co-morbidities. At that time, I will discuss the risks, alternatives, and benefits of the surgery proposed.     Francisco \"Lorena Mills MD    of Neurosurgery, [facility]  Text pager link: [phone]      Again, thank you for allowing me to participate in the care of your patient.        Sincerely,        Francisco Mills MD  "

## 2024-09-18 NOTE — PROGRESS NOTES
"Dear Dr. Rivera,     Thank you for the opportunity to participate in the care of Anne Ferreira.     As you know, she is a 43 year old right-handed female who presents with acute on chronic shoulder and left arm pain. She describes primarily C5 involvement with some findings of C6 involvement on the left side when askd to describe the location of her pain. She reports pain-limited weakness and denies any ongoing functional limitations. Specifically, she states that she can do \"everything\" but will \"pay for it later\". Her pain impairs her at all times including while sitting and sleeping. She denies any loss of fine motor function specifically when asked about buttoning buttons, zipping zippers, tying shoelaces, and using utensils. For her pain, she has tried Nortriptyline and is currently on narcotics daily.     On further review, she states she had prior cervical surgery. On my review of the prior operative report, Dr. Rubio performed a left sided approach (11/13/2019 ) to gain access to the C5-6 space and placed [employer] instrumentation. It is unclear from the surgical indications section of the report whether the patient had right or left radiculopathy. The patient ultimately states she had good relief of her pain after Dr. Rubio's sugery until about 2022 when her left sided issues described above began to manifest.     For this, the patient underwent a left sided C7-T1 injection with Dr. Zuniga. She denies any relief of pain from this procedure. The patient states she has tried physical therapy without relief of pain in the past.     Medical history is notable for ongoing tobacco use (0.5 packs per day), depression, and hypertension. Surgically, in addition to the aforementioned cervical operation, she has undergone a cholecystectomy and hysterectomy. On review of social history, the patient has 3 children (ages ranging from 18-23). 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 " She is not  and does have family in town.    Her MRI is most notable for post-surgical changes at C5-6 with good decompression of the spinal canal. At the level of C4-5, there is worsening of a left sided paracentral disc herniation with associated cord flattening ventrally. There are also concerning findings for cord signal change. There is also severe foraminal stenosis at C4-5 on the left side.     Her cervical spinal XR was reviewed which demonstrates post-surgical changes at C5-6. I did not appreciate instability at C4-5. There is loss of the normal cervical lordosis throughout the cervical spine.     In assessment, this patient has radiographic findings of myeloradiculopathy given the radiographic findings of cord signal change and clinical findings concerning for left C5 radiculopathy.     We briefly discussed performing another anterior cervical discectomy with fusion (ACDF) at C4-5 given her symptoms and radiographic findings. Given the central disc herniation, an anterior approach would likely yield the most decompression centrally and at the left neuro-foramen. However, given her smoking history, I stated concern for post-surgical pseudoarthrosis associated with an ACDF. I recommended immediate smoking cessation. Thus, we will order a urine nicotine test at the next visit. On my review, I do not believe she is a candidate for a cervical arthroplasty due to how lateral the foraminal stenosis is at C4-5 on the left.     Should she be unable to stop smoking, we may need to consider an alternative surgery which would be a cervical laminoplasty at C4-5 (open door on left and hinge on right) with a left sided foraminotomy at C4-5 as well. The laminoplasty would be performed with the goal of obtaining canal and left sided neuro-foramen decompression.     Additionally, she requires a CT cervical spine to assess for any ongoing ossification of the herniated disc at C4-5.     In the meantime, I recommended a  "EMG/NCS to assess for ongoing C6 chronic radiculopathy given her symptoms as this may represent chronic issues from her prior C5-6 operation as this would not be anticipated to improve with the two above surgeries. For some symptomatic relief, we also discussed performing a left sided transforaminal epidural steroid injection at C4-5. She did not express interest in this option, but I wrote the referral should she change her mind.     As part of her workup, she will need to see an ENT to assess for vocal cord paralysis because a right sided approach would be ideal to facilitate better left sided decompression and to minimize the risks associated with traversing left sided scar tissue from her prior surgery. If there are signs of vocal cord paralysis, I would recommend a posterior cervical operation.     Finally, I did highlight that she is at increased risk of spinal cord injury should she sustain a fall or whiplash such as with a car accident. She stated understanding of this.     She should follow-up after the above to obtain a formal recommendation from me as to which surgical approach (anterior or posterior, fusion or no fusion) to undergo given her co-morbidities. At that time, I will discuss the risks, alternatives, and benefits of the surgery proposed.     Francisco \"Jassi\" MD Lina    of Neurosurgery, [facility] [place]  Text pager link: [phone]    "

## 2024-09-19 NOTE — TELEPHONE ENCOUNTER
Pls see [employer] message below.     Referral pended, message routed to Dr. Margie Rivera for consideration.     Julie Behrendt RN

## 2024-09-20 ENCOUNTER — PATIENT OUTREACH (OUTPATIENT)
Dept: CARE COORDINATION | Facility: CLINIC | Age: 44
End: 2024-09-20
Payer: COMMERCIAL

## 2024-09-23 ENCOUNTER — PATIENT OUTREACH (OUTPATIENT)
Dept: CARE COORDINATION | Facility: CLINIC | Age: 44
End: 2024-09-23
Payer: COMMERCIAL

## 2024-09-27 ENCOUNTER — MYC MEDICAL ADVICE (OUTPATIENT)
Dept: FAMILY MEDICINE | Facility: CLINIC | Age: 44
End: 2024-09-27
Payer: COMMERCIAL

## 2024-09-27 DIAGNOSIS — G89.4 CHRONIC PAIN SYNDROME: ICD-10-CM

## 2024-09-27 DIAGNOSIS — M48.02 CERVICAL SPINAL STENOSIS: ICD-10-CM

## 2024-09-27 DIAGNOSIS — M54.12 CERVICAL RADICULOPATHY: ICD-10-CM

## 2024-09-27 RX ORDER — HYDROCODONE BITARTRATE AND ACETAMINOPHEN 5; 325 MG/1; MG/1
1 TABLET ORAL EVERY 8 HOURS PRN
Qty: 84 TABLET | Refills: 0 | Status: SHIPPED | OUTPATIENT
Start: 2024-09-27

## 2024-09-27 NOTE — TELEPHONE ENCOUNTER
"OV note 9/3/24: \"Chronic pain syndrome  - Drug Confirmation Panel Urine with Creat - lab collect  - MR Cervical Spine w/o & w Contrast  - nortriptyline (PAMELOR) 10 MG capsule  Dispense: 90 capsule; Refill: 3  - HYDROcodone-acetaminophen (NORCO) 5-325 MG tablet  Dispense: 90 tablet; Refill: 0  - Drug Confirmation Panel Urine with Creat - lab collect     Cervical radiculopathy  - Drug Confirmation Panel Urine with Creat - lab collect  - MR Cervical Spine w/o & w Contrast  - nortriptyline (PAMELOR) 10 MG capsule  Dispense: 90 capsule; Refill: 3  - HYDROcodone-acetaminophen (NORCO) 5-325 MG tablet  Dispense: 90 tablet; Refill: 0  - Spine  Referral  - Drug Confirmation Panel Urine with Creat - lab collect\"    Notriptyline was started 9/3/24.  Franca Joyce RN on 9/27/2024 at 2:23 PM      "

## 2024-10-16 ENCOUNTER — TELEPHONE (OUTPATIENT)
Dept: NEUROSURGERY | Facility: CLINIC | Age: 44
End: 2024-10-16

## 2024-10-16 NOTE — TELEPHONE ENCOUNTER
Date: October 16, 2024    Provider: Dr. Denise Romero DO     Provider/Other:     Reason for out-going call: REFERRAL/CONSULT      Detailed message: Maxed attempt letter sent out. Attempt 3x to reach patient to call and schedule EMG with Dr. Walker at the [place] [facility].   10/16/24 LM to schedule bw  10/4/24 LM to call back & schedule w. Dr. Denise cobian  9/26/24 LM msg to schedule w/ Dr. Denise cobian

## 2024-10-28 ENCOUNTER — ORDERS ONLY (AUTO-RELEASED) (OUTPATIENT)
Dept: FAMILY MEDICINE | Facility: CLINIC | Age: 44
End: 2024-10-28

## 2024-10-28 ENCOUNTER — OFFICE VISIT (OUTPATIENT)
Dept: FAMILY MEDICINE | Facility: CLINIC | Age: 44
End: 2024-10-28
Payer: COMMERCIAL

## 2024-10-28 VITALS
HEIGHT: 64 IN | DIASTOLIC BLOOD PRESSURE: 80 MMHG | WEIGHT: 197 LBS | OXYGEN SATURATION: 97 % | HEART RATE: 84 BPM | SYSTOLIC BLOOD PRESSURE: 118 MMHG | RESPIRATION RATE: 20 BRPM | BODY MASS INDEX: 33.63 KG/M2 | TEMPERATURE: 97.7 F

## 2024-10-28 DIAGNOSIS — Z12.11 SCREEN FOR COLON CANCER: ICD-10-CM

## 2024-10-28 DIAGNOSIS — Z13.1 SCREENING FOR DIABETES MELLITUS: ICD-10-CM

## 2024-10-28 DIAGNOSIS — I10 BENIGN ESSENTIAL HYPERTENSION: ICD-10-CM

## 2024-10-28 DIAGNOSIS — Z13.220 SCREENING FOR HYPERLIPIDEMIA: ICD-10-CM

## 2024-10-28 DIAGNOSIS — F17.200 NICOTINE DEPENDENCE, UNCOMPLICATED, UNSPECIFIED NICOTINE PRODUCT TYPE: ICD-10-CM

## 2024-10-28 DIAGNOSIS — Z71.6 ENCOUNTER FOR SMOKING CESSATION COUNSELING: ICD-10-CM

## 2024-10-28 DIAGNOSIS — Z01.818 PRE-OP EXAM: Primary | ICD-10-CM

## 2024-10-28 DIAGNOSIS — M48.02 CERVICAL SPINAL STENOSIS: ICD-10-CM

## 2024-10-28 DIAGNOSIS — M19.041 PRIMARY OSTEOARTHRITIS OF RIGHT HAND: ICD-10-CM

## 2024-10-28 DIAGNOSIS — E78.5 HYPERLIPIDEMIA LDL GOAL <100: ICD-10-CM

## 2024-10-28 DIAGNOSIS — M54.12 CERVICAL RADICULOPATHY: ICD-10-CM

## 2024-10-28 DIAGNOSIS — G89.4 CHRONIC PAIN SYNDROME: ICD-10-CM

## 2024-10-28 LAB
ANION GAP SERPL CALCULATED.3IONS-SCNC: 11 MMOL/L (ref 7–15)
BUN SERPL-MCNC: 7.3 MG/DL (ref 6–20)
CALCIUM SERPL-MCNC: 9.7 MG/DL (ref 8.8–10.4)
CHLORIDE SERPL-SCNC: 103 MMOL/L (ref 98–107)
CHOLEST SERPL-MCNC: 215 MG/DL
CREAT SERPL-MCNC: 0.71 MG/DL (ref 0.51–0.95)
EGFRCR SERPLBLD CKD-EPI 2021: >90 ML/MIN/1.73M2
ERYTHROCYTE [DISTWIDTH] IN BLOOD BY AUTOMATED COUNT: 12.9 % (ref 10–15)
EST. AVERAGE GLUCOSE BLD GHB EST-MCNC: 111 MG/DL
FASTING STATUS PATIENT QL REPORTED: NO
FASTING STATUS PATIENT QL REPORTED: NO
GLUCOSE SERPL-MCNC: 120 MG/DL (ref 70–99)
HBA1C MFR BLD: 5.5 % (ref 0–5.6)
HCO3 SERPL-SCNC: 27 MMOL/L (ref 22–29)
HCT VFR BLD AUTO: 42 % (ref 35–47)
HDLC SERPL-MCNC: 35 MG/DL
HGB BLD-MCNC: 13.8 G/DL (ref 11.7–15.7)
LDLC SERPL CALC-MCNC: 120 MG/DL
MCH RBC QN AUTO: 29.9 PG (ref 26.5–33)
MCHC RBC AUTO-ENTMCNC: 32.9 G/DL (ref 31.5–36.5)
MCV RBC AUTO: 91 FL (ref 78–100)
NONHDLC SERPL-MCNC: 180 MG/DL
PLATELET # BLD AUTO: 323 10E3/UL (ref 150–450)
POTASSIUM SERPL-SCNC: 4.8 MMOL/L (ref 3.4–5.3)
RBC # BLD AUTO: 4.62 10E6/UL (ref 3.8–5.2)
SODIUM SERPL-SCNC: 141 MMOL/L (ref 135–145)
TRIGL SERPL-MCNC: 298 MG/DL
WBC # BLD AUTO: 9.9 10E3/UL (ref 4–11)

## 2024-10-28 PROCEDURE — 90471 IMMUNIZATION ADMIN: CPT | Performed by: NURSE PRACTITIONER

## 2024-10-28 PROCEDURE — 36415 COLL VENOUS BLD VENIPUNCTURE: CPT | Performed by: NURSE PRACTITIONER

## 2024-10-28 PROCEDURE — 99214 OFFICE O/P EST MOD 30 MIN: CPT | Mod: 25 | Performed by: NURSE PRACTITIONER

## 2024-10-28 PROCEDURE — 90714 TD VACC NO PRESV 7 YRS+ IM: CPT | Performed by: NURSE PRACTITIONER

## 2024-10-28 PROCEDURE — 85027 COMPLETE CBC AUTOMATED: CPT | Performed by: NURSE PRACTITIONER

## 2024-10-28 PROCEDURE — 80048 BASIC METABOLIC PNL TOTAL CA: CPT | Performed by: NURSE PRACTITIONER

## 2024-10-28 PROCEDURE — 83036 HEMOGLOBIN GLYCOSYLATED A1C: CPT | Performed by: NURSE PRACTITIONER

## 2024-10-28 PROCEDURE — 80061 LIPID PANEL: CPT | Performed by: NURSE PRACTITIONER

## 2024-10-28 RX ORDER — NICOTINE 21 MG/24HR
1 PATCH, TRANSDERMAL 24 HOURS TRANSDERMAL EVERY 24 HOURS
Qty: 30 PATCH | Refills: 0 | Status: SHIPPED | OUTPATIENT
Start: 2024-10-28

## 2024-10-28 RX ORDER — ATORVASTATIN CALCIUM 20 MG/1
20 TABLET, FILM COATED ORAL DAILY
Qty: 90 TABLET | Refills: 3 | Status: SHIPPED | OUTPATIENT
Start: 2024-10-28

## 2024-10-28 RX ORDER — HYDROCODONE BITARTRATE AND ACETAMINOPHEN 5; 325 MG/1; MG/1
1 TABLET ORAL EVERY 8 HOURS PRN
Qty: 84 TABLET | Refills: 0 | Status: SHIPPED | OUTPATIENT
Start: 2024-10-28

## 2024-10-28 ASSESSMENT — PATIENT HEALTH QUESTIONNAIRE - PHQ9
SUM OF ALL RESPONSES TO PHQ QUESTIONS 1-9: 17
10. IF YOU CHECKED OFF ANY PROBLEMS, HOW DIFFICULT HAVE THESE PROBLEMS MADE IT FOR YOU TO DO YOUR WORK, TAKE CARE OF THINGS AT HOME, OR GET ALONG WITH OTHER PEOPLE: VERY DIFFICULT
SUM OF ALL RESPONSES TO PHQ QUESTIONS 1-9: 17

## 2024-10-28 ASSESSMENT — PAIN SCALES - GENERAL: PAINLEVEL_OUTOF10: SEVERE PAIN (6)

## 2024-10-28 NOTE — PROGRESS NOTES
Preoperative Evaluation  [facility]  [address] [place] [id number]  Phone: [phone]  Fax: [phone]  Primary Provider: FRANKLYN Christianson CNP  Pre-op Performing Provider: FRANKLYN Christianson CNP  Oct 28, 2024             10/28/2024   Surgical Information   What procedure is being done? cervical disc fusion    Facility or Hospital where procedure/surgery will be performed: [facility]    Who is doing the procedure / surgery? dr leger    Date of surgery / procedure: november 12 2024    Time of surgery / procedure: 130 pm    Where do you plan to recover after surgery? at home with family        Patient-reported     Fax number for surgical facility:     Assessment & Plan     The proposed surgical procedure is considered LOW risk.    Pre-op exam  - Basic metabolic panel  (Ca, Cl, CO2, Creat, Gluc, K, Na, BUN); Future  - CBC with platelets; Future  - CBC with platelets  - Basic metabolic panel  (Ca, Cl, CO2, Creat, Gluc, K, Na, BUN)    Cervical radiculopathy  - HYDROcodone-acetaminophen (NORCO) 5-325 MG tablet; Take 1 tablet by mouth every 8 hours as needed for severe pain.    Benign essential hypertension   Controlled no change in treatment plan 15 6 point  - BASIC METABOLIC PANEL; Future    Screen for colon cancer  - COLOGUARD([employer]); Future    Encounter for smoking cessation counseling  Will start on patches  - nicotine (NICODERM CQ) 21 MG/24HR 24 hr patch; Place 1 patch over 24 hours onto the skin every 24 hours.    Screening for hyperlipidemia  - Lipid panel reflex to direct LDL Non-fasting; Future  - Lipid panel reflex to direct LDL Non-fasting    Chronic pain syndrome  Norco refilled for 1 month will await interventional refills after her surgery see pain needs patient will schedule appointment.  - HYDROcodone-acetaminophen (NORCO) 5-325 MG tablet; Take 1 tablet by mouth every 8 hours as needed for severe pain.    Cervical spinal stenosis  [place] refilled  for 1 month will await interventional refills after her surgery see pain needs patient will schedule appointment.  - HYDROcodone-acetaminophen (NORCO) 5-325 MG tablet; Take 1 tablet by mouth every 8 hours as needed for severe pain.    Primary osteoarthritis of right hand  Recommend hand therapy  - Occupational Therapy  Referral; Future    Nicotine dependence, uncomplicated, unspecified nicotine product type  - [place] Quit Partner Referral; Future      Depression Screening Follow Up        10/28/2024     8:32 AM   PHQ   PHQ-9 Total Score 17    Q9: Thoughts of better off dead/self-harm past 2 weeks Several days    F/U: Thoughts of suicide or self-harm No    F/U: Safety concerns No        Patient-reported         10/28/2024     8:32 AM   Last PHQ-9   1.  Little interest or pleasure in doing things 2    2.  Feeling down, depressed, or hopeless 2    3.  Trouble falling or staying asleep, or sleeping too much 2    4.  Feeling tired or having little energy 3    5.  Poor appetite or overeating 2    6.  Feeling bad about yourself 3    7.  Trouble concentrating 2    8.  Moving slowly or restless 0    Q9: Thoughts of better off dead/self-harm past 2 weeks 1    PHQ-9 Total Score 17    In the past two weeks have you had thoughts of suicide or self harm? No    Do you have concerns about your personal safety or the safety of others? No        Patient-reported                     Follow Up Actions Taken  Crisis resource information provided in the After Visit Summary    Discussed the following ways the patient can remain in a safe environment:  remove alcohol, remove drugs, and be around others  Possible Sleep Apnea: no        No data to display                    - No identified additional risk factors other than previously addressed    Antiplatelet or Anticoagulation Medication Instructions   - Patient is on no antiplatelet or anticoagulation medications.    Additional Medication Instructions   - ibuprofen (Advil, Motrin):  DO NOT TAKE 1 day before surgery.     Recommendation  Approval given to proceed with proposed procedure, without further diagnostic evaluation.    Justin Rodríguez is a 44 year old, presenting for the following:  Pre-Op Exam          10/28/2024     8:44 AM   Additional Questions   Roomed by Terri GRIGGS related to upcoming procedure:         10/28/2024   Pre-Op Questionnaire   Have you ever had a heart attack or stroke? No    Have you ever had surgery on your heart or blood vessels, such as a stent placement, a coronary artery bypass, or surgery on an artery in your head, neck, heart, or legs? No    Do you have chest pain with activity? No    Do you have a history of heart failure? No    Do you currently have a cold, bronchitis or symptoms of other infection? No    Do you have a cough, shortness of breath, or wheezing? (!) YES  at base line from smoking    Do you or anyone in your family have previous history of blood clots? (!) YES dad had clots    Do you or does anyone in your family have a serious bleeding problem such as prolonged bleeding following surgeries or cuts? No    Have you ever had problems with anemia or been told to take iron pills? (!) YES younger    Have you had any abnormal blood loss such as black, tarry or bloody stools, or abnormal vaginal bleeding? No    Have you ever had a blood transfusion? No    Are you willing to have a blood transfusion if it is medically needed before, during, or after your surgery? Yes    Have you or any of your relatives ever had problems with anesthesia? No    Do you have sleep apnea, excessive snoring or daytime drowsiness? (!) YES not been tested   Do you have a CPAP machine? (!) NO    Do you have any artifical heart valves or other implanted medical devices like a pacemaker, defibrillator, or continuous glucose monitor? No    Do you have artificial joints? No    Are you allergic to latex? No        Patient-reported     Health Care Directive  Patient does not have  a Health Care Directive: Discussed advance care planning with patient; however, patient declined at this time.    Preoperative Review of    reviewed - controlled substances reflected in medication list.      Status of Chronic Conditions:  See problem list for active medical problems.  Problems all longstanding and stable, except as noted/documented.  See ROS for pertinent symptoms related to these conditions.    Patient Active Problem List    Diagnosis Date Noted    F11.2 - Continuous opioid dependence (H) 05/18/2023     Priority: Medium    S/P cervical spinal fusion 03/20/2023     Priority: Medium     Added automatically from request for surgery [id number]      COPD (chronic obstructive pulmonary disease) (H) 07/27/2021     Priority: Medium    Moderate episode of recurrent major depressive disorder (H) 05/21/2021     Priority: Medium    Symptomatic cholelithiasis 12/15/2020     Priority: Medium     Added automatically from request for surgery [id number]      Morbid obesity (H) 12/11/2020     Priority: Medium    Dyslipidemia 11/10/2020     Priority: Medium    Dysmenorrhea 11/10/2020     Priority: Medium     treated with continuous OCPs      GERD (gastroesophageal reflux disease) 11/10/2020     Priority: Medium    Thoracic back pain 11/10/2020     Priority: Medium     left, approx T10      H/O LEEP      Priority: Medium     1998 LEEP- Unknown results.  2010 NIL pap.  2/2/12 COLP and ECC- DARREN 1.  2013 NIL pap  4/7/15 LSIL pap, + HR HPV   8/13/15 COLP- DARREN 1, ECC- Negative.  6/10/16 NIL pap, Neg HPV.  5/14/19 NIL pap, Neg HPV.  Above results found in [facility]  10/15/20 NIL pap, Neg HPV. Plan cotest in 3 years.       Cervical spinal stenosis 07/01/2019     Priority: Medium     7/2019 MRI - At C5-6, broad-based central disc extrusion demonstrating caudal migration mildly deforms the cord and severely narrows the spinal canal. Uncovertebral joint spurring contributes to mild to moderate left neural foraminal narrowing with  potential left C6 nerve root encroachment.      Herniation of cervical intervertebral disc with radiculopathy 07/01/2019     Priority: Medium     see MRI 7/2019      Attention deficit hyperactivity disorder, combined type, mild 08/29/2018     Priority: Medium    Generalized anxiety disorder 08/29/2018     Priority: Medium    Allergic rhinitis 04/01/2008     Priority: Medium      Past Medical History:   Diagnosis Date    Abnormal Pap smear of cervix      Past Surgical History:   Procedure Laterality Date    CYSTOSCOPY N/A 4/25/2022    Procedure: CYSTOSCOPY;  Surgeon: Julius Montejo MD;  Location: [facility] OR    INJECT EPIDURAL CERVICAL  3/30/2023    Procedure: INJECTION, SPINE, CERVICAL, EPIDURAL;  Surgeon: Naresh Zuniga MD;  Location: [facility] OR    LAPAROSCOPIC CHOLECYSTECTOMY N/A 12/21/2020    Procedure: Laparoscopic cholecystectomy;  Surgeon: Manuel Durham DO;  Location: [facility] OR    LAPAROSCOPIC HYSTERECTOMY TOTAL, BILATERAL SALPINGO-OOPHORECTOMY, COMBINED Bilateral 4/25/2022    Procedure: Laparoscopic total hysterectomy, Bilateral salpingectomy, with sparing of ovaries;  Surgeon: Julius Montejo MD;  Location: [facility] OR     Current Outpatient Medications   Medication Sig Dispense Refill    acetaminophen (TYLENOL) 325 MG tablet Take 3 tablets (975 mg) by mouth every 6 hours as needed for mild pain 50 tablet 0    albuterol (PROAIR HFA/PROVENTIL HFA/VENTOLIN HFA) 108 (90 Base) MCG/ACT inhaler Inhale 2 puffs every 4-6 hours as needed for cough, wheezing, or shortness of breath 18 g 0    albuterol (PROVENTIL) (2.5 MG/3ML) 0.083% neb solution Take 1 vial (2.5 mg) by nebulization every 6 hours as needed for wheezing, cough or shortness of breath 90 mL 0    amLODIPine (NORVASC) 2.5 MG tablet Take 1 tablet (2.5 mg) by mouth daily 90 tablet 3    cyclobenzaprine (FLEXERIL) 10 MG tablet Take 1 tablet (10 mg) by mouth 2 times daily as needed for muscle spasms. 60 tablet 0    diphenhydrAMINE (BENADRYL) 25 MG tablet  Take 1-2 tablets (25-50 mg) by mouth every 6 hours as needed for itching or allergies 30 tablet 1    escitalopram (LEXAPRO) 20 MG tablet Take 1 tablet (20 mg) by mouth daily 90 tablet 0    HYDROcodone-acetaminophen (NORCO) 5-325 MG tablet Take 1 tablet by mouth every 8 hours as needed for severe pain. 84 tablet 0    hydrOXYzine (ATARAX) 25 MG tablet Take 1 tablet (25 mg) by mouth every 6 hours as needed for itching TAKE 1 TO 2 TABLETS BY MOUTH EVERY 6 HOURS AS NEEDED FOR ANXIETY Strength: 25 mg 60 tablet 0    ibuprofen (ADVIL/MOTRIN) 200 MG tablet Take 4 tablets (800 mg) by mouth every 6 hours as needed for other (mild and/or inflammatory pain)      ipratropium - albuterol 0.5 mg/2.5 mg/3 mL (DUONEB) 0.5-2.5 (3) MG/3ML neb solution Take 1 vial (3 mLs) by nebulization every 6 hours as needed for shortness of breath / dyspnea or wheezing 90 mL 0    lisinopril (ZESTRIL) 40 MG tablet TAKE ONE TABLET BY MOUTH ONCE DAILY 90 tablet 2    montelukast (SINGULAIR) 10 MG tablet TAKE ONE TABLET BY MOUTH AT BEDTIME 90 tablet 3    nortriptyline (PAMELOR) 10 MG capsule Take 1 capsule (10 mg) by mouth at bedtime. 90 capsule 3    nystatin (MYCOSTATIN) 866843 UNIT/GM external cream Apply topically 2 times daily 30 g 0    omeprazole (PRILOSEC) 20 MG DR capsule Take 20 mg by mouth as needed PRN      propranolol (INDERAL) 80 MG tablet Take 1 tablet (80 mg) by mouth daily 90 tablet 3    tamsulosin (FLOMAX) 0.4 MG capsule Take 1 capsule (0.4 mg) by mouth daily 30 capsule 0       Allergies   Allergen Reactions    Bupropion Nausea    Sulfa Antibiotics         Social History     Tobacco Use    Smoking status: Every Day     Current packs/day: 0.50     Types: Cigarettes    Smokeless tobacco: Never   Substance Use Topics    Alcohol use: Yes     Comment: occassionally     Family History   Problem Relation Age of Onset    Hypertension Father     Hypertension Brother     Hypertension Sister     Hypertension Brother      History   Drug Use Unknown  "            Review of Systems  Constitutional, HEENT, cardiovascular, pulmonary, gi and gu systems are negative, except as otherwise noted.    Objective    /80 (BP Location: Right arm)   Pulse 84   Temp 97.7  F (36.5  C) (Tympanic)   Resp 20   Ht 1.626 m (5' 4\")   Wt 89.4 kg (197 lb)   LMP 03/02/2022 (LMP Unknown)   SpO2 97%   BMI 33.81 kg/m     Estimated body mass index is 33.81 kg/m  as calculated from the following:    Height as of this encounter: 1.626 m (5' 4\").    Weight as of this encounter: 89.4 kg (197 lb).  Physical Exam  GENERAL: alert and no distress  EYES: Eyes grossly normal to inspection, PERRL and conjunctivae and sclerae normal  HENT: ear canals and TM's normal, nose and mouth without ulcers or lesions  NECK: no adenopathy, no asymmetry, masses, or scars  RESP: lungs clear to auscultation - no rales, rhonchi or wheezes  CV: regular rate and rhythm, normal S1 S2, no S3 or S4, no murmur, click or rub, no peripheral edema  ABDOMEN: soft, nontender, no hepatosplenomegaly, no masses and bowel sounds normal  MS: no gross musculoskeletal defects noted, no edema  SKIN: no suspicious lesions or rashes  NEURO: Normal strength and tone, mentation intact and speech normal  PSYCH: mentation appears normal, affect normal/bright    No results for input(s): \"HGB\", \"PLT\", \"INR\", \"NA\", \"POTASSIUM\", \"CR\", \"A1C\" in the last 8760 hours.     Diagnostics  Recent Results (from the past 48 hours)   CBC with platelets    Collection Time: 10/28/24  9:37 AM   Result Value Ref Range    WBC Count 9.9 4.0 - 11.0 10e3/uL    RBC Count 4.62 3.80 - 5.20 10e6/uL    Hemoglobin 13.8 11.7 - 15.7 g/dL    Hematocrit 42.0 35.0 - 47.0 %    MCV 91 78 - 100 fL    MCH 29.9 26.5 - 33.0 pg    MCHC 32.9 31.5 - 36.5 g/dL    RDW 12.9 10.0 - 15.0 %    Platelet Count 323 150 - 450 10e3/uL   Basic metabolic panel  (Ca, Cl, CO2, Creat, Gluc, K, Na, BUN)    Collection Time: 10/28/24  9:37 AM   Result Value Ref Range    Sodium 141 135 - 145 " mmol/L    Potassium 4.8 3.4 - 5.3 mmol/L    Chloride 103 98 - 107 mmol/L    Carbon Dioxide (CO2) 27 22 - 29 mmol/L    Anion Gap 11 7 - 15 mmol/L    Urea Nitrogen 7.3 6.0 - 20.0 mg/dL    Creatinine 0.71 0.51 - 0.95 mg/dL    GFR Estimate >90 >60 mL/min/1.73m2    Calcium 9.7 8.8 - 10.4 mg/dL    Glucose 120 (H) 70 - 99 mg/dL    Patient Fasting > 8hrs? No    Lipid panel reflex to direct LDL Non-fasting    Collection Time: 10/28/24  9:37 AM   Result Value Ref Range    Cholesterol 215 (H) <200 mg/dL    Triglycerides 298 (H) <150 mg/dL    Direct Measure HDL 35 (L) >=50 mg/dL    LDL Cholesterol Calculated 120 (H) <100 mg/dL    Non HDL Cholesterol 180 (H) <130 mg/dL    Patient Fasting > 8hrs? No       No EKG required, no history of coronary heart disease, significant arrhythmia, peripheral arterial disease or other structural heart disease.    Revised Cardiac Risk Index (RCRI)  The patient has the following serious cardiovascular risks for perioperative complications:   - No serious cardiac risks = 0 points     RCRI Interpretation: 0 points: Class I (very low risk - 0.4% complication rate)         Signed Electronically by: FRANKLYN Christianson CNP  A copy of this evaluation report is provided to the requesting physician.         Answers submitted by the patient for this visit:  Patient Health Questionnaire (Submitted on 10/28/2024)  If you checked off any problems, how difficult have these problems made it for you to do your work, take care of things at home, or get along with other people?: Very difficult  PHQ9 TOTAL SCORE: 17

## 2024-11-06 ENCOUNTER — ANCILLARY PROCEDURE (OUTPATIENT)
Dept: MAMMOGRAPHY | Facility: CLINIC | Age: 44
End: 2024-11-06
Payer: COMMERCIAL

## 2024-11-06 DIAGNOSIS — Z12.31 VISIT FOR SCREENING MAMMOGRAM: ICD-10-CM

## 2024-11-06 PROCEDURE — 77063 BREAST TOMOSYNTHESIS BI: CPT | Mod: TC | Performed by: RADIOLOGY

## 2024-11-06 PROCEDURE — 77067 SCR MAMMO BI INCL CAD: CPT | Mod: TC | Performed by: RADIOLOGY

## 2024-11-14 DIAGNOSIS — F41.1 GAD (GENERALIZED ANXIETY DISORDER): ICD-10-CM

## 2024-11-16 RX ORDER — ESCITALOPRAM OXALATE 20 MG/1
20 TABLET ORAL DAILY
Qty: 90 TABLET | Refills: 2 | Status: SHIPPED | OUTPATIENT
Start: 2024-11-16

## 2024-11-19 ENCOUNTER — OFFICE VISIT (OUTPATIENT)
Dept: FAMILY MEDICINE | Facility: CLINIC | Age: 44
End: 2024-11-19
Payer: COMMERCIAL

## 2024-11-19 VITALS
OXYGEN SATURATION: 98 % | BODY MASS INDEX: 33.3 KG/M2 | HEART RATE: 74 BPM | DIASTOLIC BLOOD PRESSURE: 83 MMHG | RESPIRATION RATE: 20 BRPM | WEIGHT: 194 LBS | SYSTOLIC BLOOD PRESSURE: 132 MMHG | TEMPERATURE: 97.5 F

## 2024-11-19 DIAGNOSIS — M54.12 CERVICAL RADICULOPATHY: Primary | ICD-10-CM

## 2024-11-19 DIAGNOSIS — F41.1 GAD (GENERALIZED ANXIETY DISORDER): ICD-10-CM

## 2024-11-19 PROCEDURE — 99214 OFFICE O/P EST MOD 30 MIN: CPT | Performed by: NURSE PRACTITIONER

## 2024-11-19 RX ORDER — ONDANSETRON 4 MG/1
4 TABLET, ORALLY DISINTEGRATING ORAL
COMMUNITY
Start: 2024-11-13 | End: 2024-11-20

## 2024-11-19 RX ORDER — OXYCODONE HYDROCHLORIDE 5 MG/1
5 TABLET ORAL
Qty: 14 TABLET | Refills: 0 | Status: SHIPPED | OUTPATIENT
Start: 2024-11-19

## 2024-11-19 RX ORDER — ESCITALOPRAM OXALATE 10 MG/1
10 TABLET ORAL DAILY
Qty: 90 TABLET | Refills: 3 | Status: SHIPPED | OUTPATIENT
Start: 2024-11-19

## 2024-11-19 RX ORDER — HYDROXYZINE PAMOATE 25 MG/1
CAPSULE ORAL
COMMUNITY
Start: 2024-11-13

## 2024-11-19 RX ORDER — METHOCARBAMOL 750 MG/1
750 TABLET, FILM COATED ORAL
COMMUNITY
Start: 2024-11-13 | End: 2024-11-27

## 2024-11-19 ASSESSMENT — PAIN SCALES - GENERAL: PAINLEVEL_OUTOF10: SEVERE PAIN (7)

## 2024-11-19 NOTE — PROGRESS NOTES
"  Assessment & Plan     Cervical radiculopathy  Patient was given oxy post surgery has used those states that she is lost her Norco last refill of her [place] for chronic pain was 1028 unable to fill until 1128.  Will see if we can bridge with oxycodone at nighttime for her pain if not in controlling her pain should follow-up with surgeon if having more postsurgical pain.  - oxyCODONE (ROXICODONE) 5 MG tablet; Take 1 tablet (5 mg) by mouth nightly as needed for pain.    MILES (generalized anxiety disorder)  Uncontrolled will increase Lexapro to 30 mg and also recommend a psych evaluation as we have not had 1 of those done prior  - escitalopram (LEXAPRO) 10 MG tablet; Take 1 tablet (10 mg) by mouth daily. Take with the 20 mg for a total of 30 mg  - Adult Mental Health  Referral; Future        MED REC REQUIRED  Post Medication Reconciliation Status:  Discharge medications reconciled, continue medications without change  Nicotine/Tobacco Cessation  She reports that she has been smoking cigarettes. She has never used smokeless tobacco.  Nicotine/Tobacco Cessation Plan  Information offered: Patient not interested at this time      BMI  Estimated body mass index is 33.3 kg/m  as calculated from the following:    Height as of 10/28/24: 1.626 m (5' 4\").    Weight as of this encounter: 88 kg (194 lb).   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  No  Do you have any other stressors you would like to discuss with your provider? No    Problems taking medications regularly:  None  Medication changes since discharge: None  Problems adhering to non-medication therapy:  None    Summary of hospitalization:  [facility] discharge summary reviewed  Diagnostic Tests/Treatments reviewed.  Follow up needed: none  Other Healthcare Providers Involved in Patient s Care:         None  Update since discharge: improved.         Plan of care communicated with patient               Review of Systems  Constitutional, HEENT, cardiovascular, pulmonary, gi and gu systems are negative, except as otherwise noted.      Objective    /83 (BP Location: Right arm)   Pulse 74   Temp 97.5  F (36.4  C) (Tympanic)   Resp 20   Wt 88 kg (194 lb)   LMP 03/02/2022 (LMP Unknown)   SpO2 98%   BMI 33.30 kg/m    Body mass index is 33.3 kg/m .  Physical Exam   GENERAL: alert and no distress  NECK: no adenopathy, no asymmetry, masses, neck brace in place   RESP: lungs clear to auscultation - no rales, rhonchi or wheezes  CV: regular rate and rhythm, normal S1 S2, no S3 or S4, no murmur, click or rub, no peripheral edema  ABDOMEN: soft, nontender, no hepatosplenomegaly, no masses and bowel sounds normal  MS: no gross musculoskeletal defects noted, no edema    No results found for any visits on 11/19/24.        Signed Electronically by: FRANKLYN Christianson CNP

## 2024-12-02 ENCOUNTER — MYC MEDICAL ADVICE (OUTPATIENT)
Dept: FAMILY MEDICINE | Facility: CLINIC | Age: 44
End: 2024-12-02
Payer: COMMERCIAL

## 2024-12-02 DIAGNOSIS — M54.12 CERVICAL RADICULOPATHY: ICD-10-CM

## 2024-12-02 DIAGNOSIS — M48.02 CERVICAL SPINAL STENOSIS: ICD-10-CM

## 2024-12-02 DIAGNOSIS — G89.4 CHRONIC PAIN SYNDROME: ICD-10-CM

## 2024-12-02 RX ORDER — HYDROCODONE BITARTRATE AND ACETAMINOPHEN 5; 325 MG/1; MG/1
1 TABLET ORAL EVERY 8 HOURS PRN
Qty: 84 TABLET | Refills: 0 | Status: CANCELLED | OUTPATIENT
Start: 2024-12-02

## 2024-12-12 ENCOUNTER — OFFICE VISIT (OUTPATIENT)
Dept: BEHAVIORAL HEALTH | Facility: CLINIC | Age: 44
End: 2024-12-12
Payer: COMMERCIAL

## 2024-12-12 ENCOUNTER — PATIENT OUTREACH (OUTPATIENT)
Dept: CARE COORDINATION | Facility: CLINIC | Age: 44
End: 2024-12-12
Payer: COMMERCIAL

## 2024-12-12 DIAGNOSIS — Z71.89 OTHER SPECIFIED COUNSELING: Primary | Chronic | ICD-10-CM

## 2024-12-12 DIAGNOSIS — F33.1 MODERATE EPISODE OF RECURRENT MAJOR DEPRESSIVE DISORDER (H): Primary | ICD-10-CM

## 2024-12-12 ASSESSMENT — ANXIETY QUESTIONNAIRES
8. IF YOU CHECKED OFF ANY PROBLEMS, HOW DIFFICULT HAVE THESE MADE IT FOR YOU TO DO YOUR WORK, TAKE CARE OF THINGS AT HOME, OR GET ALONG WITH OTHER PEOPLE?: EXTREMELY DIFFICULT
GAD7 TOTAL SCORE: 13
GAD7 TOTAL SCORE: 13
7. FEELING AFRAID AS IF SOMETHING AWFUL MIGHT HAPPEN: SEVERAL DAYS
1. FEELING NERVOUS, ANXIOUS, OR ON EDGE: MORE THAN HALF THE DAYS
IF YOU CHECKED OFF ANY PROBLEMS ON THIS QUESTIONNAIRE, HOW DIFFICULT HAVE THESE PROBLEMS MADE IT FOR YOU TO DO YOUR WORK, TAKE CARE OF THINGS AT HOME, OR GET ALONG WITH OTHER PEOPLE: EXTREMELY DIFFICULT
3. WORRYING TOO MUCH ABOUT DIFFERENT THINGS: SEVERAL DAYS
GAD7 TOTAL SCORE: 13
2. NOT BEING ABLE TO STOP OR CONTROL WORRYING: NEARLY EVERY DAY
5. BEING SO RESTLESS THAT IT IS HARD TO SIT STILL: SEVERAL DAYS
4. TROUBLE RELAXING: MORE THAN HALF THE DAYS
7. FEELING AFRAID AS IF SOMETHING AWFUL MIGHT HAPPEN: SEVERAL DAYS
6. BECOMING EASILY ANNOYED OR IRRITABLE: NEARLY EVERY DAY

## 2024-12-12 ASSESSMENT — PATIENT HEALTH QUESTIONNAIRE - PHQ9
SUM OF ALL RESPONSES TO PHQ QUESTIONS 1-9: 22
10. IF YOU CHECKED OFF ANY PROBLEMS, HOW DIFFICULT HAVE THESE PROBLEMS MADE IT FOR YOU TO DO YOUR WORK, TAKE CARE OF THINGS AT HOME, OR GET ALONG WITH OTHER PEOPLE: EXTREMELY DIFFICULT
SUM OF ALL RESPONSES TO PHQ QUESTIONS 1-9: 22

## 2024-12-12 ASSESSMENT — COLUMBIA-SUICIDE SEVERITY RATING SCALE - C-SSRS
2. HAVE YOU ACTUALLY HAD ANY THOUGHTS OF KILLING YOURSELF?: NO
1. SINCE LAST CONTACT, HAVE YOU WISHED YOU WERE DEAD OR WISHED YOU COULD GO TO SLEEP AND NOT WAKE UP?: YES

## 2024-12-12 NOTE — TELEPHONE ENCOUNTER
Frw Update:    12/12/24- Outreach attempted x 1 was unable to reach. Left message on voicemail with call back information and requested return call.  Plan: Current outreach date reflects FRW 's follow up within one week.

## 2024-12-12 NOTE — PROGRESS NOTES
[facility] Primary Care: Integrated Behavioral Health    Integrated Behavioral Health   Mental Health & Addiction Services      Progress Note - Initial [facility] Visit     Patient Name: Anne Medinaatz    Date: December 12, 2024  Service Type: Individual   Visit Start Time:  8:30am  Visit End Time:   9:00am    Attendees: Patient   Service Modality: In-person     [facility] Visit Activities (Refresh list every visit): NEW and [facility] Only       DATA:     Interactive Complexity: No   Crisis: No     Assessments completed prior to this visit:     The following assessments were completed by patient for this visit:  PHQ9:       10/17/2023     1:48 PM 1/15/2024     1:35 PM 5/6/2024     1:47 PM 8/6/2024     4:51 PM 9/3/2024     1:52 PM 10/28/2024     8:32 AM 12/12/2024     7:51 AM   PHQ-9 SCORE   PHQ-9 Total Score MyChart   8 (Mild depression) 21 (Severe depression) 12 (Moderate depression) 17 (Moderately severe depression) 22 (Severe depression)   PHQ-9 Total Score 14 8 8 21 12 17  22        Patient-reported     GAD7:       6/19/2023     2:16 PM 6/29/2023     9:19 AM 7/11/2023     9:12 AM 1/15/2024     1:35 PM 5/6/2024     1:53 PM 9/3/2024     1:53 PM 12/12/2024     8:08 AM   MILES-7 SCORE   Total Score 13 (moderate anxiety)  10 (moderate anxiety)  5 (mild anxiety) 14 (moderate anxiety) 13 (moderate anxiety)   Total Score 13 14 10 2 5 14 13        Patient-reported     PROMIS 10-Global Health (only subscores and total score):       5/17/2022     2:00 PM 10/10/2022     2:45 PM 12/12/2024     8:10 AM   PROMIS-10 Scores Only   Global Mental Health Score 9 9 7    Global Physical Health Score 9 12 9    PROMIS TOTAL - SUBSCORES 18 21 16        Patient-reported     [place] Suicide Severity Rating Scale (Lifetime/Recent)      9/2/2024     1:07 PM   [place] Suicide Severity Rating (Lifetime/Recent)   Q1 Wished to be Dead (Past Month) 0-->no   Q2 Suicidal Thoughts (Past Month) 0-->no   Q6 Suicide Behavior (Lifetime) 0-->no    Level of Risk per Screen no risks indicated     [place] Suicide Severity Rating Scale (Short Version)      9/9/2023     9:23 AM 9/22/2023     9:22 AM 10/11/2023     1:24 AM 10/11/2023     1:25 AM 10/11/2023     1:01 PM 9/2/2024     1:07 PM 12/12/2024     9:30 AM   [place] Suicide Severity Rating (Short Version)   Over the past 2 weeks have you felt down, depressed, or hopeless? no yes no no no     Over the past 2 weeks have you had thoughts of killing yourself? no no no no no     Have you ever attempted to kill yourself? no no  no no     Q1 Wished to be Dead (Past Month)      0-->no    Q2 Suicidal Thoughts (Past Month)      0-->no    Q6 Suicide Behavior (Lifetime)      0-->no    Level of Risk per Screen      no risks indicated    1. Wish to be Dead (Since Last Contact)       Y   Wish to be Dead Description (Since Last Contact)       She stated she didn't want to be in pain anymore but no plan or intent.   2. Non-Specific Active Suicidal Thoughts (Since Last Contact)       N   Calculated C-SSRS Risk Score (Since Last Contact)       Low Risk      Referral:   Patient was referred to [facility] by primary care provider.    Reason for referral: determine behavioral health treatment options and assess client readiness and motivation to change.      [facility] introduced self and role. Discussed informed consent and limits to confidentiality.     Presenting Concerns/ Current Stressors:   Pt stated that she thought she was here to discuss medications.  Was able to be redirected and open to talk to writer about ongoing depression since her surgery.  Review and provided education on coping skills.  Appt kept short due to pt not feeling well.       Therapeutic Interventions:  Motivational Interviewing (MI): Validated patient's thoughts, feelings and experience. Expressed respect for patient's autonomy in decision making.  Asked open-ended questions to invite patient's self-reflection and self-direction around change and what is  important for them in working towards their goals.  Expressed and demonstrated empathy through reflective listening.  Affirmed patient's strengths and abilities.    Response to treatment interventions:   Patient was receptive to interventions utilized.  Patient was engaged in the therapy process.      Safety Issues and Plan for Safety and Risk Management:     Patient denies a history of suicidal ideation, suicide attempts, self-injurious behavior, homicidal ideation, homicidal behavior, and and other safety concerns   Patient denies current fears or concerns for personal safety.   Patient reports the following current or recent suicidal ideation or behaviors: was sick of being in pain but no plan or intent to act on thoughts.   Patient denies current or recent homicidal ideation or behaviors.   Patient denies current or recent self injurious behavior or ideation.   Patient denies other safety concerns.   Recommended that patient call 911 or go to the local ED should there be a change in any of these risk factors   Patient reports there are no firearms in the house.       ASSESSMENT:   Mental Status:     Appearance:   Appropriate    Eye Contact:   Fair    Psychomotor Behavior: Restless    Attitude:   Guarded    Orientation:   All   Speech Rate / Production: Pressured    Volume:   Normal    Mood:    Anxious  Depressed    Affect:    Appropriate    Thought Content:  Clear    Thought Form:  Coherent    Insight:    Fair         Diagnostic Criteria:   Major Depressive Disorder  A) Recurrent episode(s) - symptoms have been present during the same 2-week period and represent a change from previous functioning 5 or more symptoms (required for diagnosis)   - Depressed mood. Note: In children and adolescents, can be irritable mood.     - Diminished interest or pleasure in all, or almost all, activities.    - Increased sleep.    - Psychomotor activity agitation.    - Fatigue or loss of energy.    - Feelings of worthlessness or  inappropriate and excessive guilt.    - Diminished ability to think or concentrate, or indecisiveness.   B) The symptoms cause clinically significant distress or impairment in social, occupational, or other important areas of functioning  C) The episode is not attributable to the physiological effects of a substance or to another medical condition  D) The occurence of major depressive episode is not better explained by other thought / psychotic disorders  E) There has never been a manic episode or hypomanic episode        DSM5 Diagnoses: (Sustained by DSM5 Criteria Listed Above)     Diagnoses: 296.32 (F33.1) Major Depressive Disorder, Recurrent Episode, Moderate With anxious distress     Psychosocial / Contextual Factors: Medical Complexities, Relationship Concerns, Occupational Issues, Interpersonal Concerns, Limited Social Support, Financial Strain, and Parent/child dynamics     Collateral Reports Completed:   Not Applicable        PLAN: (Homework, other):     1. Patient was provided:  recommendation to schedule follow-up with [facility]     2. Provider recommended the following referrals: follow up with PCP, schedule CCPS referral, follow up with care coordination to discuss county assistance.        3. Suicide Risk and Safety Concerns were assessed for Anne Ferreira    Safety Plan:   Patient denied any current/recent/lifetime history of suicidal ideation and/or behaviors. Recommended that patient call 911 or go to the local ED should there be a change in any of these risk factors       Lesvia Madden [facility], [facility]   December 12, 2024   Answers submitted by the patient for this visit:  Patient Health Questionnaire (Submitted on 12/12/2024)  If you checked off any problems, how difficult have these problems made it for you to do your work, take care of things at home, or get along with other people?: Extremely difficult  PHQ9 TOTAL SCORE: 22  Patient Health Questionnaire (G7) (Submitted on 12/12/2024)  MILES 7 TOTAL SCORE:  13

## 2024-12-13 ENCOUNTER — PATIENT OUTREACH (OUTPATIENT)
Dept: CARE COORDINATION | Facility: CLINIC | Age: 44
End: 2024-12-13
Payer: COMMERCIAL

## 2024-12-13 NOTE — PROGRESS NOTES
Clinic Care Coordination Contact  [facility]/Voicemail    Clinical Data: Care Coordinator Outreach    Outreach Documentation Number of Outreach Attempt   12/13/2024  10:21 AM 1       Left message on patient's voicemail with call back information and requested return call.      Plan: Care Coordinator will try to reach patient again in 1-2 business days.    DA Mitchell  [phone]  [facility], [place]

## 2024-12-13 NOTE — LETTER
Dear Anne,    I am a clinic community health worker who works with FRANKLYN Christianson CNP with the [facility]. I have been trying to reach you recently to introduce Clinic Care Coordination. Below is a description of clinic care coordination and how I can further assist you.       The clinic care coordination team is made up of a registered nurse, , financial resource worker and community health worker who understand the health care system. The goal of clinic care coordination is to help you manage your health and improve access to the health care system. Our team works alongside your provider to assist you in determining your health and social needs. We can help you obtain health care and community resources, providing you with necessary information and education. We can work with you through any barriers and develop a care plan that helps coordinate and strengthen the communication between you and your care team.  Our services are voluntary and are offered without charge to you personally.    Please feel free to contact me with any questions or concerns regarding care coordination and what we can offer.      We are focused on providing you with the highest-quality healthcare experience possible.    Sincerely,     DA Mitchell  [phone]  [facility]

## 2024-12-16 NOTE — PROGRESS NOTES
Clinic Care Coordination Contact  [facility]/Voicemail    Clinical Data: Care Coordinator Outreach    Outreach Documentation Number of Outreach Attempt   12/13/2024  10:21 AM 1   12/16/2024   9:08 AM 2       Left message on patient's voicemail with call back information and requested return call.      Plan: Care Coordinator will send care coordination introduction letter with care coordinator contact information and explanation of care coordination services via [employer]. Care Coordinator will do no further outreaches at this time.    Felisa Tolbert, DA  [phone]  [facility]

## 2024-12-19 ENCOUNTER — MYC MEDICAL ADVICE (OUTPATIENT)
Dept: FAMILY MEDICINE | Facility: CLINIC | Age: 44
End: 2024-12-19

## 2024-12-19 ENCOUNTER — TRANSFERRED RECORDS (OUTPATIENT)
Dept: HEALTH INFORMATION MANAGEMENT | Facility: CLINIC | Age: 44
End: 2024-12-19

## 2024-12-19 ENCOUNTER — PATIENT OUTREACH (OUTPATIENT)
Dept: CARE COORDINATION | Facility: CLINIC | Age: 44
End: 2024-12-19

## 2024-12-19 ENCOUNTER — ALLIED HEALTH/NURSE VISIT (OUTPATIENT)
Dept: FAMILY MEDICINE | Facility: CLINIC | Age: 44
End: 2024-12-19
Payer: COMMERCIAL

## 2024-12-19 ENCOUNTER — VIRTUAL VISIT (OUTPATIENT)
Dept: FAMILY MEDICINE | Facility: CLINIC | Age: 44
End: 2024-12-19
Payer: COMMERCIAL

## 2024-12-19 DIAGNOSIS — R35.0 URINE FREQUENCY: Primary | ICD-10-CM

## 2024-12-19 DIAGNOSIS — F41.1 GAD (GENERALIZED ANXIETY DISORDER): ICD-10-CM

## 2024-12-19 DIAGNOSIS — R35.0 URINE FREQUENCY: ICD-10-CM

## 2024-12-19 DIAGNOSIS — F90.2 ATTENTION DEFICIT HYPERACTIVITY DISORDER (ADHD), COMBINED TYPE: Primary | ICD-10-CM

## 2024-12-19 LAB
ALBUMIN UR-MCNC: NEGATIVE MG/DL
APPEARANCE UR: CLEAR
BILIRUB UR QL STRIP: NEGATIVE
COLOR UR AUTO: YELLOW
GLUCOSE UR STRIP-MCNC: NEGATIVE MG/DL
HGB UR QL STRIP: NEGATIVE
KETONES UR STRIP-MCNC: ABNORMAL MG/DL
LEUKOCYTE ESTERASE UR QL STRIP: NEGATIVE
NITRATE UR QL: NEGATIVE
PH UR STRIP: 7 [PH] (ref 5–7)
SP GR UR STRIP: 1.02 (ref 1–1.03)
UROBILINOGEN UR STRIP-ACNC: 0.2 E.U./DL

## 2024-12-19 RX ORDER — DEXTROAMPHETAMINE SACCHARATE, AMPHETAMINE ASPARTATE MONOHYDRATE, DEXTROAMPHETAMINE SULFATE AND AMPHETAMINE SULFATE 2.5; 2.5; 2.5; 2.5 MG/1; MG/1; MG/1; MG/1
10 CAPSULE, EXTENDED RELEASE ORAL DAILY
Qty: 30 CAPSULE | Refills: 0 | Status: SHIPPED | OUTPATIENT
Start: 2024-12-19

## 2024-12-19 RX ORDER — DEXMETHYLPHENIDATE HYDROCHLORIDE 10 MG/1
10 TABLET ORAL DAILY
Qty: 30 TABLET | Refills: 0 | Status: CANCELLED | OUTPATIENT
Start: 2025-02-17 | End: 2025-03-19

## 2024-12-19 RX ORDER — DEXMETHYLPHENIDATE HYDROCHLORIDE 10 MG/1
10 TABLET ORAL DAILY
Qty: 30 TABLET | Refills: 0 | Status: CANCELLED | OUTPATIENT
Start: 2024-12-19 | End: 2025-01-18

## 2024-12-19 RX ORDER — HYDROXYZINE HYDROCHLORIDE 25 MG/1
25 TABLET, FILM COATED ORAL EVERY 6 HOURS PRN
Qty: 60 TABLET | Refills: 0 | Status: SHIPPED | OUTPATIENT
Start: 2024-12-19

## 2024-12-19 RX ORDER — DEXMETHYLPHENIDATE HYDROCHLORIDE 10 MG/1
10 TABLET ORAL DAILY
Qty: 30 TABLET | Refills: 0 | Status: CANCELLED | OUTPATIENT
Start: 2025-01-18 | End: 2025-02-17

## 2024-12-19 NOTE — PROGRESS NOTES
Anne is a 44 year old who is being evaluated via a billable video visit.    How would you like to obtain your AVS? MyChart  If the video visit is dropped, the invitation should be resent by: Text to cell phone: [phone]  Will anyone else be joining your video visit? No      Assessment & Plan     MILES (generalized anxiety disorder)  Uncontrolled will be started on the Atarax.  - hydrOXYzine HCl (ATARAX) 25 MG tablet; Take 1 tablet (25 mg) by mouth every 6 hours as needed for itching. TAKE 1 TO 2 TABLETS BY MOUTH EVERY 6 HOURS AS NEEDED FOR ANXIETY Strength: 25 mg    Attention deficit hyperactivity disorder (ADHD), combined type  Patient has been off her ADHD medications has been uncontrolled will start on Adderall 10 mg extended release for 1 month to see if this controls her symptoms will have a 1 month follow-up we may need to add an afternoon dose or increase the dose we will see how it goes over the next 2 weeks.  - amphetamine-dextroamphetamine (ADDERALL XR) 10 MG 24 hr capsule; Take 1 capsule (10 mg) by mouth daily.    Urine frequency  - UA Macroscopic with reflex to Microscopic and Culture - Clinic Collect        MED REC REQUIRED  Post Medication Reconciliation Status:  Discharge medications reconciled, continue medications without change    See Patient Instructions    Subjective   Anne is a 44 year old, presenting for the following health issues:  ER F/U        12/19/2024     9:00 AM   Additional Questions   Roomed by richie   Accompanied by self       Video Start Time: 9:16 AM    HPI     ED/UC Followup:    Facility:  [facility]   Date of visit: 12/6/2024  Reason for visit: flank pain   Current Status: completed antibiotics which helped symptoms but 3 days ago started having pain with urination again     Stopped taking dexmethylphenidate 3 months ago because of stomach pain- was taking it for adhd,  counselor suggested to talk to primary          Review of Systems  Constitutional, HEENT,  cardiovascular, pulmonary, gi and gu systems are negative, except as otherwise noted.      Objective           Vitals:  No vitals were obtained today due to virtual visit.    Physical Exam   GENERAL: alert and no distress  EYES: Eyes grossly normal to inspection.  No discharge or erythema, or obvious scleral/conjunctival abnormalities.  RESP: No audible wheeze, cough, or visible cyanosis.    SKIN: Visible skin clear. No significant rash, abnormal pigmentation or lesions.  NEURO: Cranial nerves grossly intact.  Mentation and speech appropriate for age.  PSYCH: Appropriate affect, tone, and pace of words    Results for orders placed or performed in visit on 12/19/24   UA Macroscopic with reflex to Microscopic and Culture - Clinic Collect     Status: Abnormal    Specimen: Urine, Midstream   Result Value Ref Range    Color Urine Yellow Colorless, Straw, Light Yellow, Yellow    Appearance Urine Clear Clear    Glucose Urine Negative Negative mg/dL    Bilirubin Urine Negative Negative    Ketones Urine Trace (A) Negative mg/dL    Specific Gravity Urine 1.020 1.003 - 1.035    Blood Urine Negative Negative    pH Urine 7.0 5.0 - 7.0    Protein Albumin Urine Negative Negative mg/dL    Urobilinogen Urine 0.2 0.2, 1.0 E.U./dL    Nitrite Urine Negative Negative    Leukocyte Esterase Urine Negative Negative    Narrative    Microscopic not indicated         Video-Visit Details    Type of service:  Video Visit   Video End Time:9:32  Originating Location (pt. Location): Home    Distant Location (provider location):  On-site  Platform used for Video Visit: Juliette  Signed Electronically by: FRANKLYN Christianson CNP

## 2024-12-19 NOTE — TELEPHONE ENCOUNTER
Frw Update:    12/19/24- Frw established contact with pt and schedule an appt on 12/27 at 11:30 am to apply for county benefit ( snap,cash, Emergency assistance and Energy assistance).

## 2024-12-21 ENCOUNTER — HEALTH MAINTENANCE LETTER (OUTPATIENT)
Age: 44
End: 2024-12-21

## 2024-12-26 ENCOUNTER — OFFICE VISIT (OUTPATIENT)
Dept: BEHAVIORAL HEALTH | Facility: CLINIC | Age: 44
End: 2024-12-26
Payer: COMMERCIAL

## 2024-12-26 DIAGNOSIS — F33.1 MODERATE EPISODE OF RECURRENT MAJOR DEPRESSIVE DISORDER (H): Primary | ICD-10-CM

## 2024-12-26 NOTE — PROGRESS NOTES
[facility] Primary Care: Integrated Behavioral Health    Behavioral Health Clinician Progress Note   Mental Health & Addiction Services      Thursday December 26, 2024    Patient Name: Anne TROY Adamgila       Service Type:  Individual   Service Location:  Face to Face in Clinic   Visit Start Time: 8:01am  Visit End Time:  8:18am    Session Length: 16 - 37    Attendees: Patient   Service Modality: In-person   Visit number: 2    [facility] Visit Activities (Refresh list every visit): [facility] Only     Date of Brief Diagnostic Assessment : Will complete in the next few sessions, not completed due to time constraints.    Treatment Plan Review Date: Will complete in the next few sessions, not completed due to time constraints.        DATA:    Extended Session (60+ minutes): No   Interactive Complexity: No   Crisis: No   [facility] Patient: No     Assessments completed prior to visit:   PHQ9:       10/17/2023     1:48 PM 1/15/2024     1:35 PM 5/6/2024     1:47 PM 8/6/2024     4:51 PM 9/3/2024     1:52 PM 10/28/2024     8:32 AM 12/12/2024     7:51 AM   PHQ-9 SCORE   PHQ-9 Total Score MyChart   8 (Mild depression) 21 (Severe depression) 12 (Moderate depression) 17 (Moderately severe depression) 22 (Severe depression)   PHQ-9 Total Score 14 8 8 21 12 17  22        Patient-reported    Proxy-reported     GAD7:       6/19/2023     2:16 PM 6/29/2023     9:19 AM 7/11/2023     9:12 AM 1/15/2024     1:35 PM 5/6/2024     1:53 PM 9/3/2024     1:53 PM 12/12/2024     8:08 AM   MILES-7 SCORE   Total Score 13 (moderate anxiety)  10 (moderate anxiety)  5 (mild anxiety) 14 (moderate anxiety) 13 (moderate anxiety)   Total Score 13 14 10 2 5 14 13        Patient-reported     CAGE-AID:       12/12/2024     8:10 AM   CAGE-AID Total Score   Total Score 0    Total Score MyChart 0 (A total score of 2 or greater is considered clinically significant)       Patient-reported     PROMIS 10-Global Health (only subscores and total score):        "5/17/2022     2:00 PM 10/10/2022     2:45 PM 12/12/2024     8:10 AM   PROMIS-10 Scores Only   Global Mental Health Score 9 9 7    Global Physical Health Score 9 12 9    PROMIS TOTAL - SUBSCORES 18 21 16        Patient-reported     [place] Suicide Severity Rating Scale (Lifetime/Recent)      9/2/2024     1:07 PM   [place] Suicide Severity Rating (Lifetime/Recent)   Q1 Wished to be Dead (Past Month) 0-->no   Q2 Suicidal Thoughts (Past Month) 0-->no   Q6 Suicide Behavior (Lifetime) 0-->no   Level of Risk per Screen no risks indicated     [place] Suicide Severity Rating Scale (Short Version)      9/9/2023     9:23 AM 9/22/2023     9:22 AM 10/11/2023     1:24 AM 10/11/2023     1:25 AM 10/11/2023     1:01 PM 9/2/2024     1:07 PM 12/12/2024     9:30 AM   [place] Suicide Severity Rating (Short Version)   Over the past 2 weeks have you felt down, depressed, or hopeless? no yes no no no     Over the past 2 weeks have you had thoughts of killing yourself? no no no no no     Have you ever attempted to kill yourself? no no  no no     Q1 Wished to be Dead (Past Month)      0-->no    Q2 Suicidal Thoughts (Past Month)      0-->no    Q6 Suicide Behavior (Lifetime)      0-->no    Level of Risk per Screen      no risks indicated    1. Wish to be Dead (Since Last Contact)       Y   Wish to be Dead Description (Since Last Contact)       She stated she didn't want to be in pain anymore but no plan or intent.   2. Non-Specific Active Suicidal Thoughts (Since Last Contact)       N   Calculated C-SSRS Risk Score (Since Last Contact)       Low Risk     \"     Reason for Visit/Presenting Concern:  Depression    Current Stressors / Issues:   Pt stated that she was able to get her medications adjusted and felt much better.  She stated she used the skills of getting up, showering, opening the curtains,  and setting an intention for the day.  Praised her for completing the work.  She reviewed skills and wanted to wait to reschedule.  "     Therapeutic Interventions:  Cognitive Behavioral Therapy (CBT): Provided psycho-education on cognitive distortions., Identified and challenged maladaptive patterns of behavior and thinking that interfere with patients life, and Assisted patient in developing coping strategies (e.g. more physical exercise, less internal focus, increase social involvement, more assertiveness, etc.).    Response to treatment interventions:   Patient was receptive to interventions utilized.  Patient was engaged in the therapy process.       Progress on Treatment Objective(s) / Homework:   Satisfactory progress - ACTION (Actively working towards change); Intervened by reinforcing change plan / affirming steps taken     Medication Review:   No changes to current psychiatric medication(s)     Medication Compliance:   Yes     Chemical Use Review:  Substance Use: Chemical use reviewed, no active concerns identified      Tobacco Use: No current tobacco use.       Assessment: Current Emotional / Mental Status (status of significant symptoms):    Risk status (Self / Other harm or suicidal ideation)   Patient denies a history of suicidal ideation, suicide attempts, self-injurious behavior, homicidal ideation, homicidal behavior, and and other safety concerns   Patient denies current fears or concerns for personal safety.   Patient denies current or recent suicidal ideation or behaviors.   Patient denies current or recent homicidal ideation or behaviors.   Patient denies current or recent self injurious behavior or ideation.   Patient denies other safety concerns.   Recommended that patient call 911 or go to the local ED should there be a change in any of these risk factors      ASSESSMENT:   Mental Status:     Appearance:   Appropriate    Eye Contact:   Fair    Psychomotor Behavior: Normal    Attitude:   Cooperative    Orientation:   All   Speech Rate / Production: Normal    Volume:   Normal    Mood:    Normal   Affect:    Appropriate     Thought Content:  Clear    Thought Form:  Coherent  Logical    Insight:    Good          Diagnoses:   Data Unavailable    Collateral Reports Completed:   Not Applicable       Plan: (Homework, other):   Patient was provided No indications of CD issues  Patient was given information about behavioral services and encouraged to schedule a follow up appointment with the clinic [facility] as needed.         GAYLA Brink, [facility]

## 2025-01-20 ENCOUNTER — MYC MEDICAL ADVICE (OUTPATIENT)
Dept: FAMILY MEDICINE | Facility: CLINIC | Age: 45
End: 2025-01-20
Payer: COMMERCIAL

## 2025-01-20 DIAGNOSIS — F90.2 ATTENTION DEFICIT HYPERACTIVITY DISORDER (ADHD), COMBINED TYPE: ICD-10-CM

## 2025-01-20 RX ORDER — DEXTROAMPHETAMINE SACCHARATE, AMPHETAMINE ASPARTATE MONOHYDRATE, DEXTROAMPHETAMINE SULFATE AND AMPHETAMINE SULFATE 2.5; 2.5; 2.5; 2.5 MG/1; MG/1; MG/1; MG/1
10 CAPSULE, EXTENDED RELEASE ORAL DAILY
Qty: 3 CAPSULE | Refills: 0 | Status: SHIPPED | OUTPATIENT
Start: 2025-01-20

## 2025-01-23 ENCOUNTER — VIRTUAL VISIT (OUTPATIENT)
Dept: FAMILY MEDICINE | Facility: CLINIC | Age: 45
End: 2025-01-23
Payer: COMMERCIAL

## 2025-01-23 DIAGNOSIS — F90.2 ATTENTION DEFICIT HYPERACTIVITY DISORDER (ADHD), COMBINED TYPE: Primary | ICD-10-CM

## 2025-01-23 RX ORDER — DEXTROAMPHETAMINE SACCHARATE, AMPHETAMINE ASPARTATE MONOHYDRATE, DEXTROAMPHETAMINE SULFATE AND AMPHETAMINE SULFATE 2.5; 2.5; 2.5; 2.5 MG/1; MG/1; MG/1; MG/1
10 CAPSULE, EXTENDED RELEASE ORAL DAILY
Qty: 30 CAPSULE | Refills: 0 | Status: SHIPPED | OUTPATIENT
Start: 2025-02-22 | End: 2025-03-24

## 2025-01-23 RX ORDER — DEXTROAMPHETAMINE SACCHARATE, AMPHETAMINE ASPARTATE MONOHYDRATE, DEXTROAMPHETAMINE SULFATE AND AMPHETAMINE SULFATE 2.5; 2.5; 2.5; 2.5 MG/1; MG/1; MG/1; MG/1
10 CAPSULE, EXTENDED RELEASE ORAL DAILY
Qty: 30 CAPSULE | Refills: 0 | Status: SHIPPED | OUTPATIENT
Start: 2025-03-24 | End: 2025-04-23

## 2025-01-23 RX ORDER — DEXTROAMPHETAMINE SACCHARATE, AMPHETAMINE ASPARTATE, DEXTROAMPHETAMINE SULFATE AND AMPHETAMINE SULFATE 2.5; 2.5; 2.5; 2.5 MG/1; MG/1; MG/1; MG/1
10 TABLET ORAL DAILY
Qty: 30 TABLET | Refills: 0 | Status: SHIPPED | OUTPATIENT
Start: 2025-02-22 | End: 2025-03-24

## 2025-01-23 RX ORDER — DEXTROAMPHETAMINE SACCHARATE, AMPHETAMINE ASPARTATE, DEXTROAMPHETAMINE SULFATE AND AMPHETAMINE SULFATE 2.5; 2.5; 2.5; 2.5 MG/1; MG/1; MG/1; MG/1
10 TABLET ORAL DAILY
Qty: 30 TABLET | Refills: 0 | Status: SHIPPED | OUTPATIENT
Start: 2025-03-24 | End: 2025-04-23

## 2025-01-23 RX ORDER — DEXTROAMPHETAMINE SACCHARATE, AMPHETAMINE ASPARTATE, DEXTROAMPHETAMINE SULFATE AND AMPHETAMINE SULFATE 2.5; 2.5; 2.5; 2.5 MG/1; MG/1; MG/1; MG/1
10 TABLET ORAL DAILY
Qty: 30 TABLET | Refills: 0 | Status: SHIPPED | OUTPATIENT
Start: 2025-01-23 | End: 2025-02-22

## 2025-01-23 RX ORDER — DEXTROAMPHETAMINE SACCHARATE, AMPHETAMINE ASPARTATE MONOHYDRATE, DEXTROAMPHETAMINE SULFATE AND AMPHETAMINE SULFATE 2.5; 2.5; 2.5; 2.5 MG/1; MG/1; MG/1; MG/1
10 CAPSULE, EXTENDED RELEASE ORAL DAILY
Qty: 30 CAPSULE | Refills: 0 | Status: SHIPPED | OUTPATIENT
Start: 2025-01-23 | End: 2025-02-22

## 2025-01-23 NOTE — PROGRESS NOTES
Anne is a 44 year old who is being evaluated via a billable video visit.    How would you like to obtain your AVS? MyChart  If the video visit is dropped, the invitation should be resent by: Text to cell phone: [phone]  Will anyone else be joining your video visit? No      Assessment & Plan     Attention deficit hyperactivity disorder (ADHD), combined type  Uncontrolled is having breakthrough inattention in the afternoon will add an immediate release of 10 mg.  3 months supply was prescribed today will need follow-up in 3 months.  - amphetamine-dextroamphetamine (ADDERALL) 10 MG tablet; Take 1 tablet (10 mg) by mouth daily.  - amphetamine-dextroamphetamine (ADDERALL) 10 MG tablet; Take 1 tablet (10 mg) by mouth daily.  - amphetamine-dextroamphetamine (ADDERALL) 10 MG tablet; Take 1 tablet (10 mg) by mouth daily.  - amphetamine-dextroamphetamine (ADDERALL XR) 10 MG 24 hr capsule; Take 1 capsule (10 mg) by mouth daily.  - amphetamine-dextroamphetamine (ADDERALL XR) 10 MG 24 hr capsule; Take 1 capsule (10 mg) by mouth daily.  - amphetamine-dextroamphetamine (ADDERALL XR) 10 MG 24 hr capsule; Take 1 capsule (10 mg) by mouth daily.            See Patient Instructions  There are no Patient Instructions on file for this visit.    Subjective   Anne is a 44 year old, presenting for the following health issues:  A.D.H.D (1 month recheck)        1/23/2025    11:37 AM   Additional Questions   Roomed by Sabiha HINKLE   Accompanied by Self         1/23/2025    11:37 AM   Patient Reported Additional Medications   Patient reports taking the following new medications .       Video Star2t Time: 12:48 PM    HPI     ADHD Follow-Up    Attention deficit hyperactivity disorder (ADHD), combined type  Patient has been off her ADHD medications has been uncontrolled will start on Adderall 10 mg extended release for 1 month to see if this controls her symptoms will have a 1 month follow-up we may need to add an afternoon dose or increase the dose  we will see how it goes over the next 2 weeks.  - amphetamine-dextroamphetamine (ADDERALL XR) 10 MG 24 hr capsule; Take 1 capsule (10 mg) by mouth daily.    Date of last ADHD office visit: 12/19/24  Status since last visit: Improving  Taking controlled (daily) medications as prescribed: Yes                       Parent/Patient Concerns with Medications: None  ADHD Medication       Amphetamines Disp Start End     amphetamine-dextroamphetamine (ADDERALL XR) 10 MG 24 hr capsule 3 capsule 1/20/2025 --    Sig - Route: Take 1 capsule (10 mg) by mouth daily. - Oral    Class: E-Prescribe    Earliest Fill Date: 1/20/2025          Sleep: no problems  Home/Family Concerns: Improving  Peer Concerns: Improving    Co-Morbid Diagnosis: Depression    Currently in counseling: No    Medication Benefits:   Controlled symptoms: Attention span, Distractability, Finishing tasks, and Impulse control  Uncontrolled Symptoms : None    Medication side effects:  Side effects noted: none          Review of Systems  Constitutional, HEENT, cardiovascular, pulmonary, gi and gu systems are negative, except as otherwise noted.      Objective           Vitals:  No vitals were obtained today due to virtual visit.    Physical Exam   GENERAL: alert and no distress  EYES: Eyes grossly normal to inspection.  No discharge or erythema, or obvious scleral/conjunctival abnormalities.  RESP: No audible wheeze, cough, or visible cyanosis.    SKIN: Visible skin clear. No significant rash, abnormal pigmentation or lesions.  NEURO: Cranial nerves grossly intact.  Mentation and speech appropriate for age.  PSYCH: Appropriate affect, tone, and pace of words    No results found for any visits on 01/23/25.      Video-Visit Details    Type of service:  Video Visit   Video End Time:12:58 PM  Originating Location (pt. Location): Home    Distant Location (provider location):  On-site  Platform used for Video Visit: Trey  Signed Electronically by: FRANKLYN Christianson  CNP

## 2025-01-27 ENCOUNTER — PATIENT OUTREACH (OUTPATIENT)
Dept: CARE COORDINATION | Facility: CLINIC | Age: 45
End: 2025-01-27
Payer: COMMERCIAL

## 2025-01-27 NOTE — LETTER
[facility]        January 27, 2025      Anne TROY Piatz  [address] [address]        Dear Anne,                                                                      The Financial Resource team has attempted to reach to you to assist you with any financial barriers you may be facing in your healthcare journey.  We would like to provide any additional support you may need related to financial support for your healthcare.  Our team are Certified MNSure Navigators, and we offer support with application assistance for Medical Assistance, MNSure Applications, Energy Assistance, Emergency Assistance, Food Assistance and many other initial applications for [facility] benefits.     I would appreciate if you would call me at [phone] to let me know if you would like assistance.      Sincerely,                                                                         Teressa Dan  Financial Resource Worker  [facility]  [phone]

## 2025-01-27 NOTE — TELEPHONE ENCOUNTER
Fr Update:    1/27/25- Established outreach attempted x 2. Left message on voicemail indicating last outreach attempt.   Plan: [facility] closed the FRW program, FAM Case, FAM Tracker and will send an unreachable letter. Patient can be referred back to [facility] if needed.

## 2025-01-30 ENCOUNTER — TRANSFERRED RECORDS (OUTPATIENT)
Dept: HEALTH INFORMATION MANAGEMENT | Facility: CLINIC | Age: 45
End: 2025-01-30
Payer: COMMERCIAL

## 2025-02-15 DIAGNOSIS — J45.20 MILD INTERMITTENT ASTHMA WITHOUT COMPLICATION: ICD-10-CM

## 2025-02-15 DIAGNOSIS — I10 BENIGN ESSENTIAL HYPERTENSION: ICD-10-CM

## 2025-02-18 RX ORDER — LISINOPRIL 40 MG/1
40 TABLET ORAL DAILY
Qty: 90 TABLET | Refills: 2 | Status: SHIPPED | OUTPATIENT
Start: 2025-02-18

## 2025-02-18 RX ORDER — MONTELUKAST SODIUM 10 MG/1
TABLET ORAL
Qty: 90 TABLET | Refills: 3 | Status: SHIPPED | OUTPATIENT
Start: 2025-02-18

## 2025-03-24 ENCOUNTER — PATIENT OUTREACH (OUTPATIENT)
Dept: CARE COORDINATION | Facility: CLINIC | Age: 45
End: 2025-03-24
Payer: COMMERCIAL

## 2025-04-20 DIAGNOSIS — F90.2 ATTENTION DEFICIT HYPERACTIVITY DISORDER (ADHD), COMBINED TYPE: ICD-10-CM

## 2025-04-21 RX ORDER — DEXTROAMPHETAMINE SACCHARATE, AMPHETAMINE ASPARTATE MONOHYDRATE, DEXTROAMPHETAMINE SULFATE AND AMPHETAMINE SULFATE 2.5; 2.5; 2.5; 2.5 MG/1; MG/1; MG/1; MG/1
10 CAPSULE, EXTENDED RELEASE ORAL DAILY
Qty: 30 CAPSULE | Refills: 0 | OUTPATIENT
Start: 2025-04-21

## 2025-04-21 RX ORDER — DEXTROAMPHETAMINE SACCHARATE, AMPHETAMINE ASPARTATE, DEXTROAMPHETAMINE SULFATE AND AMPHETAMINE SULFATE 2.5; 2.5; 2.5; 2.5 MG/1; MG/1; MG/1; MG/1
10 TABLET ORAL DAILY
Qty: 30 TABLET | Refills: 0 | OUTPATIENT
Start: 2025-04-21

## 2025-04-24 ENCOUNTER — OFFICE VISIT (OUTPATIENT)
Dept: FAMILY MEDICINE | Facility: CLINIC | Age: 45
End: 2025-04-24
Attending: NURSE PRACTITIONER
Payer: COMMERCIAL

## 2025-04-24 ENCOUNTER — ORDERS ONLY (AUTO-RELEASED) (OUTPATIENT)
Dept: FAMILY MEDICINE | Facility: CLINIC | Age: 45
End: 2025-04-24

## 2025-04-24 VITALS
OXYGEN SATURATION: 99 % | HEART RATE: 67 BPM | BODY MASS INDEX: 34.04 KG/M2 | WEIGHT: 199.4 LBS | HEIGHT: 64 IN | SYSTOLIC BLOOD PRESSURE: 126 MMHG | DIASTOLIC BLOOD PRESSURE: 80 MMHG | RESPIRATION RATE: 12 BRPM | TEMPERATURE: 97.3 F

## 2025-04-24 DIAGNOSIS — Z12.4 CERVICAL CANCER SCREENING: ICD-10-CM

## 2025-04-24 DIAGNOSIS — Z71.6 ENCOUNTER FOR SMOKING CESSATION COUNSELING: ICD-10-CM

## 2025-04-24 DIAGNOSIS — Z12.11 SPECIAL SCREENING FOR MALIGNANT NEOPLASMS, COLON: ICD-10-CM

## 2025-04-24 DIAGNOSIS — M25.522 LEFT ELBOW PAIN: ICD-10-CM

## 2025-04-24 DIAGNOSIS — J45.20 MILD INTERMITTENT ASTHMA WITHOUT COMPLICATION: ICD-10-CM

## 2025-04-24 DIAGNOSIS — M62.838 MUSCLE SPASM: ICD-10-CM

## 2025-04-24 DIAGNOSIS — F90.2 ATTENTION DEFICIT HYPERACTIVITY DISORDER, COMBINED TYPE, MILD: Primary | ICD-10-CM

## 2025-04-24 PROCEDURE — 99214 OFFICE O/P EST MOD 30 MIN: CPT | Performed by: NURSE PRACTITIONER

## 2025-04-24 PROCEDURE — 1125F AMNT PAIN NOTED PAIN PRSNT: CPT | Performed by: NURSE PRACTITIONER

## 2025-04-24 PROCEDURE — 3074F SYST BP LT 130 MM HG: CPT | Performed by: NURSE PRACTITIONER

## 2025-04-24 PROCEDURE — 3079F DIAST BP 80-89 MM HG: CPT | Performed by: NURSE PRACTITIONER

## 2025-04-24 PROCEDURE — 96127 BRIEF EMOTIONAL/BEHAV ASSMT: CPT | Performed by: NURSE PRACTITIONER

## 2025-04-24 RX ORDER — MONTELUKAST SODIUM 10 MG/1
TABLET ORAL
Qty: 90 TABLET | Refills: 3 | Status: SHIPPED | OUTPATIENT
Start: 2025-04-24

## 2025-04-24 RX ORDER — DEXTROAMPHETAMINE SACCHARATE, AMPHETAMINE ASPARTATE, DEXTROAMPHETAMINE SULFATE AND AMPHETAMINE SULFATE 2.5; 2.5; 2.5; 2.5 MG/1; MG/1; MG/1; MG/1
10 TABLET ORAL DAILY
Qty: 30 TABLET | Refills: 0 | Status: SHIPPED | OUTPATIENT
Start: 2025-05-24 | End: 2025-06-23

## 2025-04-24 RX ORDER — TIZANIDINE 2 MG/1
2 TABLET ORAL 2 TIMES DAILY PRN
Qty: 60 TABLET | Refills: 2 | Status: SHIPPED | OUTPATIENT
Start: 2025-04-24

## 2025-04-24 RX ORDER — DEXTROAMPHETAMINE SACCHARATE, AMPHETAMINE ASPARTATE, DEXTROAMPHETAMINE SULFATE AND AMPHETAMINE SULFATE 5; 5; 5; 5 MG/1; MG/1; MG/1; MG/1
20 TABLET ORAL DAILY
Qty: 30 TABLET | Refills: 0 | Status: SHIPPED | OUTPATIENT
Start: 2025-06-23 | End: 2025-07-23

## 2025-04-24 RX ORDER — DEXTROAMPHETAMINE SACCHARATE, AMPHETAMINE ASPARTATE, DEXTROAMPHETAMINE SULFATE AND AMPHETAMINE SULFATE 5; 5; 5; 5 MG/1; MG/1; MG/1; MG/1
20 TABLET ORAL DAILY
Qty: 30 TABLET | Refills: 0 | Status: SHIPPED | OUTPATIENT
Start: 2025-05-24 | End: 2025-06-23

## 2025-04-24 RX ORDER — DEXTROAMPHETAMINE SACCHARATE, AMPHETAMINE ASPARTATE, DEXTROAMPHETAMINE SULFATE AND AMPHETAMINE SULFATE 5; 5; 5; 5 MG/1; MG/1; MG/1; MG/1
20 TABLET ORAL DAILY
Qty: 30 TABLET | Refills: 0 | Status: SHIPPED | OUTPATIENT
Start: 2025-04-24 | End: 2025-05-24

## 2025-04-24 RX ORDER — DEXTROAMPHETAMINE SACCHARATE, AMPHETAMINE ASPARTATE, DEXTROAMPHETAMINE SULFATE AND AMPHETAMINE SULFATE 2.5; 2.5; 2.5; 2.5 MG/1; MG/1; MG/1; MG/1
10 TABLET ORAL DAILY
Qty: 30 TABLET | Refills: 0 | Status: SHIPPED | OUTPATIENT
Start: 2025-04-24 | End: 2025-05-24

## 2025-04-24 RX ORDER — DEXTROAMPHETAMINE SACCHARATE, AMPHETAMINE ASPARTATE, DEXTROAMPHETAMINE SULFATE AND AMPHETAMINE SULFATE 2.5; 2.5; 2.5; 2.5 MG/1; MG/1; MG/1; MG/1
10 TABLET ORAL DAILY
Qty: 30 TABLET | Refills: 0 | Status: SHIPPED | OUTPATIENT
Start: 2025-06-23 | End: 2025-07-23

## 2025-04-24 RX ORDER — VARENICLINE TARTRATE 0.5 (11)-1
KIT ORAL
Qty: 53 TABLET | Refills: 0 | Status: SHIPPED | OUTPATIENT
Start: 2025-04-24

## 2025-04-24 ASSESSMENT — PATIENT HEALTH QUESTIONNAIRE - PHQ9
SUM OF ALL RESPONSES TO PHQ QUESTIONS 1-9: 7
10. IF YOU CHECKED OFF ANY PROBLEMS, HOW DIFFICULT HAVE THESE PROBLEMS MADE IT FOR YOU TO DO YOUR WORK, TAKE CARE OF THINGS AT HOME, OR GET ALONG WITH OTHER PEOPLE: SOMEWHAT DIFFICULT
SUM OF ALL RESPONSES TO PHQ QUESTIONS 1-9: 7

## 2025-04-24 ASSESSMENT — PAIN SCALES - GENERAL: PAINLEVEL_OUTOF10: MILD PAIN (2)

## 2025-04-24 NOTE — PROGRESS NOTES
Assessment & Plan     Attention deficit hyperactivity disorder, combined type, mild   Controlled no change in treatment plan   - REVIEW OF HEALTH MAINTENANCE PROTOCOL ORDERS  - amphetamine-dextroamphetamine (ADDERALL) 20 MG tablet; Take 1 tablet (20 mg) by mouth daily.  - amphetamine-dextroamphetamine (ADDERALL) 20 MG tablet; Take 1 tablet (20 mg) by mouth daily.  - amphetamine-dextroamphetamine (ADDERALL) 20 MG tablet; Take 1 tablet (20 mg) by mouth daily.  - amphetamine-dextroamphetamine (ADDERALL) 10 MG tablet; Take 1 tablet (10 mg) by mouth daily.  - amphetamine-dextroamphetamine (ADDERALL) 10 MG tablet; Take 1 tablet (10 mg) by mouth daily.  - amphetamine-dextroamphetamine (ADDERALL) 10 MG tablet; Take 1 tablet (10 mg) by mouth daily.    Cervical cancer screening  Declined at this time    Left elbow pain  Continues to have left elbow pain most likely tendinitis will wear brace if not improving will follow-up  - Wrist/Arm/Hand Bracing Supplies Order Tennis Elbow Arm Band; Left    Mild intermittent asthma without complication  Will add Singulair uncontrolled asthma  - montelukast (SINGULAIR) 10 MG tablet; TAKE ONE TABLET BY MOUTH AT BEDTIME    Special screening for malignant neoplasms, colon  - COLOGUARD([employer]); Future    Encounter for smoking cessation counseling  Recommend smoking sensation  - varenicline (CHANTIX PATRIZIA) 0.5 MG X 11 & 1 MG X 42 tablet; Take 0.5 mg tab daily for 3 days, THEN 0.5 mg tab twice daily for 4 days, THEN 1 mg twice daily.    Muscle spasm  Stable   - tiZANidine (ZANAFLEX) 2 MG tablet; Take 1 tablet (2 mg) by mouth 2 times daily as needed for muscle spasms.      The longitudinal plan of care for the diagnosis(es)/condition(s) as documented were addressed during this visit. Due to the added complexity in care, I will continue to support Anne in the subsequent management and with ongoing continuity of care.      Subjective   Anne is a 44 year old, presenting for the following health  issues:  Back Pain, Musculoskeletal Problem, and A.D.H.D        4/24/2025     4:22 PM   Additional Questions   Roomed by Anastasia FERRER([facility])     History of Present Illness       Reason for visit:  Med recheck.  Back and arm pain   She is taking medications regularly.        ADHD Follow up : stable with medication. 10 mg am and 10mg afternoon helps with focus.       Smoke Cessation: Wants to try to stop.   Patient reported has increased in smoking and interest in trying quit.   Had tried in the past: cutting down - not working.  Would like to try chantix      Medication problem - Lost it - Montelukast - okay to have 90 days supply.   Patient moved recently and lost     Joint or Musculoskeletal Pain  Duration of complaint: Couple weeks ago    Description:   Location: Left arm ,elbow  Character: muscle pain, tightness  Intensity: 2/10 when doesn't move, 7/10 when moves   Progression of Symptoms: worse  Accompanying Signs & Symptoms: Other symptoms: radiation of pain to lower part of the arm - hands having tingling sensations - dropping objections   History: Previous similar pain: no    Precipitating factors: Trauma or overuse: no  Alleviating factors: Improved by: compressions   Therapies Tried and outcome: compressions      Pain w/ tennis elbow movement. Lateral epicondyle            Spine infusion on 2023  Pain History:  When did you first notice your pain? More than 6 weeks ago -    Have you seen this provider for your pain in the past? No   Where in your body do your have pain? Lower back and Neck   Are you seeing anyone else for your pain? No  What makes your pain better? Lay down - Stretching   What makes your pain worse? Heat   How has pain affected your ability to work? 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 Boyfriend and step son         10/28/2024     8:32 AM 12/12/2024     7:51 AM 4/24/2025     3:04 PM   PHQ-9 SCORE   PHQ-9 Total Score Demetri Arevalo  "(Moderately severe depression) 22 (Severe depression) 7 (Mild depression)   PHQ-9 Total Score 17  22  7        Patient-reported    Proxy-reported           5/6/2024     1:53 PM 9/3/2024     1:53 PM 12/12/2024     8:08 AM   MILES-7 SCORE   Total Score 5 (mild anxiety) 14 (moderate anxiety) 13 (moderate anxiety)   Total Score 5 14 13        Patient-reported               10/28/2024     8:32 AM 12/12/2024     7:51 AM 4/24/2025     3:04 PM   PHQ-9 SCORE   PHQ-9 Total Score MyChart 17 (Moderately severe depression) 22 (Severe depression) 7 (Mild depression)   PHQ-9 Total Score 17  22  7        Patient-reported    Proxy-reported       Chronic Pain - Initial Assessment:    How would you describe your pain?  Managed  Have you had any recent changes to the severity or character of your pain?  Stable  Is there an underlying cause that has been identified?  None  Has your ability to work or do daily activities changed recently because of your pain?  None  Which of these pain treatments have you tried? Physical Therapy  Previous medication treatments:                Review of Systems  Constitutional, HEENT, cardiovascular, pulmonary, gi and gu systems are negative, except as otherwise noted.      Objective    /80   Pulse 67   Temp 97.3  F (36.3  C) (Tympanic)   Resp 12   Ht 1.62 m (5' 3.78\")   Wt 90.4 kg (199 lb 6.4 oz)   LMP 03/02/2022 (LMP Unknown)   SpO2 99%   BMI 34.46 kg/m    Body mass index is 34.46 kg/m .  Physical Exam  Constitutional:       Appearance: Normal appearance. She is normal weight.   HENT:      Head: Normocephalic.      Right Ear: Tympanic membrane normal.      Left Ear: Tympanic membrane normal.      Nose: Nose normal.      Mouth/Throat:      Mouth: Mucous membranes are moist.      Pharynx: Oropharynx is clear.   Eyes:      Pupils: Pupils are equal, round, and reactive to light.   Cardiovascular:      Rate and Rhythm: Normal rate and regular rhythm.      Pulses: Normal pulses.      Heart sounds: " Normal heart sounds.   Pulmonary:      Effort: Pulmonary effort is normal.      Breath sounds: Normal breath sounds.   Abdominal:      General: Abdomen is flat.      Palpations: Abdomen is soft.   Musculoskeletal:         General: Tenderness present.      Right elbow: Normal.      Left elbow: Decreased range of motion. Tenderness present in lateral epicondyle.      Cervical back: Normal range of motion.   Skin:     General: Skin is warm and dry.   Neurological:      General: No focal deficit present.      Mental Status: She is alert and oriented to person, place, and time. Mental status is at baseline.   Psychiatric:         Mood and Affect: Mood normal.         Behavior: Behavior normal.         Thought Content: Thought content normal.         Judgment: Judgment normal.            No results found for this or any previous visit (from the past 24 hours).        Signed Electronically by: FRANKLYN Christianson CNP

## 2025-04-24 NOTE — NURSING NOTE
"Chief Complaint   Patient presents with    Back Pain    Musculoskeletal Problem    A.D.H.D       Initial LMP 03/02/2022 (LMP Unknown)  Estimated body mass index is 33.3 kg/m  as calculated from the following:    Height as of 10/28/24: 1.626 m (5' 4\").    Weight as of 11/19/24: 88 kg (194 lb).    Patient presents to the clinic using No DME    Is there anyone who you would like to be able to receive your results? No  If yes have patient fill out VENUS      "

## 2025-07-17 DIAGNOSIS — G43.009 MIGRAINE WITHOUT AURA AND WITHOUT STATUS MIGRAINOSUS, NOT INTRACTABLE: ICD-10-CM

## 2025-07-17 DIAGNOSIS — I10 BENIGN ESSENTIAL HYPERTENSION: ICD-10-CM

## 2025-07-17 RX ORDER — PROPRANOLOL HYDROCHLORIDE 80 MG/1
80 TABLET ORAL DAILY
Qty: 90 TABLET | Refills: 2 | Status: SHIPPED | OUTPATIENT
Start: 2025-07-17

## 2025-07-17 RX ORDER — AMLODIPINE BESYLATE 2.5 MG/1
2.5 TABLET ORAL DAILY
Qty: 90 TABLET | Refills: 2 | Status: SHIPPED | OUTPATIENT
Start: 2025-07-17

## 2025-07-22 ENCOUNTER — MYC MEDICAL ADVICE (OUTPATIENT)
Dept: FAMILY MEDICINE | Facility: CLINIC | Age: 45
End: 2025-07-22
Payer: COMMERCIAL

## 2025-07-22 DIAGNOSIS — F90.2 ATTENTION DEFICIT HYPERACTIVITY DISORDER, COMBINED TYPE, MILD: ICD-10-CM

## 2025-07-22 RX ORDER — DEXTROAMPHETAMINE SACCHARATE, AMPHETAMINE ASPARTATE, DEXTROAMPHETAMINE SULFATE AND AMPHETAMINE SULFATE 5; 5; 5; 5 MG/1; MG/1; MG/1; MG/1
20 TABLET ORAL DAILY
Qty: 17 TABLET | Refills: 0 | Status: SHIPPED | OUTPATIENT
Start: 2025-07-22

## 2025-07-22 RX ORDER — DEXTROAMPHETAMINE SACCHARATE, AMPHETAMINE ASPARTATE, DEXTROAMPHETAMINE SULFATE AND AMPHETAMINE SULFATE 2.5; 2.5; 2.5; 2.5 MG/1; MG/1; MG/1; MG/1
10 TABLET ORAL DAILY
Qty: 17 TABLET | Refills: 0 | Status: SHIPPED | OUTPATIENT
Start: 2025-07-22

## (undated) DEVICE — DEVICE RUMI II KOH-EFFICIENT 3.0CM KC-RUMI-30

## (undated) DEVICE — UTERINE MANIPULATOR RUMI 6.7MMX8CM UMB678

## (undated) DEVICE — SYSTEM LAPAROVUE VISIBILITY LAPVUE10

## (undated) DEVICE — DRAPE POUCH INSTRUMENT 3 POCKET 1018L

## (undated) DEVICE — TRAY PAIN INJECTION 97A 640

## (undated) DEVICE — FILTER LAPROSHIELD SMOKE EVAC LSF1

## (undated) DEVICE — SOL NACL 0.9% IRRIG 3000ML BAG 07972-08

## (undated) DEVICE — ESU HOLSTER PLASTIC DISP E2400

## (undated) DEVICE — SU VICRYL 4-0 FS-2 27" J422-H

## (undated) DEVICE — ENDO TROCAR SLEEVE KII ADV FIXATION 05X100MM CFS02

## (undated) DEVICE — SYR 10ML PERFIX LL 332152

## (undated) DEVICE — SU MONOCRYL 4-0 PS-2 18" UND Y496G

## (undated) DEVICE — SUCTION IRR STRYKERFLOW II W/TIP 250-070-520

## (undated) DEVICE — SOL WATER IRRIG 1000ML BOTTLE 07139-09

## (undated) DEVICE — ENDO POUCH UNIV RETRIEVAL SYSTEM INZII 10MM CD001

## (undated) DEVICE — BASIN SET MINOR DISP

## (undated) DEVICE — STOCKING SLEEVE COMPRESSION CALF LG

## (undated) DEVICE — ESU CORD MONOPOLAR 10'  E0510

## (undated) DEVICE — ESU LIGASURE MARYLAND LAPAROSCOPIC SLR/DVDR 5MMX37CM LF1937

## (undated) DEVICE — ESU ENDO SCISSORS 5MM CVD 5DCS

## (undated) DEVICE — ENDO TROCAR FIRST ENTRY KII FIOS ADV FIX 11X100MM CFF33

## (undated) DEVICE — ENDO TROCAR OPTICAL ACCESS KII Z-THRD 08X100MM C0Q19

## (undated) DEVICE — LUBRICATING JELLY 4.25OZ

## (undated) DEVICE — PACK LAPAROSCOPY/PELVISCOPY STD

## (undated) DEVICE — SOL NACL 0.9% IRRIG 1000ML BOTTLE 07138-09

## (undated) DEVICE — ADH SKIN CLOSURE PREMIERPRO EXOFIN 1.0ML 3470

## (undated) DEVICE — DECANTER VIAL 2006S

## (undated) DEVICE — PREP CHLORHEXIDINE 4% 4OZ (HIBICLENS) 57504

## (undated) DEVICE — PAD PERI INDIV WRAP 11" 2022

## (undated) DEVICE — Device

## (undated) DEVICE — PREP CHLORAPREP W/ORANGE TINT 10.5ML 260715

## (undated) DEVICE — ESU ELEC CLEANCOAT LAP FLAT L-HOOK 36CM E3774-36C

## (undated) DEVICE — TUBING SUCTION 12"X1/4" N612

## (undated) DEVICE — TUBING C02 INSUFFLATION LAP FILTER HEATER 6198

## (undated) DEVICE — ANTIFOG SOLUTION W/FOAM PAD 31142527

## (undated) DEVICE — CLIP APPLIER ENDO ROTATING 10MM MED/LG ER320

## (undated) DEVICE — DRAPE MAYO STAND 23X54 8337

## (undated) DEVICE — PREP CHLORAPREP 26ML TINTED ORANGE  260815

## (undated) DEVICE — SOL NACL 0.9% INJ 1000ML BAG 07983-09

## (undated) DEVICE — PAD FLOOR SURGISAFE

## (undated) DEVICE — GLOVE PROTEXIS POWDER FREE SMT 7.0  2D72PT70X

## (undated) DEVICE — SYR EAR BULB 2OZ

## (undated) DEVICE — SU VICRYL 0 CT-1 36" J946H

## (undated) DEVICE — SUCTION MANIFOLD NEPTUNE 2 SYS 1 PORT 702-025-000

## (undated) DEVICE — GLOVE PROTEXIS BLUE W/NEU-THERA 8.0  2D73EB80

## (undated) DEVICE — PREP SKIN SCRUB TRAY 4461A

## (undated) DEVICE — STOCKING SLEEVE COMPRESSION CALF MED

## (undated) DEVICE — NDL INSUFFLATION 13GA 120MM C2201

## (undated) DEVICE — ENDO TROCAR FIRST ENTRY KII FIOS ADV FIX 05X100MM CFF03

## (undated) DEVICE — GOWN XLG DISP 9545

## (undated) DEVICE — GLOVE PROTEXIS W/NEU-THERA 8.0  2D73TE80

## (undated) DEVICE — GLOVE PROTEXIS W/NEU-THERA 7.5  2D73TE75

## (undated) DEVICE — SYR 05ML LL W/O NDL

## (undated) DEVICE — NDL BUTTERFLY 25GA .75" 367298

## (undated) DEVICE — SYR 50ML LL W/O NDL 309653

## (undated) DEVICE — SU VICRYL 0 UR-6 27" J603H

## (undated) DEVICE — SURGICEL HEMOSTAT 4X8" 1952

## (undated) DEVICE — CATH TRAY FOLEY SURESTEP 16FR W/URINE MTR STATLK LF A303416A

## (undated) DEVICE — TUBING CYSTO/BLADDER IRRIG SET 80" 06544-01

## (undated) DEVICE — SYR 20ML LL W/O NDL

## (undated) DEVICE — GOWN IMPERVIOUS SPECIALTY XLG/XLONG 32474

## (undated) DEVICE — ESU PENCIL W/COATED BLADE E2450H

## (undated) DEVICE — SUCTION IRRIGATION STRYKFLOW II W/TIP DISP 250-070-520

## (undated) DEVICE — SYR 10ML LL W/O NDL

## (undated) DEVICE — PACK LITHO STD

## (undated) RX ORDER — FENTANYL CITRATE 50 UG/ML
INJECTION, SOLUTION INTRAMUSCULAR; INTRAVENOUS
Status: DISPENSED
Start: 2022-04-25

## (undated) RX ORDER — DEXAMETHASONE SODIUM PHOSPHATE 4 MG/ML
INJECTION, SOLUTION INTRA-ARTICULAR; INTRALESIONAL; INTRAMUSCULAR; INTRAVENOUS; SOFT TISSUE
Status: DISPENSED
Start: 2022-04-25

## (undated) RX ORDER — BUPIVACAINE HYDROCHLORIDE 5 MG/ML
INJECTION, SOLUTION PERINEURAL
Status: DISPENSED
Start: 2020-12-21

## (undated) RX ORDER — KETOROLAC TROMETHAMINE 30 MG/ML
INJECTION, SOLUTION INTRAMUSCULAR; INTRAVENOUS
Status: DISPENSED
Start: 2020-12-21

## (undated) RX ORDER — PROPOFOL 10 MG/ML
INJECTION, EMULSION INTRAVENOUS
Status: DISPENSED
Start: 2022-04-25

## (undated) RX ORDER — LIDOCAINE HYDROCHLORIDE AND EPINEPHRINE 10; 10 MG/ML; UG/ML
INJECTION, SOLUTION INFILTRATION; PERINEURAL
Status: DISPENSED
Start: 2020-12-21

## (undated) RX ORDER — ALBUTEROL SULFATE 90 UG/1
AEROSOL, METERED RESPIRATORY (INHALATION)
Status: DISPENSED
Start: 2022-04-25

## (undated) RX ORDER — EPHEDRINE SULFATE 50 MG/ML
INJECTION, SOLUTION INTRAMUSCULAR; INTRAVENOUS; SUBCUTANEOUS
Status: DISPENSED
Start: 2022-04-25

## (undated) RX ORDER — BUPIVACAINE HYDROCHLORIDE 2.5 MG/ML
INJECTION, SOLUTION INFILTRATION; PERINEURAL
Status: DISPENSED
Start: 2022-04-25

## (undated) RX ORDER — OXYCODONE HCL 10 MG/1
TABLET, FILM COATED, EXTENDED RELEASE ORAL
Status: DISPENSED
Start: 2022-04-25

## (undated) RX ORDER — CEFAZOLIN SODIUM 2 G/100ML
INJECTION, SOLUTION INTRAVENOUS
Status: DISPENSED
Start: 2020-12-21

## (undated) RX ORDER — DIAZEPAM 5 MG
TABLET ORAL
Status: DISPENSED
Start: 2023-03-30

## (undated) RX ORDER — TRIAMCINOLONE ACETONIDE 40 MG/ML
INJECTION, SUSPENSION INTRA-ARTICULAR; INTRAMUSCULAR
Status: DISPENSED
Start: 2022-08-11

## (undated) RX ORDER — LIDOCAINE HYDROCHLORIDE 10 MG/ML
INJECTION, SOLUTION EPIDURAL; INFILTRATION; INTRACAUDAL; PERINEURAL
Status: DISPENSED
Start: 2022-08-11

## (undated) RX ORDER — ACETAMINOPHEN 325 MG/1
TABLET ORAL
Status: DISPENSED
Start: 2022-04-25

## (undated) RX ORDER — ACETAMINOPHEN 325 MG/1
TABLET ORAL
Status: DISPENSED
Start: 2020-12-21

## (undated) RX ORDER — ONDANSETRON 2 MG/ML
INJECTION INTRAMUSCULAR; INTRAVENOUS
Status: DISPENSED
Start: 2022-04-25

## (undated) RX ORDER — FENTANYL CITRATE 50 UG/ML
INJECTION, SOLUTION INTRAMUSCULAR; INTRAVENOUS
Status: DISPENSED
Start: 2020-12-21

## (undated) RX ORDER — MAGNESIUM SULFATE HEPTAHYDRATE 40 MG/ML
INJECTION, SOLUTION INTRAVENOUS
Status: DISPENSED
Start: 2022-04-25

## (undated) RX ORDER — LIDOCAINE HYDROCHLORIDE 10 MG/ML
INJECTION, SOLUTION EPIDURAL; INFILTRATION; INTRACAUDAL; PERINEURAL
Status: DISPENSED
Start: 2020-12-21

## (undated) RX ORDER — FENTANYL CITRATE-0.9 % NACL/PF 10 MCG/ML
PLASTIC BAG, INJECTION (ML) INTRAVENOUS
Status: DISPENSED
Start: 2022-04-25

## (undated) RX ORDER — LIDOCAINE HYDROCHLORIDE 10 MG/ML
INJECTION, SOLUTION EPIDURAL; INFILTRATION; INTRACAUDAL; PERINEURAL
Status: DISPENSED
Start: 2022-04-25

## (undated) RX ORDER — BUPIVACAINE HYDROCHLORIDE 2.5 MG/ML
INJECTION, SOLUTION EPIDURAL; INFILTRATION; INTRACAUDAL
Status: DISPENSED
Start: 2022-08-11

## (undated) RX ORDER — PHENAZOPYRIDINE HYDROCHLORIDE 200 MG/1
TABLET, FILM COATED ORAL
Status: DISPENSED
Start: 2022-04-25

## (undated) RX ORDER — FENTANYL CITRATE-0.9 % NACL/PF 10 MCG/ML
PLASTIC BAG, INJECTION (ML) INTRAVENOUS
Status: DISPENSED
Start: 2020-12-21

## (undated) RX ORDER — ALBUTEROL SULFATE 0.83 MG/ML
SOLUTION RESPIRATORY (INHALATION)
Status: DISPENSED
Start: 2022-04-25

## (undated) RX ORDER — BUPIVACAINE HYDROCHLORIDE AND EPINEPHRINE 5; 5 MG/ML; UG/ML
INJECTION, SOLUTION EPIDURAL; INTRACAUDAL; PERINEURAL
Status: DISPENSED
Start: 2022-04-25

## (undated) RX ORDER — DEXAMETHASONE SODIUM PHOSPHATE 10 MG/ML
INJECTION, SOLUTION INTRAMUSCULAR; INTRAVENOUS
Status: DISPENSED
Start: 2020-12-21

## (undated) RX ORDER — OXYCODONE HYDROCHLORIDE 5 MG/1
TABLET ORAL
Status: DISPENSED
Start: 2022-04-25

## (undated) RX ORDER — EPHEDRINE SULFATE 50 MG/ML
INJECTION, SOLUTION INTRAMUSCULAR; INTRAVENOUS; SUBCUTANEOUS
Status: DISPENSED
Start: 2020-12-21

## (undated) RX ORDER — OXYCODONE HYDROCHLORIDE 5 MG/1
TABLET ORAL
Status: DISPENSED
Start: 2020-12-21

## (undated) RX ORDER — HYDROXYZINE HYDROCHLORIDE 50 MG/1
TABLET, FILM COATED ORAL
Status: DISPENSED
Start: 2022-04-25